# Patient Record
Sex: MALE | Race: WHITE | NOT HISPANIC OR LATINO
[De-identification: names, ages, dates, MRNs, and addresses within clinical notes are randomized per-mention and may not be internally consistent; named-entity substitution may affect disease eponyms.]

---

## 2017-01-03 ENCOUNTER — RX RENEWAL (OUTPATIENT)
Age: 62
End: 2017-01-03

## 2017-01-03 ENCOUNTER — APPOINTMENT (OUTPATIENT)
Dept: FAMILY MEDICINE | Facility: CLINIC | Age: 62
End: 2017-01-03

## 2017-01-31 ENCOUNTER — RX RENEWAL (OUTPATIENT)
Age: 62
End: 2017-01-31

## 2017-02-03 ENCOUNTER — RX RENEWAL (OUTPATIENT)
Age: 62
End: 2017-02-03

## 2017-02-07 ENCOUNTER — APPOINTMENT (OUTPATIENT)
Dept: FAMILY MEDICINE | Facility: CLINIC | Age: 62
End: 2017-02-07

## 2017-02-07 VITALS
DIASTOLIC BLOOD PRESSURE: 74 MMHG | SYSTOLIC BLOOD PRESSURE: 126 MMHG | WEIGHT: 315 LBS | OXYGEN SATURATION: 95 % | BODY MASS INDEX: 47.74 KG/M2 | RESPIRATION RATE: 13 BRPM | HEIGHT: 68 IN | HEART RATE: 91 BPM

## 2017-02-07 LAB
BILIRUB UR QL STRIP: NEGATIVE
CLARITY UR: CLEAR
COLLECTION METHOD: NORMAL
GLUCOSE UR-MCNC: NEGATIVE
HCG UR QL: 0.2 EU/DL
HGB UR QL STRIP.AUTO: NEGATIVE
KETONES UR-MCNC: NEGATIVE
LEUKOCYTE ESTERASE UR QL STRIP: NEGATIVE
NITRITE UR QL STRIP: NEGATIVE
PH UR STRIP: 5.5
PROT UR STRIP-MCNC: NEGATIVE
SP GR UR STRIP: 1.02

## 2017-02-08 LAB
24R-OH-CALCIDIOL SERPL-MCNC: 42.1 PG/ML
ALBUMIN SERPL ELPH-MCNC: 4.1 G/DL
ALP BLD-CCNC: 79 U/L
ALT SERPL-CCNC: 21 U/L
ANION GAP SERPL CALC-SCNC: 19 MMOL/L
AST SERPL-CCNC: 21 U/L
BASOPHILS # BLD AUTO: 0.03 K/UL
BASOPHILS NFR BLD AUTO: 0.4 %
BILIRUB SERPL-MCNC: 0.3 MG/DL
BUN SERPL-MCNC: 21 MG/DL
CALCIUM SERPL-MCNC: 9.9 MG/DL
CHLORIDE SERPL-SCNC: 100 MMOL/L
CHOLEST SERPL-MCNC: 177 MG/DL
CHOLEST/HDLC SERPL: 4.2 RATIO
CO2 SERPL-SCNC: 21 MMOL/L
CREAT SERPL-MCNC: 0.98 MG/DL
EOSINOPHIL # BLD AUTO: 0.22 K/UL
EOSINOPHIL NFR BLD AUTO: 2.6 %
ERYTHROCYTE [SEDIMENTATION RATE] IN BLOOD BY WESTERGREN METHOD: 25 MM/HR
FOLATE SERPL-MCNC: 3.7 NG/ML
GLUCOSE SERPL-MCNC: 139 MG/DL
HBA1C MFR BLD HPLC: 6.7 %
HCT VFR BLD CALC: 47.7 %
HDLC SERPL-MCNC: 42 MG/DL
HGB BLD-MCNC: 15 G/DL
IMM GRANULOCYTES NFR BLD AUTO: 0.2 %
LDLC SERPL CALC-MCNC: 105 MG/DL
LYMPHOCYTES # BLD AUTO: 2.27 K/UL
LYMPHOCYTES NFR BLD AUTO: 27.2 %
MAN DIFF?: NORMAL
MCHC RBC-ENTMCNC: 27.9 PG
MCHC RBC-ENTMCNC: 31.4 GM/DL
MCV RBC AUTO: 88.7 FL
MONOCYTES # BLD AUTO: 0.36 K/UL
MONOCYTES NFR BLD AUTO: 4.3 %
NEUTROPHILS # BLD AUTO: 5.46 K/UL
NEUTROPHILS NFR BLD AUTO: 65.3 %
PLATELET # BLD AUTO: 280 K/UL
POTASSIUM SERPL-SCNC: 4.3 MMOL/L
PROT SERPL-MCNC: 7.7 G/DL
RBC # BLD: 5.38 M/UL
RBC # FLD: 15.1 %
SODIUM SERPL-SCNC: 140 MMOL/L
T3 SERPL-MCNC: 105 NG/DL
T4 SERPL-MCNC: 5.2 UG/DL
TRIGL SERPL-MCNC: 150 MG/DL
TSH SERPL-ACNC: 2.21 UIU/ML
VIT B12 SERPL-MCNC: 762 PG/ML
WBC # FLD AUTO: 8.36 K/UL

## 2017-02-14 ENCOUNTER — RX RENEWAL (OUTPATIENT)
Age: 62
End: 2017-02-14

## 2017-05-03 ENCOUNTER — RX RENEWAL (OUTPATIENT)
Age: 62
End: 2017-05-03

## 2017-05-04 ENCOUNTER — RX RENEWAL (OUTPATIENT)
Age: 62
End: 2017-05-04

## 2017-05-12 ENCOUNTER — APPOINTMENT (OUTPATIENT)
Dept: FAMILY MEDICINE | Facility: CLINIC | Age: 62
End: 2017-05-12

## 2017-05-12 VITALS
BODY MASS INDEX: 47.74 KG/M2 | DIASTOLIC BLOOD PRESSURE: 76 MMHG | HEART RATE: 58 BPM | WEIGHT: 315 LBS | SYSTOLIC BLOOD PRESSURE: 126 MMHG | RESPIRATION RATE: 13 BRPM | OXYGEN SATURATION: 96 % | HEIGHT: 68 IN

## 2017-05-15 LAB
ALBUMIN SERPL ELPH-MCNC: 3.9 G/DL
ALP BLD-CCNC: 76 U/L
ALT SERPL-CCNC: 30 U/L
ANION GAP SERPL CALC-SCNC: 20 MMOL/L
AST SERPL-CCNC: 28 U/L
BASOPHILS # BLD AUTO: 0.05 K/UL
BASOPHILS NFR BLD AUTO: 0.7 %
BILIRUB SERPL-MCNC: 0.2 MG/DL
BUN SERPL-MCNC: 13 MG/DL
CALCIUM SERPL-MCNC: 10 MG/DL
CHLORIDE SERPL-SCNC: 98 MMOL/L
CHOLEST SERPL-MCNC: 167 MG/DL
CHOLEST/HDLC SERPL: 4 RATIO
CO2 SERPL-SCNC: 21 MMOL/L
CREAT SERPL-MCNC: 1.06 MG/DL
EOSINOPHIL # BLD AUTO: 0.22 K/UL
EOSINOPHIL NFR BLD AUTO: 3.2 %
ERYTHROCYTE [SEDIMENTATION RATE] IN BLOOD BY WESTERGREN METHOD: 23 MM/HR
GLUCOSE SERPL-MCNC: 114 MG/DL
HBA1C MFR BLD HPLC: 7 %
HCT VFR BLD CALC: 46.3 %
HDLC SERPL-MCNC: 40 MG/DL
HGB BLD-MCNC: 14.9 G/DL
IMM GRANULOCYTES NFR BLD AUTO: 0.1 %
LDLC SERPL CALC-MCNC: 105 MG/DL
LYMPHOCYTES # BLD AUTO: 2.09 K/UL
LYMPHOCYTES NFR BLD AUTO: 30.1 %
MAN DIFF?: NORMAL
MCHC RBC-ENTMCNC: 28.1 PG
MCHC RBC-ENTMCNC: 32.2 GM/DL
MCV RBC AUTO: 87.4 FL
MONOCYTES # BLD AUTO: 0.31 K/UL
MONOCYTES NFR BLD AUTO: 4.5 %
NEUTROPHILS # BLD AUTO: 4.26 K/UL
NEUTROPHILS NFR BLD AUTO: 61.4 %
PLATELET # BLD AUTO: 259 K/UL
POTASSIUM SERPL-SCNC: 4.6 MMOL/L
PROT SERPL-MCNC: 7.6 G/DL
RBC # BLD: 5.3 M/UL
RBC # FLD: 15 %
SODIUM SERPL-SCNC: 139 MMOL/L
TRIGL SERPL-MCNC: 112 MG/DL
TSH SERPL-ACNC: 3.2 UIU/ML
WBC # FLD AUTO: 6.94 K/UL

## 2017-05-25 ENCOUNTER — LABORATORY RESULT (OUTPATIENT)
Age: 62
End: 2017-05-25

## 2017-05-26 ENCOUNTER — APPOINTMENT (OUTPATIENT)
Dept: FAMILY MEDICINE | Facility: CLINIC | Age: 62
End: 2017-05-26

## 2017-05-26 VITALS
HEIGHT: 68 IN | BODY MASS INDEX: 47.74 KG/M2 | WEIGHT: 315 LBS | OXYGEN SATURATION: 97 % | DIASTOLIC BLOOD PRESSURE: 104 MMHG | HEART RATE: 79 BPM | SYSTOLIC BLOOD PRESSURE: 150 MMHG | TEMPERATURE: 98.3 F

## 2017-05-26 RX ORDER — METRONIDAZOLE 500 MG/1
500 TABLET ORAL
Qty: 15 | Refills: 0 | Status: DISCONTINUED | COMMUNITY
Start: 2017-05-12 | End: 2017-05-26

## 2017-05-26 RX ORDER — LEVOFLOXACIN 500 MG/1
500 TABLET, FILM COATED ORAL DAILY
Qty: 10 | Refills: 0 | Status: DISCONTINUED | COMMUNITY
Start: 2017-05-12 | End: 2017-05-26

## 2017-05-29 LAB
ALBUMIN SERPL ELPH-MCNC: 4.2 G/DL
ALP BLD-CCNC: 65 U/L
ALT SERPL-CCNC: 23 U/L
ANION GAP SERPL CALC-SCNC: 21 MMOL/L
AST SERPL-CCNC: 23 U/L
BASOPHILS # BLD AUTO: 0.03 K/UL
BASOPHILS NFR BLD AUTO: 0.4 %
BILIRUB SERPL-MCNC: 0.3 MG/DL
BUN SERPL-MCNC: 22 MG/DL
CALCIUM SERPL-MCNC: 9.8 MG/DL
CHLORIDE SERPL-SCNC: 100 MMOL/L
CO2 SERPL-SCNC: 19 MMOL/L
CREAT SERPL-MCNC: 1.05 MG/DL
EOSINOPHIL # BLD AUTO: 0.29 K/UL
EOSINOPHIL NFR BLD AUTO: 3.9 %
GLIADIN IGA SER QL: 7.5 UNITS
GLIADIN IGG SER QL: <5 UNITS
GLIADIN PEPTIDE IGA SER-ACNC: NEGATIVE
GLIADIN PEPTIDE IGG SER-ACNC: NEGATIVE
GLUCOSE SERPL-MCNC: 119 MG/DL
HCT VFR BLD CALC: 45.2 %
HGB BLD-MCNC: 14.6 G/DL
IMM GRANULOCYTES NFR BLD AUTO: 0.1 %
LYMPHOCYTES # BLD AUTO: 1.79 K/UL
LYMPHOCYTES NFR BLD AUTO: 24.2 %
MAN DIFF?: NORMAL
MCHC RBC-ENTMCNC: 28.1 PG
MCHC RBC-ENTMCNC: 32.3 GM/DL
MCV RBC AUTO: 86.9 FL
MONOCYTES # BLD AUTO: 0.42 K/UL
MONOCYTES NFR BLD AUTO: 5.7 %
NEUTROPHILS # BLD AUTO: 4.87 K/UL
NEUTROPHILS NFR BLD AUTO: 65.7 %
PLATELET # BLD AUTO: 248 K/UL
POTASSIUM SERPL-SCNC: 4.3 MMOL/L
PROT SERPL-MCNC: 7.5 G/DL
RBC # BLD: 5.2 M/UL
RBC # FLD: 15.4 %
SODIUM SERPL-SCNC: 140 MMOL/L
WBC # FLD AUTO: 7.41 K/UL

## 2017-06-08 LAB
DEPRECATED GLUTEN IGE RAST QL: <0.1 KUA/L
GLUTEN IGG QN: 0

## 2017-07-31 ENCOUNTER — RX RENEWAL (OUTPATIENT)
Age: 62
End: 2017-07-31

## 2017-08-22 ENCOUNTER — APPOINTMENT (OUTPATIENT)
Dept: FAMILY MEDICINE | Facility: CLINIC | Age: 62
End: 2017-08-22
Payer: COMMERCIAL

## 2017-08-22 VITALS
OXYGEN SATURATION: 97 % | SYSTOLIC BLOOD PRESSURE: 124 MMHG | DIASTOLIC BLOOD PRESSURE: 76 MMHG | BODY MASS INDEX: 47.74 KG/M2 | HEART RATE: 76 BPM | RESPIRATION RATE: 13 BRPM | HEIGHT: 68 IN | WEIGHT: 315 LBS

## 2017-08-22 PROCEDURE — 99214 OFFICE O/P EST MOD 30 MIN: CPT | Mod: 25

## 2017-08-22 PROCEDURE — 36415 COLL VENOUS BLD VENIPUNCTURE: CPT

## 2017-08-23 LAB
ALBUMIN SERPL ELPH-MCNC: 4 G/DL
ALP BLD-CCNC: 84 U/L
ALT SERPL-CCNC: 31 U/L
ANION GAP SERPL CALC-SCNC: 15 MMOL/L
APPEARANCE: CLEAR
AST SERPL-CCNC: 28 U/L
BASOPHILS # BLD AUTO: 0.03 K/UL
BASOPHILS NFR BLD AUTO: 0.4 %
BILIRUB SERPL-MCNC: 0.4 MG/DL
BILIRUBIN URINE: NEGATIVE
BLOOD URINE: NEGATIVE
BUN SERPL-MCNC: 19 MG/DL
CALCIUM SERPL-MCNC: 9.7 MG/DL
CHLORIDE SERPL-SCNC: 101 MMOL/L
CHOLEST SERPL-MCNC: 159 MG/DL
CHOLEST/HDLC SERPL: 4.5 RATIO
CO2 SERPL-SCNC: 24 MMOL/L
COLOR: YELLOW
CREAT SERPL-MCNC: 0.94 MG/DL
EOSINOPHIL # BLD AUTO: 0.25 K/UL
EOSINOPHIL NFR BLD AUTO: 3.1 %
ERYTHROCYTE [SEDIMENTATION RATE] IN BLOOD BY WESTERGREN METHOD: 29 MM/HR
GLUCOSE QUALITATIVE U: NORMAL MG/DL
GLUCOSE SERPL-MCNC: 138 MG/DL
HBA1C MFR BLD HPLC: 6.8 %
HCT VFR BLD CALC: 44.1 %
HDLC SERPL-MCNC: 35 MG/DL
HGB BLD-MCNC: 14.1 G/DL
IMM GRANULOCYTES NFR BLD AUTO: 0.1 %
KETONES URINE: NEGATIVE
LDLC SERPL CALC-MCNC: 95 MG/DL
LEUKOCYTE ESTERASE URINE: NEGATIVE
LYMPHOCYTES # BLD AUTO: 2.08 K/UL
LYMPHOCYTES NFR BLD AUTO: 26 %
MAN DIFF?: NORMAL
MCHC RBC-ENTMCNC: 27.9 PG
MCHC RBC-ENTMCNC: 32 GM/DL
MCV RBC AUTO: 87.2 FL
MONOCYTES # BLD AUTO: 0.42 K/UL
MONOCYTES NFR BLD AUTO: 5.2 %
NEUTROPHILS # BLD AUTO: 5.22 K/UL
NEUTROPHILS NFR BLD AUTO: 65.2 %
NITRITE URINE: NEGATIVE
PH URINE: 6.5
PLATELET # BLD AUTO: 240 K/UL
POTASSIUM SERPL-SCNC: 4.3 MMOL/L
PROT SERPL-MCNC: 7.3 G/DL
PROTEIN URINE: NEGATIVE MG/DL
RBC # BLD: 5.06 M/UL
RBC # FLD: 15.9 %
SODIUM SERPL-SCNC: 140 MMOL/L
SPECIFIC GRAVITY URINE: 1.01
TRIGL SERPL-MCNC: 144 MG/DL
TSH SERPL-ACNC: 3.17 UIU/ML
UROBILINOGEN URINE: NORMAL MG/DL
WBC # FLD AUTO: 8.01 K/UL

## 2017-10-18 ENCOUNTER — RX RENEWAL (OUTPATIENT)
Age: 62
End: 2017-10-18

## 2017-11-21 ENCOUNTER — RX RENEWAL (OUTPATIENT)
Age: 62
End: 2017-11-21

## 2017-11-28 ENCOUNTER — APPOINTMENT (OUTPATIENT)
Dept: FAMILY MEDICINE | Facility: CLINIC | Age: 62
End: 2017-11-28
Payer: COMMERCIAL

## 2017-11-28 VITALS
HEIGHT: 68 IN | DIASTOLIC BLOOD PRESSURE: 76 MMHG | RESPIRATION RATE: 13 BRPM | SYSTOLIC BLOOD PRESSURE: 132 MMHG | BODY MASS INDEX: 47.74 KG/M2 | OXYGEN SATURATION: 98 % | WEIGHT: 315 LBS | HEART RATE: 87 BPM

## 2017-11-28 PROCEDURE — 99214 OFFICE O/P EST MOD 30 MIN: CPT | Mod: 25

## 2017-11-28 PROCEDURE — 36415 COLL VENOUS BLD VENIPUNCTURE: CPT

## 2017-11-28 RX ORDER — HYDROCODONE BITARTRATE AND ACETAMINOPHEN 5; 325 MG/1; MG/1
5-325 TABLET ORAL
Qty: 24 | Refills: 0 | Status: DISCONTINUED | COMMUNITY
Start: 2017-11-14 | End: 2017-11-28

## 2017-11-29 LAB
BASOPHILS # BLD AUTO: 0.03 K/UL
BASOPHILS NFR BLD AUTO: 0.5 %
CHOLEST SERPL-MCNC: 156 MG/DL
CHOLEST/HDLC SERPL: 4.3 RATIO
EOSINOPHIL # BLD AUTO: 0.23 K/UL
EOSINOPHIL NFR BLD AUTO: 3.5 %
ERYTHROCYTE [SEDIMENTATION RATE] IN BLOOD BY WESTERGREN METHOD: 24 MM/HR
HBA1C MFR BLD HPLC: 7 %
HCT VFR BLD CALC: 45.8 %
HDLC SERPL-MCNC: 36 MG/DL
HGB BLD-MCNC: 14.5 G/DL
IMM GRANULOCYTES NFR BLD AUTO: 0.3 %
LDLC SERPL CALC-MCNC: 99 MG/DL
LYMPHOCYTES # BLD AUTO: 1.64 K/UL
LYMPHOCYTES NFR BLD AUTO: 25.2 %
MAN DIFF?: NORMAL
MCHC RBC-ENTMCNC: 29 PG
MCHC RBC-ENTMCNC: 31.7 GM/DL
MCV RBC AUTO: 91.6 FL
MONOCYTES # BLD AUTO: 0.33 K/UL
MONOCYTES NFR BLD AUTO: 5.1 %
NEUTROPHILS # BLD AUTO: 4.27 K/UL
NEUTROPHILS NFR BLD AUTO: 65.4 %
PLATELET # BLD AUTO: 222 K/UL
RBC # BLD: 5 M/UL
RBC # FLD: 15.4 %
TRIGL SERPL-MCNC: 106 MG/DL
TSH SERPL-ACNC: 2.92 UIU/ML
WBC # FLD AUTO: 6.52 K/UL

## 2017-12-14 LAB
ALBUMIN SERPL ELPH-MCNC: 3.8 G/DL
ALP BLD-CCNC: 75 U/L
ALT SERPL-CCNC: 33 U/L
ANION GAP SERPL CALC-SCNC: 14 MMOL/L
AST SERPL-CCNC: 30 U/L
BILIRUB SERPL-MCNC: 0.4 MG/DL
BUN SERPL-MCNC: 15 MG/DL
CALCIUM SERPL-MCNC: 9.4 MG/DL
CHLORIDE SERPL-SCNC: 98 MMOL/L
CO2 SERPL-SCNC: 24 MMOL/L
CREAT SERPL-MCNC: 1.02 MG/DL
GLUCOSE SERPL-MCNC: 152 MG/DL
POTASSIUM SERPL-SCNC: 4.4 MMOL/L
PROT SERPL-MCNC: 7.1 G/DL
SODIUM SERPL-SCNC: 136 MMOL/L

## 2018-01-21 ENCOUNTER — RX RENEWAL (OUTPATIENT)
Age: 63
End: 2018-01-21

## 2018-03-27 ENCOUNTER — LABORATORY RESULT (OUTPATIENT)
Age: 63
End: 2018-03-27

## 2018-03-27 ENCOUNTER — APPOINTMENT (OUTPATIENT)
Dept: FAMILY MEDICINE | Facility: CLINIC | Age: 63
End: 2018-03-27
Payer: COMMERCIAL

## 2018-03-27 VITALS
RESPIRATION RATE: 14 BRPM | SYSTOLIC BLOOD PRESSURE: 144 MMHG | HEART RATE: 69 BPM | DIASTOLIC BLOOD PRESSURE: 88 MMHG | OXYGEN SATURATION: 95 %

## 2018-03-27 PROCEDURE — 36415 COLL VENOUS BLD VENIPUNCTURE: CPT

## 2018-03-27 PROCEDURE — 99214 OFFICE O/P EST MOD 30 MIN: CPT | Mod: 25

## 2018-03-28 LAB
ALBUMIN SERPL ELPH-MCNC: 4.1 G/DL
ALP BLD-CCNC: 78 U/L
ALT SERPL-CCNC: 32 U/L
ANION GAP SERPL CALC-SCNC: 17 MMOL/L
APPEARANCE: CLEAR
AST SERPL-CCNC: 30 U/L
BASOPHILS # BLD AUTO: 0.04 K/UL
BASOPHILS NFR BLD AUTO: 0.5 %
BILIRUB SERPL-MCNC: 0.3 MG/DL
BILIRUBIN URINE: NEGATIVE
BLOOD URINE: NEGATIVE
BUN SERPL-MCNC: 14 MG/DL
CALCIUM SERPL-MCNC: 10 MG/DL
CHLORIDE SERPL-SCNC: 100 MMOL/L
CHOLEST SERPL-MCNC: 175 MG/DL
CHOLEST/HDLC SERPL: 4.3 RATIO
CO2 SERPL-SCNC: 23 MMOL/L
COLOR: ABNORMAL
CREAT SERPL-MCNC: 0.91 MG/DL
EOSINOPHIL # BLD AUTO: 0.29 K/UL
EOSINOPHIL NFR BLD AUTO: 3.6 %
ERYTHROCYTE [SEDIMENTATION RATE] IN BLOOD BY WESTERGREN METHOD: 34 MM/HR
GLUCOSE QUALITATIVE U: NEGATIVE MG/DL
GLUCOSE SERPL-MCNC: 132 MG/DL
HBA1C MFR BLD HPLC: 7.3 %
HCT VFR BLD CALC: 47.8 %
HDLC SERPL-MCNC: 41 MG/DL
HGB BLD-MCNC: 15.9 G/DL
IMM GRANULOCYTES NFR BLD AUTO: 0.1 %
KETONES URINE: NEGATIVE
LDLC SERPL CALC-MCNC: 112 MG/DL
LEUKOCYTE ESTERASE URINE: NEGATIVE
LYMPHOCYTES # BLD AUTO: 2.42 K/UL
LYMPHOCYTES NFR BLD AUTO: 29.9 %
MAN DIFF?: NORMAL
MCHC RBC-ENTMCNC: 29.2 PG
MCHC RBC-ENTMCNC: 33.3 GM/DL
MCV RBC AUTO: 87.9 FL
MONOCYTES # BLD AUTO: 0.42 K/UL
MONOCYTES NFR BLD AUTO: 5.2 %
NEUTROPHILS # BLD AUTO: 4.91 K/UL
NEUTROPHILS NFR BLD AUTO: 60.7 %
NITRITE URINE: NEGATIVE
PH URINE: 7
PLATELET # BLD AUTO: 214 K/UL
POTASSIUM SERPL-SCNC: 4.3 MMOL/L
PROT SERPL-MCNC: 7.7 G/DL
PROTEIN URINE: NEGATIVE MG/DL
RBC # BLD: 5.44 M/UL
RBC # FLD: 16.2 %
SODIUM SERPL-SCNC: 140 MMOL/L
SPECIFIC GRAVITY URINE: 1.03
TRIGL SERPL-MCNC: 111 MG/DL
TSH SERPL-ACNC: 2.41 UIU/ML
UROBILINOGEN URINE: NEGATIVE MG/DL
WBC # FLD AUTO: 8.09 K/UL

## 2018-04-27 ENCOUNTER — RX RENEWAL (OUTPATIENT)
Age: 63
End: 2018-04-27

## 2018-06-26 ENCOUNTER — APPOINTMENT (OUTPATIENT)
Dept: FAMILY MEDICINE | Facility: CLINIC | Age: 63
End: 2018-06-26
Payer: COMMERCIAL

## 2018-06-26 VITALS
DIASTOLIC BLOOD PRESSURE: 80 MMHG | SYSTOLIC BLOOD PRESSURE: 146 MMHG | BODY MASS INDEX: 47.74 KG/M2 | HEART RATE: 66 BPM | HEIGHT: 68 IN | OXYGEN SATURATION: 97 % | RESPIRATION RATE: 13 BRPM | WEIGHT: 315 LBS | TEMPERATURE: 98.5 F

## 2018-06-26 PROCEDURE — 99214 OFFICE O/P EST MOD 30 MIN: CPT | Mod: 25

## 2018-06-26 PROCEDURE — 36415 COLL VENOUS BLD VENIPUNCTURE: CPT

## 2018-06-26 NOTE — HISTORY OF PRESENT ILLNESS
[FreeTextEntry1] : patient is here for 3 month follow up. for HTN HLD DM  diabetic journal reviewed\par Needs meds has ortho and pulmonary pending has dermatis of hands needs derm eval

## 2018-06-26 NOTE — COUNSELING
[Healthy eating counseling provided] : healthy eating [Activity counseling provided] : activity [Keep Food Diary] : Keep food diary [Low Salt Diet] : Low salt diet [Walking] : Walking [None] : None [Good understanding] : Patient has a good understanding of lifestyle changes and the steps needed to achieve self management goals [Engage in a relaxing activity] : Engage in a relaxing activity [Plan in advance] : Plan in advance

## 2018-06-26 NOTE — REVIEW OF SYSTEMS
[Fatigue] : fatigue [Hearing Loss] : hearing loss [Postnasal Drip] : postnasal drip [Lower Ext Edema] : lower extremity edema [Nocturia] : nocturia [Joint Pain] : joint pain [Joint Stiffness] : joint stiffness [Muscle Weakness] : muscle weakness [Back Pain] : back pain [Joint Swelling] : joint swelling [Itching] : itching [Negative] : Heme/Lymph [Fever] : no fever [Chills] : no chills [Night Sweats] : no night sweats [Recent Change In Weight] : ~T no recent weight change [Discharge] : no discharge [Pain] : no pain [Redness] : no redness [Vision Problems] : no vision problems [Itching] : no itching [Earache] : no earache [Nosebleeds] : no nosebleeds [Nasal Discharge] : no nasal discharge [Sore Throat] : no sore throat [Hoarseness] : no hoarseness [Chest Pain] : no chest pain [Palpitations] : no palpitations [Claudication] : no  leg claudication [Orthopena] : no orthopnea [Paroysmal Nocturnal Dyspnea] : no paroysmal nocturnal dyspnea [Abdominal Pain] : no abdominal pain [Nausea] : no nausea [Vomiting] : no vomiting [Heartburn] : no heartburn [Melena] : no melena [Dysuria] : no dysuria [Incontinence] : no incontinence [Hesitancy] : no hesitancy [Hematuria] : no hematuria [Frequency] : no frequency [Impotence] : no impotency [Poor Libido] : libido not poor [Muscle Pain] : no muscle pain [Headache] : no headache [Dizziness] : no dizziness [Fainting] : no fainting [Confusion] : no confusion [Unsteady Walk] : no ataxia [Memory Loss] : no memory loss

## 2018-06-26 NOTE — PLAN
[FreeTextEntry1] : patient is here for 3 month follow up. for HTN HLD DM  diabetic journal reviewed\par Needs meds has ortho and pulmonary pending has dermatis of hands needs derm eval \par Meds and Labs done weight loss advised derm referral given \par Meds rx  has specialist follow up

## 2018-06-27 LAB
ALBUMIN SERPL ELPH-MCNC: 4.1 G/DL
ALP BLD-CCNC: 80 U/L
ALT SERPL-CCNC: 31 U/L
ANION GAP SERPL CALC-SCNC: 16 MMOL/L
AST SERPL-CCNC: 31 U/L
BASOPHILS # BLD AUTO: 0.05 K/UL
BASOPHILS NFR BLD AUTO: 0.7 %
BILIRUB SERPL-MCNC: 0.4 MG/DL
BUN SERPL-MCNC: 15 MG/DL
CALCIUM SERPL-MCNC: 9.9 MG/DL
CHLORIDE SERPL-SCNC: 105 MMOL/L
CHOLEST SERPL-MCNC: 164 MG/DL
CHOLEST/HDLC SERPL: 3.6 RATIO
CO2 SERPL-SCNC: 27 MMOL/L
CREAT SERPL-MCNC: 1.1 MG/DL
EOSINOPHIL # BLD AUTO: 0.27 K/UL
EOSINOPHIL NFR BLD AUTO: 3.6 %
ERYTHROCYTE [SEDIMENTATION RATE] IN BLOOD BY WESTERGREN METHOD: 24 MM/HR
GLUCOSE SERPL-MCNC: 138 MG/DL
HCT VFR BLD CALC: 50.3 %
HDLC SERPL-MCNC: 45 MG/DL
HGB BLD-MCNC: 15.5 G/DL
IMM GRANULOCYTES NFR BLD AUTO: 0.3 %
LDLC SERPL CALC-MCNC: 87 MG/DL
LYMPHOCYTES # BLD AUTO: 2.42 K/UL
LYMPHOCYTES NFR BLD AUTO: 31.9 %
MAN DIFF?: NORMAL
MCHC RBC-ENTMCNC: 27.9 PG
MCHC RBC-ENTMCNC: 30.8 GM/DL
MCV RBC AUTO: 90.6 FL
MONOCYTES # BLD AUTO: 0.46 K/UL
MONOCYTES NFR BLD AUTO: 6.1 %
NEUTROPHILS # BLD AUTO: 4.37 K/UL
NEUTROPHILS NFR BLD AUTO: 57.4 %
PLATELET # BLD AUTO: 238 K/UL
POTASSIUM SERPL-SCNC: 4.6 MMOL/L
PROT SERPL-MCNC: 7.8 G/DL
RBC # BLD: 5.55 M/UL
RBC # FLD: 15.6 %
SODIUM SERPL-SCNC: 148 MMOL/L
TRIGL SERPL-MCNC: 158 MG/DL
TSH SERPL-ACNC: 2.33 UIU/ML
WBC # FLD AUTO: 7.59 K/UL

## 2018-06-28 LAB
HBA1C MFR BLD HPLC: 7.1 %
HCV AB SER QL: NONREACTIVE
HCV S/CO RATIO: 0.24 S/CO

## 2018-07-02 ENCOUNTER — RX RENEWAL (OUTPATIENT)
Age: 63
End: 2018-07-02

## 2018-07-19 ENCOUNTER — RX RENEWAL (OUTPATIENT)
Age: 63
End: 2018-07-19

## 2018-09-10 ENCOUNTER — LABORATORY RESULT (OUTPATIENT)
Age: 63
End: 2018-09-10

## 2018-09-11 ENCOUNTER — APPOINTMENT (OUTPATIENT)
Dept: FAMILY MEDICINE | Facility: CLINIC | Age: 63
End: 2018-09-11
Payer: COMMERCIAL

## 2018-09-11 VITALS
HEIGHT: 68 IN | TEMPERATURE: 98.8 F | BODY MASS INDEX: 47.74 KG/M2 | SYSTOLIC BLOOD PRESSURE: 156 MMHG | RESPIRATION RATE: 13 BRPM | DIASTOLIC BLOOD PRESSURE: 78 MMHG | WEIGHT: 315 LBS | OXYGEN SATURATION: 98 % | HEART RATE: 77 BPM

## 2018-09-11 PROCEDURE — 36415 COLL VENOUS BLD VENIPUNCTURE: CPT

## 2018-09-11 PROCEDURE — 99214 OFFICE O/P EST MOD 30 MIN: CPT | Mod: 25

## 2018-09-11 NOTE — PLAN
[FreeTextEntry1] : patient is here for 3 month follow up. for HTN HLD DM  diabetic journal reviewed\par Needs Meds has follow up for cataract in left eye has follow up, patient also declines flu shot. Has seen ortho Dr Johnson and has been cleared on knee  Labs and Meds reviewed Diet exercise and Weight loss\par Labs and meds done care plan reviewed No Flu Shot   RTC 3 months

## 2018-09-11 NOTE — HISTORY OF PRESENT ILLNESS
[FreeTextEntry1] : patient is here for 3 month follow up. for HTN HLD DM  diabetic journal reviewed\par Needs meds has follow up for cataract in left eye has follow up, patient also declines flu shot. Has seen ortho Dr Johnson and has been cleared on knee

## 2018-09-11 NOTE — COUNSELING
[Healthy eating counseling provided] : healthy eating [Activity counseling provided] : activity [Keep Food Diary] : Keep food diary [Low Salt Diet] : Low salt diet [Walking] : Walking [Engage in a relaxing activity] : Engage in a relaxing activity [Plan in advance] : Plan in advance [None] : None [Good understanding] : Patient has a good understanding of lifestyle changes and the steps needed to achieve self management goals

## 2018-09-12 LAB
ALBUMIN SERPL ELPH-MCNC: 4.2 G/DL
ALP BLD-CCNC: 85 U/L
ALT SERPL-CCNC: 30 U/L
ANION GAP SERPL CALC-SCNC: 15 MMOL/L
APPEARANCE: CLEAR
AST SERPL-CCNC: 28 U/L
BILIRUB SERPL-MCNC: 0.3 MG/DL
BILIRUBIN URINE: NEGATIVE
BLOOD URINE: NEGATIVE
BUN SERPL-MCNC: 18 MG/DL
CALCIUM SERPL-MCNC: 9.4 MG/DL
CHLORIDE SERPL-SCNC: 103 MMOL/L
CHOLEST SERPL-MCNC: 151 MG/DL
CHOLEST/HDLC SERPL: 4.2 RATIO
CO2 SERPL-SCNC: 23 MMOL/L
COLOR: YELLOW
CREAT SERPL-MCNC: 0.89 MG/DL
CREAT SPEC-SCNC: 129 MG/DL
ERYTHROCYTE [SEDIMENTATION RATE] IN BLOOD BY WESTERGREN METHOD: 25 MM/HR
GLUCOSE QUALITATIVE U: NEGATIVE MG/DL
GLUCOSE SERPL-MCNC: 113 MG/DL
HBA1C MFR BLD HPLC: 6.8 %
HDLC SERPL-MCNC: 36 MG/DL
KETONES URINE: NEGATIVE
LDLC SERPL CALC-MCNC: 95 MG/DL
LEUKOCYTE ESTERASE URINE: NEGATIVE
MICROALBUMIN 24H UR DL<=1MG/L-MCNC: 3.2 MG/DL
MICROALBUMIN/CREAT 24H UR-RTO: 25 MG/G
NITRITE URINE: NEGATIVE
PH URINE: 6.5
POTASSIUM SERPL-SCNC: 4.1 MMOL/L
PROT SERPL-MCNC: 7.4 G/DL
PROTEIN URINE: ABNORMAL MG/DL
SODIUM SERPL-SCNC: 141 MMOL/L
SPECIFIC GRAVITY URINE: 1.02
TRIGL SERPL-MCNC: 100 MG/DL
TSH SERPL-ACNC: 2.02 UIU/ML
UROBILINOGEN URINE: 1 MG/DL

## 2018-09-18 LAB
BASOPHILS # BLD AUTO: 0.03 K/UL
BASOPHILS NFR BLD AUTO: 0.4 %
EOSINOPHIL # BLD AUTO: 0.25 K/UL
EOSINOPHIL NFR BLD AUTO: 3.7 %
HCT VFR BLD CALC: 47.7 %
HGB BLD-MCNC: 14.7 G/DL
IMM GRANULOCYTES NFR BLD AUTO: 0.1 %
LYMPHOCYTES # BLD AUTO: 2.08 K/UL
LYMPHOCYTES NFR BLD AUTO: 30.8 %
MAN DIFF?: NORMAL
MCHC RBC-ENTMCNC: 27.9 PG
MCHC RBC-ENTMCNC: 30.8 GM/DL
MCV RBC AUTO: 90.5 FL
MONOCYTES # BLD AUTO: 0.32 K/UL
MONOCYTES NFR BLD AUTO: 4.7 %
NEUTROPHILS # BLD AUTO: 4.07 K/UL
NEUTROPHILS NFR BLD AUTO: 60.3 %
PLATELET # BLD AUTO: 218 K/UL
RBC # BLD: 5.27 M/UL
RBC # FLD: 16.1 %
WBC # FLD AUTO: 6.76 K/UL

## 2018-10-09 ENCOUNTER — APPOINTMENT (OUTPATIENT)
Dept: DERMATOLOGY | Facility: CLINIC | Age: 63
End: 2018-10-09
Payer: COMMERCIAL

## 2018-10-09 PROCEDURE — 99202 OFFICE O/P NEW SF 15 MIN: CPT

## 2018-10-15 ENCOUNTER — RX RENEWAL (OUTPATIENT)
Age: 63
End: 2018-10-15

## 2018-12-09 ENCOUNTER — LABORATORY RESULT (OUTPATIENT)
Age: 63
End: 2018-12-09

## 2018-12-10 ENCOUNTER — RX RENEWAL (OUTPATIENT)
Age: 63
End: 2018-12-10

## 2018-12-11 ENCOUNTER — APPOINTMENT (OUTPATIENT)
Dept: FAMILY MEDICINE | Facility: CLINIC | Age: 63
End: 2018-12-11
Payer: COMMERCIAL

## 2018-12-11 VITALS
HEART RATE: 55 BPM | TEMPERATURE: 98.2 F | DIASTOLIC BLOOD PRESSURE: 88 MMHG | WEIGHT: 315 LBS | BODY MASS INDEX: 47.74 KG/M2 | HEIGHT: 68 IN | RESPIRATION RATE: 12 BRPM | SYSTOLIC BLOOD PRESSURE: 160 MMHG | OXYGEN SATURATION: 92 %

## 2018-12-11 PROCEDURE — 36415 COLL VENOUS BLD VENIPUNCTURE: CPT

## 2018-12-11 PROCEDURE — 99214 OFFICE O/P EST MOD 30 MIN: CPT | Mod: 25

## 2018-12-12 LAB
ALBUMIN SERPL ELPH-MCNC: 4.1 G/DL
ALP BLD-CCNC: 82 U/L
ALT SERPL-CCNC: 34 U/L
ANION GAP SERPL CALC-SCNC: 14 MMOL/L
APPEARANCE: CLEAR
AST SERPL-CCNC: 32 U/L
BASOPHILS # BLD AUTO: 0.04 K/UL
BASOPHILS NFR BLD AUTO: 0.5 %
BILIRUB SERPL-MCNC: 0.4 MG/DL
BILIRUBIN URINE: NEGATIVE
BLOOD URINE: NEGATIVE
BUN SERPL-MCNC: 21 MG/DL
CALCIUM SERPL-MCNC: 9.2 MG/DL
CHLORIDE SERPL-SCNC: 101 MMOL/L
CHOLEST SERPL-MCNC: 171 MG/DL
CHOLEST/HDLC SERPL: 4.8 RATIO
CO2 SERPL-SCNC: 25 MMOL/L
COLOR: YELLOW
CREAT SERPL-MCNC: 0.9 MG/DL
EOSINOPHIL # BLD AUTO: 0.18 K/UL
EOSINOPHIL NFR BLD AUTO: 2.1 %
ERYTHROCYTE [SEDIMENTATION RATE] IN BLOOD BY WESTERGREN METHOD: 27 MM/HR
GLUCOSE QUALITATIVE U: NEGATIVE MG/DL
GLUCOSE SERPL-MCNC: 108 MG/DL
HBA1C MFR BLD HPLC: 6.9 %
HCT VFR BLD CALC: 47.2 %
HDLC SERPL-MCNC: 36 MG/DL
HGB BLD-MCNC: 15 G/DL
IMM GRANULOCYTES NFR BLD AUTO: 0.4 %
KETONES URINE: NEGATIVE
LDLC SERPL CALC-MCNC: 109 MG/DL
LEUKOCYTE ESTERASE URINE: NEGATIVE
LYMPHOCYTES # BLD AUTO: 2.53 K/UL
LYMPHOCYTES NFR BLD AUTO: 29.7 %
MAN DIFF?: NORMAL
MCHC RBC-ENTMCNC: 28.6 PG
MCHC RBC-ENTMCNC: 31.8 GM/DL
MCV RBC AUTO: 89.9 FL
MONOCYTES # BLD AUTO: 0.44 K/UL
MONOCYTES NFR BLD AUTO: 5.2 %
NEUTROPHILS # BLD AUTO: 5.29 K/UL
NEUTROPHILS NFR BLD AUTO: 62.1 %
NITRITE URINE: NEGATIVE
PH URINE: 6
PLATELET # BLD AUTO: 246 K/UL
POTASSIUM SERPL-SCNC: 4.4 MMOL/L
PROT SERPL-MCNC: 7.6 G/DL
PROTEIN URINE: ABNORMAL MG/DL
RBC # BLD: 5.25 M/UL
RBC # FLD: 15.5 %
SODIUM SERPL-SCNC: 140 MMOL/L
SPECIFIC GRAVITY URINE: 1.02
TRIGL SERPL-MCNC: 131 MG/DL
TSH SERPL-ACNC: 3.4 UIU/ML
UROBILINOGEN URINE: NEGATIVE MG/DL
WBC # FLD AUTO: 8.51 K/UL

## 2018-12-12 NOTE — COUNSELING
[Healthy eating counseling provided] : healthy eating [Activity counseling provided] : activity [Behavioral health counseling provided] : behavioral health  [Low Salt Diet] : Low salt diet [___ min/wk activity recommended] : [unfilled] min/wk activity recommended [Walking] : Walking [Engage in a relaxing activity] : Engage in a relaxing activity [Plan in advance] : Plan in advance [None] : None

## 2018-12-12 NOTE — PLAN
[FreeTextEntry1] : Patient in for HTN  DM HLD and Increased BMI has JASON with daily use of CPAP \par Labs and Meds reviewed Patient declines all immunizations JASON reviewed\par Care plan reviewed and weight loss discussed

## 2018-12-12 NOTE — REVIEW OF SYSTEMS
[Fever] : no fever [Chills] : no chills [Fatigue] : fatigue [Night Sweats] : no night sweats [Recent Change In Weight] : ~T no recent weight change [Discharge] : no discharge [Pain] : no pain [Redness] : no redness [Vision Problems] : no vision problems [Itching] : no itching [Earache] : no earache [Hearing Loss] : hearing loss [Nosebleeds] : no nosebleeds [Postnasal Drip] : postnasal drip [Nasal Discharge] : no nasal discharge [Sore Throat] : no sore throat [Hoarseness] : no hoarseness [Chest Pain] : no chest pain [Palpitations] : no palpitations [Claudication] : no  leg claudication [Lower Ext Edema] : lower extremity edema [Orthopena] : no orthopnea [Paroysmal Nocturnal Dyspnea] : no paroysmal nocturnal dyspnea [Abdominal Pain] : no abdominal pain [Nausea] : no nausea [Vomiting] : no vomiting [Heartburn] : no heartburn [Melena] : no melena [Dysuria] : no dysuria [Incontinence] : no incontinence [Hesitancy] : no hesitancy [Nocturia] : nocturia [Hematuria] : no hematuria [Frequency] : no frequency [Impotence] : no impotency [Poor Libido] : libido not poor [Joint Pain] : joint pain [Joint Stiffness] : joint stiffness [Muscle Pain] : no muscle pain [Muscle Weakness] : muscle weakness [Back Pain] : back pain [Joint Swelling] : joint swelling [Itching] : itching [Headache] : no headache [Dizziness] : no dizziness [Fainting] : no fainting [Confusion] : no confusion [Unsteady Walk] : no ataxia [Memory Loss] : no memory loss [Negative] : Heme/Lymph

## 2018-12-12 NOTE — HEALTH RISK ASSESSMENT
[] : Yes [No falls in past year] : Patient reported no falls in the past year [0] : 2) Feeling down, depressed, or hopeless: Not at all (0) [[id number]] : 0

## 2018-12-18 ENCOUNTER — INPATIENT (INPATIENT)
Facility: HOSPITAL | Age: 63
LOS: 1 days | Discharge: HOME | DRG: 309 | End: 2018-12-20
Attending: HOSPITALIST | Admitting: INTERNAL MEDICINE
Payer: COMMERCIAL

## 2018-12-18 VITALS
RESPIRATION RATE: 20 BRPM | OXYGEN SATURATION: 98 % | HEIGHT: 70 IN | DIASTOLIC BLOOD PRESSURE: 100 MMHG | TEMPERATURE: 98 F | SYSTOLIC BLOOD PRESSURE: 214 MMHG | HEART RATE: 78 BPM | WEIGHT: 279.99 LBS

## 2018-12-18 DIAGNOSIS — R42 DIZZINESS AND GIDDINESS: ICD-10-CM

## 2018-12-18 DIAGNOSIS — Z96.651 PRESENCE OF RIGHT ARTIFICIAL KNEE JOINT: Chronic | ICD-10-CM

## 2018-12-18 LAB
ALBUMIN SERPL ELPH-MCNC: 4.2 G/DL — SIGNIFICANT CHANGE UP (ref 3.3–5.2)
ALP SERPL-CCNC: 82 U/L — SIGNIFICANT CHANGE UP (ref 40–120)
ALT FLD-CCNC: 57 U/L — HIGH
ANION GAP SERPL CALC-SCNC: 13 MMOL/L — SIGNIFICANT CHANGE UP (ref 5–17)
APAP SERPL-MCNC: <7.5 UG/ML — LOW (ref 10–26)
APTT BLD: 30.3 SEC — SIGNIFICANT CHANGE UP (ref 27.5–36.3)
AST SERPL-CCNC: 44 U/L — HIGH
BASOPHILS # BLD AUTO: 0 K/UL — SIGNIFICANT CHANGE UP (ref 0–0.2)
BASOPHILS NFR BLD AUTO: 0.2 % — SIGNIFICANT CHANGE UP (ref 0–2)
BILIRUB SERPL-MCNC: 0.7 MG/DL — SIGNIFICANT CHANGE UP (ref 0.4–2)
BUN SERPL-MCNC: 17 MG/DL — SIGNIFICANT CHANGE UP (ref 8–20)
CALCIUM SERPL-MCNC: 9.4 MG/DL — SIGNIFICANT CHANGE UP (ref 8.6–10.2)
CHLORIDE SERPL-SCNC: 101 MMOL/L — SIGNIFICANT CHANGE UP (ref 98–107)
CO2 SERPL-SCNC: 27 MMOL/L — SIGNIFICANT CHANGE UP (ref 22–29)
CREAT SERPL-MCNC: 0.83 MG/DL — SIGNIFICANT CHANGE UP (ref 0.5–1.3)
EOSINOPHIL # BLD AUTO: 0.1 K/UL — SIGNIFICANT CHANGE UP (ref 0–0.5)
EOSINOPHIL NFR BLD AUTO: 1.4 % — SIGNIFICANT CHANGE UP (ref 0–5)
GAS PNL BLDV: SIGNIFICANT CHANGE UP
GLUCOSE SERPL-MCNC: 119 MG/DL — HIGH (ref 70–115)
HCO3 BLDV-SCNC: 27 MMOL/L — HIGH (ref 20–26)
HCT VFR BLD CALC: 48.2 % — SIGNIFICANT CHANGE UP (ref 42–52)
HGB BLD-MCNC: 15.9 G/DL — SIGNIFICANT CHANGE UP (ref 14–18)
INR BLD: 1.09 RATIO — SIGNIFICANT CHANGE UP (ref 0.88–1.16)
LYMPHOCYTES # BLD AUTO: 1.8 K/UL — SIGNIFICANT CHANGE UP (ref 1–4.8)
LYMPHOCYTES # BLD AUTO: 20.9 % — SIGNIFICANT CHANGE UP (ref 20–55)
MCHC RBC-ENTMCNC: 29 PG — SIGNIFICANT CHANGE UP (ref 27–31)
MCHC RBC-ENTMCNC: 33 G/DL — SIGNIFICANT CHANGE UP (ref 32–36)
MCV RBC AUTO: 88 FL — SIGNIFICANT CHANGE UP (ref 80–94)
MONOCYTES # BLD AUTO: 0.5 K/UL — SIGNIFICANT CHANGE UP (ref 0–0.8)
MONOCYTES NFR BLD AUTO: 5.5 % — SIGNIFICANT CHANGE UP (ref 3–10)
NEUTROPHILS # BLD AUTO: 6.3 K/UL — SIGNIFICANT CHANGE UP (ref 1.8–8)
NEUTROPHILS NFR BLD AUTO: 71.8 % — SIGNIFICANT CHANGE UP (ref 37–73)
PCO2 BLDV: 41 MMHG — SIGNIFICANT CHANGE UP (ref 35–50)
PH BLDV: 7.44 — HIGH (ref 7.32–7.43)
PLATELET # BLD AUTO: 227 K/UL — SIGNIFICANT CHANGE UP (ref 150–400)
PO2 BLDV: 121 MMHG — HIGH (ref 25–45)
POTASSIUM SERPL-MCNC: 3.7 MMOL/L — SIGNIFICANT CHANGE UP (ref 3.5–5.3)
POTASSIUM SERPL-SCNC: 3.7 MMOL/L — SIGNIFICANT CHANGE UP (ref 3.5–5.3)
PROT SERPL-MCNC: 8 G/DL — SIGNIFICANT CHANGE UP (ref 6.6–8.7)
PROTHROM AB SERPL-ACNC: 12.6 SEC — SIGNIFICANT CHANGE UP (ref 10–12.9)
RBC # BLD: 5.48 M/UL — SIGNIFICANT CHANGE UP (ref 4.6–6.2)
RBC # FLD: 14.9 % — SIGNIFICANT CHANGE UP (ref 11–15.6)
SALICYLATES SERPL-MCNC: 2.3 MG/DL — LOW (ref 10–20)
SAO2 % BLDV: 99 % — SIGNIFICANT CHANGE UP
SODIUM SERPL-SCNC: 141 MMOL/L — SIGNIFICANT CHANGE UP (ref 135–145)
TROPONIN T SERPL-MCNC: <0.01 NG/ML — SIGNIFICANT CHANGE UP (ref 0–0.06)
TROPONIN T SERPL-MCNC: <0.01 NG/ML — SIGNIFICANT CHANGE UP (ref 0–0.06)
WBC # BLD: 8.8 K/UL — SIGNIFICANT CHANGE UP (ref 4.8–10.8)
WBC # FLD AUTO: 8.8 K/UL — SIGNIFICANT CHANGE UP (ref 4.8–10.8)

## 2018-12-18 PROCEDURE — 70450 CT HEAD/BRAIN W/O DYE: CPT | Mod: 26

## 2018-12-18 PROCEDURE — 93010 ELECTROCARDIOGRAM REPORT: CPT

## 2018-12-18 PROCEDURE — 99285 EMERGENCY DEPT VISIT HI MDM: CPT

## 2018-12-18 PROCEDURE — 99223 1ST HOSP IP/OBS HIGH 75: CPT

## 2018-12-18 PROCEDURE — 71045 X-RAY EXAM CHEST 1 VIEW: CPT | Mod: 26

## 2018-12-18 RX ORDER — ALLOPURINOL 300 MG
300 TABLET ORAL DAILY
Qty: 0 | Refills: 0 | Status: DISCONTINUED | OUTPATIENT
Start: 2018-12-18 | End: 2018-12-20

## 2018-12-18 RX ORDER — PANTOPRAZOLE SODIUM 20 MG/1
40 TABLET, DELAYED RELEASE ORAL
Qty: 0 | Refills: 0 | Status: DISCONTINUED | OUTPATIENT
Start: 2018-12-18 | End: 2018-12-20

## 2018-12-18 RX ORDER — OXYCODONE HYDROCHLORIDE 5 MG/1
5 TABLET ORAL EVERY 6 HOURS
Qty: 0 | Refills: 0 | Status: DISCONTINUED | OUTPATIENT
Start: 2018-12-18 | End: 2018-12-20

## 2018-12-18 RX ORDER — IBUPROFEN 200 MG
200 TABLET ORAL
Qty: 0 | Refills: 0 | Status: DISCONTINUED | OUTPATIENT
Start: 2018-12-18 | End: 2018-12-18

## 2018-12-18 RX ORDER — INFLUENZA VIRUS VACCINE 15; 15; 15; 15 UG/.5ML; UG/.5ML; UG/.5ML; UG/.5ML
0.5 SUSPENSION INTRAMUSCULAR ONCE
Qty: 0 | Refills: 0 | Status: COMPLETED | OUTPATIENT
Start: 2018-12-18 | End: 2018-12-18

## 2018-12-18 RX ORDER — MECLIZINE HCL 12.5 MG
25 TABLET ORAL ONCE
Qty: 0 | Refills: 0 | Status: COMPLETED | OUTPATIENT
Start: 2018-12-18 | End: 2018-12-18

## 2018-12-18 RX ORDER — HYDROCHLOROTHIAZIDE 25 MG
25 TABLET ORAL DAILY
Qty: 0 | Refills: 0 | Status: DISCONTINUED | OUTPATIENT
Start: 2018-12-18 | End: 2018-12-20

## 2018-12-18 RX ORDER — ACETAMINOPHEN 500 MG
650 TABLET ORAL EVERY 6 HOURS
Qty: 0 | Refills: 0 | Status: DISCONTINUED | OUTPATIENT
Start: 2018-12-18 | End: 2018-12-20

## 2018-12-18 RX ORDER — ALBUTEROL 90 UG/1
2 AEROSOL, METERED ORAL EVERY 6 HOURS
Qty: 0 | Refills: 0 | Status: DISCONTINUED | OUTPATIENT
Start: 2018-12-18 | End: 2018-12-20

## 2018-12-18 RX ORDER — ASPIRIN/CALCIUM CARB/MAGNESIUM 324 MG
81 TABLET ORAL DAILY
Qty: 0 | Refills: 0 | Status: DISCONTINUED | OUTPATIENT
Start: 2018-12-19 | End: 2018-12-20

## 2018-12-18 RX ORDER — ASPIRIN/CALCIUM CARB/MAGNESIUM 324 MG
1000 TABLET ORAL
Qty: 0 | Refills: 0 | Status: DISCONTINUED | OUTPATIENT
Start: 2018-12-18 | End: 2018-12-18

## 2018-12-18 RX ORDER — HYDRALAZINE HCL 50 MG
10 TABLET ORAL EVERY 6 HOURS
Qty: 0 | Refills: 0 | Status: DISCONTINUED | OUTPATIENT
Start: 2018-12-18 | End: 2018-12-20

## 2018-12-18 RX ORDER — HYDRALAZINE HCL 50 MG
10 TABLET ORAL ONCE
Qty: 0 | Refills: 0 | Status: COMPLETED | OUTPATIENT
Start: 2018-12-18 | End: 2018-12-18

## 2018-12-18 RX ORDER — ASPIRIN/CALCIUM CARB/MAGNESIUM 324 MG
325 TABLET ORAL ONCE
Qty: 0 | Refills: 0 | Status: COMPLETED | OUTPATIENT
Start: 2018-12-18 | End: 2018-12-18

## 2018-12-18 RX ORDER — ENOXAPARIN SODIUM 100 MG/ML
40 INJECTION SUBCUTANEOUS DAILY
Qty: 0 | Refills: 0 | Status: DISCONTINUED | OUTPATIENT
Start: 2018-12-18 | End: 2018-12-20

## 2018-12-18 RX ADMIN — Medication 25 MILLIGRAM(S): at 12:17

## 2018-12-18 RX ADMIN — Medication 10 MILLIGRAM(S): at 12:53

## 2018-12-18 RX ADMIN — Medication 325 MILLIGRAM(S): at 21:13

## 2018-12-18 RX ADMIN — Medication 10 MILLIGRAM(S): at 23:46

## 2018-12-18 RX ADMIN — Medication 200 MILLIGRAM(S): at 17:55

## 2018-12-18 NOTE — H&P ADULT - HISTORY OF PRESENT ILLNESS
64 yo obese M with pmh COPD, JASON on CPAP, gout, DJD, pre-DM, arthritis who p/w 2 days of worsening dizziness and diaphoresis. States he was in his usual state of health prior to yesterday and in the AM on 12/17 he felt dizzy after taking off his CPAP when going from laying to sitting position. Throughout the day his dizzy symptoms continued with accompanying sweating and at one point had to catch himself with a handrail. This AM he had similar symptoms after a good night of sleep and decided to come to [facility] ED. He states his pulmonologist is Dr. Guy, his PMD is Dr. Olena Urena and cardio is Dr. Laura Rodriguez form [facility]. In ER. patient was found to be hypertensive.CT head neg. labs unremarkable. patient was admitted for dizziness, hypertensive urgency, bradycardia. 62 yo obese M with pmh COPD, JASON on CPAP, gout, DJD, pre-DM, arthritis who p/w 2 days of worsening dizziness and diaphoresis. States he was in his usual state of health prior to yesterday and in the AM on 12/17 he felt dizzy after taking off his CPAP when going from laying to sitting position. Throughout the day his dizzy symptoms continued with accompanying sweating and at one point had to catch himself with a handrail. This AM he had similar symptoms after a good night of sleep and decided to come to [facility] ED. patient  also reports on and off headaches, occipital and neck, 8/10, worse for last 2 days, aggravates with movement. does not want pain meds. denied vision or speech problems. no lucretia weakness. no earaches or diacharge. no tinnitus or hearing loss. no sinus congestion. No recent head injury/ h/o head trauma on playground when he was 16 yrs old. did not seek medical attention. no memory problem. no recent viral infection. no recent travel or hospitalizations. no sick contact. he works as  .  In ER. patient was found to be hypertensive. CT head neg. EKG showed jose alfredo with Left fascicular block, Jose Alfredo. labs unremarkable. Reported no h/o afib. patient was admitted for dizziness, hypertensive urgency, new onset afib with bradycardia.

## 2018-12-18 NOTE — ED PROVIDER NOTE - OBJECTIVE STATEMENT
63M with hx of arthritis, afib, htn p/w dizziness. patient states he has been feeling dizzy since yesterday. worse when bending over. this morning woke up feeling like the room was spinning around him. lasted about 10mins, better when closing his eyes. (+) recent URI last week. no tinnitus. patient does admit to taking 2000mg of ASA daily for knee pain.

## 2018-12-18 NOTE — CONSULT NOTE ADULT - SUBJECTIVE AND OBJECTIVE BOX
[facility], [facility]                                                    [address], [address], [place] [address]                                                         PHONE: [phone]    FAX: [phone] [address], [address], [place] [address]                                                 PHONE: [phone]    FAX: [phone]  *******************************************************************************    Reason for Consult: Palpitations    HPI:  EVERETT GEORGE is a 63y Male    PAST MEDICAL & SURGICAL HISTORY:  Closed fracture of elbow, unspecified laterality, initial encounter  Degenerative joint disease  HTN (hypertension)  Afib  History of total right knee replacement (TKR)      fish (Unknown)  No Known Drug Allergies      MEDICATIONS  (STANDING):  allopurinol 300 milliGRAM(s) Oral daily  aspirin  Oral Tab/Cap - Peds 1000 milliGRAM(s) Oral two times a day  hydrochlorothiazide 25 milliGRAM(s) Oral daily  ibuprofen  Tablet. 200 milliGRAM(s) Oral two times a day  influenza   Vaccine 0.5 milliLiter(s) IntraMuscular once    MEDICATIONS  (PRN):  ALBUTerol    90 MICROgram(s) HFA Inhaler 2 Puff(s) Inhalation every 6 hours PRN Shortness of Breath and/or Wheezing      Social History: no active tobacco / EtOH / IVDA    Family History: No pertinent family history in first degree relatives      ROS: As noted above, otherwise unremarkable.    Vital Signs Last 24 Hrs  T(C): 36.8 (18 Dec 2018 16:07), Max: 36.8 (18 Dec 2018 16:07)  T(F): 98.3 (18 Dec 2018 16:07), Max: 98.3 (18 Dec 2018 16:07)  HR: 69 (18 Dec 2018 16:07) (52 - 78)  BP: 133/72 (18 Dec 2018 16:07) (132/80 - 214/100)  BP(mean): --  RR: 20 (18 Dec 2018 16:07) (18 - 20)  SpO2: 96% (18 Dec 2018 16:07) (96% - 99%)    I&O's Detail    I&O's Summary          PHYSICAL EXAM:  General: Appears well developed, well nourished, no acute distress  HEENT: Head: normocephalic, atraumatic  Eyes: Pupils equal and reactive  Neck: Supple, no carotid bruit, no JVD, no HJR  CARDIOVASCULAR: Normal S1 and S2, no murmur, rub, or gallop  LUNGS: Clear to auscultation bilaterally, no rales, rhonchi or wheeze  ABDOMEN: Soft, nontender, non-distended, positive bowel sounds, no mass or bruit  EXTREMITIES: No edema, distal pulses WNL  SKIN: Warm and dry with normal turgor  NEURO: Alert & oriented x 3, grossly intact  PSYCH: normal mood and affect    LABS:                        15.9   8.8   )-----------( 227      ( 18 Dec 2018 12:06 )             48.2     12-18    141  |  101  |  17.0  ----------------------------<  119<H>  3.7   |  27.0  |  0.83    Ca    9.4      18 Dec 2018 12:06    TPro  8.0  /  Alb  4.2  /  TBili  0.7  /  DBili  x   /  AST  44<H>  /  ALT  57<H>  /  AlkPhos  82  12-18    CARDIAC MARKERS ( 18 Dec 2018 15:43 )  x     / <0.01 ng/mL / x     / x     / x      CARDIAC MARKERS ( 18 Dec 2018 12:06 )  x     / <0.01 ng/mL / x     / x     / x          PT/INR - ( 18 Dec 2018 12:06 )   PT: 12.6 sec;   INR: 1.09 ratio         PTT - ( 18 Dec 2018 12:06 )  PTT:30.3 sec      RADIOLOGY & ADDITIONAL STUDIES:    ECG: af nsst    ECHO:    STRESS TEST:    CARDIAC CATHETERIZATION:    Assessment and Plan:  In summary, EVERETT GEORGE is a 63y Male with past medical history significant for morbid obesity, HTN, Dyslip p/w dizzness, no syncope no additional neuro sx, noted to have af on ecg.  No sob or cp    - Monitor on telemetry  - troponins x3   - Repeat EKG  - Echocardiogram  - No evidence of ischemia or CHF clinically, eventual ischemic evaluation (likely as outpatient)  - Rhythm/hemodynamics stable = continue current doses for now and titrate PRN  - Keep K > 4, Mg > 2  - CHADSVASC 1 with HTN will need above testing to recalculate AC need  - d/w er team and pt, hospitalist to enter all orders  - dvt proph    We will follow with you.  Thank you for allowing me to participate in the care of your patient.      Sincerely,    Mitchel Chaudhry, DO

## 2018-12-18 NOTE — H&P ADULT - ASSESSMENT
ASSESSMENT AND PLAN: 64 yo obese M with pmh COPD, JASON on CPAP, gout, DJD, pre-DM, arthritis who p/w 2 days of worsening dizziness and diaphoresis a/w Bradycardia, HTN urgency and vertigo, r/o ACS, w/u neuro and cardiac causes for current symptomatology    1.HTN urgency-unknown cause at this time  - monitor vs  -check MR head to r/o cva  -called cardio and neuro consults  -hydralazine prn bp above 160/99  -cont home dose med      2.Vertigo-may be sec to HTN urgency vs BPPV vs labyrinthitis to r/o TIA/CVA  -meclizine prn  -check orthostatics  -cont neuro checks Q4hrs  -consult neuro  -MR head to r/o infarcts  - PT eval  - Fall precautions    3. Bradycardia-acute, could be sec to current meds atenolol  -monitor on tele  -consult cardio  - echo  - CE and EKG neg*2, 3rd set pending  -trend CE, repeat EKG in AM  -cont pacer pads  -check thyroid panel  -avoid abraham blocking agents    4. DVT-P: lovenox 64 yo obese M with pmh COPD, JASON on CPAP, gout, DJD, pre-DM, arthritis who p/w 2 days of worsening dizziness and diaphoresis. States he was in his usual state of health prior to yesterday and in the AM on 12/17 he felt dizzy after taking off his CPAP when going from laying to sitting position. Throughout the day his dizzy symptoms continued with accompanying sweating and at one point had to catch himself with a handrail. This AM he had similar symptoms after a good night of sleep and decided to come to [facility] ED. patient  also reports on and off headaches, occipital and neck, 8/10, worse for last 2 days, aggravates with movement. does not want pain meds. denied vision or speech problems. no lucretia weakness. no earaches or diacharge. no tinnitus or hearing loss. no sinus congestion. No recent head injury/ h/o head trauma on playground when he was 16 yrs old. did not seek medical attention. no memory problem. no recent viral infection. no recent travel or hospitalizations. no sick contact. he works as  .  In ER. patient was found to be hypertensive. CT head neg. EKG showed suman with Left fascicular block, Suman. labs unremarkable. Reported no h/o afib. patient was admitted for dizziness, hypertensive urgency, new onset afib with bradycardia.       A/P:    1.HTN urgency. patient complaints with meds and diet  - monitor vs  -check MR head to r/o cva  -called cardio and neuro consults  -hydralazine prn bp above 160/99  -cont home med, adjust dose PRN      2.headaches, Vertigo-may be sec to HTN urgency vs BPPV vs labyrinthitis to r/o TIA/CVA  -meclizine prn  -check orthostatics  -cont neuro checks Q4hrs  -consult neuro  -MR head to r/o infarcts: no contraindication reported  - PT eval  - Fall precautions    3. Bradycardia-acute, with new onset aifb on EKG, suman could be sec to atenolol  -monitor on tele  -consult cardio  - echo  - CE  neg*2, 3rd set in am  - repeat EKG now and am  -cont pacer pads  -check thyroid panel  -avoid abraham blocking agents    4. DVT-P: lovenox 62 yo obese M with pmh COPD, JASON on CPAP, gout, DJD, pre-DM, arthritis who p/w 2 days of worsening dizziness and diaphoresis. States he was in his usual state of health prior to yesterday and in the AM on 12/17 he felt dizzy after taking off his CPAP when going from laying to sitting position. Throughout the day his dizzy symptoms continued with accompanying sweating and at one point had to catch himself with a handrail. This AM he had similar symptoms after a good night of sleep and decided to come to [facility] ED. patient  also reports on and off headaches, occipital and neck, 8/10, worse for last 2 days, aggravates with movement. does not want pain meds. denied vision or speech problems. no lucretia weakness. no earaches or diacharge. no tinnitus or hearing loss. no sinus congestion. No recent head injury/ h/o head trauma on playground when he was 16 yrs old. did not seek medical attention. no memory problem. no recent viral infection. no recent travel or hospitalizations. no sick contact. he works as  .  In ER. patient was found to be hypertensive. CT head neg. EKG showed suman with Left fascicular block, Suman. labs unremarkable. Reported no h/o afib. patient was admitted for dizziness, hypertensive urgency, new onset afib with bradycardia.       A/P:    1.HTN urgency. patient complaints with meds and diet  - monitor vs  -check MR head to r/o cva  -called cardio and neuro consults  -hydralazine prn bp above 160/99  -cont home med, adjust dose PRN      2.headaches, Vertigo-may be sec to HTN urgency vs BPPV vs labyrinthitis to r/o TIA/CVA  -meclizine prn  -check orthostatics  -cont neuro checks Q4hrs  -consult neuro  -MR head to r/o infarcts: no contraindication reported  - PT eval  - Fall precautions  - a1c, lipid  - optimize cardiovascular risk    3. Bradycardia-acute, with new onset aifb on EKG, suman could be sec to atenolol  - monitor on tele  -consult cardio  - echo  - CE  neg*2, 3rd set in am  - repeat EKG now and am  -cont pacer pads  -check thyroid panel  -avoid abraham blocking agents    4. DVT-P: lovenox

## 2018-12-18 NOTE — H&P ADULT - NSHPREVIEWOFSYSTEMS_GEN_ALL_CORE
CONSTITUTIONAL:  No distress. no fever/chills/fatigue/weight loss  HEENT:  Eyes:  No diplopia or blurred vision.   CARDIOVASCULAR:  No pressure, squeezing, tightness, heaviness or aching about the chest; no palpitations.no leg swelling, no orthopnea or PND  RESPIRATORY:  no SOB. no wheezing.no cough or sputum.  No hemoptysis  GI: no abd pain, no nausea, no vomiting, no diarrhea, no constipation. No hematochezia or melena  EXT:No joint pain or joint swelling or redness. no leg or calf pain  SKIN: no skin break or ulcer. No cellulitis.   CNS: AS per HPI/ No weakness. no numbness. No depression or anxiety. No SI

## 2018-12-18 NOTE — ED ADULT NURSE REASSESSMENT NOTE - NS ED NURSE REASSESS COMMENT FT1
Assumed pt care at 1230.  Report received from off going RN.  Pt awake, alert and oriented x3 offering no complaints at this time.  Pt bradycardic on cardiac monitor.  Placed on pads and bedside pacer as ordered.  Will continue to monitor and reassess.

## 2018-12-18 NOTE — ED ADULT NURSE REASSESSMENT NOTE - NS ED NURSE REASSESS COMMENT FT1
Pt remains awake, alert and oriented x3 with no signs of acute distress.  Pt states he sat up to use urinal and felt wave of dizziness.  Will continue to monitor.

## 2018-12-18 NOTE — ED STATDOCS - MEDICAL DECISION MAKING DETAILS
Focused evaluation performed at intake to enter orders appropriate for patient's complaint.  Patient placed in the main ED to complete full evaluation by another provider.

## 2018-12-18 NOTE — H&P ADULT - FAMILY HISTORY
No pertinent family history in first degree relatives Mother  Still living? Unknown  Family history of cerebrovascular accident (CVA), Age at diagnosis: Age Unknown     Sibling  Still living? Unknown  Family history of essential hypertension, Age at diagnosis: Age Unknown  Family history of diabetes mellitus, Age at diagnosis: Age Unknown

## 2018-12-18 NOTE — CHART NOTE - NSCHARTNOTEFT_GEN_A_CORE
REASON FOR CONSULT: admit for tele, vertigo and bradycardia    SUBJECTIVE: 64 yo obese M with pmh COPD, JASON on CPAP, gout, DJD, pre-DM, arthritis who p/w 2 days of worsening dizziness and diaphoresis. States he was in his usual state of health prior to yesterday and in the AM on  he felt dizzy after taking off his CPAP when going from laying to sitting position. Throughout the day his dizzy symptoms continued with accompanying sweating and at one point had to catch himself with a handrail. This AM he had similar symptoms after a good night of sleep and decided to come to [facility] ED. He states his pulmonologist is Dr. Guy, his PMD is Dr. Olena Urena and cardio is Dr. Laura Rodriguez form [facility].     PSxH: L TKR, R elbow fx repair, will need L cataract in near future  FH: mom  of cva at 67yo, brother recently had MI at age 63      OBJECTIVE  ROS: 12 systems wnl per patient except that stated in hpi    PHYSICAL EXAM: Tele-evaluation precludes physical exam. Pertinent physical exam findings as observed are as following: pt able to speak in full  sentences, AAOX3, multiple tatoos on upper torso. Pt is pleasant and cooperative, good historian.     LABS AND IMAGING DATA:                        15.9   8.8   )-----------( 227      ( 18 Dec 2018 12:06 )             48.2         141  |  101  |  17.0  ----------------------------<  119<H>  3.7   |  27.0  |  0.83    Ca    9.4      18 Dec 2018 12:06    TPro  8.0  /  Alb  4.2  /  TBili  0.7  /  DBili  x   /  AST  44<H>  /  ALT  57<H>  /  AlkPhos  82            ASSESSMENT AND PLAN: 64 yo obese M with pmh COPD, JASON on CPAP, gout, DJD, pre-DM, arthritis who p/w 2 days of worsening dizziness and diaphoresis a/w Bradycardia, HTN urgency and vertigo, r/o ACS, w/u neuro and cardiac causes for current symptomatology    1.HTN urgency-unknown cause at this time  -monitor vs  -check mr head to r/o cva  -apprec cardio and neuro consults  -hydralazine prn bp  -cont home dose with hold parameters valsartan and hctz    2. Bradycardia-acute, could be sec to current meds atenolol  -monitor on tele  -consult cardio  -consider echo  -trend CE, repeat EKG in AM  -cont pacer pads  -check thyroid panel  -avoid abraham blocking agents    3.Vertigo-may be sec to HTN urgency, check ear exam on physical  -meclizine prn  -check orthostatics  -cont neuro checks  -consult neuro  -consider MR head to r/o infarcts    Care plan discussed with Dr Burgess [facility] accepting/admitting hospitalist, will call cardio and neuro REASON FOR CONSULT: admit for tele, vertigo and bradycardia    SUBJECTIVE: 62 yo obese M with pmh COPD, JASON on CPAP, gout, DJD, pre-DM, arthritis who p/w 2 days of worsening dizziness and diaphoresis. States he was in his usual state of health prior to yesterday and in the AM on  he felt dizzy after taking off his CPAP when going from laying to sitting position. Throughout the day his dizzy symptoms continued with accompanying sweating and at one point had to catch himself with a handrail. This AM he had similar symptoms after a good night of sleep and decided to come to [facility] ED. He states his pulmonologist is Dr. Guy, his PMD is Dr. Olena Urena and cardio is Dr. Laura Rodriguez form [facility].     PSxH: L TKR, R elbow fx repair, will need L cataract in near future  FH: mom  of cva at 67yo, brother recently had MI at age 63      OBJECTIVE  ROS: 12 systems wnl per patient except that stated in hpi    PHYSICAL EXAM: Tele-evaluation precludes physical exam. Pertinent physical exam findings as observed are as following: pt able to speak in full  sentences, AAOX3, multiple tatoos on upper torso. Pt is pleasant and cooperative, good historian.     LABS AND IMAGING DATA:                        15.9   8.8   )-----------( 227      ( 18 Dec 2018 12:06 )             48.2         141  |  101  |  17.0  ----------------------------<  119<H>  3.7   |  27.0  |  0.83    Ca    9.4      18 Dec 2018 12:06    TPro  8.0  /  Alb  4.2  /  TBili  0.7  /  DBili  x   /  AST  44<H>  /  ALT  57<H>  /  AlkPhos  82            ASSESSMENT AND PLAN: 62 yo obese M with pmh COPD, JASON on CPAP, gout, DJD, pre-DM, arthritis who p/w 2 days of worsening dizziness and diaphoresis a/w Bradycardia, HTN urgency and vertigo, r/o ACS, w/u neuro and cardiac causes for current symptomatology    1.HTN urgency-unknown cause at this time  -monitor vs  -check mr head to r/o cva  -apprec cardio and neuro consults  -hydralazine prn bp  -cont home dose with hold parameters hctz, no valsartan on formulary at this time    2. Bradycardia-acute, could be sec to current meds atenolol  -monitor on tele  -consult cardio  -consider echo  -trend CE, repeat EKG in AM  -cont pacer pads  -check thyroid panel  -avoid abraham blocking agents    3.Vertigo-may be sec to HTN urgency, check ear exam on physical  -meclizine prn  -check orthostatics  -cont neuro checks  -consult neuro  -consider MR head to r/o infarcts    Care plan discussed with Dr Burgess [facility] accepting/admitting hospitalist, who will call cardio and neuro

## 2018-12-18 NOTE — H&P ADULT - PMH
Afib    Closed fracture of elbow, unspecified laterality, initial encounter    Degenerative joint disease    HTN (hypertension)

## 2018-12-18 NOTE — H&P ADULT - NSHPLABSRESULTS_GEN_ALL_CORE
15.9   8.8   )-----------( 227      ( 18 Dec 2018 12:06 )             48.2       12-18    141  |  101  |  17.0  ----------------------------<  119<H>  3.7   |  27.0  |  0.83    Ca    9.4      18 Dec 2018 12:06    TPro  8.0  /  Alb  4.2  /  TBili  0.7  /  DBili  x   /  AST  44<H>  /  ALT  57<H>  /  AlkPhos  82  12-18    PT/INR - ( 18 Dec 2018 12:06 )   PT: 12.6 sec;   INR: 1.09 ratio         PTT - ( 18 Dec 2018 12:06 )  PTT:30.3 sec    CARDIAC MARKERS ( 18 Dec 2018 15:43 )  x     / <0.01 ng/mL / x     / x     / x      CARDIAC MARKERS ( 18 Dec 2018 12:06 )  x     / <0.01 ng/mL / x     / x     / x        < from: CT Head No Cont (12.18.18 @ 13:22) >    IMPRESSION:       No parenchymal contusion, hemorrhage or extra-axial collection.    No CT evidence of an acute territorial infarction.    Chronic small vessel ischemic changes.    < end of copied text >

## 2018-12-18 NOTE — H&P ADULT - NSHPOUTPATIENTPROVIDERS_GEN_ALL_CORE
pulmonologist is Dr. Guy,   PMD is Dr. Olena Urena  cardio: Dr. Laura Rodriguez form [facility].

## 2018-12-18 NOTE — H&P ADULT - NSHPPHYSICALEXAM_GEN_ALL_CORE
Vital Signs Last 24 Hrs  T(C): 36.8 (18 Dec 2018 16:07), Max: 36.8 (18 Dec 2018 16:07)  T(F): 98.3 (18 Dec 2018 16:07), Max: 98.3 (18 Dec 2018 16:07)  HR: 69 (18 Dec 2018 16:07) (52 - 78)  BP: 133/72 (18 Dec 2018 16:07) (132/80 - 214/100)  BP(mean): --  RR: 20 (18 Dec 2018 16:07) (18 - 20)  SpO2: 96% (18 Dec 2018 16:07) (96% - 99%)    CAPILLARY BLOOD GLUCOSE    GENERAL: Not in distress. Alert    HEENT:  Normocephalic and atraumatic. PEARLA,EOMI  NECK: Supple.  No JVD.    CARDIOVASCULAR: RRR S1, S2. No murmur/rubs/gallop  LUNGS: BLAE+, no rales, no wheezing, no rhonchi.    ABDOMEN: ND. Soft,  NT, no guarding / rebound / rigidity. BS normoactive. No CVA tenderness.    BACK: No spine tenderness.  EXTREMITIES: no cyanosis, no clubbing, no edema.   SKIN: no rash. No skin break or ulcer. No cellulitis.  NEUROLOGIC: AAO*3.strength is symmetric, sensation intact, speech fluent.    PSYCHIATRIC: Calm.  No agitation. Vital Signs Last 24 Hrs  T(C): 36.8 (18 Dec 2018 16:07), Max: 36.8 (18 Dec 2018 16:07)  T(F): 98.3 (18 Dec 2018 16:07), Max: 98.3 (18 Dec 2018 16:07)  HR: 69 (18 Dec 2018 16:07) (52 - 78)  BP: 133/72 (18 Dec 2018 16:07) (132/80 - 214/100)  BP(mean): --  RR: 20 (18 Dec 2018 16:07) (18 - 20)  SpO2: 96% (18 Dec 2018 16:07) (96% - 99%)    CAPILLARY BLOOD GLUCOSE    GENERAL: Not in distress. Alert    HEENT:  Normocephalic and atraumatic. PEARLA,EOMI. normal ear/nose and throat. no sinus tenderness  NECK: Supple.  No JVD.    CARDIOVASCULAR: RRR S1, S2. No murmur/rubs/gallop  LUNGS: BLAE+, no rales, no wheezing, no rhonchi.    ABDOMEN: ND. Soft,  NT, no guarding / rebound / rigidity. BS normoactive. No CVA tenderness.    BACK: No spine tenderness.  EXTREMITIES: no cyanosis, no clubbing, no edema.   SKIN: no rash. No skin break or ulcer. No cellulitis.  NEUROLOGIC: AAO*3.strength is symmetric, sensation intact, speech fluent.    PSYCHIATRIC: Calm.  No agitation.

## 2018-12-18 NOTE — ED ADULT NURSE NOTE - NSIMPLEMENTINTERV_GEN_ALL_ED
Implemented All Universal Safety Interventions:  [place] to call system. Call bell, personal items and telephone within reach. Instruct patient to call for assistance. Room bathroom lighting operational. Non-slip footwear when patient is off stretcher. Physically safe environment: no spills, clutter or unnecessary equipment. Stretcher in lowest position, wheels locked, appropriate side rails in place.

## 2018-12-18 NOTE — ED STATDOCS - PROGRESS NOTE DETAILS
64 y/o M pt with hx of HTN presents to ED with dizziness associated with episodes of diaphoresis and headache since yesterday. Dizziness described as a room spinning sensation;  worsens with bending. pt states yesterday when he got out of bed yesterday he felt his neck crack and since then he's had dizziness sensation. 64 y/o M pt with hx of HTN presents to ED with dizziness associated with episodes of diaphoresis and headache since yesterday. Dizziness described as a room spinning sensation;  worsens with bending. pt states yesterday when he got out of bed yesterday he felt his neck crack and since then he's had dizziness sensation. Given marked hypertension, headache and dizziness (albeit appears vertigionous in nature), will upgrade to main  Gen: NAD, well appearing CV: RRR Pul: CTA b/l Abd: Soft, non-distended, non-tender Neuro: no focal deficits

## 2018-12-18 NOTE — ED PROVIDER NOTE - MEDICAL DECISION MAKING DETAILS
63M with vertigo, elevated BP and bradycardia. (+) Afib (chronic). r/o cva, salicylate OD, central vertigo.

## 2018-12-18 NOTE — ED PROVIDER NOTE - ATTENDING CONTRIBUTION TO CARE
I personally saw the patient with the resident, and completed the key components of the history and physical exam. I then discussed the management plan with the resident.    63M with hx of arthritis, afib, htn p/w dizziness. patient states he has been feeling dizzy since yesterday. worse when bending over. this morning woke up feeling like the room was spinning around him. lasted about 10mins, better when closing his eyes. (+) recent URI last week. no tinnitus. patient does admit to taking 2000mg of ASA daily for knee pain.    ***GEN - NAD; well appearing; A+O x3 ***HEAD - NC/AT ***EYES/NOSE - PEERL, EOMI, mucous membranes moist, no discharge ***THROAT: Oral cavity and pharynx normal. No inflammation***NECK: Neck supple  ***PULMONARY - CTA b/l, symmetric breath sounds. ***CARDIAC -s1s2, RRR  ***ABDOMEN - +BS, ND, NT, soft, no guarding, no rebound, no masses   ***BACK - no CVA tenderness, Normal  spine ***EXTREMITIES - symmetric pulses, 2+ dp, capillary refill < 2 seconds, no clubbing, no cyanosis, no edema ***SKIN - no rash or bruising   ***NEUROLOGIC - alert, CN 2-12 intact, reflexes nl, sensation nl, coordination negative, motor 5/5 RUE/LUE/RLE/LLE gait nl, ***PSYCH - insight and judgment nl, memory nl, affect nl, thought nl

## 2018-12-18 NOTE — ED ADULT NURSE NOTE - OBJECTIVE STATEMENT
pt arrives in critical care bay due to HTN, headache and dizziness, pt denies chest pain, SOB, weakness or numbness, c/o dull headache to posterior head pt arrives in critical care bay due to HTN crisis, headache and dizziness, pt denies chest pain, SOB, weakness or numbness, c/o dull headache to posterior head. neuro intact, MAEx4,resp even unlabored, will stabalize BP and move pt to main ED to be followed by primary RN. pt eith hx of htn currently on atenolo, valsartan and HTZ, 10mg hydralazine given in crit care pt BP now 175/84

## 2018-12-19 LAB
ANION GAP SERPL CALC-SCNC: 14 MMOL/L — SIGNIFICANT CHANGE UP (ref 5–17)
BUN SERPL-MCNC: 18 MG/DL — SIGNIFICANT CHANGE UP (ref 8–20)
CALCIUM SERPL-MCNC: 9 MG/DL — SIGNIFICANT CHANGE UP (ref 8.6–10.2)
CHLORIDE SERPL-SCNC: 103 MMOL/L — SIGNIFICANT CHANGE UP (ref 98–107)
CK MB CFR SERPL CALC: 2.9 NG/ML — SIGNIFICANT CHANGE UP (ref 0–6.7)
CK SERPL-CCNC: 180 U/L — SIGNIFICANT CHANGE UP (ref 30–200)
CO2 SERPL-SCNC: 24 MMOL/L — SIGNIFICANT CHANGE UP (ref 22–29)
CREAT SERPL-MCNC: 0.82 MG/DL — SIGNIFICANT CHANGE UP (ref 0.5–1.3)
GLUCOSE SERPL-MCNC: 109 MG/DL — SIGNIFICANT CHANGE UP (ref 70–115)
HBA1C BLD-MCNC: 6.5 % — HIGH (ref 4–5.6)
HCT VFR BLD CALC: 45.5 % — SIGNIFICANT CHANGE UP (ref 42–52)
HCV AB S/CO SERPL IA: 0.15 S/CO — SIGNIFICANT CHANGE UP
HCV AB SERPL-IMP: SIGNIFICANT CHANGE UP
HGB BLD-MCNC: 14.8 G/DL — SIGNIFICANT CHANGE UP (ref 14–18)
HIV 1 & 2 AB SERPL IA.RAPID: SIGNIFICANT CHANGE UP
MCHC RBC-ENTMCNC: 28.1 PG — SIGNIFICANT CHANGE UP (ref 27–31)
MCHC RBC-ENTMCNC: 32.5 G/DL — SIGNIFICANT CHANGE UP (ref 32–36)
MCV RBC AUTO: 86.5 FL — SIGNIFICANT CHANGE UP (ref 80–94)
PLATELET # BLD AUTO: 232 K/UL — SIGNIFICANT CHANGE UP (ref 150–400)
POTASSIUM SERPL-MCNC: 3.4 MMOL/L — LOW (ref 3.5–5.3)
POTASSIUM SERPL-SCNC: 3.4 MMOL/L — LOW (ref 3.5–5.3)
RBC # BLD: 5.26 M/UL — SIGNIFICANT CHANGE UP (ref 4.6–6.2)
RBC # FLD: 15.3 % — SIGNIFICANT CHANGE UP (ref 11–15.6)
SODIUM SERPL-SCNC: 141 MMOL/L — SIGNIFICANT CHANGE UP (ref 135–145)
T3 SERPL-MCNC: 103 NG/DL — SIGNIFICANT CHANGE UP (ref 80–200)
T4 AB SER-ACNC: 6.4 UG/DL — SIGNIFICANT CHANGE UP (ref 4.5–12)
TROPONIN T SERPL-MCNC: <0.01 NG/ML — SIGNIFICANT CHANGE UP (ref 0–0.06)
TSH SERPL-MCNC: 4.55 UIU/ML — HIGH (ref 0.27–4.2)
WBC # BLD: 8.4 K/UL — SIGNIFICANT CHANGE UP (ref 4.8–10.8)
WBC # FLD AUTO: 8.4 K/UL — SIGNIFICANT CHANGE UP (ref 4.8–10.8)

## 2018-12-19 PROCEDURE — 93306 TTE W/DOPPLER COMPLETE: CPT | Mod: 26

## 2018-12-19 PROCEDURE — 93880 EXTRACRANIAL BILAT STUDY: CPT | Mod: 26

## 2018-12-19 PROCEDURE — 70551 MRI BRAIN STEM W/O DYE: CPT | Mod: 26

## 2018-12-19 PROCEDURE — 99233 SBSQ HOSP IP/OBS HIGH 50: CPT

## 2018-12-19 PROCEDURE — 99223 1ST HOSP IP/OBS HIGH 75: CPT

## 2018-12-19 RX ORDER — POTASSIUM CHLORIDE 20 MEQ
40 PACKET (EA) ORAL EVERY 4 HOURS
Qty: 0 | Refills: 0 | Status: COMPLETED | OUTPATIENT
Start: 2018-12-19 | End: 2018-12-19

## 2018-12-19 RX ADMIN — Medication 300 MILLIGRAM(S): at 12:03

## 2018-12-19 RX ADMIN — Medication 25 MILLIGRAM(S): at 05:41

## 2018-12-19 RX ADMIN — ENOXAPARIN SODIUM 40 MILLIGRAM(S): 100 INJECTION SUBCUTANEOUS at 12:03

## 2018-12-19 RX ADMIN — PANTOPRAZOLE SODIUM 40 MILLIGRAM(S): 20 TABLET, DELAYED RELEASE ORAL at 05:41

## 2018-12-19 RX ADMIN — Medication 40 MILLIEQUIVALENT(S): at 13:20

## 2018-12-19 RX ADMIN — Medication 81 MILLIGRAM(S): at 12:03

## 2018-12-19 RX ADMIN — Medication 40 MILLIEQUIVALENT(S): at 09:54

## 2018-12-19 NOTE — PHYSICAL THERAPY INITIAL EVALUATION ADULT - PERTINENT HX OF CURRENT PROBLEM, REHAB EVAL
Pt presents to [facility] with reports of worsening dizziness and headache at the back of his head

## 2018-12-19 NOTE — PROGRESS NOTE ADULT - ASSESSMENT
64 yo obese M with pmh COPD, JASON on CPAP, gout, DJD, pre-DM, arthritis who p/w 2 days of worsening dizziness and diaphoresis. States he was in his usual state of health prior to yesterday and in the AM on 12/17 he felt dizzy after taking off his CPAP when going from laying to sitting position. Throughout the day his dizzy symptoms continued with accompanying sweating and at one point had to catch himself with a handrail. This AM he had similar symptoms after a good night of sleep and decided to come to [facility] ED. patient  also reports on and off headaches, occipital and neck, 8/10, worse for last 2 days, aggravates with movement. does not want pain meds. denied vision or speech problems. no lucretia weakness. no earaches or diacharge. no tinnitus or hearing loss. no sinus congestion. No recent head injury/ h/o head trauma on playground when he was 16 yrs old. did not seek medical attention. no memory problem. no recent viral infection. no recent travel or hospitalizations. no sick contact. he works as  .  In ER. patient was found to be hypertensive. CT head neg. EKG showed suman with Left fascicular block, Suman. labs unremarkable. Reported no h/o afib. patient was admitted for dizziness, hypertensive urgency, new onset afib with bradycardia.       A/P:    1.HTN urgency. patient complaints with meds and diet: BP better controlled  - monitor vs  -check MR head to r/o cva. patient has left TKA, RN to check with MR tech.   - cardio cx appreciated.  -hydralazine prn bp above 160/99  -cont home med, adjust dose PRN      2.headaches, Vertigo-may be sec to HTN urgency vs BPPV vs labyrinthitis vs cervicogenic headaches to r/o TIA/CVA  -meclizine prn  -check orthostatics  -cont neuro checks Q4hrs  - f/u neuro cx  -MR head to r/o infarcts: no contraindication reported except left TKR. will check with MR tech  - PT eval appreciated. no skilled PT need  - Fall precautions  - a1c 6.5, lipid pending  - optimize cardiovascular risk  - get CT C-spine. no ssx of radiculopathy    3. Tachy-suman syndrome, with ?new onset aifb on EKG was on Atenolol at home  - monitor on tele  -consulted cardio  - echo pending  - CE and EKG neg *3  - normal thyroid panel  -avoid abraham blocking agents  - Need EP eval, cardiologist was informed about tele findings    4. DVT-P: lovenox

## 2018-12-19 NOTE — CONSULT NOTE ADULT - ASSESSMENT
The patient is a 63y Male who is followed by neurology because of positional vertigo    Vertigo  likely positional   has some risk factor for CVA  await MRI brain if not too broad for MRI opening  can have o/p vestibular rehab on discharge    Afib, jose alfredo  per cardiology    HTN  better controlled now  per medicine    will follow with you    Girish Aguilera MD PhD   [id number]

## 2018-12-19 NOTE — PROGRESS NOTE ADULT - SUBJECTIVE AND OBJECTIVE BOX
Dr. Burgess Hospitalist Progress Note  EVERETT GEORGE [id number]    Patient is a 63y old  Male who presents with a chief complaint of dizziness (19 Dec 2018 09:10)    HPI:  62 yo obese M with pmh COPD, JASON on CPAP, gout, DJD, pre-DM, arthritis who p/w 2 days of worsening dizziness and diaphoresis. States he was in his usual state of health prior to yesterday and in the AM on 12/17 he felt dizzy after taking off his CPAP when going from laying to sitting position. Throughout the day his dizzy symptoms continued with accompanying sweating and at one point had to catch himself with a handrail. This AM he had similar symptoms after a good night of sleep and decided to come to [facility] ED. patient  also reports on and off headaches, occipital and neck, 8/10, worse for last 2 days, aggravates with movement. does not want pain meds. denied vision or speech problems. no lucretia weakness. no earaches or diacharge. no tinnitus or hearing loss. no sinus congestion. No recent head injury/ h/o head trauma on playground when he was 16 yrs old. did not seek medical attention. no memory problem. no recent viral infection. no recent travel or hospitalizations. no sick contact. he works as  .  In ER. patient was found to be hypertensive. CT head neg. EKG showed suman with Left fascicular block, Suman. labs unremarkable. Reported no h/o afib. patient was admitted for dizziness, hypertensive urgency, new onset afib with bradycardia. (18 Dec 2018 16:09).     Interval: seen at bedside. denied complaints except dizziness and headaches and neck pain. neuro eval pending. seen by cardio. ECHO pending. tele: afib , with multiple pauses.       ROS:  CONSTITUTIONAL:  No distress. no fever/chills/fatigue/weight loss  HEENT:  Eyes:  No diplopia or blurred vision. pressure inside the eyes since this am  CARDIOVASCULAR:  No pressure, squeezing, tightness, heaviness or aching about the chest; no palpitations. no leg swelling, no orthopnea or PND  RESPIRATORY:  no SOB. no wheezing.no cough or sputum.  No hemoptysis  GI: no abd pain, no nausea, no vomiting, no diarrhea, no constipation. No hematochezia or melena  EXT:No joint pain or joint swelling or redness. no leg or calf pain  MSK: h/o OA was taking aspirin 1000 mg BID and ibuprofen 200 mg Q6hrs. currently denied any pain except above  SKIN: no skin break or ulcer. No rash  CNS: + headaches. No weakness.no numbness. No depression or anxiety. No SI    T(C): 36.8 (12-19-18 @ 10:20), Max: 36.8 (12-18-18 @ 16:07)  HR: 68 (12-19-18 @ 10:20) (54 - 98)  BP: 152/72 (12-19-18 @ 10:20) (133/72 - 170/84)  RR: 18 (12-19-18 @ 10:20) (18 - 20)  SpO2: 97% (12-19-18 @ 05:39) (65% - 99%)  CAPILLARY BLOOD GLUCOSE    Physical Exam:  GENERAL: Not in distress. Alert. morbid obesity  HEENT:  Normocephalic and atraumatic. PEARLA, EOMI  NECK: Supple.  No JVD.    CARDIOVASCULAR: RRR S1, S2. No murmur/rubs/gallop  LUNGS: BLAE+, no rales, no wheezing, no rhonchi.    ABDOMEN: ND. Soft,  NT, no guarding / rebound / rigidity. BS normoactive. No CVA tenderness.    BACK: No spine tenderness.  EXTREMITIES: no cyanosis, no clubbing, no edema.   SKIN: no rash. No skin break or ulcer. No cellulitis.  NEUROLOGIC: AAO*3.strength is symmetric, sensation intact, speech fluent.    PSYCHIATRIC: Calm.  No agitation.    Labs                        14.8   8.4   )-----------( 232      ( 19 Dec 2018 06:10 )             45.5     12-19    141  |  103  |  18.0  ----------------------------<  109  3.4<L>   |  24.0  |  0.82    Ca    9.0      19 Dec 2018 06:10    TPro  8.0  /  Alb  4.2  /  TBili  0.7  /  DBili  x   /  AST  44<H>  /  ALT  57<H>  /  AlkPhos  82  12-18   PT/INR - ( 18 Dec 2018 12:06 )   PT: 12.6 sec;   INR: 1.09 ratio         PTT - ( 18 Dec 2018 12:06 )  PTT:30.3 sec    Hemoglobin A1C, Whole Blood in AM (12.19.18 @ 06:10)    Hemoglobin A1C, Whole Blood: 6.5 %    Thyroid Stimulating Hormone, Serum in AM (12.19.18 @ 06:10)    Thyroid Stimulating Hormone, Serum: 4.55 uIU/mL        MEDICATIONS  (STANDING):  allopurinol 300 milliGRAM(s) Oral daily  aspirin  chewable 81 milliGRAM(s) Oral daily  enoxaparin Injectable 40 milliGRAM(s) SubCutaneous daily  hydrochlorothiazide 25 milliGRAM(s) Oral daily  pantoprazole    Tablet 40 milliGRAM(s) Oral before breakfast  potassium chloride    Tablet ER 40 milliEquivalent(s) Oral every 4 hours    MEDICATIONS  (PRN):  acetaminophen   Tablet .. 650 milliGRAM(s) Oral every 6 hours PRN Mild Pain (1 - 3), Moderate Pain (4 - 6)  ALBUTerol    90 MICROgram(s) HFA Inhaler 2 Puff(s) Inhalation every 6 hours PRN Shortness of Breath and/or Wheezing  hydrALAZINE Injectable 10 milliGRAM(s) IV Push every 6 hours PRN SBP > 160  oxyCODONE    IR 5 milliGRAM(s) Oral every 6 hours PRN Severe Pain (7 - 10)

## 2018-12-19 NOTE — PROGRESS NOTE ADULT - SUBJECTIVE AND OBJECTIVE BOX
INTERVAL HISTORY: denies CP,SOB  	  MEDICATIONS:  hydrALAZINE Injectable 10 milliGRAM(s) IV Push every 6 hours PRN  hydrochlorothiazide 25 milliGRAM(s) Oral daily  ALBUTerol    90 MICROgram(s) HFA Inhaler 2 Puff(s) Inhalation every 6 hours PRN  acetaminophen   Tablet .. 650 milliGRAM(s) Oral every 6 hours PRN  oxyCODONE    IR 5 milliGRAM(s) Oral every 6 hours PRN  pantoprazole    Tablet 40 milliGRAM(s) Oral before breakfast  allopurinol 300 milliGRAM(s) Oral daily  aspirin  chewable 81 milliGRAM(s) Oral daily  enoxaparin Injectable 40 milliGRAM(s) SubCutaneous daily  influenza   Vaccine 0.5 milliLiter(s) IntraMuscular once  potassium chloride    Tablet ER 40 milliEquivalent(s) Oral every 4 hours        PHYSICAL EXAM:  T(C): 36.8 (12-19-18 @ 05:39), Max: 36.8 (12-18-18 @ 16:07)  HR: 54 (12-19-18 @ 05:39) (52 - 98)  BP: 144/74 (12-19-18 @ 05:39) (132/80 - 214/100)  RR: 18 (12-19-18 @ 05:39) (18 - 20)  SpO2: 97% (12-19-18 @ 05:39) (65% - 99%)  Wt(kg): --  I&O's Summary    Height (cm): 177.8 (12-18 @ 11:21)  Weight (kg): 127 (12-18 @ 11:21)  BMI (kg/m2): 40.2 (12-18 @ 11:21)  BSA (m2): 2.41 (12-18 @ 11:21)    Appearance: Normal	  HEENT:   Normal oral mucosa  Cardiovascular: Normal S1 S2, No JVD, No murmurs, No edema  Respiratory: Lungs clear to auscultation	  Psychiatry: A & O x 3, Mood & affect appropriate  Gastrointestinal:  Soft, Non-tender, + BS	  Skin: No rashes, No ecchymoses, No cyanosis  Neurologic: Non-focal  Extremities: Normal range of motion, No clubbing, cyanosis or edema  Vascular: Peripheral pulses palpable 2+ bilaterally    TELEMETRY: 	AF CVR      LABS:	 	                        14.8   8.4   )-----------( 232      ( 19 Dec 2018 06:10 )             45.5     12-19    141  |  103  |  18.0  ----------------------------<  109  3.4<L>   |  24.0  |  0.82    Ca    9.0      19 Dec 2018 06:10    TPro  8.0  /  Alb  4.2  /  TBili  0.7  /  DBili  x   /  AST  44<H>  /  ALT  57<H>  /  AlkPhos  82  12-18    HgA1c: Hemoglobin A1C, Whole Blood: 6.5 % (12-19 @ 06:10)    TSH: Thyroid Stimulating Hormone, Serum: 4.55 uIU/mL (12-19 @ 06:10)      ASSESSMENT/PLAN: EVERETT GEORGE is a 63y Male with obesity, HTN, Dyslip p/w dizzness, no syncope no additional neuro sx, noted to have AFib.    Denies CP,SOB, palpitation, syncope. No suggestion of ACS, CHF    - Monitor on telemetry  - Echocardiogram  - No evidence of ischemia or CHF clinically, eventual ischemic evaluation (likely as outpatient)  - Rhythm/hemodynamics stable = continue current doses for now and titrate PRN  - Keep K > 4, Mg > 2  - CHADSVASC 1 with HTN will need above testing to recalculate AC need

## 2018-12-19 NOTE — PHYSICAL THERAPY INITIAL EVALUATION ADULT - ADDITIONAL COMMENTS
per patient he has 1 small step to enter the home. He reports owning a SAC that he uses on occasions when his ankles bother him.

## 2018-12-19 NOTE — CONSULT NOTE ADULT - SUBJECTIVE AND OBJECTIVE BOX
[facility]                                     Neurology at [place]                                 Ale Benitez, & Fred                                  [address]                                        [place], [place], [address]                                             [phone]    CC:   vertigo  HPI:  The patient is a 63y Male with episodic vertigo with change in head position over past two days with nausea, now resolved.  He also had diaphoresis and a sensation of his heart racing with accelerated HTN on arrival to ED.  He is doing better today.  He also reports posterior headache radiating to vertex and across head temples to front.  He also hears "cracking" in his neck often.  Neuro eval is called    PAST MEDICAL & SURGICAL HISTORY:  Closed fracture of elbow, unspecified laterality, initial encounter  Degenerative joint disease  HTN (hypertension)  Afib  History of total right knee replacement (TKR)      MEDICATIONS  (STANDING):  allopurinol 300 milliGRAM(s) Oral daily  aspirin  chewable 81 milliGRAM(s) Oral daily  enoxaparin Injectable 40 milliGRAM(s) SubCutaneous daily  hydrochlorothiazide 25 milliGRAM(s) Oral daily  pantoprazole    Tablet 40 milliGRAM(s) Oral before breakfast    MEDICATIONS  (PRN):  acetaminophen   Tablet .. 650 milliGRAM(s) Oral every 6 hours PRN Mild Pain (1 - 3), Moderate Pain (4 - 6)  ALBUTerol    90 MICROgram(s) HFA Inhaler 2 Puff(s) Inhalation every 6 hours PRN Shortness of Breath and/or Wheezing  hydrALAZINE Injectable 10 milliGRAM(s) IV Push every 6 hours PRN SBP > 160  oxyCODONE    IR 5 milliGRAM(s) Oral every 6 hours PRN Severe Pain (7 - 10)      Allergies    fish (Unknown)  No Known Drug Allergies    Intolerances    none    SOCIAL HISTORY:  no tob,   no sig  alcohol   no drugs    FAMILY HISTORY:  Family history of diabetes mellitus (Sibling)  Family history of essential hypertension (Sibling)  Family history of cerebrovascular accident (CVA) (Mother)      ROS:  ROS: 14 point ROS negative other than what is present in HPI or below    Vital Signs Last 24 Hrs  T(C): 36.8 (19 Dec 2018 10:20), Max: 36.8 (18 Dec 2018 16:07)  T(F): 98.3 (19 Dec 2018 10:20), Max: 98.3 (18 Dec 2018 16:07)  HR: 68 (19 Dec 2018 10:20) (54 - 98)  BP: 152/72 (19 Dec 2018 10:20) (133/72 - 164/82)  BP(mean): --  RR: 18 (19 Dec 2018 10:20) (18 - 20)  SpO2: 97% (19 Dec 2018 05:39) (65% - 97%)      General: NAD    Detailed Neurologic Exam:    Mental status: The patient is awake and alert and has normal attention span.  The patient is fully oriented in 3 spheres. The patient is oriented to current events. The patient is able to name objects, follow commands, repeat sentences.    Cranial nerves: Pupils equal and react symmetrically to light. There is no visual field deficit to confrontation. Extraocular motion is full with no nystagmus. There is no ptosis. Facial sensation is intact. Facial musculature is symmetric. Palate elevates symmetrically. Shoulder shrug is normal. Tongue is midline.    Motor: There is normal bulk and tone.  There is no tremor.  Strength is 5/5 in the right arm and leg.   Strength is 5/5 in the left arm and leg.    Sensation: Intact to light touch and pin in 4 extremities    Reflexes: 2+ throughout and plantar responses are flexor.    Cerebellar: There is no dysmetria on finger to nose testing.    Gait : deferred    LABS:                         14.8   8.4   )-----------( 232      ( 19 Dec 2018 06:10 )             45.5       12-19    141  |  103  |  18.0  ----------------------------<  109  3.4<L>   |  24.0  |  0.82    Ca    9.0      19 Dec 2018 06:10    TPro  8.0  /  Alb  4.2  /  TBili  0.7  /  DBili  x   /  AST  44<H>  /  ALT  57<H>  /  AlkPhos  82  12-18      PT/INR - ( 18 Dec 2018 12:06 )   PT: 12.6 sec;   INR: 1.09 ratio         PTT - ( 18 Dec 2018 12:06 )  PTT:30.3 sec    RADIOLOGY & ADDITIONAL STUDIES (independently reviewed unless otherwise noted):  CT head- no acute CVA, mass or blood

## 2018-12-20 ENCOUNTER — TRANSCRIPTION ENCOUNTER (OUTPATIENT)
Age: 63
End: 2018-12-20

## 2018-12-20 VITALS — DIASTOLIC BLOOD PRESSURE: 104 MMHG | SYSTOLIC BLOOD PRESSURE: 150 MMHG

## 2018-12-20 PROBLEM — M19.90 UNSPECIFIED OSTEOARTHRITIS, UNSPECIFIED SITE: Chronic | Status: ACTIVE | Noted: 2018-12-18

## 2018-12-20 PROBLEM — I10 ESSENTIAL (PRIMARY) HYPERTENSION: Chronic | Status: ACTIVE | Noted: 2018-12-18

## 2018-12-20 LAB
ANION GAP SERPL CALC-SCNC: 13 MMOL/L — SIGNIFICANT CHANGE UP (ref 5–17)
BUN SERPL-MCNC: 17 MG/DL — SIGNIFICANT CHANGE UP (ref 8–20)
CALCIUM SERPL-MCNC: 9.4 MG/DL — SIGNIFICANT CHANGE UP (ref 8.6–10.2)
CHLORIDE SERPL-SCNC: 100 MMOL/L — SIGNIFICANT CHANGE UP (ref 98–107)
CO2 SERPL-SCNC: 23 MMOL/L — SIGNIFICANT CHANGE UP (ref 22–29)
CREAT SERPL-MCNC: 0.92 MG/DL — SIGNIFICANT CHANGE UP (ref 0.5–1.3)
GLUCOSE SERPL-MCNC: 139 MG/DL — HIGH (ref 70–115)
POTASSIUM SERPL-MCNC: 4.2 MMOL/L — SIGNIFICANT CHANGE UP (ref 3.5–5.3)
POTASSIUM SERPL-SCNC: 4.2 MMOL/L — SIGNIFICANT CHANGE UP (ref 3.5–5.3)
SODIUM SERPL-SCNC: 136 MMOL/L — SIGNIFICANT CHANGE UP (ref 135–145)

## 2018-12-20 PROCEDURE — 93005 ELECTROCARDIOGRAM TRACING: CPT

## 2018-12-20 PROCEDURE — 71045 X-RAY EXAM CHEST 1 VIEW: CPT

## 2018-12-20 PROCEDURE — 93306 TTE W/DOPPLER COMPLETE: CPT

## 2018-12-20 PROCEDURE — 93880 EXTRACRANIAL BILAT STUDY: CPT

## 2018-12-20 PROCEDURE — 99239 HOSP IP/OBS DSCHRG MGMT >30: CPT

## 2018-12-20 PROCEDURE — 85610 PROTHROMBIN TIME: CPT

## 2018-12-20 PROCEDURE — 84480 ASSAY TRIIODOTHYRONINE (T3): CPT

## 2018-12-20 PROCEDURE — 70551 MRI BRAIN STEM W/O DYE: CPT

## 2018-12-20 PROCEDURE — 96374 THER/PROPH/DIAG INJ IV PUSH: CPT

## 2018-12-20 PROCEDURE — 80053 COMPREHEN METABOLIC PANEL: CPT

## 2018-12-20 PROCEDURE — 94660 CPAP INITIATION&MGMT: CPT

## 2018-12-20 PROCEDURE — 80048 BASIC METABOLIC PNL TOTAL CA: CPT

## 2018-12-20 PROCEDURE — 84436 ASSAY OF TOTAL THYROXINE: CPT

## 2018-12-20 PROCEDURE — 70450 CT HEAD/BRAIN W/O DYE: CPT

## 2018-12-20 PROCEDURE — 82550 ASSAY OF CK (CPK): CPT

## 2018-12-20 PROCEDURE — 80307 DRUG TEST PRSMV CHEM ANLYZR: CPT

## 2018-12-20 PROCEDURE — 86803 HEPATITIS C AB TEST: CPT

## 2018-12-20 PROCEDURE — 84484 ASSAY OF TROPONIN QUANT: CPT

## 2018-12-20 PROCEDURE — 85730 THROMBOPLASTIN TIME PARTIAL: CPT

## 2018-12-20 PROCEDURE — 99285 EMERGENCY DEPT VISIT HI MDM: CPT | Mod: 25

## 2018-12-20 PROCEDURE — 86703 HIV-1/HIV-2 1 RESULT ANTBDY: CPT

## 2018-12-20 PROCEDURE — 36415 COLL VENOUS BLD VENIPUNCTURE: CPT

## 2018-12-20 PROCEDURE — 85027 COMPLETE CBC AUTOMATED: CPT

## 2018-12-20 PROCEDURE — 84443 ASSAY THYROID STIM HORMONE: CPT

## 2018-12-20 PROCEDURE — 82803 BLOOD GASES ANY COMBINATION: CPT

## 2018-12-20 PROCEDURE — 83036 HEMOGLOBIN GLYCOSYLATED A1C: CPT

## 2018-12-20 PROCEDURE — 82553 CREATINE MB FRACTION: CPT

## 2018-12-20 PROCEDURE — 97163 PT EVAL HIGH COMPLEX 45 MIN: CPT

## 2018-12-20 RX ORDER — METFORMIN HYDROCHLORIDE 850 MG/1
1 TABLET ORAL
Qty: 60 | Refills: 0
Start: 2018-12-20 | End: 2019-01-18

## 2018-12-20 RX ORDER — LISINOPRIL 2.5 MG/1
1 TABLET ORAL
Qty: 30 | Refills: 0
Start: 2018-12-20 | End: 2019-01-18

## 2018-12-20 RX ORDER — PANTOPRAZOLE SODIUM 20 MG/1
1 TABLET, DELAYED RELEASE ORAL
Qty: 0 | Refills: 0 | DISCHARGE
Start: 2018-12-20

## 2018-12-20 RX ORDER — FENOPROFEN CALCIUM 600 MG
1 TABLET ORAL
Qty: 0 | Refills: 0 | COMMUNITY

## 2018-12-20 RX ORDER — IBUPROFEN 200 MG
1 TABLET ORAL
Qty: 0 | Refills: 0 | COMMUNITY

## 2018-12-20 RX ORDER — ASPIRIN/CALCIUM CARB/MAGNESIUM 324 MG
2 TABLET ORAL
Qty: 0 | Refills: 0 | COMMUNITY

## 2018-12-20 RX ORDER — ASPIRIN/CALCIUM CARB/MAGNESIUM 324 MG
1 TABLET ORAL
Qty: 30 | Refills: 0
Start: 2018-12-20 | End: 2019-01-18

## 2018-12-20 RX ORDER — ATENOLOL 25 MG/1
1 TABLET ORAL
Qty: 0 | Refills: 0 | COMMUNITY

## 2018-12-20 RX ADMIN — Medication 81 MILLIGRAM(S): at 11:12

## 2018-12-20 RX ADMIN — Medication 300 MILLIGRAM(S): at 11:12

## 2018-12-20 RX ADMIN — PANTOPRAZOLE SODIUM 40 MILLIGRAM(S): 20 TABLET, DELAYED RELEASE ORAL at 05:37

## 2018-12-20 RX ADMIN — Medication 25 MILLIGRAM(S): at 05:37

## 2018-12-20 NOTE — DISCHARGE NOTE ADULT - PLAN OF CARE
resolverd, likely secondary to bradycardia atenolol held, follow up with cardio  TIA RULED OUT hold atenolol folllow up with cardio    full dose aspirin home meds adjusted metfomrin new diagnosis advised to lose weight continue home cpap follow up with pulmonary

## 2018-12-20 NOTE — DISCHARGE NOTE ADULT - MEDICATION SUMMARY - MEDICATIONS TO STOP TAKING
I will STOP taking the medications listed below when I get home from the hospital:    Nalfon 400 mg oral capsule  -- 1 cap(s) by mouth 3 times a day    valsartan-hydrochlorothiazide 320mg-25mg oral tablet  -- 1 tab(s) by mouth once a day    atenolol 50 mg oral tablet  -- 1 tab(s) by mouth once a day    Aleve 220 mg oral tablet  -- orally 2 times a day    Advil 200 mg oral tablet  -- 1 tab(s) by mouth 2 times a day

## 2018-12-20 NOTE — PROGRESS NOTE ADULT - SUBJECTIVE AND OBJECTIVE BOX
INTERVAL HISTORY: Feels better, denies CP,SOB  	  MEDICATIONS:  hydrALAZINE Injectable 10 milliGRAM(s) IV Push every 6 hours PRN  hydrochlorothiazide 25 milliGRAM(s) Oral daily  ALBUTerol    90 MICROgram(s) HFA Inhaler 2 Puff(s) Inhalation every 6 hours PRN  acetaminophen   Tablet .. 650 milliGRAM(s) Oral every 6 hours PRN  oxyCODONE    IR 5 milliGRAM(s) Oral every 6 hours PRN  pantoprazole    Tablet 40 milliGRAM(s) Oral before breakfast  allopurinol 300 milliGRAM(s) Oral daily  aspirin  chewable 81 milliGRAM(s) Oral daily  enoxaparin Injectable 40 milliGRAM(s) SubCutaneous daily        PHYSICAL EXAM:  T(C): 36.6 (12-20-18 @ 05:35), Max: 36.8 (12-19-18 @ 10:20)  HR: 63 (12-20-18 @ 05:35) (62 - 68)  BP: 140/60 (12-20-18 @ 05:35) (140/60 - 154/92)  RR: 18 (12-20-18 @ 05:35) (18 - 18)  SpO2: 95% (12-20-18 @ 05:35) (95% - 95%)  Wt(kg): --  I&O's Summary    19 Dec 2018 07:01  -  20 Dec 2018 07:00  --------------------------------------------------------  IN: 720 mL / OUT: 800 mL / NET: -80 mL          Appearance: Normal	  HEENT:   Normal oral mucosa  Cardiovascular: Normal S1 S2, No JVD, No murmurs, No edema  Respiratory: Lungs clear to auscultation	  Psychiatry: A & O x 3, Mood & affect appropriate  Gastrointestinal:  Soft, Non-tender, + BS	  Skin: No rashes, No ecchymoses, No cyanosis  Neurologic: Non-focal  Extremities: Normal range of motion, No clubbing, cyanosis or edema  Vascular: Peripheral pulses palpable 2+ bilaterally    TELEMETRY: AF,  occasional pauses	    	  LABS:	 	                        14.8   8.4   )-----------( 232      ( 19 Dec 2018 06:10 )             45.5     12-19    141  |  103  |  18.0  ----------------------------<  109  3.4<L>   |  24.0  |  0.82    Ca    9.0      19 Dec 2018 06:10    TPro  8.0  /  Alb  4.2  /  TBili  0.7  /  DBili  x   /  AST  44<H>  /  ALT  57<H>  /  AlkPhos  82  12-18        ASSESSMENT/PLAN: EVERETT GEORGE is a 63y Male with obesity, HTN, Dyslip p/w dizzness, no syncope no additional neuro sx, noted to have AFib.    Denies CP,SOB, palpitation, syncope. No suggestion of ACS, CHF. Echocardiogram, CT of head, Carotid studies are rather unremarkable.  - No evidence of ischemia or CHF clinically, eventual ischemic evaluation (likely as outpatient)  - Rhythm/hemodynamics stable = continue current doses for now and titrate PRN  - Keep K > 4, Mg > 2  - CHADSVASC 1 with HTN. Would treat with ASA.  - Stable for D/C with out pt f/w with EPS.

## 2018-12-20 NOTE — DISCHARGE NOTE ADULT - CARE PROVIDERS DIRECT ADDRESSES
[email or web address],[email or web address],DirectAddress_Unknown

## 2018-12-20 NOTE — DISCHARGE NOTE ADULT - MEDICATION SUMMARY - MEDICATIONS TO CHANGE
I will SWITCH the dose or number of times a day I take the medications listed below when I get home from the hospital:    aspirin 500 mg oral tablet  -- 2 tab(s) by mouth 2 times a day

## 2018-12-20 NOTE — DISCHARGE NOTE ADULT - PROVIDER TOKENS
TOKEN:'[id number]',FREE:[LAST:[kinga],PHONE:[(   )    -],FAX:[(   )    -],ADDRESS:[[facility]]],TOKEN:'[id number]'

## 2018-12-20 NOTE — DISCHARGE NOTE ADULT - MEDICATION SUMMARY - MEDICATIONS TO TAKE
I will START or STAY ON the medications listed below when I get home from the hospital:    aspirin 325 mg oral delayed release tablet  -- 1 tab(s) by mouth once a day   -- Swallow whole.  Do not crush.  Take with food or milk.    -- Indication: For Heart health    lisinopril 5 mg oral tablet  -- 1 tab(s) by mouth once a day   -- Do not take this drug if you are pregnant.  It is very important that you take or use this exactly as directed.  Do not skip doses or discontinue unless directed by your doctor.  Some non-prescription drugs may aggravate your condition.  Read all labels carefully.  If a warning appears, check with your doctor before taking.    -- Indication: For HTN (hypertension)    metFORMIN 500 mg oral tablet  -- 1 tab(s) by mouth 2 times a day   -- Check with your doctor before becoming pregnant.  Do not drink alcoholic beverages when taking this medication.  It is very important that you take or use this exactly as directed.  Do not skip doses or discontinue unless directed by your doctor.  Obtain medical advice before taking any non-prescription drugs as some may affect the action of this medication.  Take with food or milk.    -- Indication: For Dm2    allopurinol 300 mg oral tablet  -- 1 tab(s) by mouth once a day  -- Indication: For gout    ProAir HFA 90 mcg/inh inhalation aerosol  -- 2 puff(s) inhaled 4 times a day, As Needed sob/wheezing/ COPD  -- Indication: For breathing    hydroCHLOROthiazide 25 mg oral tablet  -- 1 tab(s) by mouth once a day  -- Indication: For HTN (hypertension)    pantoprazole 40 mg oral delayed release tablet  -- 1 tab(s) by mouth once a day (before a meal)  -- Indication: For gerd

## 2018-12-20 NOTE — DISCHARGE NOTE ADULT - PATIENT PORTAL LINK FT
You can access the [facility], offered by [facility], by registering with the following website: [email or web address]

## 2018-12-20 NOTE — DISCHARGE NOTE ADULT - HOSPITAL COURSE
History of Present Illness: 	  64 yo obese M with pmh COPD, JASON on CPAP, gout, DJD, pre-DM, arthritis who p/w 2 days of worsening dizziness and diaphoresis. States he was in his usual state of health prior to yesterday and in the AM on 12/17 he felt dizzy after taking off his CPAP when going from laying to sitting position. Throughout the day his dizzy symptoms continued with accompanying sweating and at one point had to catch himself with a handrail. This AM he had similar symptoms after a good night of sleep and decided to come to [facility] ED. CT head neg. EKG showed jose alfredo with Left fascicular block, Jose Alfredo. labs unremarkable. Reported no h/o afib. patient was admitted for dizziness, hypertensive urgency, new onset afib with bradycardia    patient seen by cardio and neuro. mri head was negative for stroke and atenolol held,. patient cleared by pt and is now stable for dc home with full dose aspirin and metformin.     time spent on dc 32 minutes

## 2018-12-26 ENCOUNTER — APPOINTMENT (OUTPATIENT)
Dept: PULMONOLOGY | Facility: CLINIC | Age: 63
End: 2018-12-26
Payer: COMMERCIAL

## 2018-12-26 VITALS
HEART RATE: 60 BPM | BODY MASS INDEX: 53.67 KG/M2 | SYSTOLIC BLOOD PRESSURE: 130 MMHG | DIASTOLIC BLOOD PRESSURE: 96 MMHG | OXYGEN SATURATION: 94 % | WEIGHT: 315 LBS

## 2018-12-26 DIAGNOSIS — Z87.898 PERSONAL HISTORY OF OTHER SPECIFIED CONDITIONS: ICD-10-CM

## 2018-12-26 PROCEDURE — 99205 OFFICE O/P NEW HI 60 MIN: CPT | Mod: 25

## 2018-12-26 PROCEDURE — 94729 DIFFUSING CAPACITY: CPT

## 2018-12-26 PROCEDURE — 94010 BREATHING CAPACITY TEST: CPT

## 2018-12-26 PROCEDURE — 94727 GAS DIL/WSHOT DETER LNG VOL: CPT

## 2018-12-26 PROCEDURE — 85018 HEMOGLOBIN: CPT | Mod: QW

## 2018-12-27 ENCOUNTER — APPOINTMENT (OUTPATIENT)
Dept: FAMILY MEDICINE | Facility: CLINIC | Age: 63
End: 2018-12-27
Payer: COMMERCIAL

## 2018-12-27 VITALS
OXYGEN SATURATION: 93 % | RESPIRATION RATE: 13 BRPM | HEART RATE: 56 BPM | DIASTOLIC BLOOD PRESSURE: 78 MMHG | TEMPERATURE: 98.8 F | SYSTOLIC BLOOD PRESSURE: 150 MMHG | WEIGHT: 315 LBS | BODY MASS INDEX: 47.74 KG/M2 | HEIGHT: 68 IN

## 2018-12-27 PROCEDURE — 99496 TRANSJ CARE MGMT HIGH F2F 7D: CPT | Mod: 25

## 2018-12-27 PROCEDURE — 36415 COLL VENOUS BLD VENIPUNCTURE: CPT

## 2018-12-27 NOTE — HISTORY OF PRESENT ILLNESS
[Post-hospitalization from ___ Hospital] : Post-hospitalization from [unfilled] Hospital [Admitted on: ___] : The patient was admitted on [unfilled] [Discharged on ___] : discharged on [unfilled] [Med Reconciliation] : medication reconciliation has been completed [FreeTextEntry2] : Patient is a 63-year-old white male here for hospital discharge followup. Patient states on December 18 she was admitted at [facility] for severe dizziness. Patient was found to be in atrial fibrillation. Patient states multiple tests were done.. he was discharged on December 20 with a change of medication from valsartan to lisinopril 5 mg once daily, hydrochlorothiazide 25 mg once daily and Ecotrin 325 mg once daily. Patient was also started on metformin 500 mg b.i.d. for new onset diabetes.\par Patient has a cardiology appointment this afternoon with Dr. Rodriguez.\par He was found to be hypokalemic in the hospital and given potassium supplements.\par No other acute complaints today.

## 2018-12-27 NOTE — COUNSELING
[Weight management counseling provided] : Weight management [Healthy eating counseling provided] : healthy eating [Activity counseling provided] : activity [Weight Self Once Weekly] : Weight self once weekly [Low Fat Diet] : Low fat diet [Decrease Portions] : Decrease food portions [Low Salt Diet] : Low salt diet [Walking] : Walking

## 2018-12-27 NOTE — REVIEW OF SYSTEMS
[Fever] : no fever [Chills] : no chills [Discharge] : no discharge [Vision Problems] : no vision problems [Earache] : no earache [Nasal Discharge] : no nasal discharge [Sore Throat] : no sore throat [Chest Pain] : no chest pain [Palpitations] : no palpitations [Lower Ext Edema] : no lower extremity edema [Shortness Of Breath] : no shortness of breath [Cough] : no cough [Abdominal Pain] : no abdominal pain [Nausea] : no nausea [Diarrhea] : no diarrhea [Dysuria] : no dysuria [Joint Pain] : no joint pain [Back Pain] : no back pain [Itching] : no itching [Skin Rash] : no skin rash [Anxiety] : no anxiety [Depression] : no depression [Easy Bleeding] : no easy bleeding [Easy Bruising] : no easy bruising [Swollen Glands] : no swollen glands [Negative] : Heme/Lymph

## 2018-12-27 NOTE — ASSESSMENT
[FreeTextEntry1] : Hospital discharge followup\par Atrial fibrillation----patient is on atenolol 50 mg once daily and aspirin 325 mg once daily. Patient was not put on any other blood thinners. Appointment with cardiologist as this afternoon.\par Hypertension----stable on lisinopril 5 mg once daily and hydrochlorothiazide 25 mg once daily.\par New-onset diabetes----patient has been started on metformin 500 mg b.i.d..\par Hypokalemia--- patient given potassium supplements in the hospital. CMP checked today.\par Multiple lung nodules----patient has seen pulmonologist for multiple lung nodules seen on CAT scan chest in the hospital. Repeat CAT scan has been ordered. Followup with pulmonology.\par \par Followup one month.

## 2018-12-28 ENCOUNTER — RESULT REVIEW (OUTPATIENT)
Age: 63
End: 2018-12-28

## 2018-12-28 LAB
ALBUMIN SERPL ELPH-MCNC: 3.8 G/DL
ALP BLD-CCNC: 77 U/L
ALT SERPL-CCNC: 34 U/L
ANION GAP SERPL CALC-SCNC: 12 MMOL/L
AST SERPL-CCNC: 31 U/L
BILIRUB SERPL-MCNC: 0.2 MG/DL
BUN SERPL-MCNC: 12 MG/DL
CALCIUM SERPL-MCNC: 9.9 MG/DL
CHLORIDE SERPL-SCNC: 100 MMOL/L
CO2 SERPL-SCNC: 27 MMOL/L
CREAT SERPL-MCNC: 0.91 MG/DL
GLUCOSE SERPL-MCNC: 121 MG/DL
POTASSIUM SERPL-SCNC: 4.2 MMOL/L
PROT SERPL-MCNC: 7.4 G/DL
SODIUM SERPL-SCNC: 139 MMOL/L

## 2019-01-08 LAB — HEMOCCULT STL QL IA: NEGATIVE

## 2019-01-17 ENCOUNTER — FORM ENCOUNTER (OUTPATIENT)
Age: 64
End: 2019-01-17

## 2019-01-18 ENCOUNTER — APPOINTMENT (OUTPATIENT)
Dept: CT IMAGING | Facility: CLINIC | Age: 64
End: 2019-01-18
Payer: SELF-PAY

## 2019-01-18 ENCOUNTER — OUTPATIENT (OUTPATIENT)
Dept: OUTPATIENT SERVICES | Facility: HOSPITAL | Age: 64
LOS: 1 days | End: 2019-01-18
Payer: SELF-PAY

## 2019-01-18 DIAGNOSIS — Z96.651 PRESENCE OF RIGHT ARTIFICIAL KNEE JOINT: Chronic | ICD-10-CM

## 2019-01-18 DIAGNOSIS — Z87.891 PERSONAL HISTORY OF NICOTINE DEPENDENCE: ICD-10-CM

## 2019-01-18 PROCEDURE — G0297: CPT

## 2019-01-18 PROCEDURE — G0297: CPT | Mod: 26

## 2019-01-22 ENCOUNTER — APPOINTMENT (OUTPATIENT)
Dept: FAMILY MEDICINE | Facility: CLINIC | Age: 64
End: 2019-01-22
Payer: COMMERCIAL

## 2019-01-22 VITALS
BODY MASS INDEX: 47.74 KG/M2 | SYSTOLIC BLOOD PRESSURE: 158 MMHG | RESPIRATION RATE: 12 BRPM | OXYGEN SATURATION: 97 % | TEMPERATURE: 97.5 F | DIASTOLIC BLOOD PRESSURE: 90 MMHG | WEIGHT: 315 LBS | HEIGHT: 68 IN | HEART RATE: 58 BPM

## 2019-01-22 PROCEDURE — 99214 OFFICE O/P EST MOD 30 MIN: CPT | Mod: 25

## 2019-01-22 PROCEDURE — 36415 COLL VENOUS BLD VENIPUNCTURE: CPT

## 2019-01-22 RX ORDER — HYDROCHLOROTHIAZIDE 25 MG/1
25 TABLET ORAL
Refills: 0 | Status: DISCONTINUED | COMMUNITY
Start: 1900-01-01 | End: 2019-01-22

## 2019-01-22 RX ORDER — LISINOPRIL 5 MG/1
5 TABLET ORAL
Refills: 0 | Status: DISCONTINUED | COMMUNITY
End: 2019-01-22

## 2019-01-22 NOTE — HEALTH RISK ASSESSMENT
[] : No [No falls in past year] : Patient reported no falls in the past year [0] : 2) Feeling down, depressed, or hopeless: Not at all (0) [de-identified] : Pulm,Cardio [[id number]] : 0

## 2019-01-22 NOTE — ASSESSMENT
[FreeTextEntry1] : Hypertension----blood pressure elevated today. Patient just took blood pressure medications of an hour ago. He has been restarted on valsartan/hydrochlorothiazide 320/25 once daily, metoprolol 25 mg once daily to continue.\par Atrial fibrillation----patient is awaiting insurance approval to start Xarelto. Cardioversion to be scheduled next month.\par Diabetes new-onset----hemoglobin A1c and urine microalbumin, CBC and CMP checked today. Patient to get diabetic eye exams. Metformin 500 mg one tablet b.i.d. refilled.\par \par Care plan reviewed. Healthy eating, regular exercise, keep well hydrated.\par Follow up 2 months.

## 2019-01-22 NOTE — HISTORY OF PRESENT ILLNESS
[FreeTextEntry1] : Patient is a 63-year-old white male here for followup of diabetes, hypertension and atrial fibrillation. Patient has seen cardiologist last week. Recommendation is to start Xaralto after insurance approval. Patient will be scheduled for cardioversion next month.\par Patient has also seen pulmonologist for lung nodules. Low dose CT scan has been done on Friday, results are pending.\par Patient feels well, no acute complaints.

## 2019-01-23 ENCOUNTER — RESULT REVIEW (OUTPATIENT)
Age: 64
End: 2019-01-23

## 2019-01-23 LAB
ALBUMIN SERPL ELPH-MCNC: 3.9 G/DL
ALP BLD-CCNC: 79 U/L
ALT SERPL-CCNC: 33 U/L
ANION GAP SERPL CALC-SCNC: 13 MMOL/L
AST SERPL-CCNC: 26 U/L
BASOPHILS # BLD AUTO: 0.03 K/UL
BASOPHILS NFR BLD AUTO: 0.4 %
BILIRUB SERPL-MCNC: 0.3 MG/DL
BUN SERPL-MCNC: 14 MG/DL
CALCIUM SERPL-MCNC: 9.4 MG/DL
CHLORIDE SERPL-SCNC: 101 MMOL/L
CO2 SERPL-SCNC: 26 MMOL/L
CREAT SERPL-MCNC: 0.88 MG/DL
CREAT SPEC-SCNC: 235 MG/DL
EOSINOPHIL # BLD AUTO: 0.21 K/UL
EOSINOPHIL NFR BLD AUTO: 2.7 %
GLUCOSE SERPL-MCNC: 136 MG/DL
HBA1C MFR BLD HPLC: 7 %
HCT VFR BLD CALC: 48.3 %
HGB BLD-MCNC: 15.6 G/DL
IMM GRANULOCYTES NFR BLD AUTO: 0.3 %
LYMPHOCYTES # BLD AUTO: 2.08 K/UL
LYMPHOCYTES NFR BLD AUTO: 27.2 %
MAN DIFF?: NORMAL
MCHC RBC-ENTMCNC: 28.7 PG
MCHC RBC-ENTMCNC: 32.3 GM/DL
MCV RBC AUTO: 89 FL
MICROALBUMIN 24H UR DL<=1MG/L-MCNC: 5.2 MG/DL
MICROALBUMIN/CREAT 24H UR-RTO: 22 MG/G
MONOCYTES # BLD AUTO: 0.32 K/UL
MONOCYTES NFR BLD AUTO: 4.2 %
NEUTROPHILS # BLD AUTO: 4.98 K/UL
NEUTROPHILS NFR BLD AUTO: 65.2 %
PLATELET # BLD AUTO: 239 K/UL
POTASSIUM SERPL-SCNC: 4.3 MMOL/L
PROT SERPL-MCNC: 7.4 G/DL
RBC # BLD: 5.43 M/UL
RBC # FLD: 15.3 %
SODIUM SERPL-SCNC: 140 MMOL/L
WBC # FLD AUTO: 7.64 K/UL

## 2019-02-12 ENCOUNTER — APPOINTMENT (OUTPATIENT)
Dept: PULMONOLOGY | Facility: CLINIC | Age: 64
End: 2019-02-12
Payer: COMMERCIAL

## 2019-02-12 VITALS
WEIGHT: 315 LBS | BODY MASS INDEX: 55.35 KG/M2 | OXYGEN SATURATION: 95 % | SYSTOLIC BLOOD PRESSURE: 148 MMHG | DIASTOLIC BLOOD PRESSURE: 84 MMHG | HEART RATE: 62 BPM

## 2019-02-12 PROCEDURE — 99214 OFFICE O/P EST MOD 30 MIN: CPT

## 2019-02-12 NOTE — PHYSICAL EXAM
[General Appearance - Well Developed] : well developed [Normal Appearance] : normal appearance [General Appearance - In No Acute Distress] : no acute distress [Normal Conjunctiva] : the conjunctiva exhibited no abnormalities [Low Lying Soft Palate] : low lying soft palate [Elongated Uvula] : elongated uvula [Enlarged Base of the Tongue] : enlargement of the base of the tongue [III] : III [Neck Appearance] : the appearance of the neck was normal [Heart Rate And Rhythm] : heart rate and rhythm were normal [Heart Sounds] : normal S1 and S2 [Murmurs] : no murmurs present [Edema] : no peripheral edema present [] : no respiratory distress [Respiration, Rhythm And Depth] : normal respiratory rhythm and effort [Exaggerated Use Of Accessory Muscles For Inspiration] : no accessory muscle use [Auscultation Breath Sounds / Voice Sounds] : lungs were clear to auscultation bilaterally [Abdomen Soft] : soft [Abdomen Tenderness] : non-tender [Abnormal Walk] : normal gait [Nail Clubbing] : no clubbing of the fingernails [Cyanosis, Localized] : no localized cyanosis [No Focal Deficits] : no focal deficits [Oriented To Time, Place, And Person] : oriented to person, place, and time [FreeTextEntry1] : No abnormalities.

## 2019-02-12 NOTE — CONSULT LETTER
[Dear  ___] : Dear  [unfilled], [Consult Letter:] : I had the pleasure of evaluating your patient, [unfilled]. [Please see my note below.] : Please see my note below. [Consult Closing:] : Thank you very much for allowing me to participate in the care of this patient.  If you have any questions, please do not hesitate to contact me. [Sincerely,] : Sincerely, [FreeTextEntry3] : Jono Reid MD, FCCP, ELVIRA. ABSM\par

## 2019-02-12 NOTE — DISCUSSION/SUMMARY
[FreeTextEntry1] : \par #1. Mild restriction on PFTs likely related to weight\par #2. SOBOE is likely related to weight or deconditioning given restriction on PFTs\par #3. Diet and exercise for weight loss\par #4. ProAir as needed but he rarely requires it\par #5. The patient does not appear to require chronic BD therapy at this time\par #6. Continue BiPAP at 24/18 cm of water\par #7. Will order a new machine with replacement equipment as needed; ordered 2/12/19\par #8. Chest CT for lung cancer screening given smoking hx was clear; repeat in 2/2020\par #9. F/u in 8 weeks

## 2019-02-12 NOTE — HISTORY OF PRESENT ILLNESS
[BiPAP] : BiPAP [Good Compliance] : good compliance with treatment [Good Tolerance] : good tolerance of treatment [Good Symptom Control] : good symptom control [Follow-Up - Routine Clinic] : a routine clinic follow-up of [Currently Experiencing] : The patient is currently experiencing symptoms. [Dyspnea] : dyspnea [Knee Pain] : knee pain [Joint Pain] : joint pain [Low Calorie Diet] : low calorie diet [Fair Compliance] : fair compliance with treatment [Fair Tolerance] : fair tolerance of treatment [Poor Symptom Control] : poor symptom control [On ___] : performed on [unfilled] [Patient] : the patient [To Assess ___] : to assess [unfilled] [None] : no new symptoms reported [FreeTextEntry1] : He was recently hospitalized at [facility] for dizziness and was found to have AF which was new. He also has a h/o morbid obesity and JASON for which he is maintained on BiPAP of 24/18 cm of water. He reports compliance with the machine. He was told to f/u in our office on d/c from the hospital. He denies any respiratory complaints during his hospitalization.\par \par He is a former smoker of up to 2 ppd x 30 years but quit 2007. [de-identified] : 24/18 cm of water [FreeTextEntry9] : Chest CT  [FreeTextEntry8] : Stable nodules going back to 3/15/14.

## 2019-02-12 NOTE — PROCEDURE
[FreeTextEntry1] : PFTs 12/26/18 - Mild to moderate restriction with normal diffusion capacity; restriction is worse than his previous.

## 2019-02-12 NOTE — REASON FOR VISIT
[Follow-Up] : a follow-up visit [Abnormal CT Scan] : abnormal CT Scan [Shortness of Breath] : shortness of Breath [Sleep Apnea] : sleep apnea [FreeTextEntry2] : morbid obesity,  nodules

## 2019-02-12 NOTE — REVIEW OF SYSTEMS
[Hypertension] : ~T hypertension [Dysrhythmia] : dysrhythmia [Edema] : ~T edema was present [Diabetes] : diabetes mellitus [As Noted in HPI] : as noted in HPI [Fever] : no fever [Chills] : no chills [Dry Eyes] : no dryness of the eyes [Eye Irritation] : no ~T irritation of the eyes [Nasal Congestion] : no nasal congestion [Epistaxis] : no nosebleeds [Postnasal Drip] : no postnasal drip [Sinus Problems] : no sinus problems [Cough] : no cough [Sputum] : not coughing up ~M sputum [Dyspnea] : no dyspnea [Chest Tightness] : no chest tightness [Pleuritic Pain] : no pleuritic pain [Wheezing] : no wheezing [Chest Discomfort] : no chest discomfort [Murmurs] : no murmurs were heard [Palpitations] : no palpitations [Hay Fever] : no hay fever [Itchy Eyes] : no itching of ~T the eyes [Reflux] : no reflux [Nausea] : no nausea [Vomiting] : no vomiting [Constipation] : no constipation [Diarrhea] : no diarrhea [Abdominal Pain] : no abdominal pain [Dysuria] : no dysuria [Trauma] : no ~T physical trauma [Fracture] : no fracture [Anemia] : no anemia [Headache] : no headache [Dizziness] : no dizziness [Syncope] : no fainting [Numbness] : no numbness [Paralysis] : no paralysis was seen [Seizures] : no seizures [Depression] : no depression [Anxiety] : no anxiety [Thyroid Problem] : no thyroid problem

## 2019-03-12 ENCOUNTER — RX RENEWAL (OUTPATIENT)
Age: 64
End: 2019-03-12

## 2019-04-01 ENCOUNTER — APPOINTMENT (OUTPATIENT)
Dept: FAMILY MEDICINE | Facility: CLINIC | Age: 64
End: 2019-04-01
Payer: MEDICAID

## 2019-04-01 ENCOUNTER — LABORATORY RESULT (OUTPATIENT)
Age: 64
End: 2019-04-01

## 2019-04-01 VITALS
BODY MASS INDEX: 47.74 KG/M2 | HEIGHT: 68 IN | SYSTOLIC BLOOD PRESSURE: 180 MMHG | DIASTOLIC BLOOD PRESSURE: 100 MMHG | WEIGHT: 315 LBS | TEMPERATURE: 97.6 F | RESPIRATION RATE: 12 BRPM | OXYGEN SATURATION: 96 % | HEART RATE: 60 BPM

## 2019-04-01 PROCEDURE — 36415 COLL VENOUS BLD VENIPUNCTURE: CPT

## 2019-04-01 PROCEDURE — 99214 OFFICE O/P EST MOD 30 MIN: CPT | Mod: 25

## 2019-04-01 NOTE — COUNSELING
[Weight management counseling provided] : Weight management [Healthy eating counseling provided] : healthy eating [Activity counseling provided] : activity [Behavioral health counseling provided] : behavioral health  [Fall prevention counseling provided] : fall prevention  [Decrease Portions] : Decrease food portions [Walking] : Walking

## 2019-04-01 NOTE — HISTORY OF PRESENT ILLNESS
[FreeTextEntry1] : Patient in for follow up of HTN  has not had insurance patient claims 9 lb weight gain, has been eating poorly ave 170-180/90 had been in hospital december for afib followed by cardio, on Xarelto, also compliant of CPAP  needs meds and Labs today

## 2019-04-01 NOTE — HEALTH RISK ASSESSMENT
[] : Yes [No falls in past year] : Patient reported no falls in the past year [0] : 2) Feeling down, depressed, or hopeless: Not at all (0) [[id number]] : 0

## 2019-04-01 NOTE — PLAN
[FreeTextEntry1] : Patient in for follow up of HTN  has not had insurance patient claims 9 lb weight gain, has been eating poorly ave 170-180/90 had been in hospital december for afib followed by cardio, on Xarelto, also compliant of CPAP  needs meds and Labs today\par Care plan reviewed New Meds  continue meds RTC 1 week continue home BP add Norvasc weight loss advised

## 2019-04-02 LAB
ALBUMIN SERPL ELPH-MCNC: 4 G/DL
ALP BLD-CCNC: 76 U/L
ALT SERPL-CCNC: 39 U/L
ANION GAP SERPL CALC-SCNC: 16 MMOL/L
APPEARANCE: CLEAR
AST SERPL-CCNC: 36 U/L
BASOPHILS # BLD AUTO: 0.05 K/UL
BASOPHILS NFR BLD AUTO: 0.6 %
BILIRUB SERPL-MCNC: 0.5 MG/DL
BILIRUBIN URINE: NEGATIVE
BLOOD URINE: NEGATIVE
BUN SERPL-MCNC: 16 MG/DL
CALCIUM SERPL-MCNC: 9.4 MG/DL
CHLORIDE SERPL-SCNC: 100 MMOL/L
CHOLEST SERPL-MCNC: 172 MG/DL
CHOLEST/HDLC SERPL: 4.8 RATIO
CO2 SERPL-SCNC: 25 MMOL/L
COLOR: YELLOW
CREAT SERPL-MCNC: 0.92 MG/DL
CREAT SPEC-SCNC: 197 MG/DL
EOSINOPHIL # BLD AUTO: 0.2 K/UL
EOSINOPHIL NFR BLD AUTO: 2.2 %
ERYTHROCYTE [SEDIMENTATION RATE] IN BLOOD BY WESTERGREN METHOD: 36 MM/HR
ESTIMATED AVERAGE GLUCOSE: 154 MG/DL
GLUCOSE QUALITATIVE U: NEGATIVE
GLUCOSE SERPL-MCNC: 158 MG/DL
HBA1C MFR BLD HPLC: 7 %
HCT VFR BLD CALC: 50.8 %
HDLC SERPL-MCNC: 36 MG/DL
HGB BLD-MCNC: 15.6 G/DL
IMM GRANULOCYTES NFR BLD AUTO: 0.2 %
KETONES URINE: NEGATIVE
LDLC SERPL CALC-MCNC: 104 MG/DL
LEUKOCYTE ESTERASE URINE: NEGATIVE
LYMPHOCYTES # BLD AUTO: 1.98 K/UL
LYMPHOCYTES NFR BLD AUTO: 22.3 %
MAN DIFF?: NORMAL
MCHC RBC-ENTMCNC: 28.3 PG
MCHC RBC-ENTMCNC: 30.7 GM/DL
MCV RBC AUTO: 92 FL
MICROALBUMIN 24H UR DL<=1MG/L-MCNC: 12.3 MG/DL
MICROALBUMIN/CREAT 24H UR-RTO: 63 MG/G
MONOCYTES # BLD AUTO: 0.4 K/UL
MONOCYTES NFR BLD AUTO: 4.5 %
NEUTROPHILS # BLD AUTO: 6.24 K/UL
NEUTROPHILS NFR BLD AUTO: 70.2 %
NITRITE URINE: NEGATIVE
PH URINE: 6
PLATELET # BLD AUTO: 201 K/UL
POTASSIUM SERPL-SCNC: 4.1 MMOL/L
PROT SERPL-MCNC: 7.6 G/DL
PROTEIN URINE: ABNORMAL
RBC # BLD: 5.52 M/UL
RBC # FLD: 16.8 %
SODIUM SERPL-SCNC: 141 MMOL/L
SPECIFIC GRAVITY URINE: 1.02
TRIGL SERPL-MCNC: 161 MG/DL
TSH SERPL-ACNC: 3.27 UIU/ML
UROBILINOGEN URINE: NORMAL
WBC # FLD AUTO: 8.89 K/UL

## 2019-04-11 ENCOUNTER — APPOINTMENT (OUTPATIENT)
Dept: FAMILY MEDICINE | Facility: CLINIC | Age: 64
End: 2019-04-11
Payer: MEDICAID

## 2019-04-11 VITALS
HEART RATE: 56 BPM | RESPIRATION RATE: 13 BRPM | HEIGHT: 68 IN | DIASTOLIC BLOOD PRESSURE: 76 MMHG | WEIGHT: 315 LBS | BODY MASS INDEX: 47.74 KG/M2 | OXYGEN SATURATION: 98 % | SYSTOLIC BLOOD PRESSURE: 126 MMHG

## 2019-04-11 DIAGNOSIS — Z78.9 OTHER SPECIFIED HEALTH STATUS: ICD-10-CM

## 2019-04-11 PROCEDURE — 99214 OFFICE O/P EST MOD 30 MIN: CPT | Mod: 25

## 2019-04-11 PROCEDURE — 36415 COLL VENOUS BLD VENIPUNCTURE: CPT

## 2019-04-11 NOTE — COUNSELING
[Weight management counseling provided] : Weight management [Healthy eating counseling provided] : healthy eating [Activity counseling provided] : activity [Behavioral health counseling provided] : behavioral health  [Fall prevention counseling provided] : fall prevention  [Needs reinforcement, provided] : Patient needs reinforcement on understanding of disease, goals and obesity follow-up plan; reinforcement was provided [Decrease Portions] : Decrease food portions

## 2019-04-12 LAB
ALBUMIN SERPL ELPH-MCNC: 4 G/DL
ALP BLD-CCNC: 78 U/L
ALT SERPL-CCNC: 37 U/L
ANION GAP SERPL CALC-SCNC: 18 MMOL/L
AST SERPL-CCNC: 38 U/L
BASOPHILS # BLD AUTO: 0.06 K/UL
BASOPHILS NFR BLD AUTO: 0.7 %
BILIRUB SERPL-MCNC: 0.2 MG/DL
BUN SERPL-MCNC: 18 MG/DL
CALCIUM SERPL-MCNC: 9.9 MG/DL
CHLORIDE SERPL-SCNC: 101 MMOL/L
CO2 SERPL-SCNC: 24 MMOL/L
CREAT SERPL-MCNC: 0.91 MG/DL
EOSINOPHIL # BLD AUTO: 0.2 K/UL
EOSINOPHIL NFR BLD AUTO: 2.4 %
GLUCOSE SERPL-MCNC: 185 MG/DL
HCT VFR BLD CALC: 49.4 %
HGB BLD-MCNC: 15.5 G/DL
IMM GRANULOCYTES NFR BLD AUTO: 0.4 %
LYMPHOCYTES # BLD AUTO: 2.05 K/UL
LYMPHOCYTES NFR BLD AUTO: 24.8 %
MAN DIFF?: NORMAL
MCHC RBC-ENTMCNC: 28.6 PG
MCHC RBC-ENTMCNC: 31.4 GM/DL
MCV RBC AUTO: 91.1 FL
MONOCYTES # BLD AUTO: 0.42 K/UL
MONOCYTES NFR BLD AUTO: 5.1 %
NEUTROPHILS # BLD AUTO: 5.5 K/UL
NEUTROPHILS NFR BLD AUTO: 66.6 %
PLATELET # BLD AUTO: 241 K/UL
POTASSIUM SERPL-SCNC: 4.4 MMOL/L
PROT SERPL-MCNC: 7.7 G/DL
RBC # BLD: 5.42 M/UL
RBC # FLD: 15.8 %
SODIUM SERPL-SCNC: 143 MMOL/L
WBC # FLD AUTO: 8.26 K/UL

## 2019-04-16 ENCOUNTER — APPOINTMENT (OUTPATIENT)
Dept: FAMILY MEDICINE | Facility: CLINIC | Age: 64
End: 2019-04-16

## 2019-04-16 ENCOUNTER — APPOINTMENT (OUTPATIENT)
Dept: PULMONOLOGY | Facility: CLINIC | Age: 64
End: 2019-04-16
Payer: MEDICAID

## 2019-04-16 VITALS — OXYGEN SATURATION: 94 % | HEART RATE: 64 BPM

## 2019-04-16 VITALS — DIASTOLIC BLOOD PRESSURE: 62 MMHG | BODY MASS INDEX: 55.8 KG/M2 | WEIGHT: 315 LBS | SYSTOLIC BLOOD PRESSURE: 140 MMHG

## 2019-04-16 PROCEDURE — G0296 VISIT TO DETERM LDCT ELIG: CPT

## 2019-04-16 PROCEDURE — 99214 OFFICE O/P EST MOD 30 MIN: CPT | Mod: 25

## 2019-04-16 NOTE — PHYSICAL EXAM
[General Appearance - Well Developed] : well developed [Normal Appearance] : normal appearance [General Appearance - In No Acute Distress] : no acute distress [Low Lying Soft Palate] : low lying soft palate [Normal Conjunctiva] : the conjunctiva exhibited no abnormalities [III] : III [Enlarged Base of the Tongue] : enlargement of the base of the tongue [Elongated Uvula] : elongated uvula [Neck Appearance] : the appearance of the neck was normal [Heart Rate And Rhythm] : heart rate and rhythm were normal [Murmurs] : no murmurs present [Heart Sounds] : normal S1 and S2 [Edema] : no peripheral edema present [] : no respiratory distress [Respiration, Rhythm And Depth] : normal respiratory rhythm and effort [Exaggerated Use Of Accessory Muscles For Inspiration] : no accessory muscle use [Abdomen Soft] : soft [Auscultation Breath Sounds / Voice Sounds] : lungs were clear to auscultation bilaterally [Nail Clubbing] : no clubbing of the fingernails [Abnormal Walk] : normal gait [Abdomen Tenderness] : non-tender [No Focal Deficits] : no focal deficits [Cyanosis, Localized] : no localized cyanosis [Oriented To Time, Place, And Person] : oriented to person, place, and time [FreeTextEntry1] : No abnormalities.

## 2019-04-16 NOTE — DISCUSSION/SUMMARY
[FreeTextEntry1] : \par #1. Mild restriction on PFTs likely related to weight\par #2. SOBOE is likely related to weight or deconditioning given restriction on PFTs\par #3. Diet and exercise for weight loss\par #4. ProAir as needed but he rarely requires it\par #5. The patient does not appear to require chronic BD therapy at this time\par #6. Continue BiPAP at 24/18 cm of water\par #7. Will order a new machine with replacement equipment as needed; ordered 2/12/19\par #8. Chest CT for lung cancer screening given smoking hx. Risks and benefits regarding screening CT discussed including but not limited to the benefit of early lung cancer detection as well as other possible unknown pathology but also risk of discovering nodules or other findings which may be benign but will require periodic evaluation. Chest CT for lung cancer screening given smoking hx was clear; repeat in 2/2020\par #9. F/u one month after starting CPAP therapy

## 2019-04-16 NOTE — HISTORY OF PRESENT ILLNESS
[BiPAP] : BiPAP [Good Compliance] : good compliance with treatment [Good Tolerance] : good tolerance of treatment [Good Symptom Control] : good symptom control [Follow-Up - Routine Clinic] : a routine clinic follow-up of [Currently Experiencing] : The patient is currently experiencing symptoms. [Dyspnea] : dyspnea [Joint Pain] : joint pain [Knee Pain] : knee pain [Low Calorie Diet] : low calorie diet [Fair Compliance] : fair compliance with treatment [Poor Symptom Control] : poor symptom control [Fair Tolerance] : fair tolerance of treatment [Patient] : the patient [On ___] : performed on [unfilled] [None] : no new symptoms reported [To Assess ___] : to assess [unfilled] [FreeTextEntry1] : He was recently hospitalized at [facility] for dizziness and was found to have AF which was new. He also has a h/o morbid obesity and JASON for which he is maintained on BiPAP of 24/18 cm of water. He reports compliance with the machine. He was told to f/u in our office on d/c from the hospital. He denies any respiratory complaints during his hospitalization.\par \par He is a former smoker of up to 2 ppd x 30 years but quit 2007. [de-identified] : 24/18 cm of water [FreeTextEntry9] : Chest CT  [FreeTextEntry8] : Stable nodules going back to 3/15/14.

## 2019-04-16 NOTE — CONSULT LETTER
[Dear  ___] : Dear  [unfilled], [Please see my note below.] : Please see my note below. [Consult Letter:] : I had the pleasure of evaluating your patient, [unfilled]. [Consult Closing:] : Thank you very much for allowing me to participate in the care of this patient.  If you have any questions, please do not hesitate to contact me. [Sincerely,] : Sincerely, [FreeTextEntry3] : Jono Reid MD, FCCP, ELVIRA. ABSM\par

## 2019-04-18 NOTE — HEALTH RISK ASSESSMENT
[No falls in past year] : Patient reported no falls in the past year [0] : 2) Feeling down, depressed, or hopeless: Not at all (0) [] : No [[id number]] : 0

## 2019-04-18 NOTE — PLAN
[FreeTextEntry1] : Patient in for follow up of HTN  has been followed by Cardio for Afib planing cardio version next week has non productive, also bothered by allergies , Has appointment with pulmonary , had started to improve diet , Using CPAP and it is clean doing well on new meds  will consider Ozempic \par Care plan reviewed New Meds  continue meds RTC 1 week continue home BP norvasc doing well  weight loss advised

## 2019-04-18 NOTE — HISTORY OF PRESENT ILLNESS
[FreeTextEntry1] : Patient in for follow up of HTN  has been followed by Cardio for Afib planing cardio version next week has non productive, also bothered by allergies , Has appointment with pulmonary , had started to improve diet , Using CPAP and it is clean

## 2019-04-26 ENCOUNTER — OTHER (OUTPATIENT)
Age: 64
End: 2019-04-26

## 2019-04-30 ENCOUNTER — MEDICATION RENEWAL (OUTPATIENT)
Age: 64
End: 2019-04-30

## 2019-05-01 ENCOUNTER — APPOINTMENT (OUTPATIENT)
Dept: FAMILY MEDICINE | Facility: CLINIC | Age: 64
End: 2019-05-01
Payer: MEDICAID

## 2019-05-01 VITALS
HEIGHT: 68 IN | DIASTOLIC BLOOD PRESSURE: 76 MMHG | RESPIRATION RATE: 13 BRPM | HEART RATE: 55 BPM | WEIGHT: 315 LBS | BODY MASS INDEX: 47.74 KG/M2 | OXYGEN SATURATION: 98 % | SYSTOLIC BLOOD PRESSURE: 128 MMHG

## 2019-05-01 PROCEDURE — 99214 OFFICE O/P EST MOD 30 MIN: CPT

## 2019-05-01 RX ORDER — VALSARTAN AND HYDROCHLOROTHIAZIDE 320; 25 MG/1; MG/1
320-25 TABLET, FILM COATED ORAL
Qty: 90 | Refills: 0 | Status: DISCONTINUED | COMMUNITY
Start: 2019-01-22 | End: 2019-05-01

## 2019-05-01 NOTE — REVIEW OF SYSTEMS
[Fatigue] : fatigue [Hearing Loss] : hearing loss [Palpitations] : palpitations [Lower Ext Edema] : lower extremity edema [Nocturia] : nocturia [Joint Pain] : joint pain [Joint Stiffness] : joint stiffness [Muscle Weakness] : muscle weakness [Joint Swelling] : joint swelling [Back Pain] : back pain [Itching] : itching [Negative] : Heme/Lymph [Chills] : no chills [Fever] : no fever [Night Sweats] : no night sweats [Recent Change In Weight] : ~T no recent weight change [Discharge] : no discharge [Pain] : no pain [Redness] : no redness [Vision Problems] : no vision problems [Earache] : no earache [Itching] : no itching [Nosebleeds] : no nosebleeds [Postnasal Drip] : no postnasal drip [Nasal Discharge] : no nasal discharge [Sore Throat] : no sore throat [Chest Pain] : no chest pain [Hoarseness] : no hoarseness [Claudication] : no  leg claudication [Orthopena] : no orthopnea [Paroysmal Nocturnal Dyspnea] : no paroysmal nocturnal dyspnea [Abdominal Pain] : no abdominal pain [Nausea] : no nausea [Vomiting] : no vomiting [Heartburn] : no heartburn [Melena] : no melena [Incontinence] : no incontinence [Dysuria] : no dysuria [Hesitancy] : no hesitancy [Frequency] : no frequency [Hematuria] : no hematuria [Impotence] : no impotency [Poor Libido] : libido not poor [Muscle Pain] : no muscle pain [Dizziness] : no dizziness [Headache] : no headache [Fainting] : no fainting [Confusion] : no confusion [Unsteady Walk] : no ataxia [Memory Loss] : no memory loss

## 2019-05-01 NOTE — HISTORY OF PRESENT ILLNESS
[FreeTextEntry1] : Patient in for follow up of Cardioversion was not successful back in AFib occasion weakness, has also seen by pulmonary.

## 2019-05-01 NOTE — COUNSELING
[Weight management counseling provided] : Weight management [Activity counseling provided] : activity [Healthy eating counseling provided] : healthy eating [Behavioral health counseling provided] : behavioral health  [Fall prevention counseling provided] : fall prevention

## 2019-05-01 NOTE — HEALTH RISK ASSESSMENT
[No falls in past year] : Patient reported no falls in the past year [0] : 2) Feeling down, depressed, or hopeless: Not at all (0) [] : No [[id number]] : 0

## 2019-05-01 NOTE — PHYSICAL EXAM
[Well Nourished] : well nourished [No Acute Distress] : no acute distress [Normal Sclera/Conjunctiva] : normal sclera/conjunctiva [Well Developed] : well developed [Well-Appearing] : well-appearing [PERRL] : pupils equal round and reactive to light [Normal Outer Ear/Nose] : the outer ears and nose were normal in appearance [EOMI] : extraocular movements intact [Supple] : supple [No JVD] : no jugular venous distention [Normal Oropharynx] : the oropharynx was normal [Thyroid Normal, No Nodules] : the thyroid was normal and there were no nodules present [No Lymphadenopathy] : no lymphadenopathy [Clear to Auscultation] : lungs were clear to auscultation bilaterally [No Accessory Muscle Use] : no accessory muscle use [No Respiratory Distress] : no respiratory distress  [Regular Rhythm] : with a regular rhythm [Normal S1, S2] : normal S1 and S2 [Normal Rate] : normal rate  [No Murmur] : no murmur heard [No Carotid Bruits] : no carotid bruits [No Varicosities] : no varicosities [Pedal Pulses Present] : the pedal pulses are present [No Abdominal Bruit] : a ~M bruit was not heard ~T in the abdomen [No Extremity Clubbing/Cyanosis] : no extremity clubbing/cyanosis [No Edema] : there was no peripheral edema [Non Tender] : non-tender [No Palpable Aorta] : no palpable aorta [Soft] : abdomen soft [No Masses] : no abdominal mass palpated [Non-distended] : non-distended [Normal Posterior Cervical Nodes] : no posterior cervical lymphadenopathy [Normal Bowel Sounds] : normal bowel sounds [No HSM] : no HSM [No CVA Tenderness] : no CVA  tenderness [Normal Anterior Cervical Nodes] : no anterior cervical lymphadenopathy [No Spinal Tenderness] : no spinal tenderness [No Joint Swelling] : no joint swelling [No Rash] : no rash [Grossly Normal Strength/Tone] : grossly normal strength/tone [Normal Gait] : normal gait [Coordination Grossly Intact] : coordination grossly intact [No Focal Deficits] : no focal deficits [Deep Tendon Reflexes (DTR)] : deep tendon reflexes were 2+ and symmetric [Normal Insight/Judgement] : insight and judgment were intact [Normal Affect] : the affect was normal

## 2019-05-01 NOTE — PLAN
[FreeTextEntry1] : Patient in for follow up of Cardioversion was not successful back in AFib occasion weakness, has also seen by pulmonary. \par care plan reviewed had been seen by cardio and new Meds added has follow up 1 month\par still has weakness  Labs and Meds reviewed

## 2019-05-21 ENCOUNTER — APPOINTMENT (OUTPATIENT)
Dept: FAMILY MEDICINE | Facility: CLINIC | Age: 64
End: 2019-05-21
Payer: MEDICAID

## 2019-05-21 VITALS
RESPIRATION RATE: 13 BRPM | BODY MASS INDEX: 47.74 KG/M2 | WEIGHT: 315 LBS | OXYGEN SATURATION: 95 % | HEART RATE: 61 BPM | DIASTOLIC BLOOD PRESSURE: 80 MMHG | SYSTOLIC BLOOD PRESSURE: 128 MMHG | HEIGHT: 68 IN

## 2019-05-21 PROCEDURE — 99214 OFFICE O/P EST MOD 30 MIN: CPT

## 2019-05-21 RX ORDER — VALSARTAN 320 MG/1
320 TABLET, COATED ORAL DAILY
Qty: 90 | Refills: 0 | Status: DISCONTINUED | COMMUNITY
Start: 2019-05-01 | End: 2019-05-21

## 2019-05-21 NOTE — HEALTH RISK ASSESSMENT
[No falls in past year] : Patient reported no falls in the past year [0] : 2) Feeling down, depressed, or hopeless: Not at all (0) [] : No [[id number]] : 0

## 2019-05-21 NOTE — PLAN
[FreeTextEntry1] : Patient in for follow up of HTN  has been followed by Cardio for Afib planing 2nd  cardio version Wednesday  has non productive, also bothered by allergies ,, had started to improve diet , Using CPAP and it is clean \par Care plan reviewed New Meds  continue meds  had to change to losartan for cost  follow up with cardio and return post ablation

## 2019-05-21 NOTE — HISTORY OF PRESENT ILLNESS
[FreeTextEntry1] : Patient in for follow up of HTN  has been followed by Cardio for Afib planing 2nd  cardio version Wednesday  has non productive, also bothered by allergies ,, had started to improve diet , Using CPAP and it is clean

## 2019-06-04 ENCOUNTER — APPOINTMENT (OUTPATIENT)
Dept: FAMILY MEDICINE | Facility: CLINIC | Age: 64
End: 2019-06-04
Payer: MEDICAID

## 2019-06-04 VITALS
DIASTOLIC BLOOD PRESSURE: 90 MMHG | WEIGHT: 315 LBS | BODY MASS INDEX: 47.74 KG/M2 | HEIGHT: 68 IN | TEMPERATURE: 98.9 F | HEART RATE: 75 BPM | OXYGEN SATURATION: 93 % | SYSTOLIC BLOOD PRESSURE: 140 MMHG | RESPIRATION RATE: 13 BRPM

## 2019-06-04 PROCEDURE — 99214 OFFICE O/P EST MOD 30 MIN: CPT

## 2019-06-04 NOTE — HISTORY OF PRESENT ILLNESS
[FreeTextEntry1] : Patient in for follow up of HTN  has been followed by Cardio for Afib planing 2nd  cardio version Wednesday  has non productive, also bothered by allergies ,, had started to improve diet , Using CPAP and it is clean has follow up with cardio and pulmonary has appointment for cataract in left eye has eval today (

## 2019-06-04 NOTE — HEALTH RISK ASSESSMENT
[No falls in past year] : Patient reported no falls in the past year [0] : 1) Little interest or pleasure doing things: Not at all (0) [[id number]] : 0 [] : No

## 2019-06-04 NOTE — PLAN
[FreeTextEntry1] : Patient in for follow up of HTN  has been followed by Cardio for AFib planing 2nd  cardio version Wednesday  has non productive, also bothered by allergies ,, had started to improve diet , Using CPAP and it is clean has follow up with cardio and pulmonary has appointment for cataract in left eye has Eval today ( \par Dr perales  Meds given

## 2019-06-04 NOTE — REVIEW OF SYSTEMS
[Fatigue] : fatigue [Hearing Loss] : hearing loss [Postnasal Drip] : postnasal drip [Lower Ext Edema] : lower extremity edema [Nocturia] : nocturia [Joint Pain] : joint pain [Joint Stiffness] : joint stiffness [Muscle Weakness] : muscle weakness [Back Pain] : back pain [Joint Swelling] : joint swelling [Itching] : itching [Negative] : Heme/Lymph [Fever] : no fever [Chills] : no chills [Night Sweats] : no night sweats [Recent Change In Weight] : ~T no recent weight change [Pain] : no pain [Discharge] : no discharge [Redness] : no redness [Vision Problems] : no vision problems [Itching] : no itching [Earache] : no earache [Nosebleeds] : no nosebleeds [Nasal Discharge] : no nasal discharge [Sore Throat] : no sore throat [Hoarseness] : no hoarseness [Palpitations] : no palpitations [Chest Pain] : no chest pain [Claudication] : no  leg claudication [Paroysmal Nocturnal Dyspnea] : no paroysmal nocturnal dyspnea [Orthopena] : no orthopnea [Abdominal Pain] : no abdominal pain [Nausea] : no nausea [Vomiting] : no vomiting [Heartburn] : no heartburn [Melena] : no melena [Dysuria] : no dysuria [Incontinence] : no incontinence [Hesitancy] : no hesitancy [Hematuria] : no hematuria [Frequency] : no frequency [Impotence] : no impotency [Poor Libido] : libido not poor [Headache] : no headache [Muscle Pain] : no muscle pain [Dizziness] : no dizziness [Confusion] : no confusion [Fainting] : no fainting [Unsteady Walk] : no ataxia [Memory Loss] : no memory loss

## 2019-06-10 ENCOUNTER — APPOINTMENT (OUTPATIENT)
Dept: OPHTHALMOLOGY | Facility: CLINIC | Age: 64
End: 2019-06-10
Payer: MEDICAID

## 2019-06-10 PROCEDURE — 92136 OPHTHALMIC BIOMETRY: CPT | Mod: LT

## 2019-06-10 PROCEDURE — 92004 COMPRE OPH EXAM NEW PT 1/>: CPT

## 2019-06-25 ENCOUNTER — APPOINTMENT (OUTPATIENT)
Dept: FAMILY MEDICINE | Facility: CLINIC | Age: 64
End: 2019-06-25
Payer: MEDICAID

## 2019-06-25 VITALS
HEIGHT: 68 IN | RESPIRATION RATE: 12 BRPM | SYSTOLIC BLOOD PRESSURE: 130 MMHG | TEMPERATURE: 98.4 F | DIASTOLIC BLOOD PRESSURE: 88 MMHG | WEIGHT: 315 LBS | HEART RATE: 76 BPM | BODY MASS INDEX: 47.74 KG/M2 | OXYGEN SATURATION: 96 %

## 2019-06-25 PROCEDURE — 99215 OFFICE O/P EST HI 40 MIN: CPT

## 2019-06-25 NOTE — REVIEW OF SYSTEMS
[Fatigue] : fatigue [Vision Problems] : vision problems [Hearing Loss] : hearing loss [Postnasal Drip] : postnasal drip [Lower Ext Edema] : lower extremity edema [Nocturia] : nocturia [Joint Pain] : joint pain [Joint Stiffness] : joint stiffness [Muscle Weakness] : muscle weakness [Back Pain] : back pain [Joint Swelling] : joint swelling [Itching] : itching [Negative] : Heme/Lymph [Fever] : no fever [Chills] : no chills [Night Sweats] : no night sweats [Recent Change In Weight] : ~T no recent weight change [Discharge] : no discharge [Pain] : no pain [Redness] : no redness [Itching] : no itching [Earache] : no earache [Nosebleeds] : no nosebleeds [Nasal Discharge] : no nasal discharge [Sore Throat] : no sore throat [Chest Pain] : no chest pain [Hoarseness] : no hoarseness [Palpitations] : no palpitations [Claudication] : no  leg claudication [Orthopena] : no orthopnea [Paroysmal Nocturnal Dyspnea] : no paroysmal nocturnal dyspnea [Abdominal Pain] : no abdominal pain [Nausea] : no nausea [Vomiting] : no vomiting [Heartburn] : no heartburn [Melena] : no melena [Dysuria] : no dysuria [Incontinence] : no incontinence [Hesitancy] : no hesitancy [Hematuria] : no hematuria [Frequency] : no frequency [Impotence] : no impotency [Poor Libido] : libido not poor [Muscle Pain] : no muscle pain [Headache] : no headache [Dizziness] : no dizziness [Fainting] : no fainting [Confusion] : no confusion [Unsteady Walk] : no ataxia [Memory Loss] : no memory loss

## 2019-06-25 NOTE — PHYSICAL EXAM

## 2019-06-25 NOTE — REVIEW OF SYSTEMS
[Fatigue] : fatigue [Vision Problems] : vision problems [Hearing Loss] : hearing loss [Postnasal Drip] : postnasal drip [Lower Ext Edema] : lower extremity edema [Nocturia] : nocturia [Joint Pain] : joint pain [Joint Stiffness] : joint stiffness [Muscle Weakness] : muscle weakness [Back Pain] : back pain [Joint Swelling] : joint swelling [Itching] : itching [Negative] : Heme/Lymph [Fever] : no fever [Chills] : no chills [Night Sweats] : no night sweats [Recent Change In Weight] : ~T no recent weight change [Discharge] : no discharge [Pain] : no pain [Redness] : no redness [Itching] : no itching [Earache] : no earache [Nosebleeds] : no nosebleeds [Nasal Discharge] : no nasal discharge [Sore Throat] : no sore throat [Hoarseness] : no hoarseness [Chest Pain] : no chest pain [Palpitations] : no palpitations [Claudication] : no  leg claudication [Orthopena] : no orthopnea [Paroysmal Nocturnal Dyspnea] : no paroysmal nocturnal dyspnea [Abdominal Pain] : no abdominal pain [Nausea] : no nausea [Vomiting] : no vomiting [Heartburn] : no heartburn [Melena] : no melena [Dysuria] : no dysuria [Incontinence] : no incontinence [Hesitancy] : no hesitancy [Hematuria] : no hematuria [Frequency] : no frequency [Impotence] : no impotency [Poor Libido] : libido not poor [Muscle Pain] : no muscle pain [Headache] : no headache [Dizziness] : no dizziness [Fainting] : no fainting [Confusion] : no confusion [Unsteady Walk] : no ataxia [Memory Loss] : no memory loss

## 2019-06-25 NOTE — ASSESSMENT
[Patient Optimized for Surgery] : Patient optimized for surgery [FreeTextEntry4] : Patient has separate cardiac letter

## 2019-06-25 NOTE — COUNSELING
[Weight management counseling provided] : Weight management [Healthy eating counseling provided] : healthy eating [Activity counseling provided] : activity [Behavioral health counseling provided] : behavioral health  [Fall prevention counseling provided] : fall prevention  [Good understanding] : Patient has a good understanding of disease, goals and obesity follow-up plan [Low Salt Diet] : Low salt diet [Keep Food Diary] : Keep food diary [Walking] : Walking [Engage in a relaxing activity] : Engage in a relaxing activity

## 2019-06-25 NOTE — HISTORY OF PRESENT ILLNESS
[Atrial Fibrillation] : atrial fibrillation [Coronary Artery Disease] : coronary artery disease [Asthma] : asthma [COPD] : COPD [Sleep Apnea] : sleep apnea [Diabetes] : diabetes [Recent Myocardial Infarction] : no recent myocardial infarction [Implantable Device/Pacemaker] : no implantable device/pacemaker [Smoker] : not a smoker [Family Member] : no family member with adverse anesthesia reaction/sudden death [Self] : no previous adverse anesthesia reaction [Chronic Anticoagulation] : no chronic anticoagulation [Chronic Kidney Disease] : no chronic kidney disease [FreeTextEntry1] : Left Cataract  [FreeTextEntry2] : 07-08-19 [FreeTextEntry3] : Dr Araiza [FreeTextEntry4] : Long Hx of visual changes

## 2019-07-08 ENCOUNTER — RX RENEWAL (OUTPATIENT)
Age: 64
End: 2019-07-08

## 2019-07-08 ENCOUNTER — APPOINTMENT (OUTPATIENT)
Dept: OPHTHALMOLOGY | Facility: AMBULATORY MEDICAL SERVICES | Age: 64
End: 2019-07-08
Payer: MEDICAID

## 2019-07-08 PROCEDURE — 66984 XCAPSL CTRC RMVL W/O ECP: CPT | Mod: LT

## 2019-07-09 ENCOUNTER — NON-APPOINTMENT (OUTPATIENT)
Age: 64
End: 2019-07-09

## 2019-07-09 ENCOUNTER — APPOINTMENT (OUTPATIENT)
Dept: OPHTHALMOLOGY | Facility: CLINIC | Age: 64
End: 2019-07-09
Payer: MEDICAID

## 2019-07-09 PROCEDURE — 99024 POSTOP FOLLOW-UP VISIT: CPT

## 2019-07-12 ENCOUNTER — RX RENEWAL (OUTPATIENT)
Age: 64
End: 2019-07-12

## 2019-07-19 ENCOUNTER — APPOINTMENT (OUTPATIENT)
Dept: OPHTHALMOLOGY | Facility: CLINIC | Age: 64
End: 2019-07-19
Payer: MEDICAID

## 2019-07-19 PROCEDURE — 99024 POSTOP FOLLOW-UP VISIT: CPT

## 2019-07-29 ENCOUNTER — APPOINTMENT (OUTPATIENT)
Dept: FAMILY MEDICINE | Facility: CLINIC | Age: 64
End: 2019-07-29
Payer: MEDICAID

## 2019-07-29 VITALS
WEIGHT: 315 LBS | TEMPERATURE: 98.6 F | OXYGEN SATURATION: 96 % | HEART RATE: 78 BPM | HEIGHT: 68 IN | SYSTOLIC BLOOD PRESSURE: 142 MMHG | BODY MASS INDEX: 47.74 KG/M2 | RESPIRATION RATE: 13 BRPM | DIASTOLIC BLOOD PRESSURE: 90 MMHG

## 2019-07-29 DIAGNOSIS — H25.093 OTHER AGE-RELATED INCIPIENT CATARACT, BILATERAL: ICD-10-CM

## 2019-07-29 DIAGNOSIS — H26.8 OTHER SPECIFIED CATARACT: ICD-10-CM

## 2019-07-29 DIAGNOSIS — H25.13 AGE-RELATED NUCLEAR CATARACT, BILATERAL: ICD-10-CM

## 2019-07-29 PROCEDURE — 36415 COLL VENOUS BLD VENIPUNCTURE: CPT

## 2019-07-29 PROCEDURE — 99214 OFFICE O/P EST MOD 30 MIN: CPT | Mod: 25

## 2019-07-29 NOTE — HEALTH RISK ASSESSMENT
[No falls in past year] : Patient reported no falls in the past year [0] : 2) Feeling down, depressed, or hopeless: Not at all (0) [] : No [[id number]] : 0

## 2019-07-29 NOTE — PLAN
[FreeTextEntry1] : Patient in for follow up of HTN  has been followed by Cardio for Afib had 3 cardio version Wednesday  h, Using CPAP and it is clean has follow up with cardio and pulmonary has appointment for s/p  cataract removed \par Has cardio eval pending for meds change  no arthritis drugs Nalfon not covered needs cheap meds \par Dr perales  Meds given  Labs done RTC 1 month

## 2019-07-29 NOTE — PHYSICAL EXAM
[No Acute Distress] : no acute distress [Well Nourished] : well nourished [Well-Appearing] : well-appearing [Well Developed] : well developed [Normal Sclera/Conjunctiva] : normal sclera/conjunctiva [PERRL] : pupils equal round and reactive to light [EOMI] : extraocular movements intact [Normal Outer Ear/Nose] : the outer ears and nose were normal in appearance [Normal Oropharynx] : the oropharynx was normal [No JVD] : no jugular venous distention [No Lymphadenopathy] : no lymphadenopathy [Thyroid Normal, No Nodules] : the thyroid was normal and there were no nodules present [Supple] : supple [No Accessory Muscle Use] : no accessory muscle use [No Respiratory Distress] : no respiratory distress  [Clear to Auscultation] : lungs were clear to auscultation bilaterally [Normal Rate] : normal rate  [Regular Rhythm] : with a regular rhythm [No Murmur] : no murmur heard [Normal S1, S2] : normal S1 and S2 [No Carotid Bruits] : no carotid bruits [No Abdominal Bruit] : a ~M bruit was not heard ~T in the abdomen [Pedal Pulses Present] : the pedal pulses are present [No Varicosities] : no varicosities [No Edema] : there was no peripheral edema [No Palpable Aorta] : no palpable aorta [No Extremity Clubbing/Cyanosis] : no extremity clubbing/cyanosis [Non Tender] : non-tender [Soft] : abdomen soft [Non-distended] : non-distended [No Masses] : no abdominal mass palpated [No HSM] : no HSM [Normal Bowel Sounds] : normal bowel sounds [Normal Anterior Cervical Nodes] : no anterior cervical lymphadenopathy [Normal Posterior Cervical Nodes] : no posterior cervical lymphadenopathy [No CVA Tenderness] : no CVA  tenderness [No Spinal Tenderness] : no spinal tenderness [No Joint Swelling] : no joint swelling [No Rash] : no rash [Grossly Normal Strength/Tone] : grossly normal strength/tone [Coordination Grossly Intact] : coordination grossly intact [No Focal Deficits] : no focal deficits [Deep Tendon Reflexes (DTR)] : deep tendon reflexes were 2+ and symmetric [Normal Gait] : normal gait [Normal Insight/Judgement] : insight and judgment were intact [Normal Affect] : the affect was normal

## 2019-07-29 NOTE — COUNSELING
[Weight management counseling provided] : Weight management [Activity counseling provided] : activity [Healthy eating counseling provided] : healthy eating [Behavioral health counseling provided] : behavioral health  [Needs reinforcement, provided] : Patient needs reinforcement on understanding of disease, goals and obesity follow-up plan; reinforcement was provided [Fall prevention counseling provided] : fall prevention  [Decrease Portions] : Decrease food portions

## 2019-07-29 NOTE — REVIEW OF SYSTEMS
[Fatigue] : fatigue [Hearing Loss] : hearing loss [Postnasal Drip] : postnasal drip [Lower Ext Edema] : lower extremity edema [Nocturia] : nocturia [Joint Pain] : joint pain [Joint Stiffness] : joint stiffness [Muscle Weakness] : muscle weakness [Back Pain] : back pain [Joint Swelling] : joint swelling [Itching] : itching [Negative] : Psychiatric [Fever] : no fever [Chills] : no chills [Night Sweats] : no night sweats [Recent Change In Weight] : ~T no recent weight change [Discharge] : no discharge [Pain] : no pain [Itching] : no itching [Redness] : no redness [Vision Problems] : no vision problems [Earache] : no earache [Nosebleeds] : no nosebleeds [Nasal Discharge] : no nasal discharge [Hoarseness] : no hoarseness [Sore Throat] : no sore throat [Palpitations] : no palpitations [Chest Pain] : no chest pain [Orthopena] : no orthopnea [Claudication] : no  leg claudication [Paroysmal Nocturnal Dyspnea] : no paroysmal nocturnal dyspnea [Abdominal Pain] : no abdominal pain [Vomiting] : no vomiting [Nausea] : no nausea [Heartburn] : no heartburn [Dysuria] : no dysuria [Incontinence] : no incontinence [Melena] : no melena [Hesitancy] : no hesitancy [Frequency] : no frequency [Hematuria] : no hematuria [Poor Libido] : libido not poor [Impotence] : no impotency [Muscle Pain] : no muscle pain [Headache] : no headache [Dizziness] : no dizziness [Fainting] : no fainting [Unsteady Walk] : no ataxia [Confusion] : no confusion [Memory Loss] : no memory loss

## 2019-07-29 NOTE — HISTORY OF PRESENT ILLNESS
[FreeTextEntry1] : Patient in for follow up of HTN  has been followed by Cardio for Afib had 3 cardio version Wednesday  h, Using CPAP and it is clean has follow up with cardio and pulmonary has appointment for s/p  cataract removed

## 2019-07-30 LAB
ALBUMIN SERPL ELPH-MCNC: 4.6 G/DL
ALP BLD-CCNC: 79 U/L
ALT SERPL-CCNC: 47 U/L
ANION GAP SERPL CALC-SCNC: 17 MMOL/L
AST SERPL-CCNC: 42 U/L
BASOPHILS # BLD AUTO: 0.07 K/UL
BASOPHILS NFR BLD AUTO: 0.9 %
BILIRUB SERPL-MCNC: 0.3 MG/DL
BUN SERPL-MCNC: 12 MG/DL
CALCIUM SERPL-MCNC: 10 MG/DL
CHLORIDE SERPL-SCNC: 101 MMOL/L
CHOLEST SERPL-MCNC: 187 MG/DL
CHOLEST/HDLC SERPL: 4.4 RATIO
CO2 SERPL-SCNC: 22 MMOL/L
CREAT SERPL-MCNC: 0.88 MG/DL
EOSINOPHIL # BLD AUTO: 0.2 K/UL
EOSINOPHIL NFR BLD AUTO: 2.5 %
ERYTHROCYTE [SEDIMENTATION RATE] IN BLOOD BY WESTERGREN METHOD: 47 MM/HR
GLUCOSE SERPL-MCNC: 121 MG/DL
HCT VFR BLD CALC: 50.6 %
HDLC SERPL-MCNC: 43 MG/DL
HGB BLD-MCNC: 16 G/DL
IMM GRANULOCYTES NFR BLD AUTO: 0.4 %
LDLC SERPL CALC-MCNC: 113 MG/DL
LYMPHOCYTES # BLD AUTO: 2.36 K/UL
LYMPHOCYTES NFR BLD AUTO: 29 %
MAN DIFF?: NORMAL
MCHC RBC-ENTMCNC: 29 PG
MCHC RBC-ENTMCNC: 31.6 GM/DL
MCV RBC AUTO: 91.7 FL
MONOCYTES # BLD AUTO: 0.33 K/UL
MONOCYTES NFR BLD AUTO: 4.1 %
NEUTROPHILS # BLD AUTO: 5.15 K/UL
NEUTROPHILS NFR BLD AUTO: 63.1 %
PLATELET # BLD AUTO: 261 K/UL
POTASSIUM SERPL-SCNC: 4.1 MMOL/L
PROT SERPL-MCNC: 8.2 G/DL
RBC # BLD: 5.52 M/UL
RBC # FLD: 14.4 %
SODIUM SERPL-SCNC: 140 MMOL/L
TRIGL SERPL-MCNC: 155 MG/DL
TSH SERPL-ACNC: 3.45 UIU/ML
WBC # FLD AUTO: 8.14 K/UL

## 2019-08-05 ENCOUNTER — RX RENEWAL (OUTPATIENT)
Age: 64
End: 2019-08-05

## 2019-08-06 ENCOUNTER — NON-APPOINTMENT (OUTPATIENT)
Age: 64
End: 2019-08-06

## 2019-08-06 ENCOUNTER — APPOINTMENT (OUTPATIENT)
Dept: OPHTHALMOLOGY | Facility: CLINIC | Age: 64
End: 2019-08-06
Payer: MEDICAID

## 2019-08-06 PROCEDURE — 99024 POSTOP FOLLOW-UP VISIT: CPT

## 2019-09-03 NOTE — PHYSICAL THERAPY INITIAL EVALUATION ADULT - GAIT DEVIATIONS NOTED, PT EVAL
Group Topic: BH Coping Skills Education    Date: 8/31/2019  Start Time:  9:00 AM  End Time: 10:00 AM    Focus: PERMA   Number in attendance: 7    A presentation was given on PERMA and how it can improve a person's well-being and improve mental health. PERMA: Positive emotions, engagement, relationships, meaning, and accomplishment.  Pt shared optimism and happiness by being genuine and authentic.                               Rm Cohen, LPC, CSAC, CSIT    Attendance: Present  Patient Response: Appropriate feedback   decreased weight-shifting ability

## 2019-09-12 ENCOUNTER — RX RENEWAL (OUTPATIENT)
Age: 64
End: 2019-09-12

## 2019-09-16 NOTE — ED ADULT NURSE REASSESSMENT NOTE - PUPILS PERRL
Hospitalist Discharge Summary     Patient ID:  William Siegel  [id number]  24 y.o.  1970 9/13/2019    PCP on record: Ryan Monroy MD    Admit date: 9/13/2019  Discharge date and time: 9/16/2019    DISCHARGE DIAGNOSIS:    Acute on chronic hypercapnic and hypoxic respiratory failure- due to non compliance with BIPAP/Trilogy machine at home, recommend compliance- pt understands, empiric Amoxicillin x 4 more days  Chronic diastolic CHF  DM  Super Morbid Obesity /Cutaneous candidiasis POA- cont nystatin  COPD  HTN          CONSULTATIONS:  IP CONSULT TO PULMONOLOGY    Excerpted HPI from H&P of Stacy Law MD:  54years old female from home with past medical history significant for COPD, morbid obesity, DM, obstructive sleep apnea presented to the hospital for evaluation of worsening shortness of breath associated with chest pain, patient stated her [facility] was not working and for this reason she not  used her CPAP  because it was very hot, patient denies any fever chills any nausea vomiting any abdominal pain, ABG was done and show elevated CO2 level 75.7.                     We were asked to admit for work up and evaluation of the above problems.          ______________________________________________________________________  DISCHARGE SUMMARY/HOSPITAL COURSE:  for full details see H&P, daily progress notes, labs, consult notes. Acute on chronic hypercapnic and hypoxic respiratory failure  Continue BiPAP  DuoNeb nebulizer  - Compliance is the main issue. She says cant use BIPAp when it is too hot as her AC is broke. She says she has  A letter from her doctor so probably will be fixed soon. Chronic diastolic CHF  - On Lasix.     Chest pain   -f/u serial troponin   -echocardiogram was not done as she refused it. Labs not done as she refused.     DM  Continue Lantus  Start patient sliding scale    Morbid obesity    COPD  -continue nebulizer     Chronic lymphedema     HTN -continue home med         Code status: full  DVT prophylaxis: lovenox          _______________________________________________________________________  Patient seen and examined by me on discharge day. Pertinent Findings:  Gen:    Not in distress, Morbidly obese +  Chest: Clear lungs  CVS:   Regular rhythm. No edema  Abd:  Soft, not distended, not tender  Neuro:  Alert, oriented x 3  _______________________________________________________________________  DISCHARGE MEDICATIONS:   Current Discharge Medication List      START taking these medications    Details   amoxicillin-clavulanate (AUGMENTIN) 875-125 mg per tablet Take 1 Tab by mouth two (2) times daily (with meals) for 4 days. Qty: 8 Tab, Refills: 0         CONTINUE these medications which have CHANGED    Details   nystatin (MYCOSTATIN) topical cream Apply  to affected area two (2) times a day. Qty: 15 g, Refills: 0         CONTINUE these medications which have NOT CHANGED    Details   alum-mag hydroxide-simeth (MAALOX ADVANCED) 200-200-20 mg/5 mL susp Take 30 mL by mouth every four (4) hours as needed for Pain. Qty: 354 mL, Refills: 0      ranolazine ER (RANEXA) 500 mg SR tablet Take 1 Tab by mouth two (2) times a day. Qty: 60 Tab, Refills: 8      cyclobenzaprine (FLEXERIL) 5 mg tablet Take 1 Tab by mouth two (2) times daily as needed for Muscle Spasm(s). flexeriol  Qty: 5 Tab, Refills: 0      albuterol-ipratropium (DUO-NEB) 2.5 mg-0.5 mg/3 ml nebu 3 mL by Nebulization route four (4) times daily. Qty: 100 Nebule, Refills: 1      furosemide (LASIX) 40 mg tablet Take 40 mg by mouth two (2) times a day. fluticasone (FLONASE) 50 mcg/actuation nasal spray 2 Sprays by Both Nostrils route daily. Qty: 1 Bottle, Refills: 0      insulin glargine (LANTUS) 100 unit/mL injection 45 Units by SubCUTAneous route daily. metoprolol tartrate (LOPRESSOR) 25 mg tablet Take 25 mg by mouth two (2) times a day.       fluticasone-salmeterol (ADVAIR DISKUS) 500-50 mcg/dose diskus inhaler Take 1 Puff by inhalation every twelve (12) hours. loratadine (CLARITIN) 10 mg tablet Take 10 mg by mouth daily. albuterol (VENTOLIN HFA) 90 mcg/actuation inhaler Take 2 Puffs by inhalation every six (6) hours as needed for Wheezing. aspirin 81 mg chewable tablet Take 81 mg by mouth daily. hydrOXYzine pamoate (VISTARIL) 50 mg capsule Take 50 mg by mouth every eight (8) hours as needed for Itching. lovastatin (MEVACOR) 40 mg tablet Take 1 Tab by mouth nightly. Qty: 30 Tab, Refills: 6    Associated Diagnoses: Atherosclerosis of native coronary artery without angina pectoris      glyBURIDE (DIABETA) 5 mg tablet Take 5 mg by mouth two (2) times daily (with meals). ammonium lactate (LAC-HYDRIN) 12 % topical cream rub in to affected area well  Qty: 280 g, Refills: 1      nitroglycerin (NITROSTAT) 0.4 mg SL tablet 1 Tab by SubLINGual route every five (5) minutes as needed for Chest Pain (call 911 if not relieved by 3). Qty: 25 Tab, Refills: 2    Associated Diagnoses: SVT (supraventricular tachycardia) (HCC); CHF (congestive heart failure) (HCC)         STOP taking these medications       nystatin (MYCOSTATIN) powder Comments:   Reason for Stopping:                 Patient Follow Up Instructions:    Activity: Activity as tolerated  Diet: Cardiac Diet and Low fat, Low cholesterol, Diabetic diet    Follow-up with PCP in 1 week as needed    Follow-up Information     Follow up With Specialties Details Why Contact Info    [address], [address] [address]  [phone]          ________________________________________________________________    Risk of deterioration: Moderate due to non compliance with Trilogy machine at home    Condition at Discharge:  Stable  __________________________________________________________________    Disposition  Home with family and home health services/[facility] ____________________________________________________________________    Code Status: Full Code  ___________________________________________________________________      Total time in minutes spent coordinating this discharge (includes going over instructions, follow-up, prescriptions, and preparing report for sign off to her PCP) :  36 minutes    Signed:  Lucita Staley MD no

## 2019-09-27 ENCOUNTER — RX RENEWAL (OUTPATIENT)
Age: 64
End: 2019-09-27

## 2019-10-22 ENCOUNTER — RX RENEWAL (OUTPATIENT)
Age: 64
End: 2019-10-22

## 2019-10-30 ENCOUNTER — APPOINTMENT (OUTPATIENT)
Dept: FAMILY MEDICINE | Facility: CLINIC | Age: 64
End: 2019-10-30
Payer: MEDICAID

## 2019-10-30 ENCOUNTER — LABORATORY RESULT (OUTPATIENT)
Age: 64
End: 2019-10-30

## 2019-10-30 VITALS
WEIGHT: 315 LBS | OXYGEN SATURATION: 95 % | RESPIRATION RATE: 13 BRPM | HEART RATE: 52 BPM | TEMPERATURE: 98.9 F | HEIGHT: 68 IN | DIASTOLIC BLOOD PRESSURE: 90 MMHG | SYSTOLIC BLOOD PRESSURE: 122 MMHG | BODY MASS INDEX: 47.74 KG/M2

## 2019-10-30 PROCEDURE — 36415 COLL VENOUS BLD VENIPUNCTURE: CPT

## 2019-10-30 PROCEDURE — 99214 OFFICE O/P EST MOD 30 MIN: CPT | Mod: 25

## 2019-10-30 RX ORDER — OFLOXACIN 3 MG/ML
0.3 SOLUTION/ DROPS OPHTHALMIC 4 TIMES DAILY
Qty: 1 | Refills: 1 | Status: DISCONTINUED | COMMUNITY
Start: 2019-07-02 | End: 2019-10-30

## 2019-10-30 RX ORDER — ALBUTEROL SULFATE 90 UG/1
108 (90 BASE) INHALANT RESPIRATORY (INHALATION)
Qty: 1 | Refills: 3 | Status: DISCONTINUED | COMMUNITY
Start: 2019-09-12 | End: 2019-10-30

## 2019-10-30 RX ORDER — PREDNISOLONE ACETATE 10 MG/ML
1 SUSPENSION/ DROPS OPHTHALMIC 4 TIMES DAILY
Qty: 1 | Refills: 1 | Status: DISCONTINUED | COMMUNITY
Start: 2019-07-02 | End: 2019-10-30

## 2019-10-30 NOTE — COUNSELING
[Fall prevention counseling provided] : Fall prevention counseling provided [Behavioral health counseling provided] : Behavioral health counseling provided [Engage in a relaxing activity] : Engage in a relaxing activity [Potential consequences of obesity discussed] : Potential consequences of obesity discussed [Benefits of weight loss discussed] : Benefits of weight loss discussed [Encouraged to maintain food diary] : Encouraged to maintain food diary [Encouraged to increase physical activity] : Encouraged to increase physical activity

## 2019-10-31 LAB
ALBUMIN SERPL ELPH-MCNC: 4.7 G/DL
ALP BLD-CCNC: 76 U/L
ALT SERPL-CCNC: 84 U/L
ANION GAP SERPL CALC-SCNC: 17 MMOL/L
AST SERPL-CCNC: 77 U/L
BASOPHILS # BLD AUTO: 0.05 K/UL
BASOPHILS NFR BLD AUTO: 0.7 %
BILIRUB SERPL-MCNC: 0.4 MG/DL
BUN SERPL-MCNC: 22 MG/DL
CALCIUM SERPL-MCNC: 10.4 MG/DL
CHLORIDE SERPL-SCNC: 101 MMOL/L
CHOLEST SERPL-MCNC: 239 MG/DL
CHOLEST/HDLC SERPL: 6.5 RATIO
CO2 SERPL-SCNC: 22 MMOL/L
CREAT SERPL-MCNC: 1.02 MG/DL
EOSINOPHIL # BLD AUTO: 0.18 K/UL
EOSINOPHIL NFR BLD AUTO: 2.4 %
ERYTHROCYTE [SEDIMENTATION RATE] IN BLOOD BY WESTERGREN METHOD: 54 MM/HR
ESTIMATED AVERAGE GLUCOSE: 157 MG/DL
GLUCOSE SERPL-MCNC: 107 MG/DL
HBA1C MFR BLD HPLC: 7.1 %
HCT VFR BLD CALC: 48.3 %
HDLC SERPL-MCNC: 37 MG/DL
HGB BLD-MCNC: 15.3 G/DL
IMM GRANULOCYTES NFR BLD AUTO: 0.3 %
LDLC SERPL CALC-MCNC: 154 MG/DL
LYMPHOCYTES # BLD AUTO: 2.02 K/UL
LYMPHOCYTES NFR BLD AUTO: 26.4 %
MAN DIFF?: NORMAL
MCHC RBC-ENTMCNC: 29.1 PG
MCHC RBC-ENTMCNC: 31.7 GM/DL
MCV RBC AUTO: 92 FL
MONOCYTES # BLD AUTO: 0.37 K/UL
MONOCYTES NFR BLD AUTO: 4.8 %
NEUTROPHILS # BLD AUTO: 5.01 K/UL
NEUTROPHILS NFR BLD AUTO: 65.4 %
PLATELET # BLD AUTO: 310 K/UL
POTASSIUM SERPL-SCNC: 4.3 MMOL/L
PROT SERPL-MCNC: 8.2 G/DL
RBC # BLD: 5.25 M/UL
RBC # FLD: 15.1 %
SODIUM SERPL-SCNC: 139 MMOL/L
TRIGL SERPL-MCNC: 239 MG/DL
WBC # FLD AUTO: 7.65 K/UL

## 2019-10-31 NOTE — REVIEW OF SYSTEMS
[Fatigue] : fatigue [Hearing Loss] : hearing loss [Postnasal Drip] : postnasal drip [Lower Ext Edema] : lower extremity edema [Nocturia] : nocturia [Joint Pain] : joint pain [Joint Stiffness] : joint stiffness [Muscle Weakness] : muscle weakness [Back Pain] : back pain [Joint Swelling] : joint swelling [Itching] : itching [Negative] : Heme/Lymph [Fever] : no fever [Chills] : no chills [Night Sweats] : no night sweats [Recent Change In Weight] : ~T no recent weight change [Discharge] : no discharge [Pain] : no pain [Redness] : no redness [Vision Problems] : no vision problems [Itching] : no itching [Earache] : no earache [Nosebleeds] : no nosebleeds [Nasal Discharge] : no nasal discharge [Sore Throat] : no sore throat [Hoarseness] : no hoarseness [Chest Pain] : no chest pain [Palpitations] : no palpitations [Claudication] : no  leg claudication [Orthopena] : no orthopnea [Abdominal Pain] : no abdominal pain [Nausea] : no nausea [Vomiting] : no vomiting [Heartburn] : no heartburn [Melena] : no melena [Dysuria] : no dysuria [Incontinence] : no incontinence [Hesitancy] : no hesitancy [Hematuria] : no hematuria [Frequency] : no frequency [Impotence] : no impotency [Poor Libido] : libido not poor [Muscle Pain] : no muscle pain [Headache] : no headache [Dizziness] : no dizziness [Fainting] : no fainting [Confusion] : no confusion [Unsteady Walk] : no ataxia [Memory Loss] : no memory loss

## 2019-10-31 NOTE — HISTORY OF PRESENT ILLNESS
[FreeTextEntry1] : Patient in for follow up of HTN  has been followed by Cardio for Afib had additional  cardio version This week  on CPAP needs meds today, declines labs

## 2019-10-31 NOTE — PLAN
[FreeTextEntry1] : Patient in for follow up of HTN  has been followed by Cardio for Afib had additional  cardio version This week  on CPAP needs meds today, declines Vaccines \par \par Labs and meds diabetic care plan reviewed \par

## 2019-10-31 NOTE — HEALTH RISK ASSESSMENT
[No falls in past year] : Patient reported no falls in the past year [0] : 2) Feeling down, depressed, or hopeless: Not at all (0) [] : No [[id number]] : 0

## 2019-11-05 LAB — TSH SERPL-ACNC: 7.63 UIU/ML

## 2019-11-20 ENCOUNTER — TRANSCRIPTION ENCOUNTER (OUTPATIENT)
Age: 64
End: 2019-11-20

## 2019-11-26 ENCOUNTER — TRANSCRIPTION ENCOUNTER (OUTPATIENT)
Age: 64
End: 2019-11-26

## 2020-01-21 ENCOUNTER — RX RENEWAL (OUTPATIENT)
Age: 65
End: 2020-01-21

## 2020-01-30 ENCOUNTER — APPOINTMENT (OUTPATIENT)
Dept: FAMILY MEDICINE | Facility: CLINIC | Age: 65
End: 2020-01-30
Payer: COMMERCIAL

## 2020-01-30 ENCOUNTER — LABORATORY RESULT (OUTPATIENT)
Age: 65
End: 2020-01-30

## 2020-01-30 VITALS
BODY MASS INDEX: 47.74 KG/M2 | HEIGHT: 68 IN | OXYGEN SATURATION: 94 % | HEART RATE: 76 BPM | SYSTOLIC BLOOD PRESSURE: 128 MMHG | DIASTOLIC BLOOD PRESSURE: 82 MMHG | RESPIRATION RATE: 12 BRPM | WEIGHT: 315 LBS

## 2020-01-30 PROCEDURE — 36415 COLL VENOUS BLD VENIPUNCTURE: CPT

## 2020-01-30 PROCEDURE — 99214 OFFICE O/P EST MOD 30 MIN: CPT | Mod: 25

## 2020-01-30 NOTE — HEALTH RISK ASSESSMENT
[No falls in past year] : Patient reported no falls in the past year [0] : 2) Feeling down, depressed, or hopeless: Not at all (0) [] : No [[id number]] : 0

## 2020-01-30 NOTE — PLAN
[FreeTextEntry1] : Patient in for follow up of HTN  has been followed by Cardio for Afib  CPAP needs Meds today, pt has a cardiac follow up in a month for new  heart motor. pt not feeling palpitations \par \par Labs and meds diabetic care plan reviewed \par

## 2020-01-30 NOTE — PHYSICAL EXAM
[No Acute Distress] : no acute distress [Well Nourished] : well nourished [Well Developed] : well developed [Well-Appearing] : well-appearing [PERRL] : pupils equal round and reactive to light [Normal Sclera/Conjunctiva] : normal sclera/conjunctiva [EOMI] : extraocular movements intact [Normal Outer Ear/Nose] : the outer ears and nose were normal in appearance [Normal Oropharynx] : the oropharynx was normal [No JVD] : no jugular venous distention [No Lymphadenopathy] : no lymphadenopathy [Supple] : supple [Thyroid Normal, No Nodules] : the thyroid was normal and there were no nodules present [No Respiratory Distress] : no respiratory distress  [No Accessory Muscle Use] : no accessory muscle use [Normal Rate] : normal rate  [Clear to Auscultation] : lungs were clear to auscultation bilaterally [No Murmur] : no murmur heard [Regular Rhythm] : with a regular rhythm [Normal S1, S2] : normal S1 and S2 [No Carotid Bruits] : no carotid bruits [No Abdominal Bruit] : a ~M bruit was not heard ~T in the abdomen [No Varicosities] : no varicosities [No Edema] : there was no peripheral edema [Pedal Pulses Present] : the pedal pulses are present [No Palpable Aorta] : no palpable aorta [No Extremity Clubbing/Cyanosis] : no extremity clubbing/cyanosis [Soft] : abdomen soft [Non Tender] : non-tender [Non-distended] : non-distended [No Masses] : no abdominal mass palpated [Normal Bowel Sounds] : normal bowel sounds [Normal Posterior Cervical Nodes] : no posterior cervical lymphadenopathy [No HSM] : no HSM [Normal Anterior Cervical Nodes] : no anterior cervical lymphadenopathy [No CVA Tenderness] : no CVA  tenderness [No Joint Swelling] : no joint swelling [No Spinal Tenderness] : no spinal tenderness [Coordination Grossly Intact] : coordination grossly intact [Grossly Normal Strength/Tone] : grossly normal strength/tone [No Rash] : no rash [No Focal Deficits] : no focal deficits [Normal Gait] : normal gait [Normal Insight/Judgement] : insight and judgment were intact [Normal Affect] : the affect was normal [Deep Tendon Reflexes (DTR)] : deep tendon reflexes were 2+ and symmetric

## 2020-01-30 NOTE — COUNSELING
[Fall prevention counseling provided] : Fall prevention counseling provided [Behavioral health counseling provided] : Behavioral health counseling provided [Engage in a relaxing activity] : Engage in a relaxing activity [Potential consequences of obesity discussed] : Potential consequences of obesity discussed [Encouraged to maintain food diary] : Encouraged to maintain food diary [Benefits of weight loss discussed] : Benefits of weight loss discussed [Encouraged to increase physical activity] : Encouraged to increase physical activity

## 2020-01-30 NOTE — HISTORY OF PRESENT ILLNESS
[FreeTextEntry1] : Patient in for follow up of HTN  has been followed by Cardio for Afib  CPAP needs meds today, pt has a cardiac follow up in a month for new  heart motor. pt not feeling palpatations

## 2020-01-30 NOTE — REVIEW OF SYSTEMS
[Fatigue] : fatigue [Hearing Loss] : hearing loss [Postnasal Drip] : postnasal drip [Lower Ext Edema] : lower extremity edema [Nocturia] : nocturia [Joint Stiffness] : joint stiffness [Joint Pain] : joint pain [Muscle Weakness] : muscle weakness [Itching] : itching [Back Pain] : back pain [Joint Swelling] : joint swelling [Negative] : Psychiatric [Fever] : no fever [Chills] : no chills [Night Sweats] : no night sweats [Recent Change In Weight] : ~T no recent weight change [Discharge] : no discharge [Redness] : no redness [Pain] : no pain [Itching] : no itching [Vision Problems] : no vision problems [Earache] : no earache [Nosebleeds] : no nosebleeds [Nasal Discharge] : no nasal discharge [Sore Throat] : no sore throat [Hoarseness] : no hoarseness [Chest Pain] : no chest pain [Palpitations] : no palpitations [Claudication] : no  leg claudication [Orthopena] : no orthopnea [Abdominal Pain] : no abdominal pain [Nausea] : no nausea [Vomiting] : no vomiting [Heartburn] : no heartburn [Melena] : no melena [Dysuria] : no dysuria [Hesitancy] : no hesitancy [Incontinence] : no incontinence [Hematuria] : no hematuria [Frequency] : no frequency [Impotence] : no impotency [Poor Libido] : libido not poor [Muscle Pain] : no muscle pain [Headache] : no headache [Dizziness] : no dizziness [Fainting] : no fainting [Confusion] : no confusion [Unsteady Walk] : no ataxia [Memory Loss] : no memory loss

## 2020-01-31 LAB
ALBUMIN SERPL ELPH-MCNC: 4.6 G/DL
ALP BLD-CCNC: 75 U/L
ALT SERPL-CCNC: 66 U/L
ANION GAP SERPL CALC-SCNC: 14 MMOL/L
AST SERPL-CCNC: 53 U/L
BASOPHILS # BLD AUTO: 0.06 K/UL
BASOPHILS NFR BLD AUTO: 0.7 %
BILIRUB SERPL-MCNC: 0.3 MG/DL
BUN SERPL-MCNC: 15 MG/DL
CALCIUM SERPL-MCNC: 10.1 MG/DL
CHLORIDE SERPL-SCNC: 103 MMOL/L
CHOLEST SERPL-MCNC: 224 MG/DL
CHOLEST/HDLC SERPL: 5.5 RATIO
CO2 SERPL-SCNC: 26 MMOL/L
CREAT SERPL-MCNC: 1.16 MG/DL
EOSINOPHIL # BLD AUTO: 0.2 K/UL
EOSINOPHIL NFR BLD AUTO: 2.5 %
ERYTHROCYTE [SEDIMENTATION RATE] IN BLOOD BY WESTERGREN METHOD: 47 MM/HR
ESTIMATED AVERAGE GLUCOSE: 140 MG/DL
GLUCOSE SERPL-MCNC: 115 MG/DL
HBA1C MFR BLD HPLC: 6.5 %
HCT VFR BLD CALC: 45.3 %
HDLC SERPL-MCNC: 41 MG/DL
HGB BLD-MCNC: 14.5 G/DL
IMM GRANULOCYTES NFR BLD AUTO: 0.2 %
LDLC SERPL CALC-MCNC: 149 MG/DL
LYMPHOCYTES # BLD AUTO: 2.02 K/UL
LYMPHOCYTES NFR BLD AUTO: 24.9 %
MAN DIFF?: NORMAL
MCHC RBC-ENTMCNC: 29.1 PG
MCHC RBC-ENTMCNC: 32 GM/DL
MCV RBC AUTO: 90.8 FL
MONOCYTES # BLD AUTO: 0.44 K/UL
MONOCYTES NFR BLD AUTO: 5.4 %
NEUTROPHILS # BLD AUTO: 5.37 K/UL
NEUTROPHILS NFR BLD AUTO: 66.3 %
PLATELET # BLD AUTO: 259 K/UL
POTASSIUM SERPL-SCNC: 4.1 MMOL/L
PROT SERPL-MCNC: 7.6 G/DL
RBC # BLD: 4.99 M/UL
RBC # FLD: 14.7 %
SODIUM SERPL-SCNC: 142 MMOL/L
TRIGL SERPL-MCNC: 168 MG/DL
WBC # FLD AUTO: 8.11 K/UL

## 2020-02-04 LAB — TSH SERPL-ACNC: 7.87 UIU/ML

## 2020-03-04 ENCOUNTER — RX RENEWAL (OUTPATIENT)
Age: 65
End: 2020-03-04

## 2020-05-27 ENCOUNTER — APPOINTMENT (OUTPATIENT)
Dept: FAMILY MEDICINE | Facility: CLINIC | Age: 65
End: 2020-05-27
Payer: COMMERCIAL

## 2020-05-27 ENCOUNTER — LABORATORY RESULT (OUTPATIENT)
Age: 65
End: 2020-05-27

## 2020-05-27 VITALS
BODY MASS INDEX: 47.74 KG/M2 | HEART RATE: 97 BPM | OXYGEN SATURATION: 98 % | DIASTOLIC BLOOD PRESSURE: 84 MMHG | RESPIRATION RATE: 14 BRPM | HEIGHT: 68 IN | SYSTOLIC BLOOD PRESSURE: 140 MMHG | WEIGHT: 315 LBS

## 2020-05-27 PROCEDURE — 99214 OFFICE O/P EST MOD 30 MIN: CPT | Mod: 25

## 2020-05-27 PROCEDURE — 36415 COLL VENOUS BLD VENIPUNCTURE: CPT

## 2020-05-27 NOTE — COUNSELING
[Behavioral health counseling provided] : Behavioral health counseling provided [Engage in a relaxing activity] : Engage in a relaxing activity [Fall prevention counseling provided] : Fall prevention counseling provided [Potential consequences of obesity discussed] : Potential consequences of obesity discussed [Benefits of weight loss discussed] : Benefits of weight loss discussed [Encouraged to increase physical activity] : Encouraged to increase physical activity [Encouraged to maintain food diary] : Encouraged to maintain food diary

## 2020-05-27 NOTE — HEALTH RISK ASSESSMENT
[] : No [No falls in past year] : Patient reported no falls in the past year [0] : 1) Little interest or pleasure doing things: Not at all (0) [[id number]] : 0

## 2020-05-27 NOTE — REVIEW OF SYSTEMS
[Fatigue] : fatigue [Lower Ext Edema] : lower extremity edema [Nocturia] : nocturia [Joint Pain] : joint pain [Joint Stiffness] : joint stiffness [Muscle Weakness] : muscle weakness [Back Pain] : back pain [Joint Swelling] : joint swelling [Itching] : itching [Negative] : Heme/Lymph [Fever] : no fever [Night Sweats] : no night sweats [Chills] : no chills [Recent Change In Weight] : ~T no recent weight change [Pain] : no pain [Discharge] : no discharge [Itching] : no itching [Vision Problems] : no vision problems [Redness] : no redness [Hearing Loss] : hearing loss [Earache] : no earache [Nosebleeds] : no nosebleeds [Postnasal Drip] : postnasal drip [Nasal Discharge] : no nasal discharge [Sore Throat] : no sore throat [Palpitations] : no palpitations [Chest Pain] : no chest pain [Hoarseness] : no hoarseness [Abdominal Pain] : no abdominal pain [Orthopena] : no orthopnea [Claudication] : no  leg claudication [Heartburn] : no heartburn [Nausea] : no nausea [Vomiting] : no vomiting [Incontinence] : no incontinence [Melena] : no melena [Dysuria] : no dysuria [Hesitancy] : no hesitancy [Hematuria] : no hematuria [Impotence] : no impotency [Frequency] : no frequency [Poor Libido] : libido not poor [Headache] : no headache [Dizziness] : no dizziness [Muscle Pain] : no muscle pain [Unsteady Walk] : no ataxia [Fainting] : no fainting [Confusion] : no confusion [Memory Loss] : no memory loss

## 2020-05-27 NOTE — PLAN
[FreeTextEntry1] : Patient in for follow up of HTN  has been followed by Cardio for Afib  CPAP needs meds today, PMH stable on meds no weight gain \par \par Labs and meds diabetic care plan reviewed \par Covid education\par Advised cardiac f/u\par Diabetic education and Optho advised \par RTC 1 month\par

## 2020-05-27 NOTE — PHYSICAL EXAM
[Well Nourished] : well nourished [No Acute Distress] : no acute distress [Well Developed] : well developed [Normal Sclera/Conjunctiva] : normal sclera/conjunctiva [Well-Appearing] : well-appearing [EOMI] : extraocular movements intact [Normal Outer Ear/Nose] : the outer ears and nose were normal in appearance [PERRL] : pupils equal round and reactive to light [No Lymphadenopathy] : no lymphadenopathy [No JVD] : no jugular venous distention [Normal Oropharynx] : the oropharynx was normal [Supple] : supple [Thyroid Normal, No Nodules] : the thyroid was normal and there were no nodules present [Clear to Auscultation] : lungs were clear to auscultation bilaterally [No Respiratory Distress] : no respiratory distress  [No Accessory Muscle Use] : no accessory muscle use [Normal Rate] : normal rate  [Regular Rhythm] : with a regular rhythm [Normal S1, S2] : normal S1 and S2 [No Murmur] : no murmur heard [No Carotid Bruits] : no carotid bruits [Pedal Pulses Present] : the pedal pulses are present [No Abdominal Bruit] : a ~M bruit was not heard ~T in the abdomen [No Varicosities] : no varicosities [No Palpable Aorta] : no palpable aorta [No Edema] : there was no peripheral edema [No Extremity Clubbing/Cyanosis] : no extremity clubbing/cyanosis [Soft] : abdomen soft [Non Tender] : non-tender [Non-distended] : non-distended [No Masses] : no abdominal mass palpated [Normal Posterior Cervical Nodes] : no posterior cervical lymphadenopathy [No HSM] : no HSM [Normal Bowel Sounds] : normal bowel sounds [Normal Anterior Cervical Nodes] : no anterior cervical lymphadenopathy [No Spinal Tenderness] : no spinal tenderness [No CVA Tenderness] : no CVA  tenderness [No Joint Swelling] : no joint swelling [Grossly Normal Strength/Tone] : grossly normal strength/tone [No Focal Deficits] : no focal deficits [Coordination Grossly Intact] : coordination grossly intact [No Rash] : no rash [Normal Gait] : normal gait [Deep Tendon Reflexes (DTR)] : deep tendon reflexes were 2+ and symmetric [Normal Affect] : the affect was normal [Normal Insight/Judgement] : insight and judgment were intact

## 2020-05-28 LAB
ALBUMIN SERPL ELPH-MCNC: 4.3 G/DL
ALP BLD-CCNC: 83 U/L
ALT SERPL-CCNC: 77 U/L
ANION GAP SERPL CALC-SCNC: 14 MMOL/L
AST SERPL-CCNC: 80 U/L
BASOPHILS # BLD AUTO: 0.06 K/UL
BASOPHILS NFR BLD AUTO: 0.7 %
BILIRUB SERPL-MCNC: 0.3 MG/DL
BUN SERPL-MCNC: 13 MG/DL
CALCIUM SERPL-MCNC: 9.7 MG/DL
CHLORIDE SERPL-SCNC: 98 MMOL/L
CO2 SERPL-SCNC: 26 MMOL/L
CREAT SERPL-MCNC: 0.91 MG/DL
EOSINOPHIL # BLD AUTO: 0.37 K/UL
EOSINOPHIL NFR BLD AUTO: 4.5 %
ERYTHROCYTE [SEDIMENTATION RATE] IN BLOOD BY WESTERGREN METHOD: 56 MM/HR
ESTIMATED AVERAGE GLUCOSE: 154 MG/DL
GLUCOSE SERPL-MCNC: 231 MG/DL
HBA1C MFR BLD HPLC: 7 %
HCT VFR BLD CALC: 48.1 %
HGB BLD-MCNC: 14.5 G/DL
IMM GRANULOCYTES NFR BLD AUTO: 0.4 %
LYMPHOCYTES # BLD AUTO: 1.95 K/UL
LYMPHOCYTES NFR BLD AUTO: 23.6 %
MAN DIFF?: NORMAL
MCHC RBC-ENTMCNC: 28 PG
MCHC RBC-ENTMCNC: 30.1 GM/DL
MCV RBC AUTO: 93 FL
MONOCYTES # BLD AUTO: 0.41 K/UL
MONOCYTES NFR BLD AUTO: 5 %
NEUTROPHILS # BLD AUTO: 5.43 K/UL
NEUTROPHILS NFR BLD AUTO: 65.8 %
PLATELET # BLD AUTO: 285 K/UL
POTASSIUM SERPL-SCNC: 4.2 MMOL/L
PROT SERPL-MCNC: 8 G/DL
PSA SERPL-MCNC: 0.7 NG/ML
RBC # BLD: 5.17 M/UL
RBC # FLD: 16 %
SARS-COV-2 IGG SERPL IA-ACNC: 132 AU/ML
SARS-COV-2 IGG SERPL QL IA: POSITIVE
SODIUM SERPL-SCNC: 138 MMOL/L
TSH SERPL-ACNC: 6.8 UIU/ML
WBC # FLD AUTO: 8.25 K/UL

## 2020-05-29 LAB — 24R-OH-CALCIDIOL SERPL-MCNC: 39.6 PG/ML

## 2020-06-02 LAB
CHOLEST SERPL-MCNC: 220 MG/DL
CHOLEST/HDLC SERPL: 5.7 RATIO
HDLC SERPL-MCNC: 39 MG/DL
LDLC SERPL CALC-MCNC: 142 MG/DL
TRIGL SERPL-MCNC: 199 MG/DL

## 2020-06-05 ENCOUNTER — RX RENEWAL (OUTPATIENT)
Age: 65
End: 2020-06-05

## 2020-08-04 ENCOUNTER — RX RENEWAL (OUTPATIENT)
Age: 65
End: 2020-08-04

## 2020-08-25 ENCOUNTER — RX RENEWAL (OUTPATIENT)
Age: 65
End: 2020-08-25

## 2020-08-26 ENCOUNTER — APPOINTMENT (OUTPATIENT)
Dept: FAMILY MEDICINE | Facility: CLINIC | Age: 65
End: 2020-08-26
Payer: COMMERCIAL

## 2020-08-26 ENCOUNTER — LABORATORY RESULT (OUTPATIENT)
Age: 65
End: 2020-08-26

## 2020-08-26 VITALS
HEART RATE: 77 BPM | BODY MASS INDEX: 47.74 KG/M2 | HEIGHT: 68 IN | WEIGHT: 315 LBS | TEMPERATURE: 97.6 F | OXYGEN SATURATION: 94 % | SYSTOLIC BLOOD PRESSURE: 130 MMHG | DIASTOLIC BLOOD PRESSURE: 84 MMHG | RESPIRATION RATE: 13 BRPM

## 2020-08-26 DIAGNOSIS — Z71.89 OTHER SPECIFIED COUNSELING: ICD-10-CM

## 2020-08-26 LAB — HBA1C MFR BLD HPLC: 7.9

## 2020-08-26 PROCEDURE — 36415 COLL VENOUS BLD VENIPUNCTURE: CPT

## 2020-08-26 PROCEDURE — 99214 OFFICE O/P EST MOD 30 MIN: CPT | Mod: 25

## 2020-08-26 PROCEDURE — 83036 HEMOGLOBIN GLYCOSYLATED A1C: CPT | Mod: QW

## 2020-08-26 RX ORDER — LOSARTAN POTASSIUM 100 MG/1
100 TABLET, FILM COATED ORAL
Qty: 30 | Refills: 2 | Status: DISCONTINUED | COMMUNITY
Start: 2019-05-21 | End: 2020-08-26

## 2020-08-26 NOTE — PHYSICAL EXAM
[No Acute Distress] : no acute distress [Well Developed] : well developed [Well Nourished] : well nourished [Normal Sclera/Conjunctiva] : normal sclera/conjunctiva [Well-Appearing] : well-appearing [PERRL] : pupils equal round and reactive to light [EOMI] : extraocular movements intact [Normal Outer Ear/Nose] : the outer ears and nose were normal in appearance [Normal Oropharynx] : the oropharynx was normal [No JVD] : no jugular venous distention [No Lymphadenopathy] : no lymphadenopathy [Thyroid Normal, No Nodules] : the thyroid was normal and there were no nodules present [Supple] : supple [No Accessory Muscle Use] : no accessory muscle use [No Respiratory Distress] : no respiratory distress  [Clear to Auscultation] : lungs were clear to auscultation bilaterally [Normal Rate] : normal rate  [Normal S1, S2] : normal S1 and S2 [Regular Rhythm] : with a regular rhythm [No Murmur] : no murmur heard [No Carotid Bruits] : no carotid bruits [No Abdominal Bruit] : a ~M bruit was not heard ~T in the abdomen [No Varicosities] : no varicosities [Pedal Pulses Present] : the pedal pulses are present [No Edema] : there was no peripheral edema [No Palpable Aorta] : no palpable aorta [No Extremity Clubbing/Cyanosis] : no extremity clubbing/cyanosis [Soft] : abdomen soft [Non Tender] : non-tender [Non-distended] : non-distended [No Masses] : no abdominal mass palpated [No HSM] : no HSM [Normal Bowel Sounds] : normal bowel sounds [Normal Posterior Cervical Nodes] : no posterior cervical lymphadenopathy [Normal Anterior Cervical Nodes] : no anterior cervical lymphadenopathy [No CVA Tenderness] : no CVA  tenderness [No Spinal Tenderness] : no spinal tenderness [No Joint Swelling] : no joint swelling [Grossly Normal Strength/Tone] : grossly normal strength/tone [No Rash] : no rash [Coordination Grossly Intact] : coordination grossly intact [No Focal Deficits] : no focal deficits [Normal Gait] : normal gait [Deep Tendon Reflexes (DTR)] : deep tendon reflexes were 2+ and symmetric [Normal Affect] : the affect was normal [Normal Insight/Judgement] : insight and judgment were intact

## 2020-08-26 NOTE — PLAN
[FreeTextEntry1] : Patient in for follow up of HTN  has been followed by Cardio for Afib  CPAP needs meds today, PMH stable on meds  needs labs. Patient reports feeling well.  Patient had cardioversion done a few weeks ago for afib. \par \par Diabetic care plan reviewed \par Medications reviewed and renewed \par Patient given Ramipril instead of Losartan due to rash\par Labs done- CBC, CMP, POCT A1C\par A1C- 7.9%\par Advised cardiac f/u\par Diabetic education \par RTC 1 month Flu shot\par

## 2020-08-26 NOTE — HEALTH RISK ASSESSMENT
[No falls in past year] : Patient reported no falls in the past year [0] : 1) Little interest or pleasure doing things: Not at all (0) [] : No [[id number]] : 0

## 2020-08-26 NOTE — HISTORY OF PRESENT ILLNESS
[FreeTextEntry1] : Patient in for follow up of HTN  has been followed by Cardio for Afib  CPAP needs meds today, PMH stable on meds  needs labs. Patient reports feeling well.  Patient had cardioversion done a few weeks ago for afib.

## 2020-08-26 NOTE — REVIEW OF SYSTEMS
[Fatigue] : fatigue [Hearing Loss] : hearing loss [Postnasal Drip] : postnasal drip [Lower Ext Edema] : lower extremity edema [Nocturia] : nocturia [Joint Pain] : joint pain [Joint Stiffness] : joint stiffness [Back Pain] : back pain [Joint Swelling] : joint swelling [Muscle Weakness] : muscle weakness [Itching] : itching [Negative] : Heme/Lymph [Fever] : no fever [Chills] : no chills [Night Sweats] : no night sweats [Recent Change In Weight] : ~T no recent weight change [Discharge] : no discharge [Redness] : no redness [Pain] : no pain [Itching] : no itching [Vision Problems] : no vision problems [Earache] : no earache [Nasal Discharge] : no nasal discharge [Sore Throat] : no sore throat [Nosebleeds] : no nosebleeds [Hoarseness] : no hoarseness [Chest Pain] : no chest pain [Palpitations] : no palpitations [Orthopena] : no orthopnea [Claudication] : no  leg claudication [Abdominal Pain] : no abdominal pain [Nausea] : no nausea [Vomiting] : no vomiting [Heartburn] : no heartburn [Dysuria] : no dysuria [Melena] : no melena [Hesitancy] : no hesitancy [Incontinence] : no incontinence [Frequency] : no frequency [Hematuria] : no hematuria [Impotence] : no impotency [Muscle Pain] : no muscle pain [Headache] : no headache [Poor Libido] : libido not poor [Dizziness] : no dizziness [Fainting] : no fainting [Confusion] : no confusion [Unsteady Walk] : no ataxia [Memory Loss] : no memory loss

## 2020-08-27 LAB
ALBUMIN SERPL ELPH-MCNC: 4.3 G/DL
ALP BLD-CCNC: 96 U/L
ALT SERPL-CCNC: 90 U/L
ANION GAP SERPL CALC-SCNC: 16 MMOL/L
AST SERPL-CCNC: 105 U/L
BASOPHILS # BLD AUTO: 0.06 K/UL
BASOPHILS NFR BLD AUTO: 0.7 %
BILIRUB SERPL-MCNC: 0.3 MG/DL
BUN SERPL-MCNC: 15 MG/DL
CALCIUM SERPL-MCNC: 9.9 MG/DL
CHLORIDE SERPL-SCNC: 100 MMOL/L
CHOLEST SERPL-MCNC: 227 MG/DL
CHOLEST/HDLC SERPL: 6.6 RATIO
CO2 SERPL-SCNC: 22 MMOL/L
CREAT SERPL-MCNC: 0.9 MG/DL
EOSINOPHIL # BLD AUTO: 0.33 K/UL
EOSINOPHIL NFR BLD AUTO: 4 %
ERYTHROCYTE [SEDIMENTATION RATE] IN BLOOD BY WESTERGREN METHOD: 45 MM/HR
GLUCOSE SERPL-MCNC: 203 MG/DL
HCT VFR BLD CALC: 48 %
HCV AB SER QL: NONREACTIVE
HCV S/CO RATIO: 0.26 S/CO
HDLC SERPL-MCNC: 34 MG/DL
HGB BLD-MCNC: 15.1 G/DL
IMM GRANULOCYTES NFR BLD AUTO: 0.5 %
LDLC SERPL CALC-MCNC: 146 MG/DL
LYMPHOCYTES # BLD AUTO: 2.15 K/UL
LYMPHOCYTES NFR BLD AUTO: 25.8 %
MAN DIFF?: NORMAL
MCHC RBC-ENTMCNC: 28.4 PG
MCHC RBC-ENTMCNC: 31.5 GM/DL
MCV RBC AUTO: 90.2 FL
MONOCYTES # BLD AUTO: 0.43 K/UL
MONOCYTES NFR BLD AUTO: 5.2 %
NEUTROPHILS # BLD AUTO: 5.33 K/UL
NEUTROPHILS NFR BLD AUTO: 63.8 %
PLATELET # BLD AUTO: 244 K/UL
POTASSIUM SERPL-SCNC: 4.4 MMOL/L
PROT SERPL-MCNC: 7.9 G/DL
PSA SERPL-MCNC: 0.71 NG/ML
RBC # BLD: 5.32 M/UL
RBC # FLD: 16.4 %
SODIUM SERPL-SCNC: 138 MMOL/L
TESTOST SERPL-MCNC: 85.6 NG/DL
TRIGL SERPL-MCNC: 231 MG/DL
WBC # FLD AUTO: 8.34 K/UL

## 2020-08-28 ENCOUNTER — RX CHANGE (OUTPATIENT)
Age: 65
End: 2020-08-28

## 2020-09-03 LAB — TSH SERPL-ACNC: 5.25 UIU/ML

## 2020-09-15 RX ORDER — FENOPROFEN CALCIUM 400 MG/1
400 CAPSULE ORAL
Qty: 60 | Refills: 2 | Status: COMPLETED | COMMUNITY
Start: 2017-01-03 | End: 2020-09-15

## 2020-09-16 ENCOUNTER — APPOINTMENT (OUTPATIENT)
Dept: FAMILY MEDICINE | Facility: CLINIC | Age: 65
End: 2020-09-16
Payer: COMMERCIAL

## 2020-09-16 VITALS
TEMPERATURE: 97.2 F | RESPIRATION RATE: 14 BRPM | OXYGEN SATURATION: 96 % | HEIGHT: 68 IN | DIASTOLIC BLOOD PRESSURE: 80 MMHG | HEART RATE: 78 BPM | SYSTOLIC BLOOD PRESSURE: 134 MMHG | BODY MASS INDEX: 47.74 KG/M2 | WEIGHT: 315 LBS

## 2020-09-16 LAB — GLUCOSE BLDC GLUCOMTR-MCNC: 174

## 2020-09-16 PROCEDURE — 82962 GLUCOSE BLOOD TEST: CPT

## 2020-09-16 PROCEDURE — 99214 OFFICE O/P EST MOD 30 MIN: CPT | Mod: 25

## 2020-09-16 NOTE — PLAN
[FreeTextEntry1] : Patient in for follow up of abnormal labs. Patient has been followed by Cardio for Afib  CPAP. PMH stable on meds  needs repeat labs. Patient states he has neck swelling and difficulty swallowing, brittle nails and trouble healing \par \par Diabetic care plan reviewed \par Labs done\par Finger stick glucose 174\par Patient given rheumatology follow up \par US of thyroid and parathyroid ordered \par RTC 1 month\par

## 2020-09-16 NOTE — HEALTH RISK ASSESSMENT
[No falls in past year] : Patient reported no falls in the past year [0] : 2) Feeling down, depressed, or hopeless: Not at all (0) [] : No [[id number]] : 0

## 2020-09-16 NOTE — COUNSELING
[Fall prevention counseling provided] : Fall prevention counseling provided [Engage in a relaxing activity] : Engage in a relaxing activity [Behavioral health counseling provided] : Behavioral health counseling provided [Benefits of weight loss discussed] : Benefits of weight loss discussed [Potential consequences of obesity discussed] : Potential consequences of obesity discussed [Encouraged to maintain food diary] : Encouraged to maintain food diary [Encouraged to increase physical activity] : Encouraged to increase physical activity

## 2020-09-16 NOTE — REVIEW OF SYSTEMS
[Fatigue] : fatigue [Hearing Loss] : hearing loss [Postnasal Drip] : postnasal drip [Lower Ext Edema] : lower extremity edema [Nocturia] : nocturia [Joint Pain] : joint pain [Joint Stiffness] : joint stiffness [Muscle Weakness] : muscle weakness [Back Pain] : back pain [Joint Swelling] : joint swelling [Itching] : itching [Negative] : Neurological [Fever] : no fever [Night Sweats] : no night sweats [Chills] : no chills [Recent Change In Weight] : ~T no recent weight change [Discharge] : no discharge [Pain] : no pain [Vision Problems] : no vision problems [Redness] : no redness [Earache] : no earache [Itching] : no itching [Sore Throat] : no sore throat [Hoarseness] : no hoarseness [Nasal Discharge] : no nasal discharge [Nosebleeds] : no nosebleeds [Chest Pain] : no chest pain [Palpitations] : no palpitations [Abdominal Pain] : no abdominal pain [Orthopena] : no orthopnea [Claudication] : no  leg claudication [Nausea] : no nausea [Vomiting] : no vomiting [Dysuria] : no dysuria [Heartburn] : no heartburn [Melena] : no melena [Hematuria] : no hematuria [Incontinence] : no incontinence [Hesitancy] : no hesitancy [Frequency] : no frequency [Impotence] : no impotency [Poor Libido] : libido not poor [Muscle Pain] : no muscle pain [Headache] : no headache [Fainting] : no fainting [Dizziness] : no dizziness [Confusion] : no confusion [Memory Loss] : no memory loss [Unsteady Walk] : no ataxia

## 2020-09-16 NOTE — PHYSICAL EXAM
[No Acute Distress] : no acute distress [Well Developed] : well developed [Well-Appearing] : well-appearing [Well Nourished] : well nourished [Normal Sclera/Conjunctiva] : normal sclera/conjunctiva [EOMI] : extraocular movements intact [PERRL] : pupils equal round and reactive to light [Normal Outer Ear/Nose] : the outer ears and nose were normal in appearance [Normal Oropharynx] : the oropharynx was normal [Supple] : supple [No Lymphadenopathy] : no lymphadenopathy [No JVD] : no jugular venous distention [No Respiratory Distress] : no respiratory distress  [No Accessory Muscle Use] : no accessory muscle use [Thyroid Normal, No Nodules] : the thyroid was normal and there were no nodules present [Clear to Auscultation] : lungs were clear to auscultation bilaterally [Normal Rate] : normal rate  [Regular Rhythm] : with a regular rhythm [No Murmur] : no murmur heard [Normal S1, S2] : normal S1 and S2 [No Varicosities] : no varicosities [No Abdominal Bruit] : a ~M bruit was not heard ~T in the abdomen [No Carotid Bruits] : no carotid bruits [Pedal Pulses Present] : the pedal pulses are present [No Palpable Aorta] : no palpable aorta [No Edema] : there was no peripheral edema [No Extremity Clubbing/Cyanosis] : no extremity clubbing/cyanosis [Soft] : abdomen soft [Non Tender] : non-tender [No HSM] : no HSM [No Masses] : no abdominal mass palpated [Non-distended] : non-distended [Normal Bowel Sounds] : normal bowel sounds [Normal Posterior Cervical Nodes] : no posterior cervical lymphadenopathy [No Spinal Tenderness] : no spinal tenderness [Normal Anterior Cervical Nodes] : no anterior cervical lymphadenopathy [No CVA Tenderness] : no CVA  tenderness [Grossly Normal Strength/Tone] : grossly normal strength/tone [No Rash] : no rash [No Joint Swelling] : no joint swelling [Coordination Grossly Intact] : coordination grossly intact [No Focal Deficits] : no focal deficits [Normal Affect] : the affect was normal [Normal Gait] : normal gait [Deep Tendon Reflexes (DTR)] : deep tendon reflexes were 2+ and symmetric [Normal Insight/Judgement] : insight and judgment were intact

## 2020-09-16 NOTE — HISTORY OF PRESENT ILLNESS
[FreeTextEntry1] : Patient in for follow up of abnormal labs. Patient has been followed by Cardio for Afib  CPAP. PMH stable on meds  needs repeat labs. Patient states he has neck swelling and difficulty swallowing, brittle nails and trouble healing

## 2020-09-17 ENCOUNTER — LABORATORY RESULT (OUTPATIENT)
Age: 65
End: 2020-09-17

## 2020-09-17 LAB — TSH SERPL-ACNC: 4.53 UIU/ML

## 2020-09-20 ENCOUNTER — APPOINTMENT (OUTPATIENT)
Dept: ULTRASOUND IMAGING | Facility: CLINIC | Age: 65
End: 2020-09-20
Payer: COMMERCIAL

## 2020-09-20 ENCOUNTER — OUTPATIENT (OUTPATIENT)
Dept: OUTPATIENT SERVICES | Facility: HOSPITAL | Age: 65
LOS: 1 days | End: 2020-09-20
Payer: COMMERCIAL

## 2020-09-20 DIAGNOSIS — R79.89 OTHER SPECIFIED ABNORMAL FINDINGS OF BLOOD CHEMISTRY: ICD-10-CM

## 2020-09-20 DIAGNOSIS — Z96.651 PRESENCE OF RIGHT ARTIFICIAL KNEE JOINT: Chronic | ICD-10-CM

## 2020-09-20 DIAGNOSIS — Z00.00 ENCOUNTER FOR GENERAL ADULT MEDICAL EXAMINATION WITHOUT ABNORMAL FINDINGS: ICD-10-CM

## 2020-09-20 PROCEDURE — 76536 US EXAM OF HEAD AND NECK: CPT

## 2020-09-20 PROCEDURE — 76536 US EXAM OF HEAD AND NECK: CPT | Mod: 26

## 2020-09-23 ENCOUNTER — RESULT CHARGE (OUTPATIENT)
Age: 65
End: 2020-09-23

## 2020-10-28 ENCOUNTER — RX RENEWAL (OUTPATIENT)
Age: 65
End: 2020-10-28

## 2020-12-02 ENCOUNTER — APPOINTMENT (OUTPATIENT)
Dept: FAMILY MEDICINE | Facility: CLINIC | Age: 65
End: 2020-12-02
Payer: COMMERCIAL

## 2020-12-02 VITALS
HEART RATE: 59 BPM | OXYGEN SATURATION: 96 % | BODY MASS INDEX: 47.74 KG/M2 | HEIGHT: 68 IN | SYSTOLIC BLOOD PRESSURE: 124 MMHG | DIASTOLIC BLOOD PRESSURE: 80 MMHG | RESPIRATION RATE: 14 BRPM | TEMPERATURE: 98.2 F | WEIGHT: 315 LBS

## 2020-12-02 LAB
GLUCOSE BLDC GLUCOMTR-MCNC: 161
HBA1C MFR BLD HPLC: 7.7

## 2020-12-02 PROCEDURE — 83036 HEMOGLOBIN GLYCOSYLATED A1C: CPT | Mod: QW

## 2020-12-02 PROCEDURE — 36415 COLL VENOUS BLD VENIPUNCTURE: CPT

## 2020-12-02 PROCEDURE — 82962 GLUCOSE BLOOD TEST: CPT

## 2020-12-02 PROCEDURE — 99214 OFFICE O/P EST MOD 30 MIN: CPT | Mod: 25

## 2020-12-02 PROCEDURE — 99072 ADDL SUPL MATRL&STAF TM PHE: CPT

## 2020-12-02 NOTE — HEALTH RISK ASSESSMENT
[No falls in past year] : Patient reported no falls in the past year [0] : 2) Feeling down, depressed, or hopeless: Not at all (0) [] : No [[id number]] : 0

## 2020-12-02 NOTE — HISTORY OF PRESENT ILLNESS
[FreeTextEntry1] : Patient in for follow up of abnormal labs. Patient has been followed by Cardio for Afib  CPAP. PMH of HTN, HLD, hypothyroid, asthma, IGT stable on meds needs repeat labs.

## 2020-12-02 NOTE — PLAN
[FreeTextEntry1] : Patient in for follow up of abnormal labs. Patient has been followed by Cardio for Afib  CPAP. PMH of HTN, HLD, hypothyroid, asthma, IGT stable on meds needs repeat labs.\par \par Diabetic care plan reviewed \par Labs done\par Finger stick glucose 161, A1C 7.7\par Patient given rheumatology follow up - has not been seen yet\par US of thyroid and parathyroid done already\par RTC 1 month\par

## 2020-12-03 ENCOUNTER — LABORATORY RESULT (OUTPATIENT)
Age: 65
End: 2020-12-03

## 2020-12-03 LAB
24R-OH-CALCIDIOL SERPL-MCNC: 21.6 PG/ML
ALBUMIN SERPL ELPH-MCNC: 4.4 G/DL
ALP BLD-CCNC: 92 U/L
ALT SERPL-CCNC: 63 U/L
ANION GAP SERPL CALC-SCNC: 14 MMOL/L
AST SERPL-CCNC: 56 U/L
BASOPHILS # BLD AUTO: 0.06 K/UL
BASOPHILS NFR BLD AUTO: 0.6 %
BILIRUB SERPL-MCNC: 0.3 MG/DL
BUN SERPL-MCNC: 15 MG/DL
CALCIUM SERPL-MCNC: 9.7 MG/DL
CHLORIDE SERPL-SCNC: 100 MMOL/L
CHOLEST SERPL-MCNC: 219 MG/DL
CO2 SERPL-SCNC: 25 MMOL/L
CREAT SERPL-MCNC: 0.97 MG/DL
EOSINOPHIL # BLD AUTO: 0.27 K/UL
EOSINOPHIL NFR BLD AUTO: 2.7 %
ERYTHROCYTE [SEDIMENTATION RATE] IN BLOOD BY WESTERGREN METHOD: 52 MM/HR
GLUCOSE SERPL-MCNC: 168 MG/DL
HCT VFR BLD CALC: 48.9 %
HDLC SERPL-MCNC: 36 MG/DL
HGB BLD-MCNC: 15.2 G/DL
IMM GRANULOCYTES NFR BLD AUTO: 0.3 %
LDLC SERPL CALC-MCNC: 145 MG/DL
LYMPHOCYTES # BLD AUTO: 2.35 K/UL
LYMPHOCYTES NFR BLD AUTO: 23.6 %
MAN DIFF?: NORMAL
MCHC RBC-ENTMCNC: 28.1 PG
MCHC RBC-ENTMCNC: 31.1 GM/DL
MCV RBC AUTO: 90.4 FL
MONOCYTES # BLD AUTO: 0.52 K/UL
MONOCYTES NFR BLD AUTO: 5.2 %
NEUTROPHILS # BLD AUTO: 6.72 K/UL
NEUTROPHILS NFR BLD AUTO: 67.6 %
NONHDLC SERPL-MCNC: 183 MG/DL
PLATELET # BLD AUTO: 262 K/UL
POTASSIUM SERPL-SCNC: 4.2 MMOL/L
PROT SERPL-MCNC: 7.7 G/DL
RBC # BLD: 5.41 M/UL
RBC # FLD: 16.5 %
SODIUM SERPL-SCNC: 139 MMOL/L
T4 SERPL-MCNC: 8.1 UG/DL
TRIGL SERPL-MCNC: 189 MG/DL
WBC # FLD AUTO: 9.95 K/UL

## 2020-12-04 LAB — TSH SERPL-ACNC: 7.2 UIU/ML

## 2020-12-07 ENCOUNTER — NON-APPOINTMENT (OUTPATIENT)
Age: 65
End: 2020-12-07

## 2020-12-08 ENCOUNTER — APPOINTMENT (OUTPATIENT)
Dept: RHEUMATOLOGY | Facility: CLINIC | Age: 65
End: 2020-12-08
Payer: COMMERCIAL

## 2020-12-08 VITALS
BODY MASS INDEX: 47.74 KG/M2 | HEART RATE: 58 BPM | WEIGHT: 315 LBS | DIASTOLIC BLOOD PRESSURE: 78 MMHG | OXYGEN SATURATION: 95 % | HEIGHT: 68 IN | SYSTOLIC BLOOD PRESSURE: 137 MMHG | TEMPERATURE: 98.1 F

## 2020-12-08 PROCEDURE — 99072 ADDL SUPL MATRL&STAF TM PHE: CPT

## 2020-12-08 PROCEDURE — 99204 OFFICE O/P NEW MOD 45 MIN: CPT | Mod: 25

## 2020-12-08 PROCEDURE — 36415 COLL VENOUS BLD VENIPUNCTURE: CPT

## 2020-12-09 ENCOUNTER — LABORATORY RESULT (OUTPATIENT)
Age: 65
End: 2020-12-09

## 2021-01-04 ENCOUNTER — RX CHANGE (OUTPATIENT)
Age: 66
End: 2021-01-04

## 2021-01-04 RX ORDER — DULOXETINE HYDROCHLORIDE 30 MG/1
30 CAPSULE, DELAYED RELEASE PELLETS ORAL
Qty: 30 | Refills: 2 | Status: DISCONTINUED | COMMUNITY
Start: 2020-12-08 | End: 2021-01-04

## 2021-01-12 ENCOUNTER — APPOINTMENT (OUTPATIENT)
Dept: RHEUMATOLOGY | Facility: CLINIC | Age: 66
End: 2021-01-12
Payer: COMMERCIAL

## 2021-01-12 VITALS
SYSTOLIC BLOOD PRESSURE: 132 MMHG | HEIGHT: 68 IN | BODY MASS INDEX: 47.74 KG/M2 | OXYGEN SATURATION: 94 % | DIASTOLIC BLOOD PRESSURE: 73 MMHG | TEMPERATURE: 98.3 F | WEIGHT: 315 LBS | HEART RATE: 45 BPM

## 2021-01-12 PROCEDURE — 99072 ADDL SUPL MATRL&STAF TM PHE: CPT

## 2021-01-12 PROCEDURE — 99214 OFFICE O/P EST MOD 30 MIN: CPT

## 2021-02-23 ENCOUNTER — RX RENEWAL (OUTPATIENT)
Age: 66
End: 2021-02-23

## 2021-02-24 ENCOUNTER — APPOINTMENT (OUTPATIENT)
Dept: RHEUMATOLOGY | Facility: CLINIC | Age: 66
End: 2021-02-24
Payer: COMMERCIAL

## 2021-02-24 VITALS
DIASTOLIC BLOOD PRESSURE: 80 MMHG | WEIGHT: 315 LBS | OXYGEN SATURATION: 97 % | HEIGHT: 68 IN | TEMPERATURE: 97.2 F | SYSTOLIC BLOOD PRESSURE: 120 MMHG | HEART RATE: 66 BPM | BODY MASS INDEX: 47.74 KG/M2 | RESPIRATION RATE: 17 BRPM

## 2021-02-24 PROCEDURE — 99215 OFFICE O/P EST HI 40 MIN: CPT | Mod: 25

## 2021-02-24 PROCEDURE — 36415 COLL VENOUS BLD VENIPUNCTURE: CPT

## 2021-02-24 PROCEDURE — 99072 ADDL SUPL MATRL&STAF TM PHE: CPT

## 2021-02-24 RX ORDER — ASPIRIN 325 MG/1
325 TABLET ORAL
Refills: 0 | Status: DISCONTINUED | COMMUNITY
End: 2021-02-24

## 2021-02-25 ENCOUNTER — RX RENEWAL (OUTPATIENT)
Age: 66
End: 2021-02-25

## 2021-03-03 ENCOUNTER — APPOINTMENT (OUTPATIENT)
Dept: FAMILY MEDICINE | Facility: CLINIC | Age: 66
End: 2021-03-03
Payer: COMMERCIAL

## 2021-03-03 VITALS
OXYGEN SATURATION: 98 % | TEMPERATURE: 98.2 F | HEIGHT: 68 IN | RESPIRATION RATE: 16 BRPM | BODY MASS INDEX: 47.74 KG/M2 | SYSTOLIC BLOOD PRESSURE: 140 MMHG | DIASTOLIC BLOOD PRESSURE: 80 MMHG | WEIGHT: 315 LBS | HEART RATE: 68 BPM

## 2021-03-03 DIAGNOSIS — Z86.39 PERSONAL HISTORY OF OTHER ENDOCRINE, NUTRITIONAL AND METABOLIC DISEASE: ICD-10-CM

## 2021-03-03 LAB — GLUCOSE BLDC GLUCOMTR-MCNC: 207

## 2021-03-03 PROCEDURE — 83036 HEMOGLOBIN GLYCOSYLATED A1C: CPT | Mod: QW

## 2021-03-03 PROCEDURE — 99214 OFFICE O/P EST MOD 30 MIN: CPT | Mod: 25

## 2021-03-03 PROCEDURE — 99072 ADDL SUPL MATRL&STAF TM PHE: CPT

## 2021-03-03 PROCEDURE — 36415 COLL VENOUS BLD VENIPUNCTURE: CPT

## 2021-03-03 NOTE — HEALTH RISK ASSESSMENT
[No] : In the past 12 months have you used drugs other than those required for medical reasons? No [No falls in past year] : Patient reported no falls in the past year [0] : 2) Feeling down, depressed, or hopeless: Not at all (0) [] : No [de-identified] : Former smoker [[id number]] : 0

## 2021-03-03 NOTE — HISTORY OF PRESENT ILLNESS
[FreeTextEntry1] : PMH of Afib, HTN, HLD, hypothyroid, OA,  asthma, DM stable on meds. Patient is doing well today. A1C 7.7 today. Fingerstick  glucose 207.patient had not been following diet \par

## 2021-03-03 NOTE — ASSESSMENT
[FreeTextEntry1] : PMH of Afib, HTN, HLD, hypothyroid, OA,  asthma, DM stable on meds. Patient is doing well today. A1C 7.7 today. Fingerstick  glucose 207. \par \par \par Eye Exam referral- DM\par Meds done \par Labs done\par Patient care plan discussed \par FIT testing

## 2021-03-03 NOTE — COUNSELING
[Fall prevention counseling provided] : Fall prevention counseling provided [Use proper foot wear] : Use proper foot wear [Behavioral health counseling provided] : Behavioral health counseling provided [Engage in a relaxing activity] : Engage in a relaxing activity [Encouraged to increase physical activity] : Encouraged to increase physical activity [Decrease Portions] : decrease portions [None] : None [Good understanding] : Patient has a good understanding of lifestyle changes and steps needed to achieve self management goal [FreeTextEntry2] : Former smoker

## 2021-03-04 LAB
ESTIMATED AVERAGE GLUCOSE: 177 MG/DL
HBA1C MFR BLD HPLC: 7.8 %

## 2021-04-17 LAB — HEMOCCULT STL QL IA: POSITIVE

## 2021-04-21 ENCOUNTER — APPOINTMENT (OUTPATIENT)
Dept: RHEUMATOLOGY | Facility: CLINIC | Age: 66
End: 2021-04-21
Payer: COMMERCIAL

## 2021-04-21 VITALS
RESPIRATION RATE: 17 BRPM | WEIGHT: 315 LBS | BODY MASS INDEX: 47.74 KG/M2 | HEART RATE: 71 BPM | SYSTOLIC BLOOD PRESSURE: 124 MMHG | TEMPERATURE: 97.7 F | HEIGHT: 68 IN | DIASTOLIC BLOOD PRESSURE: 76 MMHG | OXYGEN SATURATION: 98 %

## 2021-04-21 PROCEDURE — 36415 COLL VENOUS BLD VENIPUNCTURE: CPT

## 2021-04-21 PROCEDURE — 99072 ADDL SUPL MATRL&STAF TM PHE: CPT

## 2021-04-21 PROCEDURE — 99214 OFFICE O/P EST MOD 30 MIN: CPT | Mod: 25

## 2021-04-22 NOTE — HISTORY OF PRESENT ILLNESS
[___ Month(s) Ago] : [unfilled] month(s) ago [FreeTextEntry1] : Remains on Allopurinol 300mg and Duloxetine 60mg\par Polyarthralgia in shoulders, hips, knees, hands daily.\par \par - Pt c/o new arthralgia in L shoulder and L hip \par - He reports persistent leg swelling and gout flares\par - Currently taking Hydrochlorothiazide 25mg\par - Previous lab results were discussed with the patient. Mild ESR levels.\par - ROS remains unchanged otherwise.

## 2021-04-22 NOTE — ADDENDUM
[FreeTextEntry1] : I, Argelia Emmanuel wrote this note acting as a scribe for Dr. Angel Spencer MD on Apr 21, 2021 .\par \par I, Dr. Angel Spencer MD, ordering physician, have read and attest that all the information, medical decision making and discharge instructions within are true and accurate on 04/21/2021.

## 2021-04-22 NOTE — PHYSICAL EXAM
[General Appearance - Alert] : alert [General Appearance - In No Acute Distress] : in no acute distress [General Appearance - Well Nourished] : well nourished [General Appearance - Well Developed] : well developed [General Appearance - Well-Appearing] : healthy appearing [Sclera] : the sclera and conjunctiva were normal [PERRL With Normal Accommodation] : pupils were equal in size, round, and reactive to light [Extraocular Movements] : extraocular movements were intact [Outer Ear] : the ears and nose were normal in appearance [Nasal Cavity] : the nasal mucosa and septum were normal [Oropharynx] : the oropharynx was normal [Neck Appearance] : the appearance of the neck was normal [Neck Cervical Mass (___cm)] : no neck mass was observed [Jugular Venous Distention Increased] : there was no jugular-venous distention [Thyroid Diffuse Enlargement] : the thyroid was not enlarged [Thyroid Nodule] : there were no palpable thyroid nodules [Respiration, Rhythm And Depth] : normal respiratory rhythm and effort [Auscultation Breath Sounds / Voice Sounds] : lungs were clear to auscultation bilaterally [Heart Rate And Rhythm] : heart rate was normal and rhythm regular [Heart Sounds] : normal S1 and S2 [Heart Sounds Gallop] : no gallops [Murmurs] : no murmurs [Heart Sounds Pericardial Friction Rub] : no pericardial rub [Full Pulse] : the pedal pulses are present [Edema] : there was no peripheral edema [Bowel Sounds] : normal bowel sounds [Abdomen Soft] : soft [Abdomen Tenderness] : non-tender [Abdomen Mass (___ Cm)] : no abdominal mass palpated [Cervical Lymph Nodes Enlarged Posterior Bilaterally] : posterior cervical [Cervical Lymph Nodes Enlarged Anterior Bilaterally] : anterior cervical [Supraclavicular Lymph Nodes Enlarged Bilaterally] : supraclavicular [No CVA Tenderness] : no ~M costovertebral angle tenderness [No Spinal Tenderness] : no spinal tenderness [Abnormal Walk] : normal gait [Nail Clubbing] : no clubbing  or cyanosis of the fingernails [Motor Tone] : muscle strength and tone were normal [Musculoskeletal - Swelling] : no joint swelling seen [Skin Color & Pigmentation] : normal skin color and pigmentation [Skin Turgor] : normal skin turgor [] : no rash [Skin Lesions] : no skin lesions [Sensation] : the sensory exam was normal to light touch and pinprick [Deep Tendon Reflexes (DTR)] : deep tendon reflexes were 2+ and symmetric [Motor Exam] : the motor exam was normal [No Focal Deficits] : no focal deficits [Oriented To Time, Place, And Person] : oriented to person, place, and time [Affect] : the affect was normal [Impaired Insight] : insight and judgment were intact [Mood] : the mood was normal [FreeTextEntry1] : \par Hands- OA changes in B/L hands with Heberden and Jose's nodes. L #3 DIP with ulnar subluxation\par Wrists- normal\par Elbows- normal\par Shoulders- anterior GH tenderness, +Supraspinatus signs\par Hips- Reduced external rotation bilaterally. L bursitis.\par Knees- Patellofemoral crepitus on R with small effusion.\par Ankles- normal\par Feet- normal\par MMT 10/10 proximally/distally bilaterally.

## 2021-04-23 ENCOUNTER — APPOINTMENT (OUTPATIENT)
Dept: RADIOLOGY | Facility: CLINIC | Age: 66
End: 2021-04-23
Payer: COMMERCIAL

## 2021-04-23 ENCOUNTER — RESULT REVIEW (OUTPATIENT)
Age: 66
End: 2021-04-23

## 2021-04-23 ENCOUNTER — OUTPATIENT (OUTPATIENT)
Dept: OUTPATIENT SERVICES | Facility: HOSPITAL | Age: 66
LOS: 1 days | End: 2021-04-23
Payer: COMMERCIAL

## 2021-04-23 DIAGNOSIS — Z96.651 PRESENCE OF RIGHT ARTIFICIAL KNEE JOINT: Chronic | ICD-10-CM

## 2021-04-23 DIAGNOSIS — M47.812 SPONDYLOSIS WITHOUT MYELOPATHY OR RADICULOPATHY, CERVICAL REGION: ICD-10-CM

## 2021-04-23 DIAGNOSIS — Z00.8 ENCOUNTER FOR OTHER GENERAL EXAMINATION: ICD-10-CM

## 2021-04-23 PROCEDURE — 72050 X-RAY EXAM NECK SPINE 4/5VWS: CPT | Mod: 26

## 2021-04-23 PROCEDURE — 73522 X-RAY EXAM HIPS BI 3-4 VIEWS: CPT | Mod: 26

## 2021-04-23 PROCEDURE — 72050 X-RAY EXAM NECK SPINE 4/5VWS: CPT

## 2021-04-23 PROCEDURE — 73030 X-RAY EXAM OF SHOULDER: CPT

## 2021-04-23 PROCEDURE — 73030 X-RAY EXAM OF SHOULDER: CPT | Mod: 26,50

## 2021-04-23 PROCEDURE — 73522 X-RAY EXAM HIPS BI 3-4 VIEWS: CPT

## 2021-05-04 ENCOUNTER — RX RENEWAL (OUTPATIENT)
Age: 66
End: 2021-05-04

## 2021-06-22 ENCOUNTER — RX CHANGE (OUTPATIENT)
Age: 66
End: 2021-06-22

## 2021-06-25 NOTE — DISCHARGE NOTE ADULT - REASON FOR REFUSAL (REFER PATIENT TO HEALTHCARE PROVIDER FOR FOLLOW-UP):
OB/GYN PROGRESS NOTE    Leslie Bay is a 42 y.o. female  at [address]  Day: 7    Subjective:   Patient has been seen and examined. Patient is resting in bed comfortably. She still complains of intermittent contractions. She denies any pain, but states there is just pelvic pressure. She also reports difficulty with sleeping, because she has a lot on her mind. She does not want to try any sleeping aid at this time. Patient denies any vaginal discharge and any urinary complaints. The patient reports fetal movement is present. Yesterday she reported a brief period where she felt like she was leaking fluid. Pelvic exam was declined by patient at that time. She denies any further LOF since then. She denies vaginal bleeding. Patient denies headache, vision changes, nausea, vomiting, fever, chills, shortness of breath, chest pain, RUQ pain, abdominal pain, diarrhea, change in color/amount/odor of vaginal discharge, dysuria or, hematuria. Patient is voicing concern that the team is not doing enough for her baby. When asked to elaborate, patient states that it doesn't make since that she has to wait another day for steroids. I reviewed that the neonatologists at our hospital do not resuscitate neonates until 22w0d. Celestone course will start at 21w5d. Support was offered to Pillo Cabrales and reassurance was provided that the L&D and NICU team were available to her if she needed anything.      Objective:   Vitals:  Vitals:    21 1015 21 1720 21 1940 21 0009   BP: (!) 88/53 99/65 119/68 (!) 105/58   Pulse: 96 91 89 94   Resp: 16  18 18   Temp: 98 °F (36.7 °C)  98 °F (36.7 °C) 98.3 °F (36.8 °C)   TempSrc:       SpO2:       Weight:       Height:                 Physical Exam:  General appearance:  no apparent distress, alert and cooperative  HEENT: head atraumatic, normocephalic, moist mucous membranes, trachea midline  Neurologic:  alert, oriented, normal speech, no focal findings or movement disorder noted  Lungs:  No increased work of breathing  Heart:  regular rate and rhythm and no murmur    Abdomen:  soft, gravid, non-tender, no rebound, guarding, or rigidity, no RUQ or epigastric tenderness, no signs or symptoms of abruption and no signs or symptoms of chorioamnionitis  Extremities:  no calf tenderness, non edematous  Musculoskeletal: Gross strength equal and intact throughout, no gross abnormalities, range of motion normal in hips, knees, shoulders and spine, CVA tenderness: none  Psychiatric: Mood appropriate, normal affect   Rectal Exam: not indicated  Pelvic Exam: not indicated at this time. Assessment/Plan:  Rose Davila is a 42 y.o. female  at 21w3d IUP   - Rh positive/ Rubella immune/ GBS negative   - No indication for GBS prophylaxis    - Rhogam: not indicated               - Continue PNV, SCDs, Progesterone suppositories daily              - VSS              - FHT daily              - CBC, T&S q3d- next today              - Maintain IV access              - Diet: general              - DVT prophylaxis: SCDs when non ambulatory              - [facility] US 21: breech presentation, posterior fundal placenta, EFW 13 oz. No obvious congenital anomalies      Advanced Cervical Dilation in Periviable Gestational Age              - VSS, afebrile              - On last sterile vaginal exam (21), there was bulging bag of membranes almost at +2 station with no appreciation of cervix. - Not a candidate for cerclage at this time given advanced cervical dilation with bulging membranes. - Consider Celestone at 21w5d- ordered              - NICU consulted and in to speak with patient yesterday. NICU plans to attend birth to evaluate for possible resuscitation              - Viability considered at 22 weeks.  She would accept resuscitation at that gestational age and has been counseled on significant morbidity and mortality for the fetus at that gestational age. - R/B/A were discussed regarding latency PPROM antibiotics. She desired to continue latency antibiotics despite counseling on no current RCT trials supporting these antibiotics for prolapsing membranes. She is S/p Azithromycin 1g PO x1 and Ancef 1g IV q8h for 48 hours total. Will continue Keflex 500mg PO QID x5 days.              - Patient was previously requesting tocolysis. R/B/A were discussed since tocolysis is not recommended in previable infant. Patient still was desiring something to stop contractions. Nifedipine 10mg PO was ordered, however patient declined medication.              - Patient has been reporting painful contractions and pelvic pressure. She has Morphine 2mg IV q3 hours ordered. - Continue expectant management     Hx of Spontaneous Previable Delivery after PPROM at 19w6d (G1)              - Patient has been compliant with progesterone suppositories nightly. Ordered this admission.       Asthma (no medications)              - Denies any SOB and wheezing     Hypothyroidism              - Followed by Dr. Marianna Maradiaga              - Last TSH 6/4/21 0.60              - Controlled on no medications              - Denies any s/s of hypo/hyperthyroidism     Adrenal Insufficiency              - Patient has not required medications/steroids at this time              - Recommend close f/u in PP period     Trichomonas infection in pregnancy (TOR negative)              - Positive 2/21/21              - TOR negative              - Vaginitis negative on admission      E Coli UTI during Pregnancy              - Urine culture negative on admission              - Denies dysuria     AMA (NIPT wnl)     Short Interval Pregnancy              - Last delivery 12/19/20     Anxiety/Depression              - Stable on no medications              - Denies any SI/HI     Alpha Thalassemia Minor              - aa/a- noted on carrier screening (in media section)              - low risk Overview Note:     21- [facility] referral placed for first trimester screen, anatomy scan and NIPT      Fam Hx Polydactyly 2021     Overview Note:     FOB sister w/ polydactyly      E. coli UTI during pregnancy  2021    LGSIL (POLO-1) on Colpo 2021     Overview Note:     9 o'clock and 12 o'clock biopsies  Needs repeat Pap smear 22      Trichomonas vaginalis infection 2021     Overview Note:     Neg 21  Neg 5/3/21      ASCUS with positive high risk HPV cervical 2021    SPTD after PPROM at 19w6d      Overview Note:     Pt is candidate for progesterone   Starting on vag progesterone per MFM. Please ask patient if she would like to switch to injectable and contact Margarette CURRY      Hx Previable PPROM @19wk6d on 20     Overview Note:     The patient did not know she was pregnant prior to rupture.  Anxiety 2020    Adrenal insufficiency ([facility] [address] 2020    Tobacco use 2020    Obesity 09/15/2017     Overview Note:     Early 1hr GTT wnl 111      Strabismus 09/15/2017     h/o Major depression 2013     Overview Note:     No meds      Thyroid nodule 2012     Overview Note:     CT neck : 2017  Impression: Large mass in the lower pole of the left lobe of the thyroid gland plunging into the superior mediastinum and displacing the trachea somewhat toward the right.  This has progressed dramatically since .    FNA: Benign. Nodular goiter. 2016             Will update Dr. Valentino Gunner. Lizzette Delgado DO  Ob/Gyn Resident  2021, 1:54 AM    Date: 2021  Time: 5:42 PM      Patient Name: Kathy Yoo  Patient : 1981  Room/Bed: 0703/0703-01  Admission Date/Time: 2021  7:58 PM        Attending Physician Statement  I have discussed the care of Kathy Yoo, including pertinent history and exam findings with the resident.  I have reviewed and edited their note in the electronic medical record. The key elements of all parts of the encounter have been performed/reviewed by me . I agree with the assessment, plan and orders as documented by the resident. The level of care submitted represents to the best of my ability the care documented in the medical record today. GC Modifier. This service has been performed in part by a resident under the direction of a teaching physician. Have been to see Mamta Freire twice today and both times she has been resting comfortably. Advised that I am available for any questions or concerns she may have, but today has been a good day for Mamta Freire. She has not called out in pain and her spirits have been higher. I again discussed her case with Toby Elizabeth and Jonah. Plan of care remains initiation of steroids at 21w5d, repeat 21w6d. Toby Elizabeth and Crystal Chance confirmed that NICU will attend the delivery whenever it happens, but will only consider heroic life-saving measures such as intubation/ chest compressions etc starting on Tuesday, June 29th at 25 weeks. Our team remains available for any questions and emotional support.       Attending's Name:  Paige Daly DO patient does not want

## 2021-07-02 ENCOUNTER — RX RENEWAL (OUTPATIENT)
Age: 66
End: 2021-07-02

## 2021-07-07 ENCOUNTER — APPOINTMENT (OUTPATIENT)
Dept: RHEUMATOLOGY | Facility: CLINIC | Age: 66
End: 2021-07-07

## 2021-07-26 ENCOUNTER — RX RENEWAL (OUTPATIENT)
Age: 66
End: 2021-07-26

## 2021-08-04 ENCOUNTER — LABORATORY RESULT (OUTPATIENT)
Age: 66
End: 2021-08-04

## 2021-08-04 ENCOUNTER — RX RENEWAL (OUTPATIENT)
Age: 66
End: 2021-08-04

## 2021-08-04 ENCOUNTER — APPOINTMENT (OUTPATIENT)
Dept: FAMILY MEDICINE | Facility: CLINIC | Age: 66
End: 2021-08-04
Payer: MEDICARE

## 2021-08-04 VITALS
TEMPERATURE: 98 F | HEIGHT: 68 IN | DIASTOLIC BLOOD PRESSURE: 84 MMHG | WEIGHT: 315 LBS | HEART RATE: 65 BPM | BODY MASS INDEX: 47.74 KG/M2 | RESPIRATION RATE: 16 BRPM | OXYGEN SATURATION: 98 % | SYSTOLIC BLOOD PRESSURE: 130 MMHG

## 2021-08-04 DIAGNOSIS — H53.9 UNSPECIFIED VISUAL DISTURBANCE: ICD-10-CM

## 2021-08-04 DIAGNOSIS — Z87.09 PERSONAL HISTORY OF OTHER DISEASES OF THE RESPIRATORY SYSTEM: ICD-10-CM

## 2021-08-04 DIAGNOSIS — R19.7 DIARRHEA, UNSPECIFIED: ICD-10-CM

## 2021-08-04 DIAGNOSIS — Z09 ENCOUNTER FOR FOLLOW-UP EXAMINATION AFTER COMPLETED TREATMENT FOR CONDITIONS OTHER THAN MALIGNANT NEOPLASM: ICD-10-CM

## 2021-08-04 DIAGNOSIS — R94.2 ABNORMAL RESULTS OF PULMONARY FUNCTION STUDIES: ICD-10-CM

## 2021-08-04 DIAGNOSIS — Z87.39 PERSONAL HISTORY OF OTHER DISEASES OF THE MUSCULOSKELETAL SYSTEM AND CONNECTIVE TISSUE: ICD-10-CM

## 2021-08-04 DIAGNOSIS — M25.561 PAIN IN RIGHT KNEE: ICD-10-CM

## 2021-08-04 DIAGNOSIS — Z01.818 ENCOUNTER FOR OTHER PREPROCEDURAL EXAMINATION: ICD-10-CM

## 2021-08-04 DIAGNOSIS — M25.371 OTHER INSTABILITY, RIGHT ANKLE: ICD-10-CM

## 2021-08-04 DIAGNOSIS — Z86.19 PERSONAL HISTORY OF OTHER INFECTIOUS AND PARASITIC DISEASES: ICD-10-CM

## 2021-08-04 DIAGNOSIS — M17.11 UNILATERAL PRIMARY OSTEOARTHRITIS, RIGHT KNEE: ICD-10-CM

## 2021-08-04 DIAGNOSIS — R20.0 ANESTHESIA OF SKIN: ICD-10-CM

## 2021-08-04 DIAGNOSIS — M17.10 UNILATERAL PRIMARY OSTEOARTHRITIS, UNSPECIFIED KNEE: ICD-10-CM

## 2021-08-04 LAB — HCV RNA SERPL NAA DL=5-ACNC: NON REACTIVE

## 2021-08-04 PROCEDURE — 99214 OFFICE O/P EST MOD 30 MIN: CPT | Mod: 25

## 2021-08-04 PROCEDURE — 36415 COLL VENOUS BLD VENIPUNCTURE: CPT

## 2021-08-04 NOTE — HEALTH RISK ASSESSMENT
[No] : In the past 12 months have you used drugs other than those required for medical reasons? No [No falls in past year] : Patient reported no falls in the past year [0] : 2) Feeling down, depressed, or hopeless: Not at all (0) [] : No [de-identified] : Former smoker [[id number]] : 0

## 2021-08-04 NOTE — HISTORY OF PRESENT ILLNESS
[FreeTextEntry1] : PMH of Afib, HTN, HLD, hypothyroid, OA,  asthma, DM stable on meds. Patient is doing well today. A1C 7.7 today. Fingerstick  glucose 287.patient had not been following diet \par  [de-identified] : 66M with a PMH of  Afib, HTN, HLD, hypothyroid, OA,  asthma, DM presents to the office for a 3 month follow up. The patient is complaining of new wounds on the foot that appears around 3 weeks ago. There are 3 small openings on the dorsal aspect of the foot and appear to be draining fluid as well. The wounds are tender to palpitation and the surrounding area is erythematous.  was seen in walk in given cream

## 2021-08-04 NOTE — PLAN
[FreeTextEntry1] : Patient presents to the office for a three month follow up. On physical exam the patient has bilateral lower extremity edema. He also has a wound on the right foot.

## 2021-08-04 NOTE — PHYSICAL EXAM
[Well Nourished] : well nourished [Well Developed] : well developed [Well-Appearing] : well-appearing [Normal Sclera/Conjunctiva] : normal sclera/conjunctiva [PERRL] : pupils equal round and reactive to light [EOMI] : extraocular movements intact [Normal Outer Ear/Nose] : the outer ears and nose were normal in appearance [Normal Oropharynx] : the oropharynx was normal [No JVD] : no jugular venous distention [No Lymphadenopathy] : no lymphadenopathy [Supple] : supple [Thyroid Normal, No Nodules] : the thyroid was normal and there were no nodules present [No Respiratory Distress] : no respiratory distress  [No Accessory Muscle Use] : no accessory muscle use [Clear to Auscultation] : lungs were clear to auscultation bilaterally [Normal Rate] : normal rate  [Regular Rhythm] : with a regular rhythm [Normal S1, S2] : normal S1 and S2 [No Murmur] : no murmur heard [No Carotid Bruits] : no carotid bruits [No Abdominal Bruit] : a ~M bruit was not heard ~T in the abdomen [No Varicosities] : no varicosities [Pedal Pulses Present] : the pedal pulses are present [No Palpable Aorta] : no palpable aorta [No Extremity Clubbing/Cyanosis] : no extremity clubbing/cyanosis [Soft] : abdomen soft [Non Tender] : non-tender [Non-distended] : non-distended [No Masses] : no abdominal mass palpated [No HSM] : no HSM [Normal Bowel Sounds] : normal bowel sounds [Normal Posterior Cervical Nodes] : no posterior cervical lymphadenopathy [Normal Anterior Cervical Nodes] : no anterior cervical lymphadenopathy [No CVA Tenderness] : no CVA  tenderness [No Spinal Tenderness] : no spinal tenderness [No Joint Swelling] : no joint swelling [Grossly Normal Strength/Tone] : grossly normal strength/tone [Coordination Grossly Intact] : coordination grossly intact [No Focal Deficits] : no focal deficits [Normal Gait] : normal gait [Deep Tendon Reflexes (DTR)] : deep tendon reflexes were 2+ and symmetric [Normal Affect] : the affect was normal [Normal Insight/Judgement] : insight and judgment were intact [de-identified] : Bilateral leg edema [de-identified] : Wound on the right dorsal portion of the foot. Drainage from the wound is apparent. Very tender

## 2021-08-04 NOTE — ASSESSMENT
[FreeTextEntry1] : 66M with a PMH of  Afib, HTN, HLD, hypothyroid, OA,  asthma, DM presents to the office for a 3 month follow up. The patient is complaining of new wounds on the foot that appears around 3 weeks ago. There are 3 small openings on the dorsal aspect of the foot and appear to be draining fluid as well. The wounds are tender to palpitation and the surrounding area is erythematous.  was seen in walk in given cream\par \par ortho eval \par meds\par xray \par rtc 1 week \par Eye Exam referral- DM\par Meds done \par Labs done\par Patient care plan discussed \par FIT testing

## 2021-08-05 LAB
ALBUMIN SERPL ELPH-MCNC: 4.3 G/DL
ALP BLD-CCNC: 121 U/L
ALT SERPL-CCNC: 43 U/L
ANION GAP SERPL CALC-SCNC: 18 MMOL/L
APPEARANCE: CLEAR
AST SERPL-CCNC: 27 U/L
BASOPHILS # BLD AUTO: 0.08 K/UL
BASOPHILS NFR BLD AUTO: 0.7 %
BILIRUB SERPL-MCNC: 0.2 MG/DL
BILIRUBIN URINE: NEGATIVE
BLOOD URINE: NEGATIVE
BUN SERPL-MCNC: 15 MG/DL
CALCIUM SERPL-MCNC: 10 MG/DL
CHLORIDE SERPL-SCNC: 95 MMOL/L
CHOLEST SERPL-MCNC: 200 MG/DL
CO2 SERPL-SCNC: 24 MMOL/L
COLOR: YELLOW
CREAT SERPL-MCNC: 0.95 MG/DL
EOSINOPHIL # BLD AUTO: 0.1 K/UL
EOSINOPHIL NFR BLD AUTO: 0.9 %
ERYTHROCYTE [SEDIMENTATION RATE] IN BLOOD BY WESTERGREN METHOD: 79 MM/HR
ESTIMATED AVERAGE GLUCOSE: 232 MG/DL
GLUCOSE QUALITATIVE U: ABNORMAL
GLUCOSE SERPL-MCNC: 296 MG/DL
HBA1C MFR BLD HPLC: 9.7 %
HCT VFR BLD CALC: 45 %
HDLC SERPL-MCNC: 46 MG/DL
HGB BLD-MCNC: 14.1 G/DL
IMM GRANULOCYTES NFR BLD AUTO: 0.7 %
KETONES URINE: NEGATIVE
LDLC SERPL CALC-MCNC: 118 MG/DL
LEUKOCYTE ESTERASE URINE: NEGATIVE
LYMPHOCYTES # BLD AUTO: 1.88 K/UL
LYMPHOCYTES NFR BLD AUTO: 16.4 %
MAN DIFF?: NORMAL
MCHC RBC-ENTMCNC: 29.1 PG
MCHC RBC-ENTMCNC: 31.3 GM/DL
MCV RBC AUTO: 93 FL
MONOCYTES # BLD AUTO: 0.58 K/UL
MONOCYTES NFR BLD AUTO: 5.1 %
NEUTROPHILS # BLD AUTO: 8.74 K/UL
NEUTROPHILS NFR BLD AUTO: 76.2 %
NITRITE URINE: NEGATIVE
NONHDLC SERPL-MCNC: 153 MG/DL
PH URINE: 6
PLATELET # BLD AUTO: 383 K/UL
POTASSIUM SERPL-SCNC: 4.3 MMOL/L
PROT SERPL-MCNC: 8 G/DL
PROTEIN URINE: ABNORMAL
PSA SERPL-MCNC: 0.5 NG/ML
RBC # BLD: 4.84 M/UL
RBC # FLD: 15.1 %
SODIUM SERPL-SCNC: 137 MMOL/L
SPECIFIC GRAVITY URINE: 1.03
TRIGL SERPL-MCNC: 175 MG/DL
TSH SERPL-ACNC: 3.82 UIU/ML
UROBILINOGEN URINE: NORMAL
WBC # FLD AUTO: 11.46 K/UL

## 2021-08-06 ENCOUNTER — OUTPATIENT (OUTPATIENT)
Dept: OUTPATIENT SERVICES | Facility: HOSPITAL | Age: 66
LOS: 1 days | End: 2021-08-06
Payer: MEDICARE

## 2021-08-06 DIAGNOSIS — L02.611 CUTANEOUS ABSCESS OF RIGHT FOOT: ICD-10-CM

## 2021-08-06 DIAGNOSIS — Z96.651 PRESENCE OF RIGHT ARTIFICIAL KNEE JOINT: Chronic | ICD-10-CM

## 2021-08-06 PROCEDURE — 73630 X-RAY EXAM OF FOOT: CPT | Mod: 26,RT

## 2021-08-10 LAB
CREAT SPEC-SCNC: 154 MG/DL
MICROALBUMIN 24H UR DL<=1MG/L-MCNC: 11.1 MG/DL
MICROALBUMIN/CREAT 24H UR-RTO: 72 MG/G

## 2021-08-11 ENCOUNTER — APPOINTMENT (OUTPATIENT)
Dept: FAMILY MEDICINE | Facility: CLINIC | Age: 66
End: 2021-08-11
Payer: MEDICARE

## 2021-08-11 ENCOUNTER — APPOINTMENT (OUTPATIENT)
Dept: RHEUMATOLOGY | Facility: CLINIC | Age: 66
End: 2021-08-11
Payer: MEDICARE

## 2021-08-11 VITALS
TEMPERATURE: 97.6 F | BODY MASS INDEX: 53.22 KG/M2 | RESPIRATION RATE: 17 BRPM | HEIGHT: 68 IN | OXYGEN SATURATION: 94 % | HEART RATE: 89 BPM

## 2021-08-11 VITALS
WEIGHT: 315 LBS | RESPIRATION RATE: 16 BRPM | HEART RATE: 79 BPM | HEIGHT: 68 IN | TEMPERATURE: 97.3 F | BODY MASS INDEX: 47.74 KG/M2 | SYSTOLIC BLOOD PRESSURE: 134 MMHG | DIASTOLIC BLOOD PRESSURE: 82 MMHG | OXYGEN SATURATION: 97 %

## 2021-08-11 LAB — GLUCOSE BLDC GLUCOMTR-MCNC: 251

## 2021-08-11 PROCEDURE — 99214 OFFICE O/P EST MOD 30 MIN: CPT

## 2021-08-11 PROCEDURE — 83036 HEMOGLOBIN GLYCOSYLATED A1C: CPT | Mod: QW

## 2021-08-11 PROCEDURE — 99214 OFFICE O/P EST MOD 30 MIN: CPT | Mod: 25

## 2021-08-11 NOTE — ASSESSMENT
[FreeTextEntry1] :  follow up of 66M with a PMH of  AFib, HTN, HLD, hypothyroid, OA,  asthma, DM presents to the office for a 1 week follow up appointment to discuss lab results and recent Xray of the foot. The patient will be seeing his foot doctor next week for wounds on the right foot. \par patient reports weight loss from 375 to 350 \par Labs reviewed \par Patient care plan discussed \par \par Diabetic education\par has appointment with podiatry \par Accu 251

## 2021-08-11 NOTE — PHYSICAL EXAM
[Well Nourished] : well nourished [Well Developed] : well developed [Well-Appearing] : well-appearing [Normal Sclera/Conjunctiva] : normal sclera/conjunctiva [PERRL] : pupils equal round and reactive to light [EOMI] : extraocular movements intact [Normal Outer Ear/Nose] : the outer ears and nose were normal in appearance [Normal Oropharynx] : the oropharynx was normal [No JVD] : no jugular venous distention [No Lymphadenopathy] : no lymphadenopathy [Supple] : supple [Thyroid Normal, No Nodules] : the thyroid was normal and there were no nodules present [No Respiratory Distress] : no respiratory distress  [No Accessory Muscle Use] : no accessory muscle use [Clear to Auscultation] : lungs were clear to auscultation bilaterally [Normal Rate] : normal rate  [Regular Rhythm] : with a regular rhythm [Normal S1, S2] : normal S1 and S2 [No Murmur] : no murmur heard [No Carotid Bruits] : no carotid bruits [No Abdominal Bruit] : a ~M bruit was not heard ~T in the abdomen [No Varicosities] : no varicosities [Pedal Pulses Present] : the pedal pulses are present [No Palpable Aorta] : no palpable aorta [No Extremity Clubbing/Cyanosis] : no extremity clubbing/cyanosis [Soft] : abdomen soft [Non Tender] : non-tender [Non-distended] : non-distended [No Masses] : no abdominal mass palpated [No HSM] : no HSM [Normal Bowel Sounds] : normal bowel sounds [Normal Posterior Cervical Nodes] : no posterior cervical lymphadenopathy [Normal Anterior Cervical Nodes] : no anterior cervical lymphadenopathy [No CVA Tenderness] : no CVA  tenderness [No Spinal Tenderness] : no spinal tenderness [No Joint Swelling] : no joint swelling [Grossly Normal Strength/Tone] : grossly normal strength/tone [Coordination Grossly Intact] : coordination grossly intact [No Focal Deficits] : no focal deficits [Normal Gait] : normal gait [Deep Tendon Reflexes (DTR)] : deep tendon reflexes were 2+ and symmetric [Normal Affect] : the affect was normal [Normal Insight/Judgement] : insight and judgment were intact [de-identified] : Bilateral leg edema [de-identified] : Wound on the right dorsal portion of the foot. Drainage from the wound is apparent. Very tender

## 2021-08-11 NOTE — HEALTH RISK ASSESSMENT
[No] : In the past 12 months have you used drugs other than those required for medical reasons? No [No falls in past year] : Patient reported no falls in the past year [0] : 2) Feeling down, depressed, or hopeless: Not at all (0) [] : No [de-identified] : Former smoker [[id number]] : 0

## 2021-08-11 NOTE — HISTORY OF PRESENT ILLNESS
[FreeTextEntry1] : Follow up appointment [de-identified] : 66M with a PMH of  Afib, HTN, HLD, hypothyroid, OA,  asthma, DM presents to the office for a 1 week follow up appointment to discuss lab results and recent xrays of the foot. The patient will be seeing his foot doctor next week for wounds on the right foot.

## 2021-08-13 LAB — TESTOST SERPL-MCNC: 70.1 NG/DL

## 2021-08-18 ENCOUNTER — APPOINTMENT (OUTPATIENT)
Dept: ORTHOPEDIC SURGERY | Facility: CLINIC | Age: 66
End: 2021-08-18
Payer: MEDICARE

## 2021-08-18 VITALS
BODY MASS INDEX: 47.74 KG/M2 | WEIGHT: 315 LBS | DIASTOLIC BLOOD PRESSURE: 87 MMHG | HEIGHT: 68 IN | HEART RATE: 72 BPM | SYSTOLIC BLOOD PRESSURE: 130 MMHG

## 2021-08-18 PROCEDURE — 99204 OFFICE O/P NEW MOD 45 MIN: CPT

## 2021-08-24 ENCOUNTER — RX RENEWAL (OUTPATIENT)
Age: 66
End: 2021-08-24

## 2021-09-15 ENCOUNTER — APPOINTMENT (OUTPATIENT)
Dept: FAMILY MEDICINE | Facility: CLINIC | Age: 66
End: 2021-09-15
Payer: MEDICARE

## 2021-09-15 VITALS
SYSTOLIC BLOOD PRESSURE: 130 MMHG | OXYGEN SATURATION: 94 % | RESPIRATION RATE: 16 BRPM | HEIGHT: 68 IN | TEMPERATURE: 98.2 F | BODY MASS INDEX: 47.74 KG/M2 | HEART RATE: 77 BPM | DIASTOLIC BLOOD PRESSURE: 80 MMHG | WEIGHT: 315 LBS

## 2021-09-15 PROCEDURE — 36415 COLL VENOUS BLD VENIPUNCTURE: CPT

## 2021-09-15 PROCEDURE — 99214 OFFICE O/P EST MOD 30 MIN: CPT | Mod: 25

## 2021-09-15 PROCEDURE — 83036 HEMOGLOBIN GLYCOSYLATED A1C: CPT | Mod: QW

## 2021-09-15 NOTE — COUNSELING
[Use proper foot wear] : Use proper foot wear [Fall prevention counseling provided] : Fall prevention counseling provided [Behavioral health counseling provided] : Behavioral health counseling provided [Engage in a relaxing activity] : Engage in a relaxing activity [Encouraged to increase physical activity] : Encouraged to increase physical activity [Decrease Portions] : decrease portions [None] : None [Good understanding] : Patient has a good understanding of lifestyle changes and steps needed to achieve self management goal [FreeTextEntry2] : Former smoker

## 2021-09-15 NOTE — ASSESSMENT
[FreeTextEntry1] : 6-8 week follow-up. Pt has no presenting complaints.\par \par Down to 345 lbs from 375 lbs\par Labs reviewed - POC glucose 301, POC A1C shortage\par Patient care plan discussed \par Diabetic education\par RTC in 1 month for flu shot, 3 months for routine f/u\par

## 2021-09-15 NOTE — HISTORY OF PRESENT ILLNESS
[FreeTextEntry1] : 6-8 week follow-up. Pt has no presenting complaints. [de-identified] : PMH of  Afib, HTN, HLD, hypothyroid, OA,  asthma, DM

## 2021-09-15 NOTE — PHYSICAL EXAM
[Well Nourished] : well nourished [Well Developed] : well developed [Well-Appearing] : well-appearing [Normal Sclera/Conjunctiva] : normal sclera/conjunctiva [PERRL] : pupils equal round and reactive to light [EOMI] : extraocular movements intact [Normal Outer Ear/Nose] : the outer ears and nose were normal in appearance [Normal Oropharynx] : the oropharynx was normal [No JVD] : no jugular venous distention [No Lymphadenopathy] : no lymphadenopathy [Supple] : supple [Thyroid Normal, No Nodules] : the thyroid was normal and there were no nodules present [No Respiratory Distress] : no respiratory distress  [No Accessory Muscle Use] : no accessory muscle use [Clear to Auscultation] : lungs were clear to auscultation bilaterally [Normal Rate] : normal rate  [Regular Rhythm] : with a regular rhythm [Normal S1, S2] : normal S1 and S2 [No Murmur] : no murmur heard [No Carotid Bruits] : no carotid bruits [No Abdominal Bruit] : a ~M bruit was not heard ~T in the abdomen [No Varicosities] : no varicosities [Pedal Pulses Present] : the pedal pulses are present [No Palpable Aorta] : no palpable aorta [No Extremity Clubbing/Cyanosis] : no extremity clubbing/cyanosis [Soft] : abdomen soft [Non Tender] : non-tender [Non-distended] : non-distended [No Masses] : no abdominal mass palpated [No HSM] : no HSM [Normal Bowel Sounds] : normal bowel sounds [Normal Posterior Cervical Nodes] : no posterior cervical lymphadenopathy [Normal Anterior Cervical Nodes] : no anterior cervical lymphadenopathy [No CVA Tenderness] : no CVA  tenderness [No Spinal Tenderness] : no spinal tenderness [No Joint Swelling] : no joint swelling [Grossly Normal Strength/Tone] : grossly normal strength/tone [Coordination Grossly Intact] : coordination grossly intact [No Focal Deficits] : no focal deficits [Normal Gait] : normal gait [Deep Tendon Reflexes (DTR)] : deep tendon reflexes were 2+ and symmetric [Normal Affect] : the affect was normal [Normal Insight/Judgement] : insight and judgment were intact [de-identified] : Bilateral leg edema [de-identified] : Wound on the right dorsal portion of the foot. Drainage from the wound is apparent. Very tender

## 2021-09-15 NOTE — HEALTH RISK ASSESSMENT
[No] : In the past 12 months have you used drugs other than those required for medical reasons? No [No falls in past year] : Patient reported no falls in the past year [0] : 2) Feeling down, depressed, or hopeless: Not at all (0) [] : No [de-identified] : Former smoker [[id number]] : 0

## 2021-09-16 LAB
ALBUMIN SERPL ELPH-MCNC: 4.3 G/DL
ALP BLD-CCNC: 102 U/L
ALT SERPL-CCNC: 37 U/L
ANION GAP SERPL CALC-SCNC: 13 MMOL/L
AST SERPL-CCNC: 29 U/L
BASOPHILS # BLD AUTO: 0.07 K/UL
BASOPHILS NFR BLD AUTO: 0.6 %
BILIRUB SERPL-MCNC: 0.4 MG/DL
BUN SERPL-MCNC: 18 MG/DL
CALCIUM SERPL-MCNC: 9.8 MG/DL
CHLORIDE SERPL-SCNC: 96 MMOL/L
CHOLEST SERPL-MCNC: 154 MG/DL
CO2 SERPL-SCNC: 26 MMOL/L
CREAT SERPL-MCNC: 0.92 MG/DL
EOSINOPHIL # BLD AUTO: 0.16 K/UL
EOSINOPHIL NFR BLD AUTO: 1.4 %
ERYTHROCYTE [SEDIMENTATION RATE] IN BLOOD BY WESTERGREN METHOD: 75 MM/HR
GLUCOSE SERPL-MCNC: 302 MG/DL
HCT VFR BLD CALC: 44.5 %
HDLC SERPL-MCNC: 45 MG/DL
HGB BLD-MCNC: 13.6 G/DL
IMM GRANULOCYTES NFR BLD AUTO: 0.5 %
LDLC SERPL CALC-MCNC: 85 MG/DL
LYMPHOCYTES # BLD AUTO: 1.4 K/UL
LYMPHOCYTES NFR BLD AUTO: 11.8 %
MAN DIFF?: NORMAL
MCHC RBC-ENTMCNC: 28.3 PG
MCHC RBC-ENTMCNC: 30.6 GM/DL
MCV RBC AUTO: 92.7 FL
MONOCYTES # BLD AUTO: 0.55 K/UL
MONOCYTES NFR BLD AUTO: 4.6 %
NEUTROPHILS # BLD AUTO: 9.61 K/UL
NEUTROPHILS NFR BLD AUTO: 81.1 %
NONHDLC SERPL-MCNC: 109 MG/DL
PLATELET # BLD AUTO: 388 K/UL
POTASSIUM SERPL-SCNC: 4.6 MMOL/L
PROT SERPL-MCNC: 7.9 G/DL
RBC # BLD: 4.8 M/UL
RBC # FLD: 15.7 %
SODIUM SERPL-SCNC: 135 MMOL/L
TRIGL SERPL-MCNC: 122 MG/DL
TSH SERPL-ACNC: 3.3 UIU/ML
WBC # FLD AUTO: 11.85 K/UL

## 2021-09-21 LAB
ESTIMATED AVERAGE GLUCOSE: 269 MG/DL
HBA1C MFR BLD HPLC: 11 %

## 2021-10-16 ENCOUNTER — RX RENEWAL (OUTPATIENT)
Age: 66
End: 2021-10-16

## 2021-10-21 RX ORDER — BLOOD SUGAR DIAGNOSTIC
STRIP MISCELLANEOUS DAILY
Qty: 1 | Refills: 1 | Status: ACTIVE | COMMUNITY
Start: 2021-10-21 | End: 1900-01-01

## 2021-10-21 RX ORDER — LANCETS 33 GAUGE
EACH MISCELLANEOUS
Qty: 1 | Refills: 1 | Status: ACTIVE | COMMUNITY
Start: 2021-10-21 | End: 1900-01-01

## 2021-10-21 RX ORDER — BLOOD-GLUCOSE METER
W/DEVICE EACH MISCELLANEOUS
Qty: 1 | Refills: 0 | Status: ACTIVE | COMMUNITY
Start: 2021-10-21 | End: 1900-01-01

## 2021-10-27 ENCOUNTER — RX RENEWAL (OUTPATIENT)
Age: 66
End: 2021-10-27

## 2021-10-27 RX ORDER — BLOOD-GLUCOSE CONTROL, LOW
LOW EACH MISCELLANEOUS
Qty: 1 | Refills: 0 | Status: ACTIVE | COMMUNITY
Start: 2021-10-26 | End: 1900-01-01

## 2021-11-06 NOTE — DISCHARGE NOTE ADULT - CARE PLAN
bilateral UEs & LEs grossly at least 3+/5 throughout/grossly assessed due to
Principal Discharge DX:	Vertigo  Goal:	resolverd, likely secondary to bradycardia  Assessment and plan of treatment:	atenolol held, follow up with cardio  TIA RULED OUT  Secondary Diagnosis:	Afib  Goal:	hold atenolol  Assessment and plan of treatment:	folllow up with cardio    full dose aspirin  Secondary Diagnosis:	HTN (hypertension)  Goal:	home meds adjusted  Secondary Diagnosis:	DM2 (diabetes mellitus, type 2)  Goal:	metfomrin  Assessment and plan of treatment:	new diagnosis  Secondary Diagnosis:	Obesity  Goal:	advised to lose weight  Secondary Diagnosis:	JASON (obstructive sleep apnea)  Goal:	continue home cpap  Assessment and plan of treatment:	follow up with pulmonary

## 2021-11-10 ENCOUNTER — RX RENEWAL (OUTPATIENT)
Age: 66
End: 2021-11-10

## 2021-12-08 ENCOUNTER — APPOINTMENT (OUTPATIENT)
Dept: RHEUMATOLOGY | Facility: CLINIC | Age: 66
End: 2021-12-08
Payer: MEDICARE

## 2021-12-08 VITALS
DIASTOLIC BLOOD PRESSURE: 60 MMHG | RESPIRATION RATE: 17 BRPM | OXYGEN SATURATION: 96 % | HEART RATE: 63 BPM | WEIGHT: 315 LBS | HEIGHT: 68 IN | TEMPERATURE: 97.3 F | SYSTOLIC BLOOD PRESSURE: 110 MMHG | BODY MASS INDEX: 47.74 KG/M2

## 2021-12-08 DIAGNOSIS — M10.9 GOUT, UNSPECIFIED: ICD-10-CM

## 2021-12-08 DIAGNOSIS — Z96.652 PRESENCE OF LEFT ARTIFICIAL KNEE JOINT: ICD-10-CM

## 2021-12-08 PROCEDURE — 36415 COLL VENOUS BLD VENIPUNCTURE: CPT

## 2021-12-08 PROCEDURE — 99214 OFFICE O/P EST MOD 30 MIN: CPT | Mod: 25

## 2021-12-08 RX ORDER — AMOXICILLIN AND CLAVULANATE POTASSIUM 875; 125 MG/1; MG/1
875-125 TABLET, COATED ORAL
Qty: 14 | Refills: 0 | Status: DISCONTINUED | COMMUNITY
Start: 2021-08-04 | End: 2021-12-08

## 2021-12-09 RX ORDER — PREDNISONE 5 MG/1
5 TABLET ORAL
Qty: 20 | Refills: 0 | Status: DISCONTINUED | COMMUNITY
Start: 2021-08-11 | End: 2021-12-09

## 2021-12-15 ENCOUNTER — APPOINTMENT (OUTPATIENT)
Dept: FAMILY MEDICINE | Facility: CLINIC | Age: 66
End: 2021-12-15
Payer: MEDICARE

## 2021-12-15 VITALS
OXYGEN SATURATION: 98 % | BODY MASS INDEX: 47.74 KG/M2 | HEART RATE: 89 BPM | RESPIRATION RATE: 16 BRPM | WEIGHT: 315 LBS | TEMPERATURE: 98.2 F | HEIGHT: 68 IN | DIASTOLIC BLOOD PRESSURE: 80 MMHG | SYSTOLIC BLOOD PRESSURE: 132 MMHG

## 2021-12-15 DIAGNOSIS — L30.9 DERMATITIS, UNSPECIFIED: ICD-10-CM

## 2021-12-15 PROCEDURE — 82962 GLUCOSE BLOOD TEST: CPT

## 2021-12-15 PROCEDURE — 83036 HEMOGLOBIN GLYCOSYLATED A1C: CPT | Mod: QW

## 2021-12-15 PROCEDURE — 99214 OFFICE O/P EST MOD 30 MIN: CPT | Mod: 25

## 2021-12-15 RX ORDER — METHYLPREDNISOLONE 4 MG/1
4 TABLET ORAL
Qty: 30 | Refills: 5 | Status: DISCONTINUED | COMMUNITY
Start: 2021-01-12 | End: 2021-12-15

## 2021-12-15 NOTE — PHYSICAL EXAM
[Well Nourished] : well nourished [Well Developed] : well developed [Well-Appearing] : well-appearing [Normal Sclera/Conjunctiva] : normal sclera/conjunctiva [PERRL] : pupils equal round and reactive to light [EOMI] : extraocular movements intact [Normal Outer Ear/Nose] : the outer ears and nose were normal in appearance [Normal Oropharynx] : the oropharynx was normal [No JVD] : no jugular venous distention [No Lymphadenopathy] : no lymphadenopathy [Supple] : supple [Thyroid Normal, No Nodules] : the thyroid was normal and there were no nodules present [No Respiratory Distress] : no respiratory distress  [No Accessory Muscle Use] : no accessory muscle use [Clear to Auscultation] : lungs were clear to auscultation bilaterally [Normal Rate] : normal rate  [Regular Rhythm] : with a regular rhythm [Normal S1, S2] : normal S1 and S2 [No Murmur] : no murmur heard [No Carotid Bruits] : no carotid bruits [No Abdominal Bruit] : a ~M bruit was not heard ~T in the abdomen [No Varicosities] : no varicosities [Pedal Pulses Present] : the pedal pulses are present [No Palpable Aorta] : no palpable aorta [No Extremity Clubbing/Cyanosis] : no extremity clubbing/cyanosis [Soft] : abdomen soft [Non Tender] : non-tender [Non-distended] : non-distended [No Masses] : no abdominal mass palpated [No HSM] : no HSM [Normal Bowel Sounds] : normal bowel sounds [Normal Posterior Cervical Nodes] : no posterior cervical lymphadenopathy [Normal Anterior Cervical Nodes] : no anterior cervical lymphadenopathy [No CVA Tenderness] : no CVA  tenderness [No Spinal Tenderness] : no spinal tenderness [No Joint Swelling] : no joint swelling [Grossly Normal Strength/Tone] : grossly normal strength/tone [Coordination Grossly Intact] : coordination grossly intact [No Focal Deficits] : no focal deficits [Normal Gait] : normal gait [Deep Tendon Reflexes (DTR)] : deep tendon reflexes were 2+ and symmetric [Normal Affect] : the affect was normal [Normal Insight/Judgement] : insight and judgment were intact [de-identified] : Bilateral leg edema [de-identified] : Wound on the right dorsal portion of the foot. Drainage from the wound is apparent. Very tender

## 2021-12-15 NOTE — ASSESSMENT
[FreeTextEntry1] :  Pt has no presenting complaints.PMH of  Afib, HTN, HLD, hypothyroid, OA,  asthma, DM  has stopped all diabetic meds does not want metformin \par CT stone hunt\par Down to 3305 lbs from 375 lbs\par Labs reviewed - POC glucose 301, POC A1C shortage\par Patient care plan discussed \par Diabetic education again\par declines insulin\par diet and endo trial of jardiance \par RTC in 1 month  \par Endo and urology

## 2021-12-15 NOTE — HEALTH RISK ASSESSMENT
[No] : In the past 12 months have you used drugs other than those required for medical reasons? No [No falls in past year] : Patient reported no falls in the past year [0] : 2) Feeling down, depressed, or hopeless: Not at all (0) [de-identified] : Former smoker [[id number]] : 0

## 2021-12-15 NOTE — COUNSELING
[Fall prevention counseling provided] : Fall prevention counseling provided [Use proper foot wear] : Use proper foot wear [Behavioral health counseling provided] : Behavioral health counseling provided [Engage in a relaxing activity] : Engage in a relaxing activity [Encouraged to increase physical activity] : Encouraged to increase physical activity [Decrease Portions] : decrease portions [None] : None [Good understanding] : Patient has a good understanding of lifestyle changes and steps needed to achieve self management goal

## 2021-12-15 NOTE — HISTORY OF PRESENT ILLNESS
[FreeTextEntry1] :  Pt has no presenting complaints.PMH of  Afib, HTN, HLD, hypothyroid, OA,  asthma, DM  has stopped all diabetic meds does not want metformin passed 4 kidney stones

## 2021-12-16 ENCOUNTER — NON-APPOINTMENT (OUTPATIENT)
Age: 66
End: 2021-12-16

## 2021-12-18 ENCOUNTER — RX RENEWAL (OUTPATIENT)
Age: 66
End: 2021-12-18

## 2021-12-20 ENCOUNTER — RX RENEWAL (OUTPATIENT)
Age: 66
End: 2021-12-20

## 2021-12-20 ENCOUNTER — OUTPATIENT (OUTPATIENT)
Dept: OUTPATIENT SERVICES | Facility: HOSPITAL | Age: 66
LOS: 1 days | End: 2021-12-20
Payer: MEDICARE

## 2021-12-20 ENCOUNTER — APPOINTMENT (OUTPATIENT)
Dept: CT IMAGING | Facility: CLINIC | Age: 66
End: 2021-12-20
Payer: MEDICARE

## 2021-12-20 DIAGNOSIS — Z00.8 ENCOUNTER FOR OTHER GENERAL EXAMINATION: ICD-10-CM

## 2021-12-20 DIAGNOSIS — Z96.651 PRESENCE OF RIGHT ARTIFICIAL KNEE JOINT: Chronic | ICD-10-CM

## 2021-12-20 PROCEDURE — 74176 CT ABD & PELVIS W/O CONTRAST: CPT

## 2021-12-20 PROCEDURE — 74176 CT ABD & PELVIS W/O CONTRAST: CPT | Mod: 26

## 2022-01-11 ENCOUNTER — APPOINTMENT (OUTPATIENT)
Dept: CT IMAGING | Facility: CLINIC | Age: 67
End: 2022-01-11
Payer: MEDICARE

## 2022-01-11 ENCOUNTER — OUTPATIENT (OUTPATIENT)
Dept: OUTPATIENT SERVICES | Facility: HOSPITAL | Age: 67
LOS: 1 days | End: 2022-01-11
Payer: MEDICARE

## 2022-01-11 DIAGNOSIS — Z00.8 ENCOUNTER FOR OTHER GENERAL EXAMINATION: ICD-10-CM

## 2022-01-11 DIAGNOSIS — Z96.651 PRESENCE OF RIGHT ARTIFICIAL KNEE JOINT: Chronic | ICD-10-CM

## 2022-01-11 PROCEDURE — 74170 CT ABD WO CNTRST FLWD CNTRST: CPT

## 2022-01-11 PROCEDURE — 82565 ASSAY OF CREATININE: CPT

## 2022-01-11 PROCEDURE — 74170 CT ABD WO CNTRST FLWD CNTRST: CPT | Mod: 26

## 2022-01-12 ENCOUNTER — APPOINTMENT (OUTPATIENT)
Dept: FAMILY MEDICINE | Facility: CLINIC | Age: 67
End: 2022-01-12
Payer: MEDICARE

## 2022-01-12 ENCOUNTER — EMERGENCY (EMERGENCY)
Facility: HOSPITAL | Age: 67
LOS: 1 days | Discharge: HOME | End: 2022-01-12
Attending: EMERGENCY MEDICINE
Payer: MEDICARE

## 2022-01-12 VITALS
HEART RATE: 78 BPM | RESPIRATION RATE: 16 BRPM | BODY MASS INDEX: 47.74 KG/M2 | HEIGHT: 68 IN | WEIGHT: 315 LBS | TEMPERATURE: 97.2 F | OXYGEN SATURATION: 98 % | DIASTOLIC BLOOD PRESSURE: 70 MMHG | SYSTOLIC BLOOD PRESSURE: 120 MMHG

## 2022-01-12 VITALS
RESPIRATION RATE: 16 BRPM | SYSTOLIC BLOOD PRESSURE: 125 MMHG | HEIGHT: 70 IN | DIASTOLIC BLOOD PRESSURE: 70 MMHG | TEMPERATURE: 98 F | HEART RATE: 70 BPM | WEIGHT: 315 LBS | OXYGEN SATURATION: 97 %

## 2022-01-12 VITALS
OXYGEN SATURATION: 94 % | TEMPERATURE: 99 F | DIASTOLIC BLOOD PRESSURE: 73 MMHG | SYSTOLIC BLOOD PRESSURE: 112 MMHG | HEART RATE: 71 BPM | RESPIRATION RATE: 16 BRPM

## 2022-01-12 DIAGNOSIS — Z96.651 PRESENCE OF RIGHT ARTIFICIAL KNEE JOINT: Chronic | ICD-10-CM

## 2022-01-12 LAB
A1C WITH ESTIMATED AVERAGE GLUCOSE RESULT: 12.7 % — HIGH (ref 4–5.6)
ACETONE SERPL-MCNC: NEGATIVE — SIGNIFICANT CHANGE UP
ALBUMIN SERPL ELPH-MCNC: 3.8 G/DL — SIGNIFICANT CHANGE UP (ref 3.3–5.2)
ALP SERPL-CCNC: 122 U/L — HIGH (ref 40–120)
ALT FLD-CCNC: 41 U/L — HIGH
ANION GAP SERPL CALC-SCNC: 15 MMOL/L — SIGNIFICANT CHANGE UP (ref 5–17)
AST SERPL-CCNC: 37 U/L — SIGNIFICANT CHANGE UP
BASE EXCESS BLDV CALC-SCNC: 3.3 MMOL/L — HIGH (ref -2–3)
BASOPHILS # BLD AUTO: 0.08 K/UL — SIGNIFICANT CHANGE UP (ref 0–0.2)
BASOPHILS NFR BLD AUTO: 0.6 % — SIGNIFICANT CHANGE UP (ref 0–2)
BILIRUB SERPL-MCNC: 0.3 MG/DL — LOW (ref 0.4–2)
BUN SERPL-MCNC: 19.4 MG/DL — SIGNIFICANT CHANGE UP (ref 8–20)
CALCIUM SERPL-MCNC: 9.9 MG/DL — SIGNIFICANT CHANGE UP (ref 8.6–10.2)
CHLORIDE SERPL-SCNC: 91 MMOL/L — LOW (ref 98–107)
CO2 SERPL-SCNC: 24 MMOL/L — SIGNIFICANT CHANGE UP (ref 22–29)
CREAT SERPL-MCNC: 1.16 MG/DL — SIGNIFICANT CHANGE UP (ref 0.5–1.3)
EOSINOPHIL # BLD AUTO: 0.13 K/UL — SIGNIFICANT CHANGE UP (ref 0–0.5)
EOSINOPHIL NFR BLD AUTO: 1.1 % — SIGNIFICANT CHANGE UP (ref 0–6)
ESTIMATED AVERAGE GLUCOSE: 318 MG/DL — HIGH (ref 68–114)
GLUCOSE BLDC GLUCOMTR-MCNC: 573
GLUCOSE SERPL-MCNC: 543 MG/DL — CRITICAL HIGH (ref 70–99)
HCO3 BLDV-SCNC: 28 MMOL/L — SIGNIFICANT CHANGE UP (ref 22–29)
HCT VFR BLD CALC: 41.6 % — SIGNIFICANT CHANGE UP (ref 39–50)
HGB BLD-MCNC: 13.3 G/DL — SIGNIFICANT CHANGE UP (ref 13–17)
IMM GRANULOCYTES NFR BLD AUTO: 0.6 % — SIGNIFICANT CHANGE UP (ref 0–1.5)
LYMPHOCYTES # BLD AUTO: 1.37 K/UL — SIGNIFICANT CHANGE UP (ref 1–3.3)
LYMPHOCYTES # BLD AUTO: 11.1 % — LOW (ref 13–44)
MCHC RBC-ENTMCNC: 26.4 PG — LOW (ref 27–34)
MCHC RBC-ENTMCNC: 32 GM/DL — SIGNIFICANT CHANGE UP (ref 32–36)
MCV RBC AUTO: 82.7 FL — SIGNIFICANT CHANGE UP (ref 80–100)
MONOCYTES # BLD AUTO: 0.46 K/UL — SIGNIFICANT CHANGE UP (ref 0–0.9)
MONOCYTES NFR BLD AUTO: 3.7 % — SIGNIFICANT CHANGE UP (ref 2–14)
NEUTROPHILS # BLD AUTO: 10.25 K/UL — HIGH (ref 1.8–7.4)
NEUTROPHILS NFR BLD AUTO: 82.9 % — HIGH (ref 43–77)
PCO2 BLDV: 48 MMHG — SIGNIFICANT CHANGE UP (ref 42–55)
PH BLDV: 7.38 — SIGNIFICANT CHANGE UP (ref 7.32–7.43)
PLATELET # BLD AUTO: 405 K/UL — HIGH (ref 150–400)
PO2 BLDV: 59 MMHG — HIGH (ref 25–45)
POTASSIUM SERPL-MCNC: 4.5 MMOL/L — SIGNIFICANT CHANGE UP (ref 3.5–5.3)
POTASSIUM SERPL-SCNC: 4.5 MMOL/L — SIGNIFICANT CHANGE UP (ref 3.5–5.3)
PROT SERPL-MCNC: 8.7 G/DL — SIGNIFICANT CHANGE UP (ref 6.6–8.7)
RBC # BLD: 5.03 M/UL — SIGNIFICANT CHANGE UP (ref 4.2–5.8)
RBC # FLD: 15.8 % — HIGH (ref 10.3–14.5)
SAO2 % BLDV: 88.6 % — SIGNIFICANT CHANGE UP
SODIUM SERPL-SCNC: 130 MMOL/L — LOW (ref 135–145)
WBC # BLD: 12.37 K/UL — HIGH (ref 3.8–10.5)
WBC # FLD AUTO: 12.37 K/UL — HIGH (ref 3.8–10.5)

## 2022-01-12 PROCEDURE — 83036 HEMOGLOBIN GLYCOSYLATED A1C: CPT

## 2022-01-12 PROCEDURE — 99284 EMERGENCY DEPT VISIT MOD MDM: CPT | Mod: 25

## 2022-01-12 PROCEDURE — 80053 COMPREHEN METABOLIC PANEL: CPT

## 2022-01-12 PROCEDURE — 99215 OFFICE O/P EST HI 40 MIN: CPT | Mod: 25

## 2022-01-12 PROCEDURE — 82009 KETONE BODYS QUAL: CPT

## 2022-01-12 PROCEDURE — 73030 X-RAY EXAM OF SHOULDER: CPT | Mod: 26,LT

## 2022-01-12 PROCEDURE — 82962 GLUCOSE BLOOD TEST: CPT

## 2022-01-12 PROCEDURE — 73080 X-RAY EXAM OF ELBOW: CPT

## 2022-01-12 PROCEDURE — 70450 CT HEAD/BRAIN W/O DYE: CPT | Mod: 26,MA

## 2022-01-12 PROCEDURE — 73030 X-RAY EXAM OF SHOULDER: CPT

## 2022-01-12 PROCEDURE — 73080 X-RAY EXAM OF ELBOW: CPT | Mod: 26,LT

## 2022-01-12 PROCEDURE — 82803 BLOOD GASES ANY COMBINATION: CPT

## 2022-01-12 PROCEDURE — 99285 EMERGENCY DEPT VISIT HI MDM: CPT

## 2022-01-12 PROCEDURE — 85025 COMPLETE CBC W/AUTO DIFF WBC: CPT

## 2022-01-12 PROCEDURE — 70450 CT HEAD/BRAIN W/O DYE: CPT | Mod: MA

## 2022-01-12 PROCEDURE — 83036 HEMOGLOBIN GLYCOSYLATED A1C: CPT | Mod: QW

## 2022-01-12 PROCEDURE — 96374 THER/PROPH/DIAG INJ IV PUSH: CPT

## 2022-01-12 PROCEDURE — 36415 COLL VENOUS BLD VENIPUNCTURE: CPT

## 2022-01-12 RX ORDER — SODIUM CHLORIDE 9 MG/ML
1000 INJECTION, SOLUTION INTRAVENOUS ONCE
Refills: 0 | Status: COMPLETED | OUTPATIENT
Start: 2022-01-12 | End: 2022-01-12

## 2022-01-12 RX ORDER — EMPAGLIFLOZIN 25 MG/1
25 TABLET, FILM COATED ORAL DAILY
Qty: 90 | Refills: 2 | Status: DISCONTINUED | COMMUNITY
Start: 2021-12-15 | End: 2022-01-12

## 2022-01-12 RX ORDER — INSULIN HUMAN 100 [IU]/ML
10 INJECTION, SOLUTION SUBCUTANEOUS ONCE
Refills: 0 | Status: COMPLETED | OUTPATIENT
Start: 2022-01-12 | End: 2022-01-12

## 2022-01-12 RX ADMIN — SODIUM CHLORIDE 1000 MILLILITER(S): 9 INJECTION, SOLUTION INTRAVENOUS at 13:55

## 2022-01-12 RX ADMIN — INSULIN HUMAN 10 UNIT(S): 100 INJECTION, SOLUTION SUBCUTANEOUS at 13:55

## 2022-01-12 NOTE — ED PROVIDER NOTE - OBJECTIVE STATEMENT
66 year old male with PMh DM, HTN, afib presents with hyperglycemia. pt reports that he stopped taking his metformin, went to his pmd, and was told that his blood sugar was critically high and to go to the ER. he denies any Sx including no chest pain, nausea, vomiting, diarrhea, abd pain. Then in addition, pt reports that while walking through parking lot here he tripped and fell, striking his head and landing on his shoulder. Denies headache, neck pain, numbness, tingling, weakness, palpitations, chest pain, but he is c/o L shoulder pain.

## 2022-01-12 NOTE — ED PROVIDER NOTE - PATIENT PORTAL LINK FT
You can access the FollowMyHealth Patient Portal offered by [facility] by registering at the following website: [email or web address]. By joining [facility]’s FollowMyHealth portal, you will also be able to view your health information using other applications (apps) compatible with our system.

## 2022-01-12 NOTE — HISTORY OF PRESENT ILLNESS
[FreeTextEntry1] : 67 y/o M PMH of  Afib, HTN, HLD, hypothyroid, OA,  asthma, DM for 1 month follow up.  has stopped all diabetic meds does not want metformin, as it was too expensive. Seeing Urologist Dr. Brooke today.  passed 4 kidney stones had second CT performed yesterday due to potential lesion on left kidney \par pt checking blood sugars at home - ~340

## 2022-01-12 NOTE — PHYSICAL EXAM
[Well Nourished] : well nourished [Well Developed] : well developed [Well-Appearing] : well-appearing [Normal Sclera/Conjunctiva] : normal sclera/conjunctiva [PERRL] : pupils equal round and reactive to light [EOMI] : extraocular movements intact [Normal Outer Ear/Nose] : the outer ears and nose were normal in appearance [Normal Oropharynx] : the oropharynx was normal [No JVD] : no jugular venous distention [No Lymphadenopathy] : no lymphadenopathy [Supple] : supple [Thyroid Normal, No Nodules] : the thyroid was normal and there were no nodules present [No Respiratory Distress] : no respiratory distress  [No Accessory Muscle Use] : no accessory muscle use [Clear to Auscultation] : lungs were clear to auscultation bilaterally [Normal Rate] : normal rate  [Regular Rhythm] : with a regular rhythm [Normal S1, S2] : normal S1 and S2 [No Murmur] : no murmur heard [No Carotid Bruits] : no carotid bruits [No Abdominal Bruit] : a ~M bruit was not heard ~T in the abdomen [No Varicosities] : no varicosities [Pedal Pulses Present] : the pedal pulses are present [No Palpable Aorta] : no palpable aorta [No Extremity Clubbing/Cyanosis] : no extremity clubbing/cyanosis [Soft] : abdomen soft [Non Tender] : non-tender [Non-distended] : non-distended [No Masses] : no abdominal mass palpated [No HSM] : no HSM [Normal Bowel Sounds] : normal bowel sounds [Normal Posterior Cervical Nodes] : no posterior cervical lymphadenopathy [Normal Anterior Cervical Nodes] : no anterior cervical lymphadenopathy [No CVA Tenderness] : no CVA  tenderness [No Spinal Tenderness] : no spinal tenderness [No Joint Swelling] : no joint swelling [Grossly Normal Strength/Tone] : grossly normal strength/tone [Coordination Grossly Intact] : coordination grossly intact [No Focal Deficits] : no focal deficits [Normal Gait] : normal gait [Deep Tendon Reflexes (DTR)] : deep tendon reflexes were 2+ and symmetric [Normal Affect] : the affect was normal [Normal Insight/Judgement] : insight and judgment were intact [No Acute Distress] : no acute distress [de-identified] : Bilateral leg edema [de-identified] : Wound on the right dorsal portion of the foot. Drainage from the wound is apparent. Very tender

## 2022-01-12 NOTE — ED PROVIDER NOTE - CARE PROVIDER_API CALL
Herbert Chaudhry)  EndocrinologyMetabDiabetes; Internal Medicine  [address] [place] [address]  Phone: [phone]  Fax: [phone]  Follow Up Time:

## 2022-01-12 NOTE — ED ADULT NURSE NOTE - OBJECTIVE STATEMENT
Pt with PMH of T2DM, AOx3 speaking coherently, breathing even and unlabored, pulses present and equal bilaterally, abd soft, nontender, nondistended. Pt reports drinking more because he had kidney stones and he was instructed to drink more, therefore he is urinating more. Last stone was passed 1/8/22. Pt reports no CP, SOB, dizziness, N/V/D. Pt went to the doctor this morning where his BG was 575 and his A1C was 17, so his doctor prompted him to come to the ED. Pt was on Metformin and took himself off around  months ago. He stated he checked his BG daily and it was always around 350. While walking to the ED pt tripped and hurt his left shoulder. Pt rated pain 9/10, and he cannot abduct his left arm.       went to Dr this morning 575 sugar A1C 17. Dr recommended going to ED. Fell walking to the ED, now he has pain in shoulder. drinking more because he had kidney stones so he was going to the bathroom more than usual as well. T2 taking metformin took self off about 2 months ago. glucose around 350 everyday. shoulder pain 9/10 shoulder pain. lacking ROM. passed kidney tone 4 days ago, blood in urine. Pt with PMH of T2DM, AOx3 speaking coherently, breathing even and unlabored, pulses present and equal bilaterally, abd soft, nontender, nondistended. Pt reports drinking more because he had kidney stones and he was instructed to drink more, therefore he is urinating more. Last stone was passed 1/8/22. Pt reports no CP, SOB, dizziness, N/V/D. Pt went to the doctor this morning where his BG was 575 and his A1C was 17, so his doctor prompted him to come to the ED. Pt was on Metformin and took himself off around  months ago. He stated he checked his BG daily and it was always around 350. While walking to the ED pt tripped and hurt his left shoulder. Pt rated pain 9/10, and he cannot abduct his left arm.

## 2022-01-12 NOTE — ED ADULT TRIAGE NOTE - CHIEF COMPLAINT QUOTE
Pt states was sent to ER after routine blood work showed glucose greater than 600. Pt also tripped and fell while walking into ER and is c/o L shoulder pain and was unable to get off floor by himself due to gen weakness and L shoulder pain.

## 2022-01-12 NOTE — ED ADULT NURSE NOTE - NSICDXFAMILYHX_GEN_ALL_CORE_FT
FAMILY HISTORY:  Mother  Still living? Unknown  Family history of cerebrovascular accident (CVA), Age at diagnosis: Age Unknown    Sibling  Still living? Unknown  Family history of diabetes mellitus, Age at diagnosis: Age Unknown  Family history of essential hypertension, Age at diagnosis: Age Unknown

## 2022-01-12 NOTE — ED ADULT NURSE NOTE - NSICDXPASTMEDICALHX_GEN_ALL_CORE_FT
PAST MEDICAL HISTORY:  Afib     Closed fracture of elbow, unspecified laterality, initial encounter     Degenerative joint disease     HTN (hypertension)

## 2022-01-12 NOTE — ASSESSMENT
[FreeTextEntry1] : 67 y/o M PMH of  Afib, HTN, HLD, hypothyroid, OA,  asthma, DM for 1 month follow up.  has stopped all diabetic meds does not want metformin, as it was too expensive. Seeing Urologist Dr. Brooke today.  passed 4 kidney stones had second CT performed yesterday due to potential lesion on left kidney \par \par Care plan reviewed\par Labs reviewed \par POC glucose 573\par POC A1C: 13.3\par \par pt advised to go to the hospital due to hyperglycemia. pt noncompliant with medications. declined ambulance.

## 2022-01-12 NOTE — ED ADULT NURSE NOTE - DISCHARGE DATE/TIME
71 yo F extensive pmhx presenting with asthma exacerbation and clinical pneumonia, cbc, cmp, rvp, cxr, trop, cap abx, steroids, tba 12-Jan-2022 16:36

## 2022-01-12 NOTE — HEALTH RISK ASSESSMENT
[Former] : Former [1 or 2 (0 pts)] : 1 or 2 (0 points) [Never (0 pts)] : Never (0 points) [No] : In the past 12 months have you used drugs other than those required for medical reasons? No [No falls in past year] : Patient reported no falls in the past year [0] : 2) Feeling down, depressed, or hopeless: Not at all (0) [PHQ-2 Negative - No further assessment needed] : PHQ-2 Negative - No further assessment needed [Patient/Caregiver not ready to engage] : , patient/caregiver not ready to engage [I will adhere to the patient's wishes.] : I will adhere to the patient's wishes. [Time Spent: ___ minutes] : Time Spent: [unfilled] minutes [de-identified] : Former smoker [Audit-CScore] : 0 [de-identified] : average [de-identified] : susan [[facility]] : 8 [[id number]] : 0 [AdvancecareDate] : 01/22

## 2022-01-12 NOTE — REVIEW OF SYSTEMS
[Fatigue] : fatigue [Hearing Loss] : hearing loss [Nocturia] : nocturia [Joint Pain] : joint pain [Joint Stiffness] : joint stiffness [Muscle Weakness] : muscle weakness [Back Pain] : back pain [Joint Swelling] : joint swelling [Negative] : Heme/Lymph [Fever] : no fever [Chills] : no chills [Night Sweats] : no night sweats [Recent Change In Weight] : ~T no recent weight change [Discharge] : no discharge [Pain] : no pain [Redness] : no redness [Vision Problems] : no vision problems [Itching] : no itching [Earache] : no earache [Nosebleeds] : no nosebleeds [Postnasal Drip] : no postnasal drip [Nasal Discharge] : no nasal discharge [Sore Throat] : no sore throat [Hoarseness] : no hoarseness [Chest Pain] : no chest pain [Palpitations] : no palpitations [Claudication] : no  leg claudication [Lower Ext Edema] : no lower extremity edema [Orthopena] : no orthopnea [Abdominal Pain] : no abdominal pain [Nausea] : no nausea [Vomiting] : no vomiting [Heartburn] : no heartburn [Melena] : no melena [Dysuria] : no dysuria [Incontinence] : no incontinence [Hesitancy] : no hesitancy [Hematuria] : no hematuria [Frequency] : no frequency [Impotence] : no impotency [Poor Libido] : libido not poor [Muscle Pain] : no muscle pain [Headache] : no headache [Dizziness] : no dizziness [Fainting] : no fainting [Confusion] : no confusion [Unsteady Walk] : no ataxia [Memory Loss] : no memory loss

## 2022-01-12 NOTE — ED PROVIDER NOTE - CLINICAL SUMMARY MEDICAL DECISION MAKING FREE TEXT BOX
no traumatic injury, no sign of DKA, glycemic control improved. Advised to restart the metformin and f/u with endocrine.

## 2022-01-12 NOTE — ED PROVIDER NOTE - CARE PROVIDERS DIRECT ADDRESSES
[email or web address]

## 2022-01-12 NOTE — COUNSELING
[Fall prevention counseling provided] : Fall prevention counseling provided [Use proper foot wear] : Use proper foot wear [Behavioral health counseling provided] : Behavioral health counseling provided [Engage in a relaxing activity] : Engage in a relaxing activity [Decrease Portions] : decrease portions [None] : None [Good understanding] : Patient has a good understanding of lifestyle changes and steps needed to achieve self management goal [Adequate lighting] : Adequate lighting [Sleep ___ hours/day] : Sleep [unfilled] hours/day [Plan in advance] : Plan in advance [AUDIT-C Screening administered and reviewed] : AUDIT-C Screening administered and reviewed [Potential consequences of obesity discussed] : Potential consequences of obesity discussed [Benefits of weight loss discussed] : Benefits of weight loss discussed [Encouraged to increase physical activity] : Encouraged to increase physical activity [Weigh Self Weekly] : weigh self weekly [FreeTextEntry2] : never smoker

## 2022-01-14 NOTE — CHART NOTE - NSCHARTNOTEFT_GEN_A_CORE
Outpatient Follow Up Scheduled with Dr. Chaudhry at 2:00pm on 1/17/22  Endocrinology  [address] [place] [id number]  Phone: [phone]

## 2022-01-17 ENCOUNTER — APPOINTMENT (OUTPATIENT)
Dept: ENDOCRINOLOGY | Facility: CLINIC | Age: 67
End: 2022-01-17
Payer: MEDICARE

## 2022-01-17 VITALS
HEIGHT: 68 IN | DIASTOLIC BLOOD PRESSURE: 70 MMHG | WEIGHT: 315 LBS | BODY MASS INDEX: 47.74 KG/M2 | OXYGEN SATURATION: 98 % | HEART RATE: 66 BPM | SYSTOLIC BLOOD PRESSURE: 125 MMHG

## 2022-01-17 LAB
GLUCOSE BLDC GLUCOMTR-MCNC: 452
POCT - KETONE, BLOOD: 1

## 2022-01-17 PROCEDURE — 82962 GLUCOSE BLOOD TEST: CPT

## 2022-01-17 PROCEDURE — 98960 EDU&TRN PT SELF-MGMT NQHP 1: CPT

## 2022-01-17 PROCEDURE — 99205 OFFICE O/P NEW HI 60 MIN: CPT | Mod: 25

## 2022-01-17 RX ORDER — FLASH GLUCOSE SCANNING READER
EACH MISCELLANEOUS
Qty: 1 | Refills: 0 | Status: ACTIVE | COMMUNITY
Start: 2022-01-17 | End: 1900-01-01

## 2022-01-17 NOTE — PHYSICAL EXAM
[Alert] : alert [No Acute Distress] : no acute distress [Normal Sclera/Conjunctiva] : normal sclera/conjunctiva [EOMI] : extra ocular movement intact [Thyroid Not Enlarged] : the thyroid was not enlarged [No Thyroid Nodules] : no palpable thyroid nodules [Clear to Auscultation] : lungs were clear to auscultation bilaterally [Normal to Percussion] : lungs were normal to percussion [No Stigmata of Cushings Syndrome] : no stigmata of Cushings Syndrome [Normal Gait] : normal gait [No Clubbing, Cyanosis] : no clubbing  or cyanosis of the fingernails [Cranial Nerves Intact] : cranial nerves 2-12 were intact [No Motor Deficits] : the motor exam was normal [Oriented x3] : oriented to person, place, and time [Normal Affect] : the affect was normal [de-identified] : irregular rhythm [de-identified] : generalized obesity [de-identified] : warm and dry

## 2022-01-17 NOTE — HISTORY OF PRESENT ILLNESS
[FreeTextEntry1] : DM type:2\par Severity:severely uncontrolled\par Duration:several years\par Onset:found DM on labs\par \par Associated symptoms or complications:none\par \par Modifying Factors:severe obesity; uncontrolled diabetes continues despite weight loss from 375 to 315 pounds. Alfred anorexia or abdominal pain; made many diet changes in an effort to lose weight for anticipated cardioversion\par \par Current regimen:\par did not tolerate metformin\par \par \par Current control:. ER visit yesterday due to glucose over 600. A1C over13%\par \par \par \par PMH:\par atrial fib\par renal stones\par renal lesion suspicious for carcinoma; scheduled for MRI\par hypothyroid\par \par \par

## 2022-01-17 NOTE — ASSESSMENT
[FreeTextEntry1] : DM type 2, severely uncontrolled. Acute need for better control, especially in view of possibility of kidney surgery.\par Plan;\par start basal insulin at 30 units\par start glimepiride 2\par rx viktoriya home or pro depending on availability\par eventual candidate for a glp-1 and/or sglt-2\par \par f/u 2 weeks

## 2022-01-22 ENCOUNTER — RX RENEWAL (OUTPATIENT)
Age: 67
End: 2022-01-22

## 2022-01-24 ENCOUNTER — APPOINTMENT (OUTPATIENT)
Dept: MRI IMAGING | Facility: CLINIC | Age: 67
End: 2022-01-24
Payer: MEDICARE

## 2022-01-24 ENCOUNTER — OUTPATIENT (OUTPATIENT)
Dept: OUTPATIENT SERVICES | Facility: HOSPITAL | Age: 67
LOS: 1 days | End: 2022-01-24
Payer: MEDICARE

## 2022-01-24 DIAGNOSIS — Z96.651 PRESENCE OF RIGHT ARTIFICIAL KNEE JOINT: Chronic | ICD-10-CM

## 2022-01-24 DIAGNOSIS — Z00.8 ENCOUNTER FOR OTHER GENERAL EXAMINATION: ICD-10-CM

## 2022-01-24 PROCEDURE — A9585: CPT

## 2022-01-24 PROCEDURE — 74183 MRI ABD W/O CNTR FLWD CNTR: CPT | Mod: 26

## 2022-01-24 PROCEDURE — 74183 MRI ABD W/O CNTR FLWD CNTR: CPT

## 2022-02-03 ENCOUNTER — APPOINTMENT (OUTPATIENT)
Dept: ENDOCRINOLOGY | Facility: CLINIC | Age: 67
End: 2022-02-03
Payer: MEDICARE

## 2022-02-03 PROCEDURE — G0108 DIAB MANAGE TRN  PER INDIV: CPT

## 2022-02-04 ENCOUNTER — NON-APPOINTMENT (OUTPATIENT)
Age: 67
End: 2022-02-04

## 2022-02-07 ENCOUNTER — RX RENEWAL (OUTPATIENT)
Age: 67
End: 2022-02-07

## 2022-03-10 ENCOUNTER — RX RENEWAL (OUTPATIENT)
Age: 67
End: 2022-03-10

## 2022-03-21 ENCOUNTER — LABORATORY RESULT (OUTPATIENT)
Age: 67
End: 2022-03-21

## 2022-03-21 DIAGNOSIS — R91.8 OTHER NONSPECIFIC ABNORMAL FINDING OF LUNG FIELD: ICD-10-CM

## 2022-03-21 LAB
ALBUMIN SERPL ELPH-MCNC: 3.9 G/DL
ALP BLD-CCNC: 124 U/L
ALT SERPL-CCNC: 14 U/L
ANION GAP SERPL CALC-SCNC: 15 MMOL/L
AST SERPL-CCNC: 17 U/L
BILIRUB SERPL-MCNC: 0.3 MG/DL
BUN SERPL-MCNC: 18 MG/DL
CALCIUM SERPL-MCNC: 8.8 MG/DL
CHLORIDE SERPL-SCNC: 105 MMOL/L
CO2 SERPL-SCNC: 22 MMOL/L
CREAT SERPL-MCNC: 1.13 MG/DL
EGFR: 72 ML/MIN/1.73M2
FOLATE SERPL-MCNC: 12.2 NG/ML
GLUCOSE SERPL-MCNC: 88 MG/DL
IRON SATN MFR SERPL: 6 %
IRON SERPL-MCNC: 23 UG/DL
POTASSIUM SERPL-SCNC: 4.5 MMOL/L
PROT SERPL-MCNC: 7.5 G/DL
SODIUM SERPL-SCNC: 142 MMOL/L
TIBC SERPL-MCNC: 409 UG/DL
UIBC SERPL-MCNC: 386 UG/DL
VIT B12 SERPL-MCNC: 872 PG/ML

## 2022-03-22 LAB
APPEARANCE: ABNORMAL
BASOPHILS # BLD AUTO: 0.07 K/UL
BASOPHILS NFR BLD AUTO: 0.7 %
BILIRUBIN URINE: NEGATIVE
BLOOD URINE: ABNORMAL
COLOR: ABNORMAL
EOSINOPHIL # BLD AUTO: 0.54 K/UL
EOSINOPHIL NFR BLD AUTO: 5.5 %
ERYTHROCYTE [SEDIMENTATION RATE] IN BLOOD BY WESTERGREN METHOD: 78 MM/HR
GLUCOSE QUALITATIVE U: NEGATIVE
HCT VFR BLD CALC: 27.6 %
HGB BLD-MCNC: 7.8 G/DL
IMM GRANULOCYTES NFR BLD AUTO: 0.3 %
KETONES URINE: NEGATIVE
LEUKOCYTE ESTERASE URINE: ABNORMAL
LYMPHOCYTES # BLD AUTO: 1.63 K/UL
LYMPHOCYTES NFR BLD AUTO: 16.5 %
MAN DIFF?: NORMAL
MCHC RBC-ENTMCNC: 22.4 PG
MCHC RBC-ENTMCNC: 28.3 GM/DL
MCV RBC AUTO: 79.3 FL
MONOCYTES # BLD AUTO: 0.51 K/UL
MONOCYTES NFR BLD AUTO: 5.2 %
NEUTROPHILS # BLD AUTO: 7.1 K/UL
NEUTROPHILS NFR BLD AUTO: 71.8 %
NITRITE URINE: NEGATIVE
PH URINE: 6.5
PLATELET # BLD AUTO: 418 K/UL
PROTEIN URINE: ABNORMAL
RBC # BLD: 3.48 M/UL
RBC # FLD: 17.6 %
SPECIFIC GRAVITY URINE: 1.02
UROBILINOGEN URINE: NORMAL
WBC # FLD AUTO: 9.88 K/UL

## 2022-03-25 ENCOUNTER — NON-APPOINTMENT (OUTPATIENT)
Age: 67
End: 2022-03-25

## 2022-03-28 LAB
A PHAGOCYTOPH IGG TITR SER IF: NORMAL TITER
A-TUMOR NECROSIS FACT SERPL-MCNC: 10 PG/ML
ACE BLD-CCNC: <5 U/L
ALBUMIN SERPL ELPH-MCNC: 3.6 G/DL
ALBUMIN SERPL ELPH-MCNC: 4.2 G/DL
ALBUMIN SERPL ELPH-MCNC: 4.4 G/DL
ALP BLD-CCNC: 114 U/L
ALP BLD-CCNC: 79 U/L
ALP BLD-CCNC: 83 U/L
ALT SERPL-CCNC: 27 U/L
ALT SERPL-CCNC: 54 U/L
ALT SERPL-CCNC: 81 U/L
ANA PAT FLD IF-IMP: ABNORMAL
ANACR T: ABNORMAL
ANION GAP SERPL CALC-SCNC: 12 MMOL/L
ANION GAP SERPL CALC-SCNC: 13 MMOL/L
ANION GAP SERPL CALC-SCNC: 15 MMOL/L
AST SERPL-CCNC: 19 U/L
AST SERPL-CCNC: 33 U/L
AST SERPL-CCNC: 57 U/L
B BURGDOR AB SER QL IA: NEGATIVE
B MICROTI IGG TITR SER: NORMAL TITER
BASOPHILS # BLD AUTO: 0.06 K/UL
BASOPHILS # BLD AUTO: 0.07 K/UL
BASOPHILS # BLD AUTO: 0.08 K/UL
BASOPHILS NFR BLD AUTO: 0.5 %
BASOPHILS NFR BLD AUTO: 0.5 %
BASOPHILS NFR BLD AUTO: 0.7 %
BILIRUB SERPL-MCNC: 0.4 MG/DL
BUN SERPL-MCNC: 17 MG/DL
BUN SERPL-MCNC: 19 MG/DL
BUN SERPL-MCNC: 20 MG/DL
CALCIUM SERPL-MCNC: 10.1 MG/DL
CALCIUM SERPL-MCNC: 9.2 MG/DL
CALCIUM SERPL-MCNC: 9.7 MG/DL
CCP AB SER IA-ACNC: <8 UNITS
CHLORIDE SERPL-SCNC: 100 MMOL/L
CHLORIDE SERPL-SCNC: 100 MMOL/L
CHLORIDE SERPL-SCNC: 90 MMOL/L
CK SERPL-CCNC: 147 U/L
CO2 SERPL-SCNC: 23 MMOL/L
CO2 SERPL-SCNC: 25 MMOL/L
CO2 SERPL-SCNC: 25 MMOL/L
CREAT SERPL-MCNC: 0.98 MG/DL
CREAT SERPL-MCNC: 1.04 MG/DL
CREAT SERPL-MCNC: 1.17 MG/DL
CRP SERPL-MCNC: 0.54 MG/DL
CRP SERPL-MCNC: 1.3 MG/DL
CRP SERPL-MCNC: 56 MG/L
CRP SERPL-MCNC: 8 MG/L
E CHAFFEENSIS IGG TITR SER IF: NORMAL TITER
EOSINOPHIL # BLD AUTO: 0.08 K/UL
EOSINOPHIL # BLD AUTO: 0.18 K/UL
EOSINOPHIL # BLD AUTO: 0.24 K/UL
EOSINOPHIL NFR BLD AUTO: 0.5 %
EOSINOPHIL NFR BLD AUTO: 1.6 %
EOSINOPHIL NFR BLD AUTO: 2.1 %
ERYTHROCYTE [SEDIMENTATION RATE] IN BLOOD BY WESTERGREN METHOD: 20 MM/HR
ERYTHROCYTE [SEDIMENTATION RATE] IN BLOOD BY WESTERGREN METHOD: 25 MM/HR
ERYTHROCYTE [SEDIMENTATION RATE] IN BLOOD BY WESTERGREN METHOD: 50 MM/HR
ERYTHROCYTE [SEDIMENTATION RATE] IN BLOOD BY WESTERGREN METHOD: 94 MM/HR
GLUCOSE SERPL-MCNC: 140 MG/DL
GLUCOSE SERPL-MCNC: 150 MG/DL
GLUCOSE SERPL-MCNC: 638 MG/DL
HCT VFR BLD CALC: 39.3 %
HCT VFR BLD CALC: 46 %
HCT VFR BLD CALC: 47.7 %
HGB BLD-MCNC: 12.2 G/DL
HGB BLD-MCNC: 15.1 G/DL
HGB BLD-MCNC: 15.2 G/DL
HLA-B27 RELATED AG QL: NEGATIVE
IGNF SERPL-MCNC: <4.2 PG/ML
IL10 SERPL-MCNC: <2.8 PG/ML
IL12 SERPL-MCNC: <1.9 PG/ML
IL13 SERPL-MCNC: <1.7 PG/ML
IL17A SERPL-MCNC: <1.4 PG/ML
IL2 SERPL-MCNC: 203.9 PG/ML
IL2 SERPL-MCNC: <2.1 PG/ML
IL4 SERPL-MCNC: <2.2 PG/ML
IL6 SERPL-MCNC: 5.3 PG/ML
IL6 SERPL-MCNC: <2 PG/ML
IL8 SERPL-MCNC: <3 PG/ML
IMM GRANULOCYTES NFR BLD AUTO: 0.3 %
IMM GRANULOCYTES NFR BLD AUTO: 0.4 %
IMM GRANULOCYTES NFR BLD AUTO: 0.6 %
INTERLEUKIN 1 BETA: <6.5 PG/ML
INTERLEUKIN 5: <2.1 PG/ML
LYMPHOCYTES # BLD AUTO: 1.14 K/UL
LYMPHOCYTES # BLD AUTO: 1.99 K/UL
LYMPHOCYTES # BLD AUTO: 2.29 K/UL
LYMPHOCYTES NFR BLD AUTO: 17.4 %
LYMPHOCYTES NFR BLD AUTO: 19.8 %
LYMPHOCYTES NFR BLD AUTO: 7.6 %
MAN DIFF?: NORMAL
MCHC RBC-ENTMCNC: 27.2 PG
MCHC RBC-ENTMCNC: 27.8 PG
MCHC RBC-ENTMCNC: 28.8 PG
MCHC RBC-ENTMCNC: 31 GM/DL
MCHC RBC-ENTMCNC: 31.9 GM/DL
MCHC RBC-ENTMCNC: 32.8 GM/DL
MCV RBC AUTO: 87.4 FL
MCV RBC AUTO: 87.5 FL
MCV RBC AUTO: 87.8 FL
MONOCYTES # BLD AUTO: 0.48 K/UL
MONOCYTES # BLD AUTO: 0.6 K/UL
MONOCYTES # BLD AUTO: 0.62 K/UL
MONOCYTES NFR BLD AUTO: 3.2 %
MONOCYTES NFR BLD AUTO: 5.2 %
MONOCYTES NFR BLD AUTO: 5.4 %
MPO AB + PR3 PNL SER: NORMAL
NEUTROPHILS # BLD AUTO: 13.12 K/UL
NEUTROPHILS # BLD AUTO: 8.28 K/UL
NEUTROPHILS # BLD AUTO: 8.55 K/UL
NEUTROPHILS NFR BLD AUTO: 71.7 %
NEUTROPHILS NFR BLD AUTO: 74.9 %
NEUTROPHILS NFR BLD AUTO: 87.6 %
PLATELET # BLD AUTO: 320 K/UL
PLATELET # BLD AUTO: 332 K/UL
PLATELET # BLD AUTO: 380 K/UL
POTASSIUM SERPL-SCNC: 4.1 MMOL/L
POTASSIUM SERPL-SCNC: 4.1 MMOL/L
POTASSIUM SERPL-SCNC: 4.4 MMOL/L
PROT SERPL-MCNC: 7.1 G/DL
PROT SERPL-MCNC: 7.3 G/DL
PROT SERPL-MCNC: 7.4 G/DL
RBC # BLD: 4.49 M/UL
RBC # BLD: 5.24 M/UL
RBC # BLD: 5.46 M/UL
RBC # FLD: 16.3 %
RBC # FLD: 16.4 %
RBC # FLD: 18 %
RF+CCP IGG SER-IMP: NEGATIVE
RHEUMATOID FACT SER QL: <10 IU/ML
SODIUM SERPL-SCNC: 127 MMOL/L
SODIUM SERPL-SCNC: 137 MMOL/L
SODIUM SERPL-SCNC: 138 MMOL/L
URATE SERPL-MCNC: 3.5 MG/DL
URATE SERPL-MCNC: 4.9 MG/DL
URATE SERPL-MCNC: 5.1 MG/DL
URATE SERPL-MCNC: 7.9 MG/DL
WBC # FLD AUTO: 11.43 K/UL
WBC # FLD AUTO: 11.54 K/UL
WBC # FLD AUTO: 14.98 K/UL

## 2022-04-04 ENCOUNTER — RX RENEWAL (OUTPATIENT)
Age: 67
End: 2022-04-04

## 2022-04-06 ENCOUNTER — NON-APPOINTMENT (OUTPATIENT)
Age: 67
End: 2022-04-06

## 2022-04-10 ENCOUNTER — RX RENEWAL (OUTPATIENT)
Age: 67
End: 2022-04-10

## 2022-04-12 ENCOUNTER — APPOINTMENT (OUTPATIENT)
Dept: FAMILY MEDICINE | Facility: CLINIC | Age: 67
End: 2022-04-12
Payer: MEDICARE

## 2022-04-12 VITALS
OXYGEN SATURATION: 97 % | BODY MASS INDEX: 46.98 KG/M2 | WEIGHT: 310 LBS | RESPIRATION RATE: 16 BRPM | TEMPERATURE: 98.2 F | SYSTOLIC BLOOD PRESSURE: 122 MMHG | HEART RATE: 70 BPM | DIASTOLIC BLOOD PRESSURE: 68 MMHG | HEIGHT: 68 IN

## 2022-04-12 DIAGNOSIS — N28.89 OTHER SPECIFIED DISORDERS OF KIDNEY AND URETER: ICD-10-CM

## 2022-04-12 PROCEDURE — 99214 OFFICE O/P EST MOD 30 MIN: CPT

## 2022-04-12 RX ORDER — METFORMIN HYDROCHLORIDE 500 MG/1
500 TABLET, COATED ORAL
Qty: 60 | Refills: 2 | Status: DISCONTINUED | COMMUNITY
Start: 2019-09-27 | End: 2022-04-12

## 2022-04-12 NOTE — REVIEW OF SYSTEMS
08/04/17 1535   Discharge Reassessment   Assessment Type Discharge Planning Reassessment   Can the patient answer the patient profile reliably? Yes, cognitively intact   How does the patient rate their overall health at the present time? Fair   Describe the patient's ability to walk at the present time. Major restrictions/daily assistance from another person   How often would a person be available to care for the patient? Often   Number of comorbid conditions (as recorded on the chart) Five or more   During the past month, has the patient often been bothered by feeling down, depressed or hopeless? No   During the past month, has the patient often been bothered by little interest or pleasure in doing things? No   Discharge plan remains the same: Yes   Provided patient/caregiver education on the expected discharge date and the discharge plan Yes   Discharge Plan A Rehab   Discharge Plan B Home Health   Change in patient condition or support system No      [Negative] : Heme/Lymph

## 2022-04-13 ENCOUNTER — APPOINTMENT (OUTPATIENT)
Dept: RHEUMATOLOGY | Facility: CLINIC | Age: 67
End: 2022-04-13
Payer: MEDICARE

## 2022-04-13 VITALS
DIASTOLIC BLOOD PRESSURE: 66 MMHG | HEIGHT: 68 IN | RESPIRATION RATE: 17 BRPM | HEART RATE: 74 BPM | SYSTOLIC BLOOD PRESSURE: 112 MMHG | OXYGEN SATURATION: 93 % | TEMPERATURE: 98.3 F

## 2022-04-13 PROCEDURE — 99214 OFFICE O/P EST MOD 30 MIN: CPT

## 2022-04-14 RX ORDER — CEFADROXIL 500 MG/1
500 CAPSULE ORAL
Qty: 28 | Refills: 0 | Status: COMPLETED | COMMUNITY
Start: 2021-11-03

## 2022-04-14 RX ORDER — ENOXAPARIN SODIUM 150 MG/ML
150 INJECTION, SOLUTION SUBCUTANEOUS
Qty: 4 | Refills: 0 | Status: COMPLETED | COMMUNITY
Start: 2021-12-30

## 2022-04-14 NOTE — ASSESSMENT
[As per surgery] : as per surgery [Modify anticoagulant treatment prior to procedure] : Modify anticoagulant treatment prior to procedure [Modify medications prior to procedure] : Modify medications prior to procedure [High Risk Surgery - Intraperitoneal, Intrathoracic or Supringuinal Vascular Procedures] : High Risk Surgery - Intraperitoneal, Intrathoracic or Supringuinal Vascular Procedures - No (0) [Ischemic Heart Disease] : Ischemic Heart Disease - No (0) [Congestive Heart Failure] : Congestive Heart Failure - No (0) [Prior Cerebrovascular Accident or TIA] : Prior Cerebrovascular Accident or TIA - No (0) [Insulin-dependent Diabetic (1 point)] : Insulin-dependent Diabetic - Yes (1) [Creatinine >= 2mg/dL (1 Point)] : Creatinine >= 2mg/dL - No (0) [1] : 1 , RCRI Class: II, Risk of Post-Op Cardiac Complications: 6.0%, 95% CI for Risk Estimate: 4.9% - 7.4% [FreeTextEntry4] : Pt is a 67 year old M with pmhx significant for obesity, a-fib on xarelto s/p cardiac ablation 01/22, HTN on amlodipine, HCTZ, ramipril, HLD on atorvastatin, hypothyroidism on levothyroxine, JASON with CPAP, and insulin dependent DM presents for medical clearance for robot-assisted laparoscopic R radical nephrectomy for R renal mass on 4/20/22. \par Medical clearance is pending results of presurgical testing [FreeTextEntry5] : stop xarelto prior to procedure  [FreeTextEntry7] : stop all vitamins at least 5 days prior to procedure, stop NSAIDs

## 2022-04-14 NOTE — PLAN
[FreeTextEntry1] : Pt with no absolute contraindication for surgical procedure. Clear from clinical stand point.

## 2022-04-14 NOTE — HISTORY OF PRESENT ILLNESS
[Atrial Fibrillation] : atrial fibrillation [Asthma] : asthma [Sleep Apnea] : sleep apnea [No Adverse Anesthesia Reaction] : no adverse anesthesia reaction in self or family member [Chronic Anticoagulation] : chronic anticoagulation [Diabetes] : diabetes [(Patient denies any chest pain, claudication, dyspnea on exertion, orthopnea, palpitations or syncope)] : Patient denies any chest pain, claudication, dyspnea on exertion, orthopnea, palpitations or syncope [Anticoagulants: _____] : Anticoagulants: [unfilled] [NSAIDs: _____] : NSAIDs: [unfilled] [Aortic Stenosis] : no aortic stenosis [Coronary Artery Disease] : no coronary artery disease [Recent Myocardial Infarction] : no recent myocardial infarction [Implantable Device/Pacemaker] : no implantable device/pacemaker [COPD] : no COPD [Smoker] : not a smoker [Chronic Kidney Disease] : no chronic kidney disease [FreeTextEntry1] : Robot assisted laparoscopic R radical nephrectomy  [FreeTextEntry2] : 4/20/22 [FreeTextEntry3] : Dr. Shetty  [FreeTextEntry4] : Pt is a 67 year old M with pmhx significant for obesity, a-fib on xarelto s/p cardiac ablation 01/22, HTN on amlodipine, HCTZ, ramipril, HLD on atorvastatin, hypothyroidism on levothyroxine, JASON with CPAP, and insulin dependent DM presents for medical clearance for robot-assisted laparoscopic R radical nephrectomy for R renal mass on 4/20/22. \par Pt has presurgical testing tomorrow (4/13/22).

## 2022-04-18 ENCOUNTER — RX RENEWAL (OUTPATIENT)
Age: 67
End: 2022-04-18

## 2022-04-26 ENCOUNTER — NON-APPOINTMENT (OUTPATIENT)
Age: 67
End: 2022-04-26

## 2022-04-26 RX ORDER — FLASH GLUCOSE SENSOR
KIT MISCELLANEOUS
Qty: 2 | Refills: 1 | Status: ACTIVE | COMMUNITY
Start: 2022-01-17 | End: 1900-01-01

## 2022-04-26 RX ORDER — ISOPROPYL ALCOHOL 0.7 ML/ML
SWAB TOPICAL
Qty: 1 | Refills: 0 | Status: ACTIVE | COMMUNITY
Start: 2022-01-17 | End: 1900-01-01

## 2022-04-28 ENCOUNTER — RX RENEWAL (OUTPATIENT)
Age: 67
End: 2022-04-28

## 2022-05-03 ENCOUNTER — NON-APPOINTMENT (OUTPATIENT)
Age: 67
End: 2022-05-03

## 2022-05-04 RX ORDER — PEN NEEDLE, DIABETIC 32GX 5/32"
32G X 4 MM NEEDLE, DISPOSABLE MISCELLANEOUS
Qty: 100 | Refills: 2 | Status: ACTIVE | COMMUNITY
Start: 2022-02-03 | End: 1900-01-01

## 2022-05-06 ENCOUNTER — APPOINTMENT (OUTPATIENT)
Dept: ENDOCRINOLOGY | Facility: CLINIC | Age: 67
End: 2022-05-06
Payer: MEDICARE

## 2022-05-06 VITALS
HEART RATE: 85 BPM | SYSTOLIC BLOOD PRESSURE: 124 MMHG | WEIGHT: 315 LBS | DIASTOLIC BLOOD PRESSURE: 70 MMHG | HEIGHT: 68 IN | BODY MASS INDEX: 47.74 KG/M2

## 2022-05-06 PROCEDURE — 99214 OFFICE O/P EST MOD 30 MIN: CPT | Mod: 25

## 2022-05-06 PROCEDURE — 95251 CONT GLUC MNTR ANALYSIS I&R: CPT

## 2022-05-06 NOTE — HISTORY OF PRESENT ILLNESS
[FreeTextEntry1] : DM type:2\par Severity:severely uncontrolled\par Duration:several years\par Onset:found DM on labs\par \par Associated symptoms or complications:none\par \par Modifying Factors:severe obesity; uncontrolled diabetes continues despite weight loss from 375 to 315 pounds. Alfred anorexia or abdominal pain; made many diet changes in an effort to lose weight for anticipated cardioversion\par \par Current regimen:\par did not tolerate metformin\par \par \par Current control:. ER visit yesterday due to glucose over 600. A1C over13%\par \par \par \par PMH:\par atrial fib\par renal stones\par renal lesion suspicious for carcinoma; stage one CA, s/p surgery. May have liver lesions; seen by GI\par hypothyroid\par \par \par  [Continuous Glucose Monitoring] : Continuous Glucose Monitoring: Yes [Talon] : Talon [FreeTextEntry2] : 84 [FreeTextEntry3] : 3 [FreeTextEntry4] : 13 [de-identified] : [id number] [FreeTextEntry5] : 107 [FreeTextEntry6] : 30.6

## 2022-05-06 NOTE — ASSESSMENT
[FreeTextEntry1] : DM type 2, markedly improved, likely with reversal of glucotoxicity and responsiveness to glimepiride. Last night he held his 40 units of insulin, yet today glucose levels remain WNL.Will hold insulin; pt. may eventually need to reduce glimepiride dose. Possible use instead of a glp-1 and/or sglt-2 depending on kidney function and liver findings.

## 2022-05-19 ENCOUNTER — NON-APPOINTMENT (OUTPATIENT)
Age: 67
End: 2022-05-19

## 2022-06-08 ENCOUNTER — LABORATORY RESULT (OUTPATIENT)
Age: 67
End: 2022-06-08

## 2022-06-08 ENCOUNTER — APPOINTMENT (OUTPATIENT)
Dept: FAMILY MEDICINE | Facility: CLINIC | Age: 67
End: 2022-06-08
Payer: MEDICARE

## 2022-06-08 VITALS
BODY MASS INDEX: 47.44 KG/M2 | RESPIRATION RATE: 16 BRPM | HEART RATE: 84 BPM | HEIGHT: 68 IN | TEMPERATURE: 98 F | WEIGHT: 313 LBS | DIASTOLIC BLOOD PRESSURE: 80 MMHG | SYSTOLIC BLOOD PRESSURE: 140 MMHG | OXYGEN SATURATION: 98 %

## 2022-06-08 LAB — HBA1C MFR BLD HPLC: 6.1

## 2022-06-08 PROCEDURE — 99215 OFFICE O/P EST HI 40 MIN: CPT | Mod: 25

## 2022-06-08 PROCEDURE — 36415 COLL VENOUS BLD VENIPUNCTURE: CPT

## 2022-06-08 PROCEDURE — 83036 HEMOGLOBIN GLYCOSYLATED A1C: CPT | Mod: QW

## 2022-06-08 NOTE — REVIEW OF SYSTEMS
[Fatigue] : fatigue [Hearing Loss] : hearing loss [Nocturia] : nocturia [Joint Pain] : joint pain [Joint Stiffness] : joint stiffness [Muscle Weakness] : muscle weakness [Back Pain] : back pain [Joint Swelling] : joint swelling [Negative] : Heme/Lymph [Fever] : no fever [Chills] : no chills [Night Sweats] : no night sweats [Recent Change In Weight] : ~T no recent weight change [Discharge] : no discharge [Pain] : no pain [Redness] : no redness [Vision Problems] : no vision problems [Itching] : no itching [Earache] : no earache [Nosebleeds] : no nosebleeds [Postnasal Drip] : no postnasal drip [Nasal Discharge] : no nasal discharge [Sore Throat] : no sore throat [Hoarseness] : no hoarseness [Chest Pain] : no chest pain [Palpitations] : no palpitations [Claudication] : no  leg claudication [Lower Ext Edema] : no lower extremity edema [Orthopena] : no orthopnea [Abdominal Pain] : no abdominal pain [Nausea] : no nausea [Vomiting] : no vomiting [Heartburn] : no heartburn [Melena] : no melena [Dysuria] : no dysuria [Incontinence] : no incontinence [Hesitancy] : no hesitancy [Hematuria] : no hematuria [Frequency] : no frequency [Impotence] : no impotency [Poor Libido] : libido not poor [Muscle Pain] : no muscle pain [Itching] : no itching [Headache] : no headache [Dizziness] : no dizziness [Fainting] : no fainting [Confusion] : no confusion [Unsteady Walk] : no ataxia [Memory Loss] : no memory loss

## 2022-06-08 NOTE — ASSESSMENT
[FreeTextEntry1] : 66 y/o M PMH of  Afib, HTN, HLD, hypothyroid, OA,  asthma, DM for 3 month follow up.  \par \par Care plan reviewed\par Labs ordered\par POC glucose \par POC A1C: 6.1 \par \par RTC in 3 months \par \par

## 2022-06-08 NOTE — PHYSICAL EXAM
[No Acute Distress] : no acute distress [Well Nourished] : well nourished [Well Developed] : well developed [Well-Appearing] : well-appearing [Normal Sclera/Conjunctiva] : normal sclera/conjunctiva [PERRL] : pupils equal round and reactive to light [EOMI] : extraocular movements intact [Normal Outer Ear/Nose] : the outer ears and nose were normal in appearance [Normal Oropharynx] : the oropharynx was normal [No JVD] : no jugular venous distention [No Lymphadenopathy] : no lymphadenopathy [Supple] : supple [Thyroid Normal, No Nodules] : the thyroid was normal and there were no nodules present [No Respiratory Distress] : no respiratory distress  [No Accessory Muscle Use] : no accessory muscle use [Clear to Auscultation] : lungs were clear to auscultation bilaterally [Normal Rate] : normal rate  [Regular Rhythm] : with a regular rhythm [Normal S1, S2] : normal S1 and S2 [No Murmur] : no murmur heard [No Carotid Bruits] : no carotid bruits [No Abdominal Bruit] : a ~M bruit was not heard ~T in the abdomen [No Varicosities] : no varicosities [Pedal Pulses Present] : the pedal pulses are present [No Palpable Aorta] : no palpable aorta [No Extremity Clubbing/Cyanosis] : no extremity clubbing/cyanosis [Soft] : abdomen soft [Non Tender] : non-tender [Non-distended] : non-distended [No Masses] : no abdominal mass palpated [No HSM] : no HSM [Normal Bowel Sounds] : normal bowel sounds [Normal Posterior Cervical Nodes] : no posterior cervical lymphadenopathy [Normal Anterior Cervical Nodes] : no anterior cervical lymphadenopathy [No CVA Tenderness] : no CVA  tenderness [No Spinal Tenderness] : no spinal tenderness [No Joint Swelling] : no joint swelling [Grossly Normal Strength/Tone] : grossly normal strength/tone [Coordination Grossly Intact] : coordination grossly intact [No Focal Deficits] : no focal deficits [Normal Gait] : normal gait [Deep Tendon Reflexes (DTR)] : deep tendon reflexes were 2+ and symmetric [Normal Affect] : the affect was normal [Normal Insight/Judgement] : insight and judgment were intact [No Rash] : no rash [de-identified] : Bilateral leg edema

## 2022-06-08 NOTE — HISTORY OF PRESENT ILLNESS
[FreeTextEntry1] : 68 y/o M PMH of  Afib, HTN, HLD, hypothyroid, OA,  asthma, DM for 3 month follow up.   [de-identified] : 66 y/o M PMH of  Afib, HTN, HLD, hypothyroid, OA,  asthma, DM for 3 month follow up. Denies fever, chills, sweats, abdominal pain, N/V/D/C, SOB, chest pain and cough. Pt states he is doing well today, and has no acute complaints at this time.

## 2022-06-08 NOTE — COUNSELING
[Fall prevention counseling provided] : Fall prevention counseling provided [Adequate lighting] : Adequate lighting [Use proper foot wear] : Use proper foot wear [Behavioral health counseling provided] : Behavioral health counseling provided [Sleep ___ hours/day] : Sleep [unfilled] hours/day [Engage in a relaxing activity] : Engage in a relaxing activity [Plan in advance] : Plan in advance [AUDIT-C Screening administered and reviewed] : AUDIT-C Screening administered and reviewed [Potential consequences of obesity discussed] : Potential consequences of obesity discussed [Benefits of weight loss discussed] : Benefits of weight loss discussed [Encouraged to increase physical activity] : Encouraged to increase physical activity [Weigh Self Weekly] : weigh self weekly [Decrease Portions] : decrease portions [None] : None [Good understanding] : Patient has a good understanding of lifestyle changes and steps needed to achieve self management goal [No throw rugs] : No throw rugs [FreeTextEntry2] : former smoker

## 2022-06-08 NOTE — HEALTH RISK ASSESSMENT
[Former] : Former [1 or 2 (0 pts)] : 1 or 2 (0 points) [Never (0 pts)] : Never (0 points) [No] : In the past 12 months have you used drugs other than those required for medical reasons? No [No falls in past year] : Patient reported no falls in the past year [0] : 2) Feeling down, depressed, or hopeless: Not at all (0) [PHQ-2 Negative - No further assessment needed] : PHQ-2 Negative - No further assessment needed [Patient/Caregiver not ready to engage] : , patient/caregiver not ready to engage [I will adhere to the patient's wishes.] : I will adhere to the patient's wishes. [Time Spent: ___ minutes] : Time Spent: [unfilled] minutes [de-identified] : Former smoker [Audit-CScore] : 0 [de-identified] : average [de-identified] : susan [facility]] : 8 [[id number]] : 0 [AdvancecareDate] : 06/22

## 2022-06-09 ENCOUNTER — RX RENEWAL (OUTPATIENT)
Age: 67
End: 2022-06-09

## 2022-06-09 ENCOUNTER — LABORATORY RESULT (OUTPATIENT)
Age: 67
End: 2022-06-09

## 2022-06-09 LAB
ALBUMIN SERPL ELPH-MCNC: 4.4 G/DL
ALP BLD-CCNC: 126 U/L
ALT SERPL-CCNC: 16 U/L
ANION GAP SERPL CALC-SCNC: 18 MMOL/L
APPEARANCE: ABNORMAL
AST SERPL-CCNC: 16 U/L
BASOPHILS # BLD AUTO: 0.07 K/UL
BASOPHILS NFR BLD AUTO: 0.7 %
BILIRUB SERPL-MCNC: 0.2 MG/DL
BILIRUBIN URINE: NEGATIVE
BLOOD URINE: ABNORMAL
BUN SERPL-MCNC: 36 MG/DL
CALCIUM SERPL-MCNC: 9.9 MG/DL
CHLORIDE SERPL-SCNC: 99 MMOL/L
CHOLEST SERPL-MCNC: 158 MG/DL
CO2 SERPL-SCNC: 19 MMOL/L
COLOR: YELLOW
CREAT SERPL-MCNC: 1.88 MG/DL
EGFR: 39 ML/MIN/1.73M2
EOSINOPHIL # BLD AUTO: 0.48 K/UL
EOSINOPHIL NFR BLD AUTO: 4.7 %
ERYTHROCYTE [SEDIMENTATION RATE] IN BLOOD BY WESTERGREN METHOD: 100 MM/HR
GLUCOSE QUALITATIVE U: NEGATIVE
GLUCOSE SERPL-MCNC: 166 MG/DL
HCT VFR BLD CALC: 36.7 %
HDLC SERPL-MCNC: 41 MG/DL
HGB BLD-MCNC: 10.5 G/DL
IMM GRANULOCYTES NFR BLD AUTO: 0.4 %
KETONES URINE: NEGATIVE
LDLC SERPL CALC-MCNC: 88 MG/DL
LEUKOCYTE ESTERASE URINE: ABNORMAL
LYMPHOCYTES # BLD AUTO: 1.85 K/UL
LYMPHOCYTES NFR BLD AUTO: 18.3 %
MAN DIFF?: NORMAL
MCHC RBC-ENTMCNC: 21.2 PG
MCHC RBC-ENTMCNC: 28.6 GM/DL
MCV RBC AUTO: 74.1 FL
MONOCYTES # BLD AUTO: 0.51 K/UL
MONOCYTES NFR BLD AUTO: 5 %
NEUTROPHILS # BLD AUTO: 7.16 K/UL
NEUTROPHILS NFR BLD AUTO: 70.9 %
NITRITE URINE: NEGATIVE
NONHDLC SERPL-MCNC: 117 MG/DL
PH URINE: 6.5
PLATELET # BLD AUTO: 408 K/UL
POTASSIUM SERPL-SCNC: 4.8 MMOL/L
PROT SERPL-MCNC: 8.1 G/DL
PROTEIN URINE: ABNORMAL
PSA SERPL-MCNC: 0.89 NG/ML
RBC # BLD: 4.95 M/UL
RBC # FLD: 22.8 %
SODIUM SERPL-SCNC: 136 MMOL/L
SPECIFIC GRAVITY URINE: 1.02
TRIGL SERPL-MCNC: 144 MG/DL
TSH SERPL-ACNC: 4.02 UIU/ML
UROBILINOGEN URINE: NORMAL
WBC # FLD AUTO: 10.11 K/UL

## 2022-06-14 RX ORDER — CIPROFLOXACIN HYDROCHLORIDE 500 MG/1
500 TABLET, FILM COATED ORAL
Qty: 14 | Refills: 0 | Status: DISCONTINUED | COMMUNITY
Start: 2022-06-14 | End: 2022-06-14

## 2022-06-14 RX ORDER — LEVOFLOXACIN 500 MG/1
500 TABLET, FILM COATED ORAL
Qty: 5 | Refills: 0 | Status: DISCONTINUED | COMMUNITY
Start: 2022-06-14 | End: 2022-06-14

## 2022-06-15 ENCOUNTER — NON-APPOINTMENT (OUTPATIENT)
Age: 67
End: 2022-06-15

## 2022-06-15 ENCOUNTER — APPOINTMENT (OUTPATIENT)
Dept: SURGICAL ONCOLOGY | Facility: CLINIC | Age: 67
End: 2022-06-15
Payer: MEDICARE

## 2022-06-15 VITALS
TEMPERATURE: 97.8 F | HEART RATE: 87 BPM | BODY MASS INDEX: 47.44 KG/M2 | WEIGHT: 313 LBS | SYSTOLIC BLOOD PRESSURE: 87 MMHG | HEIGHT: 68 IN | OXYGEN SATURATION: 98 % | DIASTOLIC BLOOD PRESSURE: 53 MMHG

## 2022-06-15 PROCEDURE — 99205 OFFICE O/P NEW HI 60 MIN: CPT

## 2022-06-15 NOTE — CONSULT LETTER
[Dear  ___] : Dear  [unfilled], [Sincerely,] : Sincerely, [FreeTextEntry1] : 67 year male presents for an initial consultation.   He was noted to have a suspicious kidney lesion on imaging in December 2021 and was referred for an abdominal MRI in January 2022, with incidental note of a multiple small indeterminate hepatic lesions which are indeterminate but for which metastatic renal cell carcinoma cannot be excluded (lesions noted in segments 4a, 5 and 6).  Subsequent MRI abdomen in April 2022 revealed an endophytic right lower pole renal lesion suspicious for malignancy, evidence of liver metastasis and multiple prominent subcentimeter periportal, retroperitoneal and retrocaval lymph nodes which are nonspecific.\par \par In April 2022 he underwent a right nephrectomy with Dr Jhonathan Shetty. \par \par PET/CT performed on 5/13/22 showed no definitive hypermetabolic evidence of malignant disease.  There are prominent retroperitoneal and iliac chain lymph nodes without corresponding abnormal activity.  No discrete hepatic focus to correspond with liver lesions seen on MRI (continued MRI surveillance is recommended), right anterolateral subcutaneous activity possibly related to abdominal surgery.\par \par On 6/1/22 he underwent a CT-guided biopsy of a representative colt in the inferior right hepatic lobe.  Pathology demonstrated well differentiated hepatocellular carcinoma.  \par \par 6/15/22- he states he is overall feeling well, has lost 70 pounds since November. Denies night sweats, uses Cpap at night. eating well, having normal bowel movements. States he has a UTI and is being placed on antibiotics for it. \par \par His past medical history includes morbid obesity, asthma, atrial fibrillation on xarelto, gout, type 2 diabetes (previously c/b DKA and Charcot's foot, most recent A1c of 6.1% in June 2022), JASON, hypertension, hyperlipidemia, hypothyroidism, renal cell carcinoma.....\par quit smoking 15 yrs ago and stopped drinking in 1987. \par No family history of cancer. \par past surgical left knee replacement, elbow surgery, breast implants in 1988,  April 2022 Right nephrectomy.\par \par Plan\par present to interventional radiology for ablation either RFA or microwave [FreeTextEntry3] : Lennox Zimmerman MD, FACS, FASCRS\par , Department of Surgery\par Director of the [facility]\par , Minimally Invasive/Robotic Cancer Surgery, [facility] & [facility] Divisions\par Division of Surgical Oncology\par

## 2022-06-15 NOTE — ASSESSMENT
[FreeTextEntry1] : 67 year male presents for an initial consultation.   He was noted to have a suspicious kidney lesion on imaging in December 2021 and was referred for an abdominal MRI in January 2022, with incidental note of a multiple small indeterminate hepatic lesions which are indeterminate but for which metastatic renal cell carcinoma cannot be excluded (lesions noted in segments 4a, 5 and 6).  Subsequent MRI abdomen in April 2022 revealed an endophytic right lower pole renal lesion suspicious for malignancy, evidence of liver metastasis and multiple prominent subcentimeter periportal, retroperitoneal and retrocaval lymph nodes which are nonspecific.\par \par In April 2022 he underwent a right nephrectomy with Dr Jhonathan Shetty. \par \par PET/CT performed on 5/13/22 showed no definitive hypermetabolic evidence of malignant disease.  There are prominent retroperitoneal and iliac chain lymph nodes without corresponding abnormal activity.  No discrete hepatic focus to correspond with liver lesions seen on MRI (continued MRI surveillance is recommended), right anterolateral subcutaneous activity possibly related to abdominal surgery.\par \par On 6/1/22 he underwent a CT-guided biopsy of a representative colt in the inferior right hepatic lobe.  Pathology demonstrated well differentiated hepatocellular carcinoma.  \par \par 6/15/22- he states he is overall feeling well, has lost 70 pounds since November. Denies night sweats, uses Cpap at night. eating well, having normal bowel movements. States he has a UTI and is being placed on antibiotics for it. \par \par His past medical history includes morbid obesity, asthma, atrial fibrillation on xarelto, gout, type 2 diabetes (previously c/b DKA and Charcot's foot, most recent A1c of 6.1% in June 2022), JASON, hypertension, hyperlipidemia, hypothyroidism, renal cell carcinoma.....\par quit smoking 15 yrs ago and stopped drinking in 1987. \par No family history of cancer. \par past surgical left knee replacement, elbow surgery, breast implants in 1988,  April 2022 Right nephrectomy.\par \par \par Plan\par present to interventional radiology for ablation either RFA or microwave.

## 2022-06-15 NOTE — HISTORY OF PRESENT ILLNESS
[de-identified] : 67 year male presents for an initial consultation.   He was noted to have a suspicious kidney lesion on imaging in December 2021 and was referred for an abdominal MRI in January 2022, with incidental note of a multiple small indeterminate hepatic lesions which are indeterminate but for which metastatic renal cell carcinoma cannot be excluded (lesions noted in segments 4a, 5 and 6).  Subsequent MRI abdomen in April 2022 revealed an endophytic right lower pole renal lesion suspicious for malignancy, evidence of liver metastasis and multiple prominent subcentimeter periportal, retroperitoneal and retrocaval lymph nodes which are nonspecific.\par \par In April 2022 he underwent a right nephrectomy with Dr Jhonathan Shetty. \par \par PET/CT performed on 5/13/22 showed no definitive hypermetabolic evidence of malignant disease.  There are prominent retroperitoneal and iliac chain lymph nodes without corresponding abnormal activity.  No discrete hepatic focus to correspond with liver lesions seen on MRI (continued MRI surveillance is recommended), right anterolateral subcutaneous activity possibly related to abdominal surgery.\par \par On 6/1/22 he underwent a CT-guided biopsy of a representative colt in the inferior right hepatic lobe.  Pathology demonstrated well differentiated hepatocellular carcinoma.  \par \par 6/15/22- he states he is overall feeling well, has lost 70 pounds since November. Denies night sweats, uses Cpap at night. eating well, having normal bowel movements. States he has a UTI and is being placed on antibiotics for it. \par \par His past medical history includes morbid obesity, asthma, atrial fibrillation on xarelto, gout, type 2 diabetes (previously c/b DKA and Charcot's foot, most recent A1c of 6.1% in June 2022), JASON, hypertension, hyperlipidemia, hypothyroidism, renal cell carcinoma.....\par quit smoking 15 yrs ago and stopped drinking in 1987. \par No family history of cancer. \par past surgical left knee replacement, elbow surgery, breast implants in 1988,  April 2022 Right nephrectomy.

## 2022-06-20 ENCOUNTER — RESULT REVIEW (OUTPATIENT)
Age: 67
End: 2022-06-20

## 2022-06-20 ENCOUNTER — OUTPATIENT (OUTPATIENT)
Dept: OUTPATIENT SERVICES | Facility: HOSPITAL | Age: 67
LOS: 1 days | End: 2022-06-20
Payer: MEDICARE

## 2022-06-20 DIAGNOSIS — C80.1 MALIGNANT (PRIMARY) NEOPLASM, UNSPECIFIED: ICD-10-CM

## 2022-06-20 DIAGNOSIS — Z96.651 PRESENCE OF RIGHT ARTIFICIAL KNEE JOINT: Chronic | ICD-10-CM

## 2022-06-20 PROCEDURE — 88321 CONSLTJ&REPRT SLD PREP ELSWR: CPT

## 2022-06-21 ENCOUNTER — NON-APPOINTMENT (OUTPATIENT)
Age: 67
End: 2022-06-21

## 2022-06-21 ENCOUNTER — OUTPATIENT (OUTPATIENT)
Dept: OUTPATIENT SERVICES | Facility: HOSPITAL | Age: 67
LOS: 1 days | Discharge: HOME | End: 2022-06-21

## 2022-06-21 DIAGNOSIS — Z96.651 PRESENCE OF RIGHT ARTIFICIAL KNEE JOINT: Chronic | ICD-10-CM

## 2022-06-21 DIAGNOSIS — C22.0 LIVER CELL CARCINOMA: ICD-10-CM

## 2022-06-21 LAB — SURGICAL PATHOLOGY STUDY: SIGNIFICANT CHANGE UP

## 2022-06-22 ENCOUNTER — LABORATORY RESULT (OUTPATIENT)
Age: 67
End: 2022-06-22

## 2022-06-22 ENCOUNTER — RESULT REVIEW (OUTPATIENT)
Age: 67
End: 2022-06-22

## 2022-06-22 ENCOUNTER — APPOINTMENT (OUTPATIENT)
Dept: FAMILY MEDICINE | Facility: CLINIC | Age: 67
End: 2022-06-22
Payer: MEDICARE

## 2022-06-22 ENCOUNTER — APPOINTMENT (OUTPATIENT)
Dept: HEMATOLOGY ONCOLOGY | Facility: CLINIC | Age: 67
End: 2022-06-22
Payer: MEDICARE

## 2022-06-22 VITALS
OXYGEN SATURATION: 95 % | DIASTOLIC BLOOD PRESSURE: 70 MMHG | HEART RATE: 69 BPM | RESPIRATION RATE: 16 BRPM | TEMPERATURE: 98 F | SYSTOLIC BLOOD PRESSURE: 130 MMHG | HEIGHT: 68 IN

## 2022-06-22 VITALS
SYSTOLIC BLOOD PRESSURE: 118 MMHG | BODY MASS INDEX: 47.74 KG/M2 | HEART RATE: 86 BPM | DIASTOLIC BLOOD PRESSURE: 74 MMHG | HEIGHT: 68 IN | WEIGHT: 315 LBS | OXYGEN SATURATION: 97 %

## 2022-06-22 LAB
BASOPHILS # BLD AUTO: 0.1 K/UL — SIGNIFICANT CHANGE UP (ref 0–0.2)
BASOPHILS NFR BLD AUTO: 0.6 % — SIGNIFICANT CHANGE UP (ref 0–2)
EOSINOPHIL # BLD AUTO: 0.5 K/UL — SIGNIFICANT CHANGE UP (ref 0–0.5)
EOSINOPHIL NFR BLD AUTO: 5.6 % — SIGNIFICANT CHANGE UP (ref 0–6)
HCT VFR BLD CALC: 36.8 % — LOW (ref 39–50)
HGB BLD-MCNC: 10.9 G/DL — LOW (ref 13–17)
LYMPHOCYTES # BLD AUTO: 1.9 K/UL — SIGNIFICANT CHANGE UP (ref 1–3.3)
LYMPHOCYTES # BLD AUTO: 21.4 % — SIGNIFICANT CHANGE UP (ref 13–44)
MCHC RBC-ENTMCNC: 22.1 PG — LOW (ref 27–34)
MCHC RBC-ENTMCNC: 29.7 G/DL — LOW (ref 32–36)
MCV RBC AUTO: 74.6 FL — LOW (ref 80–100)
MONOCYTES # BLD AUTO: 0.4 K/UL — SIGNIFICANT CHANGE UP (ref 0–0.9)
MONOCYTES NFR BLD AUTO: 4.2 % — SIGNIFICANT CHANGE UP (ref 2–14)
NEUTROPHILS # BLD AUTO: 6.1 K/UL — SIGNIFICANT CHANGE UP (ref 1.8–7.4)
NEUTROPHILS NFR BLD AUTO: 68.2 % — SIGNIFICANT CHANGE UP (ref 43–77)
PLATELET # BLD AUTO: 318 K/UL — SIGNIFICANT CHANGE UP (ref 150–400)
RBC # BLD: 4.94 M/UL — SIGNIFICANT CHANGE UP (ref 4.2–5.8)
RBC # FLD: 20.4 % — HIGH (ref 10.3–14.5)
WBC # BLD: 9 K/UL — SIGNIFICANT CHANGE UP (ref 3.8–10.5)
WBC # FLD AUTO: 9 K/UL — SIGNIFICANT CHANGE UP (ref 3.8–10.5)

## 2022-06-22 PROCEDURE — 99204 OFFICE O/P NEW MOD 45 MIN: CPT

## 2022-06-22 PROCEDURE — 99214 OFFICE O/P EST MOD 30 MIN: CPT

## 2022-06-22 RX ORDER — OXYCODONE AND ACETAMINOPHEN 5; 325 MG/1; MG/1
5-325 TABLET ORAL
Qty: 30 | Refills: 0 | Status: DISCONTINUED | COMMUNITY
Start: 2022-04-23 | End: 2022-06-22

## 2022-06-22 RX ORDER — AMLODIPINE BESYLATE 10 MG/1
10 TABLET ORAL
Qty: 180 | Refills: 0 | Status: DISCONTINUED | COMMUNITY
Start: 2019-04-01 | End: 2022-06-22

## 2022-06-22 NOTE — ASSESSMENT
[FreeTextEntry1] : 67 year old M with h/o anemia, Afib, gout, DM, HTN, HLD, hypothyroid, obesity, JASON and renal cancer s/p right nephrectomy who was diagnosed with HCC in June 2022. \par \par # HCC\par -MRI abd showed 0.7 and 1.2 cm Lt hepatic lobe lesion and 1.1 cm right hepatic lobe lesion\par -no evidence of metastatic disease on PET\par -will refer to Dr. Santos (IR) for possible LDT\par -will consider systemic therapy if pt is not eligible for LDT\par \par \par # RCC\par -s/p right nephrectomy\par -Path showed RCC , clear cell, nuclear grade II, pT1a, Stage I. \par -No indication for adjuvant pembro. \par -will do surveillance\par -CT or MRI i ab/pn and CXR 6 months after sx then annually\par \par \par RTC in 4 weeks

## 2022-06-22 NOTE — PHYSICAL EXAM
[Restricted in physically strenuous activity but ambulatory and able to carry out work of a light or sedentary nature] : Status 1- Restricted in physically strenuous activity but ambulatory and able to carry out work of a light or sedentary nature, e.g., light house work, office work [Obese] : obese [Normal] : affect appropriate [de-identified] : well healed incision

## 2022-06-22 NOTE — ASSESSMENT
[FreeTextEntry1] : 66 y/o M PMH of  Afib, HTN, HLD, hypothyroid, OA,  asthma, DM for 2 week follow up.  \par \par Care plan reviewed\par Seeing Dr. Madison this afternoon (Liver mass) \par \par RTC in 2 months \par \par

## 2022-06-22 NOTE — HISTORY OF PRESENT ILLNESS
[FreeTextEntry1] : 68 y/o M PMH of  Afib, HTN, HLD, hypothyroid, OA,  asthma, DM for 2 week follow up.   [de-identified] : 68 y/o M PMH of  Afib, HTN, HLD, hypothyroid, OA,  asthma, DM for 2 week follow up. Denies fever, chills, sweats, abdominal pain, N/V/D/C, SOB, chest pain and cough. Pt states he is doing well today, and has no acute complaints at this time.

## 2022-06-22 NOTE — HEALTH RISK ASSESSMENT
[Former] : Former [1 or 2 (0 pts)] : 1 or 2 (0 points) [Never (0 pts)] : Never (0 points) [No] : In the past 12 months have you used drugs other than those required for medical reasons? No [No falls in past year] : Patient reported no falls in the past year [0] : 2) Feeling down, depressed, or hopeless: Not at all (0) [PHQ-2 Negative - No further assessment needed] : PHQ-2 Negative - No further assessment needed [Patient/Caregiver not ready to engage] : , patient/caregiver not ready to engage [I will adhere to the patient's wishes.] : I will adhere to the patient's wishes. [Time Spent: ___ minutes] : Time Spent: [unfilled] minutes [de-identified] : Former smoker [Audit-CScore] : 0 [de-identified] : average [de-identified] : susan [facility]] : 8 [[id number]] : 0 [AdvancecareDate] : 06/22

## 2022-06-22 NOTE — PHYSICAL EXAM
[No Acute Distress] : no acute distress [Well Nourished] : well nourished [Well Developed] : well developed [Well-Appearing] : well-appearing [Normal Sclera/Conjunctiva] : normal sclera/conjunctiva [PERRL] : pupils equal round and reactive to light [EOMI] : extraocular movements intact [Normal Outer Ear/Nose] : the outer ears and nose were normal in appearance [Normal Oropharynx] : the oropharynx was normal [No JVD] : no jugular venous distention [No Lymphadenopathy] : no lymphadenopathy [Supple] : supple [Thyroid Normal, No Nodules] : the thyroid was normal and there were no nodules present [No Respiratory Distress] : no respiratory distress  [No Accessory Muscle Use] : no accessory muscle use [Clear to Auscultation] : lungs were clear to auscultation bilaterally [Normal Rate] : normal rate  [Regular Rhythm] : with a regular rhythm [Normal S1, S2] : normal S1 and S2 [No Murmur] : no murmur heard [No Carotid Bruits] : no carotid bruits [No Abdominal Bruit] : a ~M bruit was not heard ~T in the abdomen [No Varicosities] : no varicosities [Pedal Pulses Present] : the pedal pulses are present [No Palpable Aorta] : no palpable aorta [No Extremity Clubbing/Cyanosis] : no extremity clubbing/cyanosis [Soft] : abdomen soft [Non Tender] : non-tender [Non-distended] : non-distended [No Masses] : no abdominal mass palpated [No HSM] : no HSM [Normal Bowel Sounds] : normal bowel sounds [Normal Posterior Cervical Nodes] : no posterior cervical lymphadenopathy [Normal Anterior Cervical Nodes] : no anterior cervical lymphadenopathy [No CVA Tenderness] : no CVA  tenderness [No Spinal Tenderness] : no spinal tenderness [No Joint Swelling] : no joint swelling [Grossly Normal Strength/Tone] : grossly normal strength/tone [No Rash] : no rash [Coordination Grossly Intact] : coordination grossly intact [No Focal Deficits] : no focal deficits [Normal Gait] : normal gait [Deep Tendon Reflexes (DTR)] : deep tendon reflexes were 2+ and symmetric [Normal Affect] : the affect was normal [Normal Insight/Judgement] : insight and judgment were intact [de-identified] : Bilateral leg edema

## 2022-06-22 NOTE — COUNSELING
[Fall prevention counseling provided] : Fall prevention counseling provided [Adequate lighting] : Adequate lighting [No throw rugs] : No throw rugs [Use proper foot wear] : Use proper foot wear [Behavioral health counseling provided] : Behavioral health counseling provided [Sleep ___ hours/day] : Sleep [unfilled] hours/day KEIKO CASTELLON [Engage in a relaxing activity] : Engage in a relaxing activity [Plan in advance] : Plan in advance [AUDIT-C Screening administered and reviewed] : AUDIT-C Screening administered and reviewed [Potential consequences of obesity discussed] : Potential consequences of obesity discussed [Benefits of weight loss discussed] : Benefits of weight loss discussed [Encouraged to increase physical activity] : Encouraged to increase physical activity [Encouraged to use exercise tracking device] : Encouraged to use exercise tracking device [Weigh Self Weekly] : weigh self weekly [Decrease Portions] : decrease portions [None] : None [Good understanding] : Patient has a good understanding of lifestyle changes and steps needed to achieve self management goal [FreeTextEntry2] : former smoker

## 2022-06-22 NOTE — HISTORY OF PRESENT ILLNESS
[Disease: _____________________] : Disease: [unfilled] [de-identified] : 67 year old M with h/o anemia, Afib, gout, DM, HTN, HLD, hypothyroid, obesity, JASON and renal cancer s/p right nephrectomy who was diagnosed with HCC in June 2022. \par \par He was noted to have a suspicious kidney lesion on imaging in December 2021 and was referred for an abdominal MRI in January 2022, with incidental note of a multiple small indeterminate hepatic lesions which are indeterminate but for which metastatic renal cell carcinoma cannot be excluded. Subsequent MRI abdomen in April 2022 revealed an endophytic right lower pole renal lesion suspicious for malignancy, evidence of liver metastasis and multiple prominent subcentimeter periportal, retroperitoneal and retrocaval lymph nodes which are nonspecific.\par \par On 4/20/22 he underwent a right nephrectomy with Dr Jhonathan Shetty.  Path showed RCC , clear cell, nuclear grade II, pT1a, Stage I. No indication for adjuvant pembro. \par \par PET/CT performed on 5/13/22 showed no definitive hypermetabolic evidence of malignant disease. There are prominent retroperitoneal and iliac chain lymph nodes without corresponding abnormal activity. No discrete hepatic focus to correspond with liver lesions seen on MRI (continued MRI surveillance is recommended), right anterolateral subcutaneous activity possibly related to abdominal surgery.\par \par On 6/1/22 he underwent a CT-guided biopsy of a representative mass in the inferior right hepatic lobe. Pathology demonstrated well differentiated hepatocellular carcinoma. Immunohistochemical stains show tumor cells are positive for arginase, focal positive for HSA, CK7 and CA IX, negative for CK20, CDX2, TTF1, Villin, [place]-8, vimentin and AFP. The morphology and immuno profile support the diagnosis of hepatocellular carcinoma.\par \par Pt works as a storage . Feels well. Denies HA. CP, SOB, abd pain, constipation, diarrhea, melena, hematuria, dysuria.  [de-identified] : moderately differentiated HCC

## 2022-06-23 LAB
APPEARANCE: CLEAR
BILIRUBIN URINE: NEGATIVE
BLOOD URINE: ABNORMAL
COLOR: YELLOW
GLUCOSE QUALITATIVE U: NEGATIVE
KETONES URINE: NEGATIVE
LEUKOCYTE ESTERASE URINE: ABNORMAL
NITRITE URINE: NEGATIVE
PH URINE: 6.5
PROTEIN URINE: NORMAL
SPECIFIC GRAVITY URINE: 1.02
UROBILINOGEN URINE: NORMAL

## 2022-06-24 ENCOUNTER — APPOINTMENT (OUTPATIENT)
Dept: INTERVENTIONAL RADIOLOGY/VASCULAR | Facility: CLINIC | Age: 67
End: 2022-06-24

## 2022-06-28 LAB
AFP-TM SERPL-MCNC: 5.6 NG/ML
ALBUMIN SERPL ELPH-MCNC: 4.1 G/DL
ALP BLD-CCNC: 116 U/L
ALT SERPL-CCNC: 14 U/L
ANION GAP SERPL CALC-SCNC: 14 MMOL/L
APTT BLD: 34.7 SEC
AST SERPL-CCNC: 18 U/L
BILIRUB SERPL-MCNC: 0.3 MG/DL
BUN SERPL-MCNC: 17 MG/DL
CALCIUM SERPL-MCNC: 10 MG/DL
CHLORIDE SERPL-SCNC: 103 MMOL/L
CO2 SERPL-SCNC: 23 MMOL/L
CREAT SERPL-MCNC: 1.54 MG/DL
EGFR: 50 ML/MIN/1.73M2
GLUCOSE SERPL-MCNC: 135 MG/DL
HAV IGM SER QL: NONREACTIVE
HBV CORE IGM SER QL: NONREACTIVE
HBV SURFACE AG SER QL: NONREACTIVE
HCV AB SER QL: NONREACTIVE
HCV S/CO RATIO: 0.14 S/CO
INR PPP: 1.29 RATIO
MAGNESIUM SERPL-MCNC: 2.1 MG/DL
POTASSIUM SERPL-SCNC: 4.4 MMOL/L
PROT SERPL-MCNC: 8 G/DL
PT BLD: 15.3 SEC
SODIUM SERPL-SCNC: 140 MMOL/L

## 2022-07-06 ENCOUNTER — APPOINTMENT (OUTPATIENT)
Dept: RHEUMATOLOGY | Facility: CLINIC | Age: 67
End: 2022-07-06

## 2022-07-06 VITALS
RESPIRATION RATE: 17 BRPM | HEIGHT: 69.5 IN | DIASTOLIC BLOOD PRESSURE: 70 MMHG | SYSTOLIC BLOOD PRESSURE: 115 MMHG | HEART RATE: 72 BPM | TEMPERATURE: 98.1 F | OXYGEN SATURATION: 95 %

## 2022-07-06 DIAGNOSIS — M19.041 PRIMARY OSTEOARTHRITIS, RIGHT HAND: ICD-10-CM

## 2022-07-06 DIAGNOSIS — M19.042 PRIMARY OSTEOARTHRITIS, RIGHT HAND: ICD-10-CM

## 2022-07-06 DIAGNOSIS — M17.31 UNILATERAL POST-TRAUMATIC OSTEOARTHRITIS, RIGHT KNEE: ICD-10-CM

## 2022-07-06 PROCEDURE — 20550 NJX 1 TENDON SHEATH/LIGAMENT: CPT

## 2022-07-06 PROCEDURE — 99214 OFFICE O/P EST MOD 30 MIN: CPT | Mod: 25

## 2022-07-06 PROCEDURE — 36415 COLL VENOUS BLD VENIPUNCTURE: CPT

## 2022-07-06 RX ORDER — PANTOPRAZOLE 40 MG/1
40 TABLET, DELAYED RELEASE ORAL
Qty: 30 | Refills: 0 | Status: DISCONTINUED | COMMUNITY
Start: 2022-01-26 | End: 2022-07-06

## 2022-07-06 RX ORDER — SUCRALFATE 1 G/1
1 TABLET ORAL
Qty: 120 | Refills: 0 | Status: DISCONTINUED | COMMUNITY
Start: 2022-01-26 | End: 2022-07-06

## 2022-07-06 RX ORDER — AMIODARONE HYDROCHLORIDE 200 MG/1
200 TABLET ORAL
Qty: 30 | Refills: 0 | Status: DISCONTINUED | COMMUNITY
Start: 2021-10-16 | End: 2022-07-06

## 2022-07-07 LAB
ALBUMIN SERPL ELPH-MCNC: 4 G/DL
ALP BLD-CCNC: 113 U/L
ALT SERPL-CCNC: 16 U/L
ANION GAP SERPL CALC-SCNC: 11 MMOL/L
AST SERPL-CCNC: 18 U/L
BILIRUB SERPL-MCNC: 0.2 MG/DL
BUN SERPL-MCNC: 16 MG/DL
CALCIUM SERPL-MCNC: 9.7 MG/DL
CHLORIDE SERPL-SCNC: 104 MMOL/L
CO2 SERPL-SCNC: 23 MMOL/L
CREAT SERPL-MCNC: 1.56 MG/DL
EGFR: 49 ML/MIN/1.73M2
GLUCOSE SERPL-MCNC: 133 MG/DL
POTASSIUM SERPL-SCNC: 4.4 MMOL/L
PROT SERPL-MCNC: 7.8 G/DL
SODIUM SERPL-SCNC: 138 MMOL/L
URATE SERPL-MCNC: 5.4 MG/DL

## 2022-07-11 ENCOUNTER — RX RENEWAL (OUTPATIENT)
Age: 67
End: 2022-07-11

## 2022-07-13 ENCOUNTER — APPOINTMENT (OUTPATIENT)
Dept: GASTROENTEROLOGY | Facility: CLINIC | Age: 67
End: 2022-07-13

## 2022-07-13 VITALS
BODY MASS INDEX: 45.61 KG/M2 | WEIGHT: 315 LBS | OXYGEN SATURATION: 98 % | TEMPERATURE: 97.5 F | HEART RATE: 81 BPM | SYSTOLIC BLOOD PRESSURE: 136 MMHG | RESPIRATION RATE: 18 BRPM | HEIGHT: 69.5 IN | DIASTOLIC BLOOD PRESSURE: 90 MMHG

## 2022-07-13 PROCEDURE — 99205 OFFICE O/P NEW HI 60 MIN: CPT

## 2022-07-13 RX ORDER — AMOXICILLIN AND CLAVULANATE POTASSIUM 875; 125 MG/1; MG/1
875-125 TABLET, COATED ORAL
Qty: 14 | Refills: 1 | Status: DISCONTINUED | COMMUNITY
Start: 2022-06-14 | End: 2022-07-13

## 2022-07-14 NOTE — ASSESSMENT
[FreeTextEntry1] : 67 year old M with h/o anemia, Afib, gout, DM, HTN, HLD, hypothyroid, obesity, JASON and renal cancer s/p right nephrectomy who was diagnosed with HCC in June 2022. \par \par HCC\par Discussed at the multidisciplinary brain tumor board.\par The tumor board discussion some of the lesions suggestive of metastasis rather than HCC.\par Biopsy lesions followed by discussion at hepatobiliary tumor board.\par

## 2022-07-14 NOTE — HISTORY OF PRESENT ILLNESS
[Disease: _____________________] : Disease: [unfilled] [de-identified] : 67 year old M BMI of 46, high waist circumference  DM, HTN, HLD,renal cancer s/p right nephrectomy for RCC and HCC which was diagnosed in June 2022.\par Other medical issues include Afib, gout,JASON, hypothyroidism.  Patient is status post ablation for his A. fib.\par \par \par On 6/1/22 he underwent a CT-guided biopsy of a representative mass in the inferior right hepatic lobe. Pathology demonstrated well differentiated hepatocellular carcinoma. Immunohistochemical stains show tumor cells are positive for arginase, focal positive for HSA, CK7 and CA IX, negative for CK20, CDX2, TTF1, Villin, [place]-8, vimentin and AFP. The morphology and immuno profile support the diagnosis of hepatocellular carcinoma.\par Sampling of background liver(Limited).  Per report" although connective tissue stains trichrome and reticulin are not available on either block the immunohistochemical stains performed on block A1 give opportunity to assess parenchymal as well as portal tract architecture of the background liver.  Focal fibrous septa are present with a distribution consistent with portal to portal bridging fibrosis.  Definitive nodular transformation of the parenchyma is not present and adjacent lobular architecture in the tissue core of block A1 is focally preserved.  There is mild lymphocytic inflammation in the portal tracts, and a rare ballooned hepatocyte is observed.  The background liver is negative for steatosis, Margarette Denk body formation, cholestasis or evidence of bile duct damage.  Although the presence of fibrous septa indicate the presence of advanced liver disease and the possibility of stage IV fibrosis cannot be excluded the limited sampling of background liver and evidence of partial preservation of lobular architecture may instead reflect stage III fibrosis without progression to stage IV.  The limited sampling of\par Liver does not permit more definitive assessment of fibrosis stage"\par AFP was noted to be 5.6 on June 22, 2022.\par \par Patient denies any history of liver disease.\par Denies family history of liver disease.\par Denies history of alcohol intake.  Patient states  he stopped drinking in his 30s in the mid 1980s.\par He does have multiple tattoos all over his body but viral hepatitis panel is negative.\par Patient also has had breast implants again in the 1980s.  He states he had them as a part of her back that he lost and then never got around removing them.  He has no complications or issues from them.\par \par \par RCC history per oncology note:\par He was noted to have a suspicious kidney lesion on imaging in December 2021 and was referred for an abdominal MRI in January 2022, with incidental note of a multiple small indeterminate hepatic lesions which are indeterminate but for which metastatic renal cell carcinoma cannot be excluded. Subsequent MRI abdomen in April 2022 revealed an endophytic right lower pole renal lesion suspicious for malignancy, evidence of liver metastasis and multiple prominent subcentimeter periportal, retroperitoneal and retrocaval lymph nodes which are nonspecific.\par \par On 4/20/22 he underwent a right nephrectomy with Dr Jhonathan Shetty.  Path showed RCC , clear cell, nuclear grade II, pT1a, Stage I. No indication for adjuvant pembro. \par PET/CT performed on 5/13/22 showed no definitive hypermetabolic evidence of malignant disease. There are prominent retroperitoneal and iliac chain lymph nodes without corresponding abnormal activity. No discrete hepatic focus to correspond with liver lesions seen on MRI (continued MRI surveillance is recommended), right anterolateral subcutaneous activity possibly related to abdominal surgery.\par \par  [de-identified] : moderately differentiated HCC

## 2022-07-20 ENCOUNTER — LABORATORY RESULT (OUTPATIENT)
Age: 67
End: 2022-07-20

## 2022-07-22 ENCOUNTER — RX RENEWAL (OUTPATIENT)
Age: 67
End: 2022-07-22

## 2022-07-22 LAB
AFP-TM SERPL-MCNC: 8.7 NG/ML
ALBUMIN SERPL ELPH-MCNC: 4.2 G/DL
ALP BLD-CCNC: 115 U/L
ALT SERPL-CCNC: 21 U/L
ANION GAP SERPL CALC-SCNC: 10 MMOL/L
AST SERPL-CCNC: 18 U/L
BASOPHILS # BLD AUTO: 0.07 K/UL
BASOPHILS NFR BLD AUTO: 0.7 %
BILIRUB INDIRECT SERPL-MCNC: 0.2 MG/DL
BILIRUB SERPL-MCNC: 0.2 MG/DL
BUN SERPL-MCNC: 29 MG/DL
CALCIUM SERPL-MCNC: 9.8 MG/DL
CHLORIDE SERPL-SCNC: 103 MMOL/L
CO2 SERPL-SCNC: 26 MMOL/L
CREAT SERPL-MCNC: 1.71 MG/DL
EGFR: 44 ML/MIN/1.73M2
EOSINOPHIL # BLD AUTO: 0.54 K/UL
EOSINOPHIL NFR BLD AUTO: 5.2 %
GLUCOSE SERPL-MCNC: 100 MG/DL
HCT VFR BLD CALC: 38.5 %
HGB BLD-MCNC: 11.3 G/DL
IMM GRANULOCYTES NFR BLD AUTO: 0.3 %
INR PPP: 1.55 RATIO
LYMPHOCYTES # BLD AUTO: 2.12 K/UL
LYMPHOCYTES NFR BLD AUTO: 20.5 %
MAN DIFF?: NORMAL
MCHC RBC-ENTMCNC: 22.4 PG
MCHC RBC-ENTMCNC: 29.4 GM/DL
MCV RBC AUTO: 76.4 FL
MONOCYTES # BLD AUTO: 0.55 K/UL
MONOCYTES NFR BLD AUTO: 5.3 %
NEUTROPHILS # BLD AUTO: 7.04 K/UL
NEUTROPHILS NFR BLD AUTO: 68 %
PLATELET # BLD AUTO: 337 K/UL
POTASSIUM SERPL-SCNC: 5 MMOL/L
PROT SERPL-MCNC: 7.9 G/DL
PT BLD: 18.2 SEC
RBC # BLD: 5.04 M/UL
RBC # FLD: 21.9 %
SODIUM SERPL-SCNC: 139 MMOL/L
WBC # FLD AUTO: 10.35 K/UL

## 2022-07-27 ENCOUNTER — APPOINTMENT (OUTPATIENT)
Dept: HEMATOLOGY ONCOLOGY | Facility: CLINIC | Age: 67
End: 2022-07-27

## 2022-07-27 ENCOUNTER — TRANSCRIPTION ENCOUNTER (OUTPATIENT)
Age: 67
End: 2022-07-27

## 2022-07-27 VITALS
BODY MASS INDEX: 45.61 KG/M2 | WEIGHT: 315 LBS | SYSTOLIC BLOOD PRESSURE: 173 MMHG | HEART RATE: 58 BPM | HEIGHT: 69.5 IN | DIASTOLIC BLOOD PRESSURE: 103 MMHG | OXYGEN SATURATION: 98 %

## 2022-07-27 PROCEDURE — 99214 OFFICE O/P EST MOD 30 MIN: CPT

## 2022-07-27 NOTE — PHYSICAL EXAM
[Restricted in physically strenuous activity but ambulatory and able to carry out work of a light or sedentary nature] : Status 1- Restricted in physically strenuous activity but ambulatory and able to carry out work of a light or sedentary nature, e.g., light house work, office work [Obese] : obese [Normal] : affect appropriate [de-identified] : well healed incisions

## 2022-07-27 NOTE — ASSESSMENT
[FreeTextEntry1] : 67 year old M with h/o anemia, Afib, gout, DM, HTN, HLD, hypothyroid, obesity, JASON and renal cancer s/p right nephrectomy who was diagnosed with HCC in June 2022. \par \par # HCC\par -MRI abd showed 0.7 and 1.2 cm Lt hepatic lobe lesion and 1.1 cm right hepatic lobe lesion\par -no evidence of metastatic disease on PET\par -pt seen by hepatology >> the case was discussed at TB. pending bx of liver lesion as one of the lesion appear to be different from HCC. Given hx of RCC , will need bx to r/o mets.\par -Pt will also follow up with IR for LDT\par -will defer systemic therapy for now as pt is eligible for LDT and being evaluated for possible liver transplant\par -follow up in 3 months\par \par \par # RCC\par -s/p right nephrectomy\par -Path showed RCC , clear cell, nuclear grade II, pT1a, Stage I. \par -No indication for adjuvant pembro. \par -will do surveillance\par -CT or MRI i ab/pn and CXR 6 months after sx then annually\par \par \par RTC in 3 months

## 2022-07-27 NOTE — HISTORY OF PRESENT ILLNESS
[Disease: _____________________] : Disease: [unfilled] [de-identified] : 67 year old M with h/o anemia, Afib, gout, DM, HTN, HLD, hypothyroid, obesity, JASON and renal cancer s/p right nephrectomy who was diagnosed with HCC in June 2022. \par \par He was noted to have a suspicious kidney lesion on imaging in December 2021 and was referred for an abdominal MRI in January 2022, with incidental note of a multiple small indeterminate hepatic lesions which are indeterminate but for which metastatic renal cell carcinoma cannot be excluded. Subsequent MRI abdomen in April 2022 revealed an endophytic right lower pole renal lesion suspicious for malignancy, evidence of liver metastasis and multiple prominent subcentimeter periportal, retroperitoneal and retrocaval lymph nodes which are nonspecific.\par \par On 4/20/22 he underwent a right nephrectomy with Dr Jhonathan Shetty.  Path showed RCC , clear cell, nuclear grade II, pT1a, Stage I. No indication for adjuvant pembro. \par \par PET/CT performed on 5/13/22 showed no definitive hypermetabolic evidence of malignant disease. There are prominent retroperitoneal and iliac chain lymph nodes without corresponding abnormal activity. No discrete hepatic focus to correspond with liver lesions seen on MRI (continued MRI surveillance is recommended), right anterolateral subcutaneous activity possibly related to abdominal surgery.\par \par On 6/1/22 he underwent a CT-guided biopsy of a representative mass in the inferior right hepatic lobe. Pathology demonstrated well differentiated hepatocellular carcinoma. Immunohistochemical stains show tumor cells are positive for arginase, focal positive for HSA, CK7 and CA IX, negative for CK20, CDX2, TTF1, Villin, [place]-8, vimentin and AFP. The morphology and immuno profile support the diagnosis of hepatocellular carcinoma.\par \par Pt works as a storage . Feels well. Denies HA. CP, SOB, abd pain, constipation, diarrhea, melena, hematuria, dysuria.  [de-identified] : moderately differentiated HCC [de-identified] : 7/28/22: Angel is here for follow up for HCC. Pt's case was discussed at [facility]. Some of the lesions suggestive of metastasis rather than HCC. Pending  bx then re discussion at TB to assess for transplant eligibility. Pt will also follow up with IR for LDT. Denies HA. CP, SOB, abd pain, constipation, diarrhea, melena, hematuria, dysuria. \par

## 2022-08-08 ENCOUNTER — RESULT CHARGE (OUTPATIENT)
Age: 67
End: 2022-08-08

## 2022-08-09 RX ORDER — TRIAMCINOLONE ACETONIDE 40 MG/ML
40 SUSPENSION INTRA-ARTERIAL; INTRAMUSCULAR
Qty: 1 | Refills: 0 | Status: COMPLETED | OUTPATIENT
Start: 2022-08-09

## 2022-08-09 RX ORDER — LIDOCAINE HYDROCHLORIDE 10 MG/ML
1 INJECTION, SOLUTION INFILTRATION; PERINEURAL
Refills: 0 | Status: COMPLETED | OUTPATIENT
Start: 2022-08-09

## 2022-08-09 RX ADMIN — TRIAMCINOLONE ACETONIDE 0 MG/ML: 40 INJECTION, SUSPENSION INTRA-ARTICULAR; INTRAMUSCULAR at 00:00

## 2022-08-09 RX ADMIN — Medication %: at 00:00

## 2022-08-10 ENCOUNTER — APPOINTMENT (OUTPATIENT)
Dept: GASTROENTEROLOGY | Facility: CLINIC | Age: 67
End: 2022-08-10

## 2022-08-10 VITALS
HEIGHT: 69 IN | RESPIRATION RATE: 17 BRPM | DIASTOLIC BLOOD PRESSURE: 95 MMHG | SYSTOLIC BLOOD PRESSURE: 150 MMHG | WEIGHT: 315 LBS | OXYGEN SATURATION: 97 % | HEART RATE: 65 BPM | BODY MASS INDEX: 46.65 KG/M2 | TEMPERATURE: 98 F

## 2022-08-10 PROCEDURE — 99215 OFFICE O/P EST HI 40 MIN: CPT

## 2022-08-10 NOTE — ADDENDUM
[FreeTextEntry1] : I, Maggie Pierce NP, acted as scribe for ABIODUN Gerard for this patient encounter.

## 2022-08-10 NOTE — ASSESSMENT
[FreeTextEntry1] : EVERETT GEORGE is a 67 year old male with a PMH significant for anemia, Afib, gout, DM, HTN, HLD, hypothyroid, obesity, JASON and renal cancer s/p right nephrectomy who was diagnosed with HCC in June 2022. \par \par HCC\par -MRI abd showed 0.7 and 1.2 cm Lt hepatic lobe lesion and 1.1 cm right hepatic lobe lesion\par -no evidence of metastatic disease on PET\par -case was discussed at TB. pending bx of liver lesion as one of the lesion appear to be different from HCC. Given hx of RCC , will need bx to r/o mets.\par -Pt will also follow up with IR for LDT\par -Pending Liver biopsy, follow up with IR on status\par -Follow up in office after liver biopsy to discuss results

## 2022-08-10 NOTE — HISTORY OF PRESENT ILLNESS
[de-identified] : EVERETT GEORGE is a 67 year old male with a PMH significant for anemia, Afib, gout, DM, HTN, HLD, hypothyroid, obesity, JASON and renal cancer s/p right nephrectomy who was diagnosed with HCC in June 2022. \par \par He was noted to have a suspicious kidney lesion on imaging in December 2021 and was referred for an abdominal MRI in January 2022, with incidental note of a multiple small indeterminate hepatic lesions which are indeterminate but for which metastatic renal cell carcinoma cannot be excluded. Subsequent MRI abdomen in April 2022 revealed an endophytic right lower pole renal lesion suspicious for malignancy, evidence of liver metastasis and multiple prominent subcentimeter periportal, retroperitoneal and retrocaval lymph nodes which are nonspecific.\par \par On 4/20/22 he underwent a right nephrectomy with Dr Jhonathan Shetty. Path showed RCC, clear cell, nuclear grade II, pT1a, Stage I. No indication for adjuvant pembro. \par \par PET/CT performed on 5/13/22 showed no definitive hypermetabolic evidence of malignant disease. There are prominent retroperitoneal and iliac chain lymph nodes without corresponding abnormal activity. No discrete hepatic focus to correspond with liver lesions seen on MRI (continued MRI surveillance is recommended), right anterolateral subcutaneous activity possibly related to abdominal surgery.\par \par On 6/1/22 he underwent a CT-guided biopsy of a representative mass in the inferior right hepatic lobe. Pathology demonstrated well differentiated hepatocellular carcinoma. Immunohistochemical stains show tumor cells are positive for arginase, focal positive for HSA, CK7 and CA IX, negative for CK20, CDX2, TTF1, Villin, [place]-8, vimentin and AFP. The morphology and immuno profile support the diagnosis of hepatocellular carcinoma.\par \par 8/10/22: Pt's case was discussed at [facility]. Some of the lesions suggestive of metastasis rather than HCC. Pending bx then re discussion at TB to assess for transplant eligibility. Pt will also follow up with IR for LDT.\par Labs 7/20/22: bilirubin 0.2, AST 18, ALT 21, AlkPhos 115, INR 1.55, AFP 8.7\par Denies fatigue, malaise, arthralgias, myalgias, pruritus, recent infection, abdominal pain or distension, jaundice, hematemesis, hematochezia, dark urine, confusion, unintentional weight loss or gain. Denies family history of liver disease, sudden death or late trimester abortions.

## 2022-08-10 NOTE — PHYSICAL EXAM
[General Appearance - Alert] : alert [General Appearance - In No Acute Distress] : in no acute distress [Sclera] : the sclera and conjunctiva were normal [Outer Ear] : the ears and nose were normal in appearance [Oropharynx] : the oropharynx was normal [Neck Appearance] : the appearance of the neck was normal [Neck Cervical Mass (___cm)] : no neck mass was observed [Jugular Venous Distention Increased] : there was no jugular-venous distention [Thyroid Diffuse Enlargement] : the thyroid was not enlarged [Thyroid Nodule] : there were no palpable thyroid nodules [Auscultation Breath Sounds / Voice Sounds] : lungs were clear to auscultation bilaterally [Heart Rate And Rhythm] : heart rate was normal and rhythm regular [Bowel Sounds] : normal bowel sounds [Abdomen Soft] : soft [Abdomen Tenderness] : non-tender [Abdomen Mass (___ Cm)] : no abdominal mass palpated [Abnormal Walk] : normal gait [Nail Clubbing] : no clubbing  or cyanosis of the fingernails [Musculoskeletal - Swelling] : no joint swelling seen [Motor Tone] : muscle strength and tone were normal [Skin Color & Pigmentation] : normal skin color and pigmentation [Skin Turgor] : normal skin turgor [] : no rash [No Focal Deficits] : no focal deficits [Oriented To Time, Place, And Person] : oriented to person, place, and time [Impaired Insight] : insight and judgment were intact [Affect] : the affect was normal [Scleral Icterus] : No Scleral Icterus [Hepatojugular Reflux] : patient did not have a sustained hepatojugular reflux [Spider Angioma] : No spider angioma(s) were observed [Abdominal  Ascites] : no ascites [Splenomegaly] : no splenomegaly [Non-Tender] : non-tender [Smooth] : smooth [Scrotal Edema] : Scrotum is not edematous [Asterixis] : no asterixis observed [Jaundice] : No jaundice [Palmar Erythema] : no Palmar Erythema [Depression] : no depression [Hallucinations] : ~T no ~M hallucinations [Delusions] : no ~T delusions [Suicidal Ideation] : no suicidal ideation [Homicidal Ideation] : no homicidal ideations [Preoccupiation With Violent Thoughts] : no preoccupation with violent thoughts [Nonpitting Edema] : nonpitting edema present [___ +] : bilateral [unfilled]+ pitting edema to the ankles

## 2022-08-17 ENCOUNTER — APPOINTMENT (OUTPATIENT)
Dept: FAMILY MEDICINE | Facility: CLINIC | Age: 67
End: 2022-08-17

## 2022-08-17 ENCOUNTER — OUTPATIENT (OUTPATIENT)
Dept: OUTPATIENT SERVICES | Facility: HOSPITAL | Age: 67
LOS: 1 days | End: 2022-08-17
Payer: MEDICARE

## 2022-08-17 VITALS
BODY MASS INDEX: 46.65 KG/M2 | OXYGEN SATURATION: 98 % | SYSTOLIC BLOOD PRESSURE: 138 MMHG | TEMPERATURE: 98 F | HEART RATE: 68 BPM | DIASTOLIC BLOOD PRESSURE: 82 MMHG | WEIGHT: 315 LBS | HEIGHT: 69 IN | RESPIRATION RATE: 15 BRPM

## 2022-08-17 VITALS
OXYGEN SATURATION: 97 % | DIASTOLIC BLOOD PRESSURE: 94 MMHG | HEIGHT: 67.5 IN | HEART RATE: 59 BPM | SYSTOLIC BLOOD PRESSURE: 172 MMHG | TEMPERATURE: 98 F | RESPIRATION RATE: 16 BRPM | WEIGHT: 315 LBS

## 2022-08-17 DIAGNOSIS — Z01.818 ENCOUNTER FOR OTHER PREPROCEDURAL EXAMINATION: ICD-10-CM

## 2022-08-17 DIAGNOSIS — G47.33 OBSTRUCTIVE SLEEP APNEA (ADULT) (PEDIATRIC): ICD-10-CM

## 2022-08-17 DIAGNOSIS — Z90.5 ACQUIRED ABSENCE OF KIDNEY: Chronic | ICD-10-CM

## 2022-08-17 DIAGNOSIS — I48.91 UNSPECIFIED ATRIAL FIBRILLATION: ICD-10-CM

## 2022-08-17 DIAGNOSIS — Z29.9 ENCOUNTER FOR PROPHYLACTIC MEASURES, UNSPECIFIED: ICD-10-CM

## 2022-08-17 DIAGNOSIS — I10 ESSENTIAL (PRIMARY) HYPERTENSION: ICD-10-CM

## 2022-08-17 DIAGNOSIS — Z98.890 OTHER SPECIFIED POSTPROCEDURAL STATES: Chronic | ICD-10-CM

## 2022-08-17 DIAGNOSIS — C22.0 LIVER CELL CARCINOMA: ICD-10-CM

## 2022-08-17 DIAGNOSIS — Z98.49 CATARACT EXTRACTION STATUS, UNSPECIFIED EYE: Chronic | ICD-10-CM

## 2022-08-17 DIAGNOSIS — Z96.651 PRESENCE OF RIGHT ARTIFICIAL KNEE JOINT: Chronic | ICD-10-CM

## 2022-08-17 DIAGNOSIS — Z96.652 PRESENCE OF LEFT ARTIFICIAL KNEE JOINT: Chronic | ICD-10-CM

## 2022-08-17 DIAGNOSIS — E11.9 TYPE 2 DIABETES MELLITUS WITHOUT COMPLICATIONS: ICD-10-CM

## 2022-08-17 DIAGNOSIS — J45.909 UNSPECIFIED ASTHMA, UNCOMPLICATED: ICD-10-CM

## 2022-08-17 LAB
A1C WITH ESTIMATED AVERAGE GLUCOSE RESULT: 6.8 % — HIGH (ref 4–5.6)
ALBUMIN SERPL ELPH-MCNC: 3.9 G/DL — SIGNIFICANT CHANGE UP (ref 3.3–5.2)
ALP SERPL-CCNC: 114 U/L — SIGNIFICANT CHANGE UP (ref 40–120)
ALT FLD-CCNC: 20 U/L — SIGNIFICANT CHANGE UP
ANION GAP SERPL CALC-SCNC: 11 MMOL/L — SIGNIFICANT CHANGE UP (ref 5–17)
APTT BLD: 40.9 SEC — HIGH (ref 27.5–35.5)
AST SERPL-CCNC: 23 U/L — SIGNIFICANT CHANGE UP
BASOPHILS # BLD AUTO: 0.07 K/UL — SIGNIFICANT CHANGE UP (ref 0–0.2)
BASOPHILS NFR BLD AUTO: 0.8 % — SIGNIFICANT CHANGE UP (ref 0–2)
BILIRUB SERPL-MCNC: 0.2 MG/DL — LOW (ref 0.4–2)
BUN SERPL-MCNC: 18.5 MG/DL — SIGNIFICANT CHANGE UP (ref 8–20)
CALCIUM SERPL-MCNC: 9.3 MG/DL — SIGNIFICANT CHANGE UP (ref 8.4–10.5)
CHLORIDE SERPL-SCNC: 104 MMOL/L — SIGNIFICANT CHANGE UP (ref 98–107)
CO2 SERPL-SCNC: 23 MMOL/L — SIGNIFICANT CHANGE UP (ref 22–29)
CREAT SERPL-MCNC: 1.64 MG/DL — HIGH (ref 0.5–1.3)
EGFR: 46 ML/MIN/1.73M2 — LOW
EOSINOPHIL # BLD AUTO: 0.49 K/UL — SIGNIFICANT CHANGE UP (ref 0–0.5)
EOSINOPHIL NFR BLD AUTO: 5.5 % — SIGNIFICANT CHANGE UP (ref 0–6)
ESTIMATED AVERAGE GLUCOSE: 148 MG/DL — HIGH (ref 68–114)
GLUCOSE SERPL-MCNC: 140 MG/DL — HIGH (ref 70–99)
HCT VFR BLD CALC: 38.5 % — LOW (ref 39–50)
HGB BLD-MCNC: 11.5 G/DL — LOW (ref 13–17)
IMM GRANULOCYTES NFR BLD AUTO: 0.3 % — SIGNIFICANT CHANGE UP (ref 0–1.5)
INR BLD: 1.47 RATIO — HIGH (ref 0.88–1.16)
LYMPHOCYTES # BLD AUTO: 1.8 K/UL — SIGNIFICANT CHANGE UP (ref 1–3.3)
LYMPHOCYTES # BLD AUTO: 20.1 % — SIGNIFICANT CHANGE UP (ref 13–44)
MCHC RBC-ENTMCNC: 22.7 PG — LOW (ref 27–34)
MCHC RBC-ENTMCNC: 29.9 GM/DL — LOW (ref 32–36)
MCV RBC AUTO: 76.1 FL — LOW (ref 80–100)
MONOCYTES # BLD AUTO: 0.52 K/UL — SIGNIFICANT CHANGE UP (ref 0–0.9)
MONOCYTES NFR BLD AUTO: 5.8 % — SIGNIFICANT CHANGE UP (ref 2–14)
NEUTROPHILS # BLD AUTO: 6.03 K/UL — SIGNIFICANT CHANGE UP (ref 1.8–7.4)
NEUTROPHILS NFR BLD AUTO: 67.5 % — SIGNIFICANT CHANGE UP (ref 43–77)
PLATELET # BLD AUTO: 340 K/UL — SIGNIFICANT CHANGE UP (ref 150–400)
POTASSIUM SERPL-MCNC: 4.5 MMOL/L — SIGNIFICANT CHANGE UP (ref 3.5–5.3)
POTASSIUM SERPL-SCNC: 4.5 MMOL/L — SIGNIFICANT CHANGE UP (ref 3.5–5.3)
PROT SERPL-MCNC: 7.9 G/DL — SIGNIFICANT CHANGE UP (ref 6.6–8.7)
PROTHROM AB SERPL-ACNC: 17.1 SEC — HIGH (ref 10.5–13.4)
RBC # BLD: 5.06 M/UL — SIGNIFICANT CHANGE UP (ref 4.2–5.8)
RBC # FLD: 19.4 % — HIGH (ref 10.3–14.5)
SODIUM SERPL-SCNC: 138 MMOL/L — SIGNIFICANT CHANGE UP (ref 135–145)
WBC # BLD: 8.94 K/UL — SIGNIFICANT CHANGE UP (ref 3.8–10.5)
WBC # FLD AUTO: 8.94 K/UL — SIGNIFICANT CHANGE UP (ref 3.8–10.5)

## 2022-08-17 PROCEDURE — 93010 ELECTROCARDIOGRAM REPORT: CPT

## 2022-08-17 PROCEDURE — 80053 COMPREHEN METABOLIC PANEL: CPT

## 2022-08-17 PROCEDURE — 85610 PROTHROMBIN TIME: CPT

## 2022-08-17 PROCEDURE — 85025 COMPLETE CBC W/AUTO DIFF WBC: CPT

## 2022-08-17 PROCEDURE — 85730 THROMBOPLASTIN TIME PARTIAL: CPT

## 2022-08-17 PROCEDURE — 36415 COLL VENOUS BLD VENIPUNCTURE: CPT

## 2022-08-17 PROCEDURE — G0463: CPT

## 2022-08-17 PROCEDURE — 83036 HEMOGLOBIN GLYCOSYLATED A1C: CPT

## 2022-08-17 PROCEDURE — 99215 OFFICE O/P EST HI 40 MIN: CPT | Mod: 25

## 2022-08-17 PROCEDURE — 93005 ELECTROCARDIOGRAM TRACING: CPT

## 2022-08-17 RX ORDER — SODIUM CHLORIDE 9 MG/ML
3 INJECTION INTRAMUSCULAR; INTRAVENOUS; SUBCUTANEOUS ONCE
Refills: 0 | Status: DISCONTINUED | OUTPATIENT
Start: 2022-08-24 | End: 2022-09-07

## 2022-08-17 NOTE — H&P PST ADULT - HISTORY OF PRESENT ILLNESS
67 year old M with h/o anemia, Afib, gout, DM, HTN, HLD, hypothyroid, obesity, JASON and renal cancer s/p right nephrectomy ( On 4/20/22 he underwent a right nephrectomy with Dr Jhonathan Shetty. Path showed RCC , clear cell, nuclear grade II, pT1a, Stage I ) who was diagnosed with HCC in June 2022, here is here for PST for a Liver Biopsy with Anesthesia by Dr. Santos on 8/24/22. No Covid vaccine , covid test is on 8/21/22. Medical and cardiac clearance pending     On 6/1/22 he underwent a CT-guided biopsy of a representative mass in the inferior right hepatic lobe. Pathology demonstrated well differentiated hepatocellular carcinoma. Immunohistochemical stains show tumor cells are positive for arginase, focal positive for HSA, CK7 and CA IX, negative for CK20, CDX2, TTF1, Villin, [place]-8, vimentin and AFP. The morphology and immuno profile support the diagnosis of hepatocellular carcinoma.

## 2022-08-17 NOTE — H&P PST ADULT - ASSESSMENT
67 year old M with h/o anemia, Afib, gout, DM, HTN, HLD, hypothyroid, obesity, JASON and renal cancer s/p right nephrectomy ( On 22 he underwent a right nephrectomy with Dr Jhonathan Shetty. Path showed RCC , clear cell, nuclear grade II, pT1a, Stage I ) who was diagnosed with HCC in 2022, here is here for PST for a Liver Biopsy with Anesthesia by Dr. Santos on 22. No Covid vaccine , covid test is on 22. Medical and cardiac clearance pending     medications reviewed, instructions given on what medications to take and what not to take. medications reviewed, instructions given on what medications to take and what not to take ( Amlodipine, Levothyroxine, Amiodarone, Ramipril)  Asked the patient to consult with PCP/cardiologist about holding ASA ( he is taking 1000+ mg of ASA for Pain)  and stop/Hold it accordingly, failure to do so may result in cancellation or postponement of your surgery, pt  agreed and verbalized understanding. He was asked to hold Xarelto for 48 hrs before the procedure, last dose is in 22 PM.  All other meds can be continued till the night before surgery.     CAPRINI VTE 2.0 SCORE [CLOT updated 2019]    AGE RELATED RISK FACTORS                                                       MOBILITY RELATED FACTORS  [ ] Age 41-60 years                                            (1 Point)                    [ ] Bed rest                                                        (1 Point)  [x ] Age: 61-74 years                                           (2 Points)                  [ ] Plaster cast                                                   (2 Points)  [ ] Age= 75 years                                              (3 Points)                    [ ] Bed bound for more than 72 hours                 (2 Points)    DISEASE RELATED RISK FACTORS                                               GENDER SPECIFIC FACTORS  [ x] Edema in the lower extremities                       (1 Point)              [ ] Pregnancy                                                     (1 Point)  [ ] Varicose veins                                               (1 Point)                     [ ] Post-partum < 6 weeks                                   (1 Point)             [x ] BMI > 25 Kg/m2                                            (1 Point)                     [ ] Hormonal therapy  or oral contraception          (1 Point)                 [ ] Sepsis (in the previous month)                        (1 Point)               [ ] History of pregnancy complications                 (1 point)  [ ] Pneumonia or serious lung disease                                               [ ] Unexplained or recurrent                     (1 Point)           (in the previous month)                               (1 Point)  [ ] Abnormal pulmonary function test                     (1 Point)                 SURGERY RELATED RISK FACTORS  [ ] Acute myocardial infarction                              (1 Point)               [ ]  Section                                             (1 Point)  [ ] Congestive heart failure (in the previous month)  (1 Point)      [x ] Minor surgery                                                  (1 Point)   [ ] Inflammatory bowel disease                             (1 Point)               [ ] Arthroscopic surgery                                        (2 Points)  [ ] Central venous access                                      (2 Points)                [ ] General surgery lasting more than 45 minutes (2 points)  [x ] Malignancy- Present or previous                   (2 Points)                [ ] Elective arthroplasty                                         (5 points)    [ ] Stroke (in the previous month)                          (5 Points)                                                                                                                                                           HEMATOLOGY RELATED FACTORS                                                 TRAUMA RELATED RISK FACTORS  [ ] Prior episodes of VTE                                     (3 Points)                [ ] Fracture of the hip, pelvis, or leg                       (5 Points)  [ ] Positive family history for VTE                         (3 Points)             [ ] Acute spinal cord injury (in the previous month)  (5 Points)  [ ] Prothrombin 11397 A                                     (3 Points)               [ ] Paralysis  (less than 1 month)                             (5 Points)  [ ] Factor V Leiden                                             (3 Points)                  [ ] Multiple Trauma within 1 month                        (5 Points)  [ ] Lupus anticoagulants                                     (3 Points)                                                           [ ] Anticardiolipin antibodies                               (3 Points)                                                       [ ] High homocysteine in the blood                      (3 Points)                                             [ ] Other congenital or acquired thrombophilia      (3 Points)                                                [ ] Heparin induced thrombocytopenia                  (3 Points)                                     Total Score [     6     ]  OPIOID RISK TOOL    CAMILO EACH BOX THAT APPLIES AND ADD TOTALS AT THE END    FAMILY HISTORY OF SUBSTANCE ABUSE                 FEMALE         MALE                                                Alcohol                             [  ]1 pt          [  ]3pts                                               Illegal Drugs                     [  ]2 pts        [  ]3pts                                               Rx Drugs                           [  ]4 pts        [  ]4 pts    PERSONAL HISTORY OF SUBSTANCE ABUSE                                                                                          Alcohol                             [  ]3 pts       [  ]3 pts                                               Illegal Drugs                     [  ]4 pts        [  ]4 pts                                               Rx Drugs                           [  ]5 pts        [  ]5 pts    AGE BETWEEN 16-45 YEARS                                      [  ]1 pt         [  ]1 pt    HISTORY OF PREADOLESCENT   SEXUAL ABUSE                                                             [  ]3 pts        [  ]0pts    PSYCHOLOGICAL DISEASE                     ADD, OCD, Bipolar, Schizophrenia        [  ]2 pts         [  ]2 pts                      Depression                                               [  ]1 pt           [  ]1 pt           SCORING TOTAL   (add numbers and type here)              ( 0)                                     A score of 3 or lower indicated LOW risk for future opioid abuse  A score of 4 to 7 indicated moderate risk for future opioid abuse  A score of 8 or higher indicates a high risk for opioid abuse

## 2022-08-17 NOTE — H&P PST ADULT - NSICDXPASTSURGICALHX_GEN_ALL_CORE_FT
PAST SURGICAL HISTORY:  H/O cataract extraction left 2020    H/O elbow surgery right 1987    H/O right nephrectomy 2022    History of left knee replacement 2015

## 2022-08-17 NOTE — REVIEW OF SYSTEMS
[Joint Pain] : joint pain [Joint Stiffness] : joint stiffness [Negative] : Gastrointestinal [Chest Pain] : no chest pain [Palpitations] : no palpitations [Dysuria] : no dysuria [Incontinence] : no incontinence [Hematuria] : no hematuria [Frequency] : no frequency [Back Pain] : no back pain [Joint Swelling] : no joint swelling [Itching] : no itching [Skin Rash] : no skin rash [Headache] : no headache [Dizziness] : no dizziness [Memory Loss] : no memory loss [FreeTextEntry9] : arm, shoulder, knee, neck

## 2022-08-17 NOTE — HISTORY OF PRESENT ILLNESS
[Atrial Fibrillation] : atrial fibrillation [Sleep Apnea] : sleep apnea [No Adverse Anesthesia Reaction] : no adverse anesthesia reaction in self or family member [Diabetes] : diabetes [Asthma] : asthma [COPD] : COPD [Aortic Stenosis] : no aortic stenosis [Coronary Artery Disease] : no coronary artery disease [Recent Myocardial Infarction] : no recent myocardial infarction [Implantable Device/Pacemaker] : no implantable device/pacemaker [Smoker] : not a smoker [Chronic Anticoagulation] : no chronic anticoagulation [Chronic Kidney Disease] : no chronic kidney disease [FreeTextEntry1] : Hepatocellular Lesion Biopsy [FreeTextEntry2] : 08/24/2022 [FreeTextEntry3] : Dr. Miranda [FreeTextEntry4] : Pt is a 66 yo M presents for medical clearance of a biopsy for hepatocellular lesion. PMHx sig for anemia, HTN, HLD, Hypothyroidism, polyarthralgia, a fib, DM. Pts labs were ordered and placed. The patient was getting a kidney CT and saw 3 small lesions on the liver and wanted to biopsy.

## 2022-08-17 NOTE — ASSESSMENT
[No Further Testing Recommended] : no further testing recommended [FreeTextEntry4] : Pt is a 66 yo M presents for medical clearance of a biopsy for hepatocellular lesion. PMHx sig for anemia, HTN, HLD, Hypothyroidism, polyarthralgia, a fib, DM. Pts labs were ordered and placed. The patient was getting a kidney CT and saw 3 small lesions on the liver and wanted to biopsy.

## 2022-08-17 NOTE — H&P PST ADULT - ENDOCRINE
Present prior to admission per patient, but not revealed to staff until following initial surgery for ileostomy closure, now s/p left back abscess I&D on 8/5/2020    - Cont Vanc given surrounding erythema/induration. No further drainage necessary given lack of purulence or fluctuance today  - Follow cultures  - Local wound care postop    negative

## 2022-08-17 NOTE — H&P PST ADULT - NSICDXPASTMEDICALHX_GEN_ALL_CORE_FT
PAST MEDICAL HISTORY:  Afib on Xarelto    Asthma     Degenerative joint disease     Diabetes mellitus     HTN (hypertension)     Obstructive sleep apnea use BIPAP

## 2022-08-17 NOTE — PLAN
[FreeTextEntry1] : Pt is a 66 yo M presents for medical clearance of a biopsy for hepatocellular lesion. PMHx sig for anemia, HTN, HLD, Hypothyroidism, polyarthralgia, a fib, DM. Pts labs were ordered and placed. The patient was getting a kidney CT and saw 3 small lesions on the liver and Biopsy indicated\par \par Patient is clear for Procedure\par \par \par

## 2022-08-17 NOTE — H&P PST ADULT - PROBLEM SELECTOR PLAN 7
Liver Biopsy with Anesthesia by Dr. Santos on 8/24/22. No Covid vaccine , covid test is on 8/21/22. Medical and cardiac clearance pending

## 2022-08-18 LAB
ALBUMIN SERPL ELPH-MCNC: 4 G/DL
ALP BLD-CCNC: 118 U/L
ALT SERPL-CCNC: 22 U/L
ANION GAP SERPL CALC-SCNC: 13 MMOL/L
APTT 2H P 1:4 NP PPP: 37.6 SEC
APTT 2H P INC PPP: 37.5 SEC
APTT IMM NP/PRE NP PPP: 36.4 SEC
APTT INV RATIO PPP: 39.5 SEC
AST SERPL-CCNC: 23 U/L
BASOPHILS # BLD AUTO: 0.08 K/UL
BASOPHILS NFR BLD AUTO: 0.8 %
BILIRUB SERPL-MCNC: 0.2 MG/DL
BUN SERPL-MCNC: 18 MG/DL
CALCIUM SERPL-MCNC: 9.7 MG/DL
CHLORIDE SERPL-SCNC: 102 MMOL/L
CO2 SERPL-SCNC: 23 MMOL/L
CREAT SERPL-MCNC: 1.61 MG/DL
EGFR: 47 ML/MIN/1.73M2
EOSINOPHIL # BLD AUTO: 0.52 K/UL
EOSINOPHIL NFR BLD AUTO: 5.4 %
ESTIMATED AVERAGE GLUCOSE: 143 MG/DL
GLUCOSE SERPL-MCNC: 123 MG/DL
HBA1C MFR BLD HPLC: 6.6 %
HCT VFR BLD CALC: 40.1 %
HGB BLD-MCNC: 11.8 G/DL
IMM GRANULOCYTES NFR BLD AUTO: 0.4 %
LYMPHOCYTES # BLD AUTO: 2.3 K/UL
LYMPHOCYTES NFR BLD AUTO: 23.9 %
MAN DIFF?: NORMAL
MCHC RBC-ENTMCNC: 22.9 PG
MCHC RBC-ENTMCNC: 29.4 GM/DL
MCV RBC AUTO: 77.9 FL
MONOCYTES # BLD AUTO: 0.62 K/UL
MONOCYTES NFR BLD AUTO: 6.4 %
NEUTROPHILS # BLD AUTO: 6.07 K/UL
NEUTROPHILS NFR BLD AUTO: 63.1 %
NPP NORMAL POOLED PLASMA: 33.7 SECS
PLATELET # BLD AUTO: 360 K/UL
POTASSIUM SERPL-SCNC: 4.7 MMOL/L
PROT SERPL-MCNC: 7.8 G/DL
RBC # BLD: 5.15 M/UL
RBC # FLD: 19.7 %
SODIUM SERPL-SCNC: 138 MMOL/L
WBC # FLD AUTO: 9.63 K/UL

## 2022-08-24 ENCOUNTER — APPOINTMENT (OUTPATIENT)
Dept: FAMILY MEDICINE | Facility: CLINIC | Age: 67
End: 2022-08-24

## 2022-08-24 ENCOUNTER — RESULT REVIEW (OUTPATIENT)
Age: 67
End: 2022-08-24

## 2022-08-24 ENCOUNTER — OUTPATIENT (OUTPATIENT)
Dept: OUTPATIENT SERVICES | Facility: HOSPITAL | Age: 67
LOS: 1 days | End: 2022-08-24
Payer: MEDICARE

## 2022-08-24 VITALS
WEIGHT: 315 LBS | DIASTOLIC BLOOD PRESSURE: 98 MMHG | RESPIRATION RATE: 20 BRPM | OXYGEN SATURATION: 96 % | HEART RATE: 70 BPM | HEIGHT: 69 IN | TEMPERATURE: 98 F | SYSTOLIC BLOOD PRESSURE: 177 MMHG

## 2022-08-24 DIAGNOSIS — Z98.49 CATARACT EXTRACTION STATUS, UNSPECIFIED EYE: Chronic | ICD-10-CM

## 2022-08-24 DIAGNOSIS — Z90.5 ACQUIRED ABSENCE OF KIDNEY: Chronic | ICD-10-CM

## 2022-08-24 DIAGNOSIS — Z98.890 OTHER SPECIFIED POSTPROCEDURAL STATES: Chronic | ICD-10-CM

## 2022-08-24 DIAGNOSIS — C22.0 LIVER CELL CARCINOMA: ICD-10-CM

## 2022-08-24 DIAGNOSIS — Z96.652 PRESENCE OF LEFT ARTIFICIAL KNEE JOINT: Chronic | ICD-10-CM

## 2022-08-24 PROCEDURE — 77012 CT SCAN FOR NEEDLE BIOPSY: CPT

## 2022-08-24 PROCEDURE — 76942 ECHO GUIDE FOR BIOPSY: CPT

## 2022-08-24 PROCEDURE — C1729: CPT

## 2022-08-24 PROCEDURE — 47000 NEEDLE BIOPSY OF LIVER PERQ: CPT

## 2022-08-24 PROCEDURE — 88307 TISSUE EXAM BY PATHOLOGIST: CPT

## 2022-08-24 PROCEDURE — 88307 TISSUE EXAM BY PATHOLOGIST: CPT | Mod: 26

## 2022-08-24 PROCEDURE — 76942 ECHO GUIDE FOR BIOPSY: CPT | Mod: 26

## 2022-08-24 PROCEDURE — 82962 GLUCOSE BLOOD TEST: CPT

## 2022-08-24 NOTE — ASU DISCHARGE PLAN (ADULT/PEDIATRIC) - ASU DC SPECIAL INSTRUCTIONSFT
Biopsy Discharge    Discharge Instructions  - You have had a biopsy of your liver  - You may shower in 24 hours. No soaking or swimming until the site is completely healed.  - Keep the area covered and dry for the next 24 hours.  - Do not perform any heavy lifting for the next few days or until the site is healed.  - You may resume your normal diet.  - You may resume your normal medications however you should wait 48 hours before restarting aspirin, plavix, or blood thinners.  - It is normal to experience some pain over the site for the next few days. You may take apply ice to the area (20 minutes on, 20 minutes off) and take Tylenol for that pain. Do not take more frequently than every 6 hours and do not exceed more than 3000mg of Tylenol in a 24 hour period.    - You were given conscious sedation which may make you drowsy, therefore you need someone to stay with you until the morning following the procedure.  - Do not drive, engage in heavy lifting or strenuous activity, or drink any alcoholic beverages for the next 24 hours.   - You may resume normal activity in 24 hours.    Notify your primary physician and/or Interventional Radiology IMMEDIATELY if you experience any of the following       - Fever of 101F or 38C       - Chills or Rigors/ Shakes       - Swelling and/or Redness in the area around the biopsy site       - Worsening Pain       - Blood soaked bandages or worsening bleeding       - Lightheadedness and/or dizziness upon standing       - Chest Pain/ Tightness       - Shortness of Breath       - Difficulty walking    If you have a problem that you believe requires IMMEDIATE attention, please go to your NEAREST Emergency Room. If you believe your problem can safely wait until you speak to a physician, please call Interventional Radiology for any concerns.    During Normal Weekday Business Hours- You can contact the Interventional Radiology department during normal business hours via telephone.  During Evenings and Weekends- If you need to contact Interventional Radiology during off hours, do so by calling the hospital and requesting to be connected to the Interventional Radiologist on call.

## 2022-08-24 NOTE — ASU DISCHARGE PLAN (ADULT/PEDIATRIC) - NS MD DC FALL RISK RISK
For information on Fall & Injury Prevention, visit: [email or web address] OR  [email or web address] OR  https://www.cdc.gov/steadi/patient.html

## 2022-08-24 NOTE — PROGRESS NOTE ADULT - SUBJECTIVE AND OBJECTIVE BOX
IR Post Procedure Note    Diagnosis: Left Liver Lesion    Procedure: Left Liver Lesion Biopsy    : Casa Santos MD    Contrast: None    Anesthesia: 1% Lidocaine Subcutaneous, Sedation administered by Anesthesiology/ Moderate Sedation    Estimated Blood Loss: Less than 10cc    Specimens: Specimens identified, labeled, confirmed and sent to lab. Adequacy of sample confirmed by Cytopathology Team.    Complications: No Immediate Complications    Anticoagulation: Resume in 48 Hours    Findings & Plan: 3 18g core bx of Seg IV rim enhancing liver lesion obtained w Bard biopsy needle under US guidance. No hematoma or complications on post procedure US.      Please call Interventional Radiology with any questions, concerns, or issues.

## 2022-08-31 LAB — SURGICAL PATHOLOGY STUDY: SIGNIFICANT CHANGE UP

## 2022-09-01 ENCOUNTER — NON-APPOINTMENT (OUTPATIENT)
Age: 67
End: 2022-09-01

## 2022-09-14 ENCOUNTER — APPOINTMENT (OUTPATIENT)
Dept: ENDOCRINOLOGY | Facility: CLINIC | Age: 67
End: 2022-09-14

## 2022-09-28 ENCOUNTER — APPOINTMENT (OUTPATIENT)
Dept: FAMILY MEDICINE | Facility: CLINIC | Age: 67
End: 2022-09-28

## 2022-10-26 ENCOUNTER — RESULT REVIEW (OUTPATIENT)
Age: 67
End: 2022-10-26

## 2022-10-26 ENCOUNTER — APPOINTMENT (OUTPATIENT)
Dept: HEMATOLOGY ONCOLOGY | Facility: CLINIC | Age: 67
End: 2022-10-26
Payer: SELF-PAY

## 2022-10-26 ENCOUNTER — OUTPATIENT (OUTPATIENT)
Dept: OUTPATIENT SERVICES | Facility: HOSPITAL | Age: 67
LOS: 1 days | Discharge: HOME | End: 2022-10-26

## 2022-10-26 VITALS
WEIGHT: 315 LBS | DIASTOLIC BLOOD PRESSURE: 100 MMHG | OXYGEN SATURATION: 98 % | SYSTOLIC BLOOD PRESSURE: 180 MMHG | BODY MASS INDEX: 46.65 KG/M2 | HEIGHT: 69 IN | HEART RATE: 60 BPM

## 2022-10-26 DIAGNOSIS — Z90.5 ACQUIRED ABSENCE OF KIDNEY: Chronic | ICD-10-CM

## 2022-10-26 DIAGNOSIS — Z96.652 PRESENCE OF LEFT ARTIFICIAL KNEE JOINT: Chronic | ICD-10-CM

## 2022-10-26 DIAGNOSIS — C22.0 LIVER CELL CARCINOMA: ICD-10-CM

## 2022-10-26 DIAGNOSIS — Z98.890 OTHER SPECIFIED POSTPROCEDURAL STATES: Chronic | ICD-10-CM

## 2022-10-26 DIAGNOSIS — Z98.49 CATARACT EXTRACTION STATUS, UNSPECIFIED EYE: Chronic | ICD-10-CM

## 2022-10-26 PROBLEM — E11.9 TYPE 2 DIABETES MELLITUS WITHOUT COMPLICATIONS: Chronic | Status: ACTIVE | Noted: 2022-08-17

## 2022-10-26 PROBLEM — I48.91 UNSPECIFIED ATRIAL FIBRILLATION: Chronic | Status: ACTIVE | Noted: 2018-12-18

## 2022-10-26 PROBLEM — J45.909 UNSPECIFIED ASTHMA, UNCOMPLICATED: Chronic | Status: ACTIVE | Noted: 2022-08-17

## 2022-10-26 PROBLEM — G47.33 OBSTRUCTIVE SLEEP APNEA (ADULT) (PEDIATRIC): Chronic | Status: ACTIVE | Noted: 2022-08-17

## 2022-10-26 LAB
BASOPHILS # BLD AUTO: 0.1 K/UL — SIGNIFICANT CHANGE UP (ref 0–0.2)
BASOPHILS NFR BLD AUTO: 0.9 % — SIGNIFICANT CHANGE UP (ref 0–2)
EOSINOPHIL # BLD AUTO: 0.4 K/UL — SIGNIFICANT CHANGE UP (ref 0–0.5)
EOSINOPHIL NFR BLD AUTO: 3.9 % — SIGNIFICANT CHANGE UP (ref 0–6)
HCT VFR BLD CALC: 45.2 % — SIGNIFICANT CHANGE UP (ref 39–50)
HGB BLD-MCNC: 13.5 G/DL — SIGNIFICANT CHANGE UP (ref 13–17)
LYMPHOCYTES # BLD AUTO: 1.9 K/UL — SIGNIFICANT CHANGE UP (ref 1–3.3)
LYMPHOCYTES # BLD AUTO: 17.1 % — SIGNIFICANT CHANGE UP (ref 13–44)
MCHC RBC-ENTMCNC: 23.9 PG — LOW (ref 27–34)
MCHC RBC-ENTMCNC: 29.9 G/DL — LOW (ref 32–36)
MCV RBC AUTO: 79.9 FL — LOW (ref 80–100)
MONOCYTES # BLD AUTO: 0.4 K/UL — SIGNIFICANT CHANGE UP (ref 0–0.9)
MONOCYTES NFR BLD AUTO: 3.9 % — SIGNIFICANT CHANGE UP (ref 2–14)
NEUTROPHILS # BLD AUTO: 8.4 K/UL — HIGH (ref 1.8–7.4)
NEUTROPHILS NFR BLD AUTO: 74.2 % — SIGNIFICANT CHANGE UP (ref 43–77)
PLATELET # BLD AUTO: 355 K/UL — SIGNIFICANT CHANGE UP (ref 150–400)
RBC # BLD: 5.66 M/UL — SIGNIFICANT CHANGE UP (ref 4.2–5.8)
RBC # FLD: 17.7 % — HIGH (ref 10.3–14.5)
WBC # BLD: 11.4 K/UL — HIGH (ref 3.8–10.5)
WBC # FLD AUTO: 11.4 K/UL — HIGH (ref 3.8–10.5)

## 2022-10-26 PROCEDURE — 99214 OFFICE O/P EST MOD 30 MIN: CPT

## 2022-10-26 NOTE — HISTORY OF PRESENT ILLNESS
[Disease: _____________________] : Disease: [unfilled] [de-identified] : 67 year old M with h/o anemia, Afib, gout, DM, HTN, HLD, hypothyroid, obesity, JASON and renal cancer s/p right nephrectomy who was diagnosed with HCC in June 2022. \par \par He was noted to have a suspicious kidney lesion on imaging in December 2021 and was referred for an abdominal MRI in January 2022, with incidental note of a multiple small indeterminate hepatic lesions which are indeterminate but for which metastatic renal cell carcinoma cannot be excluded. Subsequent MRI abdomen in April 2022 revealed an endophytic right lower pole renal lesion suspicious for malignancy, evidence of liver metastasis and multiple prominent subcentimeter periportal, retroperitoneal and retrocaval lymph nodes which are nonspecific.\par \par On 4/20/22 he underwent a right nephrectomy with Dr Jhonathan Shetty.  Path showed RCC , clear cell, nuclear grade II, pT1a, Stage I. No indication for adjuvant pembro. \par \par PET/CT performed on 5/13/22 showed no definitive hypermetabolic evidence of malignant disease. There are prominent retroperitoneal and iliac chain lymph nodes without corresponding abnormal activity. No discrete hepatic focus to correspond with liver lesions seen on MRI (continued MRI surveillance is recommended), right anterolateral subcutaneous activity possibly related to abdominal surgery.\par \par On 6/1/22 he underwent a CT-guided biopsy of a representative mass in the inferior right hepatic lobe. Pathology demonstrated well differentiated hepatocellular carcinoma. Immunohistochemical stains show tumor cells are positive for arginase, focal positive for HSA, CK7 and CA IX, negative for CK20, CDX2, TTF1, Villin, [place]-8, vimentin and AFP. The morphology and immuno profile support the diagnosis of hepatocellular carcinoma.\par \par Pt works as a storage . Feels well. Denies HA. CP, SOB, abd pain, constipation, diarrhea, melena, hematuria, dysuria.  [de-identified] : moderately differentiated HCC [de-identified] : 7/28/22: Angel is here for follow up for HCC. Pt's case was discussed at [facility]. Some of the lesions suggestive of metastasis rather than HCC. Pending  bx then re discussion at TB to assess for transplant eligibility. Pt will also follow up with IR for LDT. Denies HA. CP, SOB, abd pain, constipation, diarrhea, melena, hematuria, dysuria. \par \par \par 10/25/22: : Angel is here for follow up for HCC.Bx of one of the liver lesions was neg or malignancy. mix bibrous tissue with mixed inflammation and liver cirrhosis. No evidence of metastatic RCC. Will obtain interval MRI. Pt to follow up with IR for LDT. Pt will also follow up with IR for LDT. Denies HA. CP, SOB, abd pain, constipation, diarrhea, melena, hematuria, dysuria. \par

## 2022-10-26 NOTE — PHYSICAL EXAM
[Restricted in physically strenuous activity but ambulatory and able to carry out work of a light or sedentary nature] : Status 1- Restricted in physically strenuous activity but ambulatory and able to carry out work of a light or sedentary nature, e.g., light house work, office work [Obese] : obese [Normal] : affect appropriate [de-identified] : well healed incisions

## 2022-10-26 NOTE — ASSESSMENT
[FreeTextEntry1] : 67 year old M with h/o anemia, Afib, gout, DM, HTN, HLD, hypothyroid, obesity, JASON and renal cancer s/p right nephrectomy who was diagnosed with HCC in June 2022. \par \par # HCC\par -MRI abd showed 0.7 and 1.2 cm Lt hepatic lobe lesion and 1.1 cm right hepatic lobe lesion\par -no evidence of metastatic disease on PET\par -pt seen by hepatology >> the case was discussed at TB. pending bx of liver lesion as one of the lesion appear to be different from HCC. >> bx of the lesion on 8/24 showed benign bile duct proliferation in background of fibrous tissue withmixed inflammation and liver cirrhosis\par -will obtain interval MRI of liver\par -Pt will also follow up with IR for LDT \par -will defer systemic therapy for now as pt is eligible for LDT and being evaluated for possible liver transplant\par -follow up in 3 months\par \par \par # RCC\par -s/p right nephrectomy\par -Path showed RCC , clear cell, nuclear grade II, pT1a, Stage I. \par -No indication for adjuvant pembro. \par -will do surveillance\par -CT or MRI i ab/pn and CXR 6 months after sx then annually\par \par \par RTC with in 3 months

## 2022-11-01 LAB
AFP-TM SERPL-MCNC: 19.9 NG/ML
ALBUMIN SERPL ELPH-MCNC: 4 G/DL
ALP BLD-CCNC: 119 U/L
ALT SERPL-CCNC: 19 U/L
ANION GAP SERPL CALC-SCNC: 14 MMOL/L
AST SERPL-CCNC: 17 U/L
BILIRUB SERPL-MCNC: 0.2 MG/DL
BUN SERPL-MCNC: 24 MG/DL
CALCIUM SERPL-MCNC: 10.2 MG/DL
CHLORIDE SERPL-SCNC: 102 MMOL/L
CO2 SERPL-SCNC: 24 MMOL/L
CREAT SERPL-MCNC: 1.61 MG/DL
EGFR: 47 ML/MIN/1.73M2
GLUCOSE SERPL-MCNC: 210 MG/DL
MAGNESIUM SERPL-MCNC: 2.2 MG/DL
POTASSIUM SERPL-SCNC: 4.9 MMOL/L
PROT SERPL-MCNC: 7.7 G/DL
SODIUM SERPL-SCNC: 140 MMOL/L

## 2022-11-23 ENCOUNTER — APPOINTMENT (OUTPATIENT)
Dept: RHEUMATOLOGY | Facility: CLINIC | Age: 67
End: 2022-11-23

## 2023-01-04 ENCOUNTER — APPOINTMENT (OUTPATIENT)
Dept: RHEUMATOLOGY | Facility: CLINIC | Age: 68
End: 2023-01-04

## 2023-01-12 ENCOUNTER — RESULT REVIEW (OUTPATIENT)
Age: 68
End: 2023-01-12

## 2023-01-12 ENCOUNTER — OUTPATIENT (OUTPATIENT)
Dept: OUTPATIENT SERVICES | Facility: HOSPITAL | Age: 68
LOS: 1 days | Discharge: HOME | End: 2023-01-12

## 2023-01-12 ENCOUNTER — APPOINTMENT (OUTPATIENT)
Dept: HEMATOLOGY ONCOLOGY | Facility: CLINIC | Age: 68
End: 2023-01-12
Payer: SELF-PAY

## 2023-01-12 VITALS
SYSTOLIC BLOOD PRESSURE: 179 MMHG | OXYGEN SATURATION: 97 % | TEMPERATURE: 98.1 F | WEIGHT: 315 LBS | HEART RATE: 74 BPM | BODY MASS INDEX: 46.65 KG/M2 | DIASTOLIC BLOOD PRESSURE: 94 MMHG | HEIGHT: 69 IN

## 2023-01-12 DIAGNOSIS — Z90.5 ACQUIRED ABSENCE OF KIDNEY: Chronic | ICD-10-CM

## 2023-01-12 DIAGNOSIS — Z96.652 PRESENCE OF LEFT ARTIFICIAL KNEE JOINT: Chronic | ICD-10-CM

## 2023-01-12 DIAGNOSIS — C22.0 LIVER CELL CARCINOMA: ICD-10-CM

## 2023-01-12 DIAGNOSIS — Z98.890 OTHER SPECIFIED POSTPROCEDURAL STATES: Chronic | ICD-10-CM

## 2023-01-12 DIAGNOSIS — Z98.49 CATARACT EXTRACTION STATUS, UNSPECIFIED EYE: Chronic | ICD-10-CM

## 2023-01-12 LAB
BASOPHILS # BLD AUTO: 0.1 K/UL — SIGNIFICANT CHANGE UP (ref 0–0.2)
BASOPHILS NFR BLD AUTO: 1.4 % — SIGNIFICANT CHANGE UP (ref 0–2)
EOSINOPHIL # BLD AUTO: 0.4 K/UL — SIGNIFICANT CHANGE UP (ref 0–0.5)
EOSINOPHIL NFR BLD AUTO: 4 % — SIGNIFICANT CHANGE UP (ref 0–6)
HCT VFR BLD CALC: 40.8 % — SIGNIFICANT CHANGE UP (ref 39–50)
HGB BLD-MCNC: 12.8 G/DL — LOW (ref 13–17)
LYMPHOCYTES # BLD AUTO: 1.9 K/UL — SIGNIFICANT CHANGE UP (ref 1–3.3)
LYMPHOCYTES # BLD AUTO: 18.9 % — SIGNIFICANT CHANGE UP (ref 13–44)
MCHC RBC-ENTMCNC: 26 PG — LOW (ref 27–34)
MCHC RBC-ENTMCNC: 31.4 G/DL — LOW (ref 32–36)
MCV RBC AUTO: 82.9 FL — SIGNIFICANT CHANGE UP (ref 80–100)
MONOCYTES # BLD AUTO: 0.4 K/UL — SIGNIFICANT CHANGE UP (ref 0–0.9)
MONOCYTES NFR BLD AUTO: 4.1 % — SIGNIFICANT CHANGE UP (ref 2–14)
NEUTROPHILS # BLD AUTO: 7.1 K/UL — SIGNIFICANT CHANGE UP (ref 1.8–7.4)
NEUTROPHILS NFR BLD AUTO: 71.5 % — SIGNIFICANT CHANGE UP (ref 43–77)
PLATELET # BLD AUTO: 278 K/UL — SIGNIFICANT CHANGE UP (ref 150–400)
RBC # BLD: 4.92 M/UL — SIGNIFICANT CHANGE UP (ref 4.2–5.8)
RBC # FLD: 18 % — HIGH (ref 10.3–14.5)
WBC # BLD: 9.9 K/UL — SIGNIFICANT CHANGE UP (ref 3.8–10.5)
WBC # FLD AUTO: 9.9 K/UL — SIGNIFICANT CHANGE UP (ref 3.8–10.5)

## 2023-01-12 PROCEDURE — 99215 OFFICE O/P EST HI 40 MIN: CPT

## 2023-01-13 ENCOUNTER — NON-APPOINTMENT (OUTPATIENT)
Age: 68
End: 2023-01-13

## 2023-01-17 LAB
AFP-TM SERPL-MCNC: 50 NG/ML
ALBUMIN SERPL ELPH-MCNC: 4.1 G/DL
ALP BLD-CCNC: 120 U/L
ALT SERPL-CCNC: 25 U/L
ANION GAP SERPL CALC-SCNC: 12 MMOL/L
AST SERPL-CCNC: 26 U/L
BILIRUB SERPL-MCNC: 0.3 MG/DL
BUN SERPL-MCNC: 19 MG/DL
CALCIUM SERPL-MCNC: 9.6 MG/DL
CHLORIDE SERPL-SCNC: 105 MMOL/L
CO2 SERPL-SCNC: 24 MMOL/L
CREAT SERPL-MCNC: 1.35 MG/DL
EGFR: 58 ML/MIN/1.73M2
GLUCOSE SERPL-MCNC: 162 MG/DL
MAGNESIUM SERPL-MCNC: 2.1 MG/DL
POTASSIUM SERPL-SCNC: 4.8 MMOL/L
PROT SERPL-MCNC: 7.8 G/DL
SODIUM SERPL-SCNC: 141 MMOL/L

## 2023-01-18 ENCOUNTER — APPOINTMENT (OUTPATIENT)
Dept: FAMILY MEDICINE | Facility: CLINIC | Age: 68
End: 2023-01-18

## 2023-01-18 ENCOUNTER — NON-APPOINTMENT (OUTPATIENT)
Age: 68
End: 2023-01-18

## 2023-01-18 DIAGNOSIS — L02.611 CUTANEOUS ABSCESS OF RIGHT FOOT: ICD-10-CM

## 2023-01-18 DIAGNOSIS — K76.9 LIVER DISEASE, UNSPECIFIED: ICD-10-CM

## 2023-01-18 DIAGNOSIS — M65.331 TRIGGER FINGER, RIGHT MIDDLE FINGER: ICD-10-CM

## 2023-01-18 DIAGNOSIS — M21.949 UNSPECIFIED ACQUIRED DEFORMITY OF HAND, UNSPECIFIED HAND: ICD-10-CM

## 2023-01-18 DIAGNOSIS — E11.10 TYPE 2 DIABETES MELLITUS WITH KETOACIDOSIS WITHOUT COMA: ICD-10-CM

## 2023-01-18 DIAGNOSIS — Z85.528 PERSONAL HISTORY OF OTHER MALIGNANT NEOPLASM OF KIDNEY: ICD-10-CM

## 2023-01-18 DIAGNOSIS — M25.552 PAIN IN RIGHT HIP: ICD-10-CM

## 2023-01-18 DIAGNOSIS — E11.610 TYPE 2 DIABETES MELLITUS WITH DIABETIC NEUROPATHIC ARTHROPATHY: ICD-10-CM

## 2023-01-18 DIAGNOSIS — R63.8 OTHER SYMPTOMS AND SIGNS CONCERNING FOOD AND FLUID INTAKE: ICD-10-CM

## 2023-01-18 DIAGNOSIS — N39.0 URINARY TRACT INFECTION, SITE NOT SPECIFIED: ICD-10-CM

## 2023-01-18 DIAGNOSIS — M25.551 PAIN IN RIGHT HIP: ICD-10-CM

## 2023-01-18 DIAGNOSIS — N20.0 CALCULUS OF KIDNEY: ICD-10-CM

## 2023-01-18 DIAGNOSIS — E13.638 OTHER SPECIFIED DIABETES MELLITUS WITH OTHER ORAL COMPLICATIONS: ICD-10-CM

## 2023-01-18 DIAGNOSIS — M25.512 PAIN IN RIGHT SHOULDER: ICD-10-CM

## 2023-01-18 DIAGNOSIS — M15.1 HEBERDEN'S NODES (WITH ARTHROPATHY): ICD-10-CM

## 2023-01-18 DIAGNOSIS — M25.511 PAIN IN RIGHT SHOULDER: ICD-10-CM

## 2023-01-18 DIAGNOSIS — Z86.2 PERSONAL HISTORY OF DISEASES OF THE BLOOD AND BLOOD-FORMING ORGANS AND CERTAIN DISORDERS INVOLVING THE IMMUNE MECHANISM: ICD-10-CM

## 2023-01-18 DIAGNOSIS — R79.89 OTHER SPECIFIED ABNORMAL FINDINGS OF BLOOD CHEMISTRY: ICD-10-CM

## 2023-01-18 DIAGNOSIS — Z20.822 CONTACT WITH AND (SUSPECTED) EXPOSURE TO COVID-19: ICD-10-CM

## 2023-01-18 NOTE — ASSESSMENT
[FreeTextEntry1] : 67 year old M with h/o anemia, Afib, gout, DM, HTN, HLD, hypothyroid, obesity, JASON and renal cancer s/p right nephrectomy who was diagnosed with HCC in June 2022. \par \par # HCC\par -MRI abd showed 0.7 and 1.2 cm Lt hepatic lobe lesion and 1.1 cm right hepatic lobe lesion\par -no evidence of metastatic disease on PET\par -pt seen by hepatology >> the case was discussed at TB. pending bx of liver lesion as one of the lesion appear to be different from HCC. >> bx of the lesion on 8/24 showed benign bile duct proliferation in background of fibrous tissue with mixed inflammation and liver cirrhosis\par - The patient still has signfiicant insurance issues that have hindered further wor up including MRI and IR intervention. I have made my  aware so that hopefully patient can move forward with imaging and treatment. \par -will obtain interval MRI of liver\par -Pt will also follow up with IR for LDT \par -will defer systemic therapy for now as pt is eligible for LDT and being evaluated for possible liver transplant\par -follow up in 3 months\par \par \par # RCC\par -s/p right nephrectomy\par -Path showed RCC , clear cell, nuclear grade II, pT1a, Stage I. \par -No indication for adjuvant pembro. \par -will do surveillance\par -CT or MRI i ab/pn and CXR 6 months after sx then annually\par \par \par RTC with in 3 months

## 2023-01-18 NOTE — PHYSICAL EXAM
[Restricted in physically strenuous activity but ambulatory and able to carry out work of a light or sedentary nature] : Status 1- Restricted in physically strenuous activity but ambulatory and able to carry out work of a light or sedentary nature, e.g., light house work, office work [Obese] : obese [Normal] : affect appropriate [de-identified] : well healed incisions

## 2023-01-18 NOTE — HISTORY OF PRESENT ILLNESS
[Disease: _____________________] : Disease: [unfilled] [de-identified] : 67 year old M with h/o anemia, Afib, gout, DM, HTN, HLD, hypothyroid, obesity, JASON and renal cancer s/p right nephrectomy who was diagnosed with HCC in June 2022. \par \par He was noted to have a suspicious kidney lesion on imaging in December 2021 and was referred for an abdominal MRI in January 2022, with incidental note of a multiple small indeterminate hepatic lesions which are indeterminate but for which metastatic renal cell carcinoma cannot be excluded. Subsequent MRI abdomen in April 2022 revealed an endophytic right lower pole renal lesion suspicious for malignancy, evidence of liver metastasis and multiple prominent subcentimeter periportal, retroperitoneal and retrocaval lymph nodes which are nonspecific.\par \par On 4/20/22 he underwent a right nephrectomy with Dr Jhonathan Shetty.  Path showed RCC , clear cell, nuclear grade II, pT1a, Stage I. No indication for adjuvant pembro. \par \par PET/CT performed on 5/13/22 showed no definitive hypermetabolic evidence of malignant disease. There are prominent retroperitoneal and iliac chain lymph nodes without corresponding abnormal activity. No discrete hepatic focus to correspond with liver lesions seen on MRI (continued MRI surveillance is recommended), right anterolateral subcutaneous activity possibly related to abdominal surgery.\par \par On 6/1/22 he underwent a CT-guided biopsy of a representative mass in the inferior right hepatic lobe. Pathology demonstrated well differentiated hepatocellular carcinoma. Immunohistochemical stains show tumor cells are positive for arginase, focal positive for HSA, CK7 and CA IX, negative for CK20, CDX2, TTF1, Villin, [place]-8, vimentin and AFP. The morphology and immuno profile support the diagnosis of hepatocellular carcinoma.\par \par Pt works as a storage . Feels well. Denies HA. CP, SOB, abd pain, constipation, diarrhea, melena, hematuria, dysuria.  [de-identified] : moderately differentiated HCC [de-identified] : 7/28/22: Angel is here for follow up for HCC. Pt's case was discussed at TB. Some of the lesions suggestive of metastasis rather than HCC. Pending  bx then re discussion at TB to assess for transplant eligibility. Pt will also follow up with IR for LDT. Denies HA. CP, SOB, abd pain, constipation, diarrhea, melena, hematuria, dysuria. \par \par \par 10/25/22: : Angel is here for follow up for HCC.Bx of one of the liver lesions was neg or malignancy. mix bibrous tissue with mixed inflammation and liver cirrhosis. No evidence of metastatic RCC. Will obtain interval MRI. Pt to follow up with IR for LDT. Pt will also follow up with IR for LDT. Denies HA. CP, SOB, abd pain, constipation, diarrhea, melena, hematuria, dysuria. \par \par \par 1/12/22: Care transferred from Dr. Miller to Dr. Stratton. Patient seen by urology for nephrolithiasis requiring antibiotics. Planning on going in for a procedure. since last visit, the patient's insurance issues have not been resolved, and he has not been approved for further treatment. \par

## 2023-01-18 NOTE — HISTORY OF PRESENT ILLNESS
[Aortic Stenosis] : no aortic stenosis [Atrial Fibrillation] : atrial fibrillation [Recent Myocardial Infarction] : no recent myocardial infarction [Implantable Device/Pacemaker] : no implantable device/pacemaker [Asthma] : asthma [COPD] : no COPD [Sleep Apnea] : sleep apnea [Chronic Anticoagulation] : chronic anticoagulation [FreeTextEntry2] : 1/24/23

## 2023-01-19 RX ORDER — RIVAROXABAN 20 MG/1
20 TABLET, FILM COATED ORAL
Qty: 90 | Refills: 1 | Status: ACTIVE | COMMUNITY
Start: 2019-04-01 | End: 1900-01-01

## 2023-01-19 RX ORDER — ASPIRIN/CALCIUM/MAG/ALUMINUM 500 MG
500 TABLET ORAL
Qty: 180 | Refills: 0 | Status: ACTIVE | COMMUNITY
Start: 1900-01-01 | End: 1900-01-01

## 2023-01-19 RX ORDER — FLASH GLUCOSE SENSOR
KIT MISCELLANEOUS
Qty: 2 | Refills: 2 | Status: ACTIVE | COMMUNITY
Start: 2022-01-17 | End: 1900-01-01

## 2023-01-19 RX ORDER — FLASH GLUCOSE SCANNING READER
EACH MISCELLANEOUS
Qty: 2 | Refills: 3 | Status: ACTIVE | COMMUNITY
Start: 2022-02-15 | End: 1900-01-01

## 2023-01-25 NOTE — DISCHARGE NOTE ADULT - CARE PROVIDER_API CALL
Adrián Guy), Critical Care Medicine; HospicePalliative Medicine; Pulmonary Disease; Sleep Medicine  [address] [facility] [address]  [place], [place] [id number]  Phone: [phone]  Fax: [phone]    viktor donato  Phone: (   )    -  Fax: (   )    -    Laura Rodriguez), Cardiovascular Disease; Internal Medicine  [address]  [place] [address]  Phone: [phone]  Fax: [phone] Hydroxyzine Pregnancy And Lactation Text: This medication is not safe during pregnancy and should not be taken. It is also excreted in breast milk and breast feeding isn't recommended.

## 2023-02-01 ENCOUNTER — OUTPATIENT (OUTPATIENT)
Dept: OUTPATIENT SERVICES | Facility: HOSPITAL | Age: 68
LOS: 1 days | End: 2023-02-01

## 2023-02-01 ENCOUNTER — APPOINTMENT (OUTPATIENT)
Dept: MRI IMAGING | Facility: CLINIC | Age: 68
End: 2023-02-01
Payer: MEDICARE

## 2023-02-01 ENCOUNTER — RESULT REVIEW (OUTPATIENT)
Age: 68
End: 2023-02-01

## 2023-02-01 DIAGNOSIS — Z96.652 PRESENCE OF LEFT ARTIFICIAL KNEE JOINT: Chronic | ICD-10-CM

## 2023-02-01 DIAGNOSIS — K76.9 LIVER DISEASE, UNSPECIFIED: ICD-10-CM

## 2023-02-01 DIAGNOSIS — Z98.49 CATARACT EXTRACTION STATUS, UNSPECIFIED EYE: Chronic | ICD-10-CM

## 2023-02-01 DIAGNOSIS — Z90.5 ACQUIRED ABSENCE OF KIDNEY: Chronic | ICD-10-CM

## 2023-02-01 DIAGNOSIS — Z98.890 OTHER SPECIFIED POSTPROCEDURAL STATES: Chronic | ICD-10-CM

## 2023-02-01 PROCEDURE — 74183 MRI ABD W/O CNTR FLWD CNTR: CPT | Mod: 26

## 2023-02-05 ENCOUNTER — RX RENEWAL (OUTPATIENT)
Age: 68
End: 2023-02-05

## 2023-02-06 ENCOUNTER — RX RENEWAL (OUTPATIENT)
Age: 68
End: 2023-02-06

## 2023-03-05 ENCOUNTER — INPATIENT (INPATIENT)
Facility: HOSPITAL | Age: 68
LOS: 10 days | Discharge: HOME | DRG: 872 | End: 2023-03-16
Attending: INTERNAL MEDICINE | Admitting: HOSPITALIST
Payer: MEDICARE

## 2023-03-05 VITALS
HEART RATE: 74 BPM | SYSTOLIC BLOOD PRESSURE: 148 MMHG | HEIGHT: 69 IN | RESPIRATION RATE: 22 BRPM | TEMPERATURE: 103 F | DIASTOLIC BLOOD PRESSURE: 90 MMHG | OXYGEN SATURATION: 100 % | WEIGHT: 250 LBS

## 2023-03-05 DIAGNOSIS — Z98.49 CATARACT EXTRACTION STATUS, UNSPECIFIED EYE: Chronic | ICD-10-CM

## 2023-03-05 DIAGNOSIS — Z98.890 OTHER SPECIFIED POSTPROCEDURAL STATES: Chronic | ICD-10-CM

## 2023-03-05 DIAGNOSIS — Z90.5 ACQUIRED ABSENCE OF KIDNEY: Chronic | ICD-10-CM

## 2023-03-05 DIAGNOSIS — Z96.652 PRESENCE OF LEFT ARTIFICIAL KNEE JOINT: Chronic | ICD-10-CM

## 2023-03-05 LAB
ALBUMIN SERPL ELPH-MCNC: 3.7 G/DL — SIGNIFICANT CHANGE UP (ref 3.3–5.2)
ALP SERPL-CCNC: 103 U/L — SIGNIFICANT CHANGE UP (ref 40–120)
ALT FLD-CCNC: 25 U/L — SIGNIFICANT CHANGE UP
ANION GAP SERPL CALC-SCNC: 12 MMOL/L — SIGNIFICANT CHANGE UP (ref 5–17)
APTT BLD: 33.9 SEC — SIGNIFICANT CHANGE UP (ref 27.5–35.5)
AST SERPL-CCNC: 26 U/L — SIGNIFICANT CHANGE UP
BASOPHILS # BLD AUTO: 0.04 K/UL — SIGNIFICANT CHANGE UP (ref 0–0.2)
BASOPHILS NFR BLD AUTO: 0.2 % — SIGNIFICANT CHANGE UP (ref 0–2)
BILIRUB SERPL-MCNC: 0.6 MG/DL — SIGNIFICANT CHANGE UP (ref 0.4–2)
BUN SERPL-MCNC: 18.1 MG/DL — SIGNIFICANT CHANGE UP (ref 8–20)
CALCIUM SERPL-MCNC: 9 MG/DL — SIGNIFICANT CHANGE UP (ref 8.4–10.5)
CHLORIDE SERPL-SCNC: 101 MMOL/L — SIGNIFICANT CHANGE UP (ref 96–108)
CO2 SERPL-SCNC: 24 MMOL/L — SIGNIFICANT CHANGE UP (ref 22–29)
CREAT SERPL-MCNC: 1.57 MG/DL — HIGH (ref 0.5–1.3)
EGFR: 49 ML/MIN/1.73M2 — LOW
EOSINOPHIL # BLD AUTO: 0 K/UL — SIGNIFICANT CHANGE UP (ref 0–0.5)
EOSINOPHIL NFR BLD AUTO: 0 % — SIGNIFICANT CHANGE UP (ref 0–6)
GLUCOSE SERPL-MCNC: 152 MG/DL — HIGH (ref 70–99)
HCT VFR BLD CALC: 39.5 % — SIGNIFICANT CHANGE UP (ref 39–50)
HGB BLD-MCNC: 12.5 G/DL — LOW (ref 13–17)
IMM GRANULOCYTES NFR BLD AUTO: 0.8 % — SIGNIFICANT CHANGE UP (ref 0–0.9)
INR BLD: 1.6 RATIO — HIGH (ref 0.88–1.16)
LACTATE BLDV-MCNC: 2.2 MMOL/L — HIGH (ref 0.5–2)
LYMPHOCYTES # BLD AUTO: 0.67 K/UL — LOW (ref 1–3.3)
LYMPHOCYTES # BLD AUTO: 3.2 % — LOW (ref 13–44)
MCHC RBC-ENTMCNC: 26 PG — LOW (ref 27–34)
MCHC RBC-ENTMCNC: 31.6 GM/DL — LOW (ref 32–36)
MCV RBC AUTO: 82.3 FL — SIGNIFICANT CHANGE UP (ref 80–100)
MONOCYTES # BLD AUTO: 0.67 K/UL — SIGNIFICANT CHANGE UP (ref 0–0.9)
MONOCYTES NFR BLD AUTO: 3.2 % — SIGNIFICANT CHANGE UP (ref 2–14)
NEUTROPHILS # BLD AUTO: 19.33 K/UL — HIGH (ref 1.8–7.4)
NEUTROPHILS NFR BLD AUTO: 92.6 % — HIGH (ref 43–77)
NT-PROBNP SERPL-SCNC: 940 PG/ML — HIGH (ref 0–300)
PLATELET # BLD AUTO: 265 K/UL — SIGNIFICANT CHANGE UP (ref 150–400)
POTASSIUM SERPL-MCNC: 4.5 MMOL/L — SIGNIFICANT CHANGE UP (ref 3.5–5.3)
POTASSIUM SERPL-SCNC: 4.5 MMOL/L — SIGNIFICANT CHANGE UP (ref 3.5–5.3)
PROT SERPL-MCNC: 7.5 G/DL — SIGNIFICANT CHANGE UP (ref 6.6–8.7)
PROTHROM AB SERPL-ACNC: 18.6 SEC — HIGH (ref 10.5–13.4)
RBC # BLD: 4.8 M/UL — SIGNIFICANT CHANGE UP (ref 4.2–5.8)
RBC # FLD: 17.2 % — HIGH (ref 10.3–14.5)
SODIUM SERPL-SCNC: 136 MMOL/L — SIGNIFICANT CHANGE UP (ref 135–145)
TROPONIN T SERPL-MCNC: <0.01 NG/ML — SIGNIFICANT CHANGE UP (ref 0–0.06)
WBC # BLD: 20.88 K/UL — HIGH (ref 3.8–10.5)
WBC # FLD AUTO: 20.88 K/UL — HIGH (ref 3.8–10.5)

## 2023-03-05 PROCEDURE — 71045 X-RAY EXAM CHEST 1 VIEW: CPT | Mod: 26

## 2023-03-05 PROCEDURE — 99285 EMERGENCY DEPT VISIT HI MDM: CPT

## 2023-03-05 PROCEDURE — 93010 ELECTROCARDIOGRAM REPORT: CPT

## 2023-03-05 RX ORDER — ACETAMINOPHEN 500 MG
975 TABLET ORAL ONCE
Refills: 0 | Status: COMPLETED | OUTPATIENT
Start: 2023-03-05 | End: 2023-03-05

## 2023-03-05 RX ORDER — SODIUM CHLORIDE 9 MG/ML
2200 INJECTION INTRAMUSCULAR; INTRAVENOUS; SUBCUTANEOUS ONCE
Refills: 0 | Status: COMPLETED | OUTPATIENT
Start: 2023-03-05 | End: 2023-03-05

## 2023-03-05 RX ORDER — CEFEPIME 1 G/1
2000 INJECTION, POWDER, FOR SOLUTION INTRAMUSCULAR; INTRAVENOUS ONCE
Refills: 0 | Status: COMPLETED | OUTPATIENT
Start: 2023-03-05 | End: 2023-03-05

## 2023-03-05 RX ORDER — VANCOMYCIN HCL 1 G
1500 VIAL (EA) INTRAVENOUS ONCE
Refills: 0 | Status: COMPLETED | OUTPATIENT
Start: 2023-03-05 | End: 2023-03-05

## 2023-03-05 RX ADMIN — SODIUM CHLORIDE 2200 MILLILITER(S): 9 INJECTION INTRAMUSCULAR; INTRAVENOUS; SUBCUTANEOUS at 23:31

## 2023-03-05 RX ADMIN — SODIUM CHLORIDE 2200 MILLILITER(S): 9 INJECTION INTRAMUSCULAR; INTRAVENOUS; SUBCUTANEOUS at 23:16

## 2023-03-05 RX ADMIN — Medication 975 MILLIGRAM(S): at 22:56

## 2023-03-05 RX ADMIN — CEFEPIME 2000 MILLIGRAM(S): 1 INJECTION, POWDER, FOR SOLUTION INTRAMUSCULAR; INTRAVENOUS at 22:56

## 2023-03-05 RX ADMIN — Medication 975 MILLIGRAM(S): at 23:31

## 2023-03-05 RX ADMIN — Medication 300 MILLIGRAM(S): at 23:31

## 2023-03-05 NOTE — ED PROVIDER NOTE - OBJECTIVE STATEMENT
67y M w/ hx renal cancer s/p right nephrectomy, hepatocellular carcinoma, anemia, Afib on Xarelto, gout, DM, HTN, HLD, hypothyroid, obesity, JASON; presents for generalized weakness. Pt is poor historian. Says he called EMS because he couldn't get up. Denies hitting his head or sustaining any injuries. Contrary to triage, pt denying back pain. Noted to be febrile to 103 rectal on arrival. Says he is not currently on chemo or other cancer treatment. Reports feeling short of breath but otherwise unable to articulate specific complaints. 67y M w/ hx renal cancer s/p right nephrectomy, hepatocellular carcinoma, anemia, Afib on Xarelto, gout, DM, HTN, HLD, hypothyroid, obesity, JASON; presents for generalized weakness. Pt is poor historian. Says he called EMS because he couldn't get up. Denies hitting his head or sustaining any injuries. Contrary to triage, pt denying back pain. Noted to be febrile to 103 rectal on arrival. Says he is not currently on chemo or other cancer treatment. Reports feeling short of breath but otherwise unable to articulate specific complaints. Reportedly hypoxic to 63% on RA per EMS; satting 90% on RA on arrival in the ED.

## 2023-03-05 NOTE — ED PROVIDER NOTE - NSICDXPASTMEDICALHX_GEN_ALL_CORE_FT
PAST MEDICAL HISTORY:  Afib on Xarelto    Asthma     Degenerative joint disease     Diabetes mellitus     Hepatocellular carcinoma     HTN (hypertension)     Obstructive sleep apnea use BIPAP    Renal cell carcinoma

## 2023-03-05 NOTE — ED PROVIDER NOTE - CLINICAL SUMMARY MEDICAL DECISION MAKING FREE TEXT BOX
67y M w/ hx renal and liver cancer, Afib on Xarelto, DM, HTN, presents for generalized weakness. Noted to be febrile to 103 rectal on arrival. Also mildly hypoxic to 90% on RA. Noted to have extensive RLE cellulitis. Sepsis workup initiated. Treated with fluids, empiric antibiotics. Labs and cultures sent. No pneumonia on CXR. Admitted to medicine.

## 2023-03-05 NOTE — ED PROVIDER NOTE - PHYSICAL EXAMINATION
Constitutional: Awake, alert, in no acute distress. Morbidly obese  Eyes: no scleral icterus  HENT: normocephalic, atraumatic, moist oral mucosa  Neck: supple  CV: RRR, no murmur  Pulm: +mildly tachypneic, +diminished breath sounds at bases  Abdomen: soft, non-tender, non-distended  : no rash  Extremities: no edema, no deformity  Skin: +erythema/warmth/swelling of right lower extremity extending from foot up to thigh, no crepitus or fluctuance  Neuro: AAOx3, moving all extremities equally

## 2023-03-05 NOTE — ED ADULT TRIAGE NOTE - CHIEF COMPLAINT QUOTE
BIBEMS c/o lower back pain beginning today. HX 1 L kidney, reports "sometimes having difficulty urinating". Upon arrival to ED, patient on NRB, per EMS, SPO2 63% on RA. EKG in progress.

## 2023-03-06 DIAGNOSIS — L03.115 CELLULITIS OF RIGHT LOWER LIMB: ICD-10-CM

## 2023-03-06 LAB
ALBUMIN SERPL ELPH-MCNC: 3.4 G/DL — SIGNIFICANT CHANGE UP (ref 3.3–5.2)
ALP SERPL-CCNC: 100 U/L — SIGNIFICANT CHANGE UP (ref 40–120)
ALT FLD-CCNC: 23 U/L — SIGNIFICANT CHANGE UP
ANION GAP SERPL CALC-SCNC: 12 MMOL/L — SIGNIFICANT CHANGE UP (ref 5–17)
APPEARANCE UR: CLEAR — SIGNIFICANT CHANGE UP
AST SERPL-CCNC: 26 U/L — SIGNIFICANT CHANGE UP
BASOPHILS # BLD AUTO: 0.04 K/UL — SIGNIFICANT CHANGE UP (ref 0–0.2)
BASOPHILS NFR BLD AUTO: 0.2 % — SIGNIFICANT CHANGE UP (ref 0–2)
BILIRUB SERPL-MCNC: 0.6 MG/DL — SIGNIFICANT CHANGE UP (ref 0.4–2)
BILIRUB UR-MCNC: NEGATIVE — SIGNIFICANT CHANGE UP
BUN SERPL-MCNC: 19.4 MG/DL — SIGNIFICANT CHANGE UP (ref 8–20)
CALCIUM SERPL-MCNC: 8.8 MG/DL — SIGNIFICANT CHANGE UP (ref 8.4–10.5)
CHLORIDE SERPL-SCNC: 101 MMOL/L — SIGNIFICANT CHANGE UP (ref 96–108)
CO2 SERPL-SCNC: 23 MMOL/L — SIGNIFICANT CHANGE UP (ref 22–29)
COLOR SPEC: YELLOW — SIGNIFICANT CHANGE UP
CREAT SERPL-MCNC: 1.6 MG/DL — HIGH (ref 0.5–1.3)
DIFF PNL FLD: ABNORMAL
EGFR: 48 ML/MIN/1.73M2 — LOW
EOSINOPHIL # BLD AUTO: 0 K/UL — SIGNIFICANT CHANGE UP (ref 0–0.5)
EOSINOPHIL NFR BLD AUTO: 0 % — SIGNIFICANT CHANGE UP (ref 0–6)
EPI CELLS # UR: SIGNIFICANT CHANGE UP
GLUCOSE BLDC GLUCOMTR-MCNC: 114 MG/DL — HIGH (ref 70–99)
GLUCOSE BLDC GLUCOMTR-MCNC: 114 MG/DL — HIGH (ref 70–99)
GLUCOSE BLDC GLUCOMTR-MCNC: 118 MG/DL — HIGH (ref 70–99)
GLUCOSE BLDC GLUCOMTR-MCNC: 166 MG/DL — HIGH (ref 70–99)
GLUCOSE SERPL-MCNC: 124 MG/DL — HIGH (ref 70–99)
GLUCOSE UR QL: NEGATIVE MG/DL — SIGNIFICANT CHANGE UP
HCT VFR BLD CALC: 42.4 % — SIGNIFICANT CHANGE UP (ref 39–50)
HGB BLD-MCNC: 12.9 G/DL — LOW (ref 13–17)
IMM GRANULOCYTES NFR BLD AUTO: 0.8 % — SIGNIFICANT CHANGE UP (ref 0–0.9)
KETONES UR-MCNC: NEGATIVE — SIGNIFICANT CHANGE UP
LACTATE BLDV-MCNC: 2.7 MMOL/L — HIGH (ref 0.5–2)
LACTATE SERPL-SCNC: 1.7 MMOL/L — SIGNIFICANT CHANGE UP (ref 0.5–2)
LEUKOCYTE ESTERASE UR-ACNC: NEGATIVE — SIGNIFICANT CHANGE UP
LYMPHOCYTES # BLD AUTO: 1.17 K/UL — SIGNIFICANT CHANGE UP (ref 1–3.3)
LYMPHOCYTES # BLD AUTO: 6.4 % — LOW (ref 13–44)
MCHC RBC-ENTMCNC: 26 PG — LOW (ref 27–34)
MCHC RBC-ENTMCNC: 30.4 GM/DL — LOW (ref 32–36)
MCV RBC AUTO: 85.3 FL — SIGNIFICANT CHANGE UP (ref 80–100)
MONOCYTES # BLD AUTO: 0.77 K/UL — SIGNIFICANT CHANGE UP (ref 0–0.9)
MONOCYTES NFR BLD AUTO: 4.2 % — SIGNIFICANT CHANGE UP (ref 2–14)
NEUTROPHILS # BLD AUTO: 16.28 K/UL — HIGH (ref 1.8–7.4)
NEUTROPHILS NFR BLD AUTO: 88.4 % — HIGH (ref 43–77)
NITRITE UR-MCNC: NEGATIVE — SIGNIFICANT CHANGE UP
PH UR: 6 — SIGNIFICANT CHANGE UP (ref 5–8)
PLATELET # BLD AUTO: 222 K/UL — SIGNIFICANT CHANGE UP (ref 150–400)
POTASSIUM SERPL-MCNC: 4.1 MMOL/L — SIGNIFICANT CHANGE UP (ref 3.5–5.3)
POTASSIUM SERPL-SCNC: 4.1 MMOL/L — SIGNIFICANT CHANGE UP (ref 3.5–5.3)
PROT SERPL-MCNC: 7.4 G/DL — SIGNIFICANT CHANGE UP (ref 6.6–8.7)
PROT UR-MCNC: 30 MG/DL
RAPID RVP RESULT: SIGNIFICANT CHANGE UP
RBC # BLD: 4.97 M/UL — SIGNIFICANT CHANGE UP (ref 4.2–5.8)
RBC # FLD: 17.3 % — HIGH (ref 10.3–14.5)
RBC CASTS # UR COMP ASSIST: ABNORMAL /HPF (ref 0–4)
SARS-COV-2 RNA SPEC QL NAA+PROBE: SIGNIFICANT CHANGE UP
SODIUM SERPL-SCNC: 136 MMOL/L — SIGNIFICANT CHANGE UP (ref 135–145)
SP GR SPEC: 1.01 — SIGNIFICANT CHANGE UP (ref 1.01–1.02)
UROBILINOGEN FLD QL: NEGATIVE MG/DL — SIGNIFICANT CHANGE UP
WBC # BLD: 18.41 K/UL — HIGH (ref 3.8–10.5)
WBC # FLD AUTO: 18.41 K/UL — HIGH (ref 3.8–10.5)
WBC UR QL: NEGATIVE /HPF — SIGNIFICANT CHANGE UP (ref 0–5)

## 2023-03-06 PROCEDURE — 99223 1ST HOSP IP/OBS HIGH 75: CPT

## 2023-03-06 RX ORDER — GLUCAGON INJECTION, SOLUTION 0.5 MG/.1ML
1 INJECTION, SOLUTION SUBCUTANEOUS ONCE
Refills: 0 | Status: DISCONTINUED | OUTPATIENT
Start: 2023-03-06 | End: 2023-03-16

## 2023-03-06 RX ORDER — ATORVASTATIN CALCIUM 80 MG/1
20 TABLET, FILM COATED ORAL AT BEDTIME
Refills: 0 | Status: DISCONTINUED | OUTPATIENT
Start: 2023-03-06 | End: 2023-03-16

## 2023-03-06 RX ORDER — DEXTROSE 50 % IN WATER 50 %
25 SYRINGE (ML) INTRAVENOUS ONCE
Refills: 0 | Status: DISCONTINUED | OUTPATIENT
Start: 2023-03-06 | End: 2023-03-16

## 2023-03-06 RX ORDER — ALLOPURINOL 300 MG
300 TABLET ORAL DAILY
Refills: 0 | Status: DISCONTINUED | OUTPATIENT
Start: 2023-03-06 | End: 2023-03-16

## 2023-03-06 RX ORDER — ASPIRIN/CALCIUM CARB/MAGNESIUM 324 MG
325 TABLET ORAL DAILY
Refills: 0 | Status: DISCONTINUED | OUTPATIENT
Start: 2023-03-06 | End: 2023-03-08

## 2023-03-06 RX ORDER — INSULIN LISPRO 100/ML
4 VIAL (ML) SUBCUTANEOUS
Refills: 0 | Status: DISCONTINUED | OUTPATIENT
Start: 2023-03-06 | End: 2023-03-16

## 2023-03-06 RX ORDER — AMLODIPINE BESYLATE 2.5 MG/1
10 TABLET ORAL DAILY
Refills: 0 | Status: DISCONTINUED | OUTPATIENT
Start: 2023-03-06 | End: 2023-03-16

## 2023-03-06 RX ORDER — DULOXETINE HYDROCHLORIDE 30 MG/1
60 CAPSULE, DELAYED RELEASE ORAL DAILY
Refills: 0 | Status: DISCONTINUED | OUTPATIENT
Start: 2023-03-06 | End: 2023-03-16

## 2023-03-06 RX ORDER — VANCOMYCIN HCL 1 G
1250 VIAL (EA) INTRAVENOUS EVERY 24 HOURS
Refills: 0 | Status: DISCONTINUED | OUTPATIENT
Start: 2023-03-07 | End: 2023-03-09

## 2023-03-06 RX ORDER — PIPERACILLIN AND TAZOBACTAM 4; .5 G/20ML; G/20ML
3.38 INJECTION, POWDER, LYOPHILIZED, FOR SOLUTION INTRAVENOUS EVERY 8 HOURS
Refills: 0 | Status: DISCONTINUED | OUTPATIENT
Start: 2023-03-06 | End: 2023-03-11

## 2023-03-06 RX ORDER — LANOLIN ALCOHOL/MO/W.PET/CERES
3 CREAM (GRAM) TOPICAL AT BEDTIME
Refills: 0 | Status: DISCONTINUED | OUTPATIENT
Start: 2023-03-06 | End: 2023-03-16

## 2023-03-06 RX ORDER — DEXTROSE 50 % IN WATER 50 %
15 SYRINGE (ML) INTRAVENOUS ONCE
Refills: 0 | Status: DISCONTINUED | OUTPATIENT
Start: 2023-03-06 | End: 2023-03-16

## 2023-03-06 RX ORDER — LISINOPRIL 2.5 MG/1
40 TABLET ORAL DAILY
Refills: 0 | Status: DISCONTINUED | OUTPATIENT
Start: 2023-03-06 | End: 2023-03-16

## 2023-03-06 RX ORDER — VANCOMYCIN HCL 1 G
750 VIAL (EA) INTRAVENOUS EVERY 12 HOURS
Refills: 0 | Status: DISCONTINUED | OUTPATIENT
Start: 2023-03-06 | End: 2023-03-06

## 2023-03-06 RX ORDER — AMIODARONE HYDROCHLORIDE 400 MG/1
200 TABLET ORAL DAILY
Refills: 0 | Status: DISCONTINUED | OUTPATIENT
Start: 2023-03-06 | End: 2023-03-16

## 2023-03-06 RX ORDER — ASPIRIN/CALCIUM CARB/MAGNESIUM 324 MG
1 TABLET ORAL
Qty: 0 | Refills: 0 | DISCHARGE

## 2023-03-06 RX ORDER — VANCOMYCIN HCL 1 G
1750 VIAL (EA) INTRAVENOUS EVERY 24 HOURS
Refills: 0 | Status: DISCONTINUED | OUTPATIENT
Start: 2023-03-06 | End: 2023-03-06

## 2023-03-06 RX ORDER — INSULIN GLARGINE 100 [IU]/ML
10 INJECTION, SOLUTION SUBCUTANEOUS AT BEDTIME
Refills: 0 | Status: DISCONTINUED | OUTPATIENT
Start: 2023-03-06 | End: 2023-03-16

## 2023-03-06 RX ORDER — SODIUM CHLORIDE 9 MG/ML
1000 INJECTION, SOLUTION INTRAVENOUS
Refills: 0 | Status: DISCONTINUED | OUTPATIENT
Start: 2023-03-06 | End: 2023-03-16

## 2023-03-06 RX ORDER — RIVAROXABAN 15 MG-20MG
15 KIT ORAL
Refills: 0 | Status: DISCONTINUED | OUTPATIENT
Start: 2023-03-06 | End: 2023-03-16

## 2023-03-06 RX ORDER — SODIUM CHLORIDE 9 MG/ML
1000 INJECTION INTRAMUSCULAR; INTRAVENOUS; SUBCUTANEOUS ONCE
Refills: 0 | Status: COMPLETED | OUTPATIENT
Start: 2023-03-06 | End: 2023-03-06

## 2023-03-06 RX ORDER — INSULIN LISPRO 100/ML
VIAL (ML) SUBCUTANEOUS
Refills: 0 | Status: DISCONTINUED | OUTPATIENT
Start: 2023-03-06 | End: 2023-03-16

## 2023-03-06 RX ORDER — LEVOTHYROXINE SODIUM 125 MCG
50 TABLET ORAL DAILY
Refills: 0 | Status: DISCONTINUED | OUTPATIENT
Start: 2023-03-06 | End: 2023-03-16

## 2023-03-06 RX ORDER — ACETAMINOPHEN 500 MG
650 TABLET ORAL EVERY 6 HOURS
Refills: 0 | Status: DISCONTINUED | OUTPATIENT
Start: 2023-03-06 | End: 2023-03-16

## 2023-03-06 RX ORDER — TAMSULOSIN HYDROCHLORIDE 0.4 MG/1
0.4 CAPSULE ORAL AT BEDTIME
Refills: 0 | Status: DISCONTINUED | OUTPATIENT
Start: 2023-03-06 | End: 2023-03-16

## 2023-03-06 RX ORDER — INFLUENZA VIRUS VACCINE 15; 15; 15; 15 UG/.5ML; UG/.5ML; UG/.5ML; UG/.5ML
0.7 SUSPENSION INTRAMUSCULAR ONCE
Refills: 0 | Status: DISCONTINUED | OUTPATIENT
Start: 2023-03-06 | End: 2023-03-16

## 2023-03-06 RX ORDER — ONDANSETRON 8 MG/1
4 TABLET, FILM COATED ORAL EVERY 8 HOURS
Refills: 0 | Status: DISCONTINUED | OUTPATIENT
Start: 2023-03-06 | End: 2023-03-16

## 2023-03-06 RX ADMIN — Medication 4 UNIT(S): at 09:18

## 2023-03-06 RX ADMIN — RIVAROXABAN 15 MILLIGRAM(S): KIT at 17:14

## 2023-03-06 RX ADMIN — Medication 4 UNIT(S): at 14:02

## 2023-03-06 RX ADMIN — Medication 325 MILLIGRAM(S): at 14:20

## 2023-03-06 RX ADMIN — Medication 50 MICROGRAM(S): at 05:29

## 2023-03-06 RX ADMIN — TAMSULOSIN HYDROCHLORIDE 0.4 MILLIGRAM(S): 0.4 CAPSULE ORAL at 22:01

## 2023-03-06 RX ADMIN — Medication 4 UNIT(S): at 16:57

## 2023-03-06 RX ADMIN — LISINOPRIL 40 MILLIGRAM(S): 2.5 TABLET ORAL at 05:28

## 2023-03-06 RX ADMIN — PIPERACILLIN AND TAZOBACTAM 25 GRAM(S): 4; .5 INJECTION, POWDER, LYOPHILIZED, FOR SOLUTION INTRAVENOUS at 05:30

## 2023-03-06 RX ADMIN — ATORVASTATIN CALCIUM 20 MILLIGRAM(S): 80 TABLET, FILM COATED ORAL at 22:00

## 2023-03-06 RX ADMIN — AMLODIPINE BESYLATE 10 MILLIGRAM(S): 2.5 TABLET ORAL at 05:29

## 2023-03-06 RX ADMIN — AMIODARONE HYDROCHLORIDE 200 MILLIGRAM(S): 400 TABLET ORAL at 05:29

## 2023-03-06 RX ADMIN — SODIUM CHLORIDE 2000 MILLILITER(S): 9 INJECTION INTRAMUSCULAR; INTRAVENOUS; SUBCUTANEOUS at 02:22

## 2023-03-06 RX ADMIN — INSULIN GLARGINE 10 UNIT(S): 100 INJECTION, SOLUTION SUBCUTANEOUS at 22:01

## 2023-03-06 RX ADMIN — Medication 2: at 22:02

## 2023-03-06 RX ADMIN — Medication 300 MILLIGRAM(S): at 14:20

## 2023-03-06 RX ADMIN — PIPERACILLIN AND TAZOBACTAM 25 GRAM(S): 4; .5 INJECTION, POWDER, LYOPHILIZED, FOR SOLUTION INTRAVENOUS at 16:57

## 2023-03-06 RX ADMIN — Medication 250 MILLIGRAM(S): at 16:48

## 2023-03-06 RX ADMIN — PIPERACILLIN AND TAZOBACTAM 25 GRAM(S): 4; .5 INJECTION, POWDER, LYOPHILIZED, FOR SOLUTION INTRAVENOUS at 22:01

## 2023-03-06 RX ADMIN — DULOXETINE HYDROCHLORIDE 60 MILLIGRAM(S): 30 CAPSULE, DELAYED RELEASE ORAL at 14:20

## 2023-03-06 NOTE — PROGRESS NOTE ADULT - ASSESSMENT
68 y/o M w/ PMH of morbid obesity, pAF (on xarelto), CKD III, HTN, HLD, DMII, JASON, RCC s/p rt nephrectomy, HCC, gout, hypothyroid came in c/o RLE redness found to have RLE cellulitis.  Initial  w/u significant for wbc 20K and lactic acidosis and admitted for sepsis 2/2 RLE cellulitis.        Sepsis 2/2 RLE cellulitis  - Leukocytosis trending down and lactic acidosis resolved s/p IVFs  - c/w Gentle hydration for today   - c/w broad spectrum IV abx with MRSA coverage pending cultures  - No crepitus or fluctuance on exam  - If spikes recurring fevers will order sono of LE or CT but noncon in light of CKD s/p nephrectomy   - Bcx/sensitivities pending   - ID following and recs noted       AFib   - On Xarelto  - c/w Amiodarone       CKD-3b / RCC s/p nephrectomy  - Cr. Stable  - Avoid nephrotoxins given ckd and s/p nephrectomy   - Renally dose meds as indicated   - F/u with Dr. Stratton and Dr. Shetty as scheduled outpt       HTN / HLD  - c/w home meds       DM-2  - Hold oral agents while hospitalized  - Basal/bolus regimen with coverage scale  - titrate insulin regimen to maintain BG<180      HCC  - f/u w/ Dr. Stratton at [facility] as scheduled       Gout  - c/w Allopurinol       Hypothyroidism  - c/w Synthroid       VTE ppx: xarelto     Dispo: remains acute requiring inpt hospitalization.  68 y/o M w/ PMH of morbid obesity, pAF (on xarelto), CKD III, HTN, HLD, DMII, RCC s/p rt nephrectomy, HCC, gout, hypothyroid came in c/o RLE redness found to have RLE cellulitis.  Initial  w/u significant for wbc 20K and lactic acidosis and admitted for sepsis 2/2 RLE cellulitis.        Sepsis 2/2 RLE cellulitis  - Leukocytosis trending down and lactic acidosis resolved s/p IVFs  - c/w Gentle hydration for today   - c/w broad spectrum IV abx with MRSA coverage pending cultures  - No crepitus or fluctuance on exam  - If spikes recurring fevers will order sono of LE or CT but noncon in light of CKD s/p nephrectomy   - Bcx/sensitivities pending   - ID following and recs noted       AFib   - On Xarelto  - c/w Amiodarone       CKD-3b / RCC s/p nephrectomy  - Cr. Stable  - Avoid nephrotoxins given ckd and s/p nephrectomy   - Renally dose meds as indicated   - F/u with Dr. Stratton and Dr. Shetty as scheduled outpt       HTN / HLD  - c/w home meds       DM-2  - Hold oral agents while hospitalized  - Basal/bolus regimen with coverage scale  - titrate insulin regimen to maintain BG<180      HCC  - f/u w/ Dr. Stratton at [facility] as scheduled       Gout  - c/w Allopurinol       Hypothyroidism  - c/w Synthroid       VTE ppx: xarelto     Dispo: remains acute requiring inpt hospitalization.

## 2023-03-06 NOTE — CONSULT NOTE ADULT - ASSESSMENT
66 y/o male with hx of Afib on Xarelto, obesity, CKD-3, HTN, HLD, DM-2, Hypothyroidism, JASON, Gout, RCC s/p right nephrectomy in 4/22 with no chemo/XRT, known HCC s/p liver biopsy 6/22 currently being followed at [facility]. Admitted on 3/6 with RLE cellulitis, marked leukocytosis 21K and fever. started on empiric piperacillin-tazobactam and vancomycin     RLE cellulitis  Leukocytosis  Fever   Obesity     - Continue piperacillin-tazobactam and vanco pending cultures  - Monitor vanco through; dose adjustment per pharmacy protocol   - Monitor closely, if no improvement consider imaging RLE   - follow admission BCx drawn 3/5   - Check MRSA nasal swab   - RVP neg   - Prior available micro data reviewed   - Trend leukocytosis - slowly trending down   - monitor temp curve     d/w Dr. Sierra

## 2023-03-06 NOTE — PATIENT PROFILE ADULT - FALL HARM RISK - HARM RISK INTERVENTIONS

## 2023-03-06 NOTE — CONSULT NOTE ADULT - SUBJECTIVE AND OBJECTIVE BOX
[facility]  INFECTIOUS DISEASES at [place] and [place]  =====================================================                               Joe Oviedo MD*    Merlene Ghosh MD*                             Fuentes Luke MD*     Martha Pollard MD*            Diplomates American Board of Internal Medicine & Infectious Diseases                * [place] Office - Appt - Tel  [phone] Fax [phone]                * [place] Office - Appt - Tel [phone] Fax [phone]                                  Hospital Consult line:  [phone]  =====================================================      MRN-[id number]  EVERETT ARIEL        CC: Patient is a 67y old  Male who presents with a chief complaint of RLE cellulitis  Sepsis (06 Mar 2023 02:18)    HPI:  66 y/o male with hx of Afib on xarelto, obesity, CKD-3, HTN, HLD, DM-2, Hypothyroidism, JASON, Gout, RCC s/p right nephrectomy in 4/22 with no chemo/XRT, known HCC s/p liver biopsy 6/22 currently being followed at [facility]. Patient presents to the ED c/o right leg pain, and generalized weakness for the past 2-3 days. Denies fever, chills, N/V, CP, SOB. In the ED patient noted to be febrile, tachypneic, not hypotensive. Right leg with diffuse erythema, warmth. No crepitus, no fluctuance. Lactate 2.2, and Leucocytosis. Given IVF bolus based on IBW, pan cultured, and given broad spectrum Abx. Denies any other complaints at this time.  (06 Mar 2023 02:18)    As above - Admitted for RLE cellulitis associated with fever and marked leukocytosis of 21K. Started empirically on piperacillin-tazobactam + vancomycin. Patient denies trauma. No pets at home. He was not on abx at home.     On my assessment feeling better. No further Fever. Tolerating abx w/o noticeable side effects. No N, V, diarrhea, chills, rash.     ______________________________________________________  PAST MEDICAL & SURGICAL HISTORY:  Afib on Xarelto    HTN (hypertension)    Degenerative joint disease    Asthma    Obstructive sleep apnea use BIPAP    Diabetes mellitus    Renal cell carcinoma    Hepatocellular carcinoma    History of left knee replacement  2015    H/O elbow surgery  right 1987    H/O cataract extraction  left 2020    H/O right nephrectomy  2022      Social History:  denies smoking  denies etoh  denies illicit drugs   Lives with friends  No pets      FAMILY HISTORY:  Family history of cerebrovascular accident (CVA) (Mother)    Family history of essential hypertension (Sibling)    Family history of diabetes mellitus (Sibling)        ______________________________________________________  Allergies    fish (Unknown)  No Known Drug Allergies  shellfish (Vomiting; Nausea)    Intolerances        ______________________________________________________  MEDICATIONS:  Antibiotics:  piperacillin/tazobactam IVPB.. 3.375 Gram(s) IV Intermittent every 8 hours  vancomycin  IVPB 750 milliGRAM(s) IV Intermittent every 12 hours    Other medications:  allopurinol 300 milliGRAM(s) Oral daily  aMIOdarone    Tablet 200 milliGRAM(s) Oral daily  amLODIPine   Tablet 10 milliGRAM(s) Oral daily  aspirin enteric coated 325 milliGRAM(s) Oral daily  atorvastatin 20 milliGRAM(s) Oral at bedtime  dextrose 5%. 1000 milliLiter(s) IV Continuous <Continuous>  dextrose 50% Injectable 25 Gram(s) IV Push once  DULoxetine 60 milliGRAM(s) Oral daily  glucagon  Injectable 1 milliGRAM(s) IntraMuscular once  influenza  Vaccine (HIGH DOSE) 0.7 milliLiter(s) IntraMuscular once  insulin glargine Injectable (LANTUS) 10 Unit(s) SubCutaneous at bedtime  insulin lispro (ADMELOG) corrective regimen sliding scale   SubCutaneous Before meals and at bedtime  insulin lispro Injectable (ADMELOG) 4 Unit(s) SubCutaneous before breakfast  insulin lispro Injectable (ADMELOG) 4 Unit(s) SubCutaneous before lunch  insulin lispro Injectable (ADMELOG) 4 Unit(s) SubCutaneous before dinner  levothyroxine 50 MICROGram(s) Oral daily  lisinopril 40 milliGRAM(s) Oral daily  rivaroxaban 15 milliGRAM(s) Oral with dinner  tamsulosin 0.4 milliGRAM(s) Oral at bedtime    ______________________________________________________  REVIEW OF SYSTEMS:  CONSTITUTIONAL: as per HPI   HEENT:  No diplopia or blurred vision.  No earache, sore throat or runny nose.  CARDIOVASCULAR:  No chest pain  RESPIRATORY:  No cough, shortness of breath  GASTROINTESTINAL:  No nausea, vomiting, abdominal pain or diarrhea.  GENITOURINARY:  No dysuria, frequency or urgency. No blood in urine  MUSCULOSKELETAL:  no joint aches, no muscle pain  SKIN:  as per HPI   NEUROLOGIC:  No headaches, seizures  PSYCHIATRIC:  No disorder of thought or mood.  ENDOCRINE:  No heat or cold intolerance  HEMATOLOGICAL:  No easy bruising or bleeding.     _____________________________________________________  PHYSICAL EXAM:  GEN: Obese. Awake, alert, oriented x 3. Lying comfortable in bed, in no acute distress   HEENT: normocephalic and atraumatic. PERRL.  Anicteric sclerae. Moist mucous membranes. No mucosal lesions. No nasal discharge.   NECK: Supple.   LUNGS: eupneic, CTA B/L, no adventitious sounds  HEART: RRR, no m/r/g  ABDOMEN: Soft, NT, ND, no HSM, no palpable masses.  +BS.    : , no Crowley catheter  EXTREMITIES: well perfuseda.  MSK: No joint deformity or swelling  LYMPH: no palpable cervical, supraclavicular lymph nodes  NEUROLOGIC: Grossly no focal deficits   PSYCHIATRIC: Appropriate affect and mood.  SKIN: RLE with erythema (between knee and ankle), warmth, discomfort to palpation, and small wound/scab on lateral aspect.   LINES: PIV     ______________________________________________________  Height (cm): 175.3 (03-05 @ 20:46)  Weight (kg): 113.4 (03-05 @ 20:46)  BMI (kg/m2): 36.9 (03-05 @ 20:46)  BSA (m2): 2.27 (03-05 @ 20:46)    Vitals:  T(F): 98.8 (06 Mar 2023 11:36), Max: 103 (05 Mar 2023 20:46)  HR: 66 (06 Mar 2023 11:36)  BP: 129/74 (06 Mar 2023 11:36)  RR: 18 (06 Mar 2023 11:36)  SpO2: 98% (06 Mar 2023 11:36) (93% - 100%)  temp max in last 48H T(F): , Max: 103 (03-05-23 @ 20:46)    Current Antibiotics:  piperacillin/tazobactam IVPB.. 3.375 Gram(s) IV Intermittent every 8 hours  vancomycin  IVPB 750 milliGRAM(s) IV Intermittent every 12 hours    Other medications:  allopurinol 300 milliGRAM(s) Oral daily  aMIOdarone    Tablet 200 milliGRAM(s) Oral daily  amLODIPine   Tablet 10 milliGRAM(s) Oral daily  aspirin enteric coated 325 milliGRAM(s) Oral daily  atorvastatin 20 milliGRAM(s) Oral at bedtime  dextrose 5%. 1000 milliLiter(s) IV Continuous <Continuous>  dextrose 50% Injectable 25 Gram(s) IV Push once  DULoxetine 60 milliGRAM(s) Oral daily  glucagon  Injectable 1 milliGRAM(s) IntraMuscular once  influenza  Vaccine (HIGH DOSE) 0.7 milliLiter(s) IntraMuscular once  insulin glargine Injectable (LANTUS) 10 Unit(s) SubCutaneous at bedtime  insulin lispro (ADMELOG) corrective regimen sliding scale   SubCutaneous Before meals and at bedtime  insulin lispro Injectable (ADMELOG) 4 Unit(s) SubCutaneous before breakfast  insulin lispro Injectable (ADMELOG) 4 Unit(s) SubCutaneous before lunch  insulin lispro Injectable (ADMELOG) 4 Unit(s) SubCutaneous before dinner  levothyroxine 50 MICROGram(s) Oral daily  lisinopril 40 milliGRAM(s) Oral daily  rivaroxaban 15 milliGRAM(s) Oral with dinner  tamsulosin 0.4 milliGRAM(s) Oral at bedtime                            12.9   18.41 )-----------( 222      ( 06 Mar 2023 08:34 )             42.4     03-06    136  |  101  |  19.4  ----------------------------<  124<H>  4.1   |  23.0  |  1.60<H>    Ca    8.8      06 Mar 2023 08:34    TPro  7.4  /  Alb  3.4  /  TBili  0.6  /  DBili  x   /  AST  26  /  ALT  23  /  AlkPhos  100  03-06    RECENT CULTURES:  03-05 @ 22:50    RVP with SARS-CoV-2   NotDetec      WBC Count: 18.41 K/uL (03-06-23 @ 08:34)  WBC Count: 20.88 K/uL (03-05-23 @ 22:50)    Creatinine, Serum: 1.60 mg/dL (03-06-23 @ 08:34)  Creatinine, Serum: 1.57 mg/dL (03-05-23 @ 22:50)       SARS-CoV-2: NotDetec (03-05-23 @ 22:50)    ______________________________________________________  RADIOLOGY  < from: Xray Chest 1 View- PORTABLE-Urgent (03.05.23 @ 22:07) >  INTERPRETATION:  AP chest on March 5, 2023 at 9:50 PM. Patient has sepsis.    Heart magnified by technique but possibly enlarged.    Lungs are clear.    Chest is similar to December 18, 2018.    IMPRESSION: No acute finding or change.    < end of copied text >   [facility]  INFECTIOUS DISEASES at [place] and [place]  =====================================================                               Joe Oviedo MD*    Merlene Ghosh MD*                             Fuentes Luke MD*     Martha Pollard MD*            Diplomates American Board of Internal Medicine & Infectious Diseases                * [place] Office - Appt - Tel  [phone] Fax [phone]                * [place] Office - Appt - Tel [phone] Fax [phone]                                  Hospital Consult line:  [phone]  =====================================================      MRN-[id number]  EVERETT ARIEL        CC: Patient is a 67y old  Male who presents with a chief complaint of RLE cellulitis  Sepsis (06 Mar 2023 02:18)    HPI:  66 y/o male with hx of Afib on xarelto, obesity, CKD-3, HTN, HLD, DM-2, Hypothyroidism, JASON, Gout, RCC s/p right nephrectomy in 4/22 with no chemo/XRT, known HCC s/p liver biopsy 6/22 currently being followed at [facility]. Patient presents to the ED c/o right leg pain, and generalized weakness for the past 2-3 days. Denies fever, chills, N/V, CP, SOB. In the ED patient noted to be febrile, tachypneic, not hypotensive. Right leg with diffuse erythema, warmth. No crepitus, no fluctuance. Lactate 2.2, and Leucocytosis. Given IVF bolus based on IBW, pan cultured, and given broad spectrum Abx. Denies any other complaints at this time.  (06 Mar 2023 02:18)    As above - Admitted for RLE cellulitis associated with fever and marked leukocytosis of 21K. Started empirically on piperacillin-tazobactam + vancomycin. Patient denies trauma. No pets at home. He was not on abx at home.     On my assessment feeling better, but remains febrile. Tolerating abx w/o noticeable side effects. No N, V, diarrhea, chills, rash.     ______________________________________________________  PAST MEDICAL & SURGICAL HISTORY:  Afib on Xarelto    HTN (hypertension)    Degenerative joint disease    Asthma    Obstructive sleep apnea use BIPAP    Diabetes mellitus    Renal cell carcinoma    Hepatocellular carcinoma    History of left knee replacement  2015    H/O elbow surgery  right 1987    H/O cataract extraction  left 2020    H/O right nephrectomy  2022      Social History:  denies smoking  denies etoh  denies illicit drugs   Lives with friends  No pets      FAMILY HISTORY:  Family history of cerebrovascular accident (CVA) (Mother)    Family history of essential hypertension (Sibling)    Family history of diabetes mellitus (Sibling)        ______________________________________________________  Allergies    fish (Unknown)  No Known Drug Allergies  shellfish (Vomiting; Nausea)    Intolerances        ______________________________________________________  MEDICATIONS:  Antibiotics:  piperacillin/tazobactam IVPB.. 3.375 Gram(s) IV Intermittent every 8 hours  vancomycin  IVPB 750 milliGRAM(s) IV Intermittent every 12 hours    Other medications:  allopurinol 300 milliGRAM(s) Oral daily  aMIOdarone    Tablet 200 milliGRAM(s) Oral daily  amLODIPine   Tablet 10 milliGRAM(s) Oral daily  aspirin enteric coated 325 milliGRAM(s) Oral daily  atorvastatin 20 milliGRAM(s) Oral at bedtime  dextrose 5%. 1000 milliLiter(s) IV Continuous <Continuous>  dextrose 50% Injectable 25 Gram(s) IV Push once  DULoxetine 60 milliGRAM(s) Oral daily  glucagon  Injectable 1 milliGRAM(s) IntraMuscular once  influenza  Vaccine (HIGH DOSE) 0.7 milliLiter(s) IntraMuscular once  insulin glargine Injectable (LANTUS) 10 Unit(s) SubCutaneous at bedtime  insulin lispro (ADMELOG) corrective regimen sliding scale   SubCutaneous Before meals and at bedtime  insulin lispro Injectable (ADMELOG) 4 Unit(s) SubCutaneous before breakfast  insulin lispro Injectable (ADMELOG) 4 Unit(s) SubCutaneous before lunch  insulin lispro Injectable (ADMELOG) 4 Unit(s) SubCutaneous before dinner  levothyroxine 50 MICROGram(s) Oral daily  lisinopril 40 milliGRAM(s) Oral daily  rivaroxaban 15 milliGRAM(s) Oral with dinner  tamsulosin 0.4 milliGRAM(s) Oral at bedtime    ______________________________________________________  REVIEW OF SYSTEMS:  CONSTITUTIONAL: as per HPI   HEENT:  No diplopia or blurred vision.  No earache, sore throat or runny nose.  CARDIOVASCULAR:  No chest pain  RESPIRATORY:  No cough, shortness of breath  GASTROINTESTINAL:  No nausea, vomiting, abdominal pain or diarrhea.  GENITOURINARY:  No dysuria, frequency or urgency. No blood in urine  MUSCULOSKELETAL:  no joint aches, no muscle pain  SKIN:  as per HPI   NEUROLOGIC:  No headaches, seizures  PSYCHIATRIC:  No disorder of thought or mood.  ENDOCRINE:  No heat or cold intolerance  HEMATOLOGICAL:  No easy bruising or bleeding.     _____________________________________________________  PHYSICAL EXAM:  GEN: Obese. Awake, alert, oriented x 3. Lying comfortable in bed, in no acute distress   HEENT: normocephalic and atraumatic. PERRL.  Anicteric sclerae. Moist mucous membranes. No mucosal lesions. No nasal discharge.   NECK: Supple.   LUNGS: eupneic, CTA B/L, no adventitious sounds  HEART: RRR, no m/r/g  ABDOMEN: Soft, NT, ND, no HSM, no palpable masses.  +BS.    : , no Crowley catheter  EXTREMITIES: well perfuseda.  MSK: No joint deformity or swelling  LYMPH: no palpable cervical, supraclavicular lymph nodes  NEUROLOGIC: Grossly no focal deficits   PSYCHIATRIC: Appropriate affect and mood.  SKIN: RLE with erythema (between knee and ankle), warmth, discomfort to palpation, and small wound/scab on lateral aspect.   LINES: PIV     ______________________________________________________  Height (cm): 175.3 (03-05 @ 20:46)  Weight (kg): 113.4 (03-05 @ 20:46)  BMI (kg/m2): 36.9 (03-05 @ 20:46)  BSA (m2): 2.27 (03-05 @ 20:46)    Vitals:  T(F): 98.8 (06 Mar 2023 11:36), Max: 103 (05 Mar 2023 20:46)  HR: 66 (06 Mar 2023 11:36)  BP: 129/74 (06 Mar 2023 11:36)  RR: 18 (06 Mar 2023 11:36)  SpO2: 98% (06 Mar 2023 11:36) (93% - 100%)  temp max in last 48H T(F): , Max: 103 (03-05-23 @ 20:46)    Current Antibiotics:  piperacillin/tazobactam IVPB.. 3.375 Gram(s) IV Intermittent every 8 hours  vancomycin  IVPB 750 milliGRAM(s) IV Intermittent every 12 hours    Other medications:  allopurinol 300 milliGRAM(s) Oral daily  aMIOdarone    Tablet 200 milliGRAM(s) Oral daily  amLODIPine   Tablet 10 milliGRAM(s) Oral daily  aspirin enteric coated 325 milliGRAM(s) Oral daily  atorvastatin 20 milliGRAM(s) Oral at bedtime  dextrose 5%. 1000 milliLiter(s) IV Continuous <Continuous>  dextrose 50% Injectable 25 Gram(s) IV Push once  DULoxetine 60 milliGRAM(s) Oral daily  glucagon  Injectable 1 milliGRAM(s) IntraMuscular once  influenza  Vaccine (HIGH DOSE) 0.7 milliLiter(s) IntraMuscular once  insulin glargine Injectable (LANTUS) 10 Unit(s) SubCutaneous at bedtime  insulin lispro (ADMELOG) corrective regimen sliding scale   SubCutaneous Before meals and at bedtime  insulin lispro Injectable (ADMELOG) 4 Unit(s) SubCutaneous before breakfast  insulin lispro Injectable (ADMELOG) 4 Unit(s) SubCutaneous before lunch  insulin lispro Injectable (ADMELOG) 4 Unit(s) SubCutaneous before dinner  levothyroxine 50 MICROGram(s) Oral daily  lisinopril 40 milliGRAM(s) Oral daily  rivaroxaban 15 milliGRAM(s) Oral with dinner  tamsulosin 0.4 milliGRAM(s) Oral at bedtime                            12.9   18.41 )-----------( 222      ( 06 Mar 2023 08:34 )             42.4     03-06    136  |  101  |  19.4  ----------------------------<  124<H>  4.1   |  23.0  |  1.60<H>    Ca    8.8      06 Mar 2023 08:34    TPro  7.4  /  Alb  3.4  /  TBili  0.6  /  DBili  x   /  AST  26  /  ALT  23  /  AlkPhos  100  03-06    RECENT CULTURES:  03-05 @ 22:50    RVP with SARS-CoV-2   NotDetec      WBC Count: 18.41 K/uL (03-06-23 @ 08:34)  WBC Count: 20.88 K/uL (03-05-23 @ 22:50)    Creatinine, Serum: 1.60 mg/dL (03-06-23 @ 08:34)  Creatinine, Serum: 1.57 mg/dL (03-05-23 @ 22:50)       SARS-CoV-2: NotDetec (03-05-23 @ 22:50)    ______________________________________________________  RADIOLOGY  < from: Xray Chest 1 View- PORTABLE-Urgent (03.05.23 @ 22:07) >  INTERPRETATION:  AP chest on March 5, 2023 at 9:50 PM. Patient has sepsis.    Heart magnified by technique but possibly enlarged.    Lungs are clear.    Chest is similar to December 18, 2018.    IMPRESSION: No acute finding or change.    < end of copied text >

## 2023-03-06 NOTE — PROGRESS NOTE ADULT - SUBJECTIVE AND OBJECTIVE BOX
Chief Complaint:  RLE cellulitis     SUBJECTIVE / OVERNIGHT EVENTS:  no acute events reported overnight.  pt offers no acute ocmplains at this time.       I&O's Summary        PHYSICAL EXAM:  Vital Signs Last 24 Hrs  T(C): 37.1 (06 Mar 2023 11:36), Max: 39.4 (05 Mar 2023 20:46)  T(F): 98.8 (06 Mar 2023 11:36), Max: 103 (05 Mar 2023 20:46)  HR: 66 (06 Mar 2023 11:36) (66 - 78)  BP: 129/74 (06 Mar 2023 11:36) (129/74 - 177/100)  BP(mean): 109 (06 Mar 2023 02:18) (109 - 109)  RR: 18 (06 Mar 2023 11:36) (18 - 22)  SpO2: 98% (06 Mar 2023 11:36) (93% - 100%)    Parameters below as of 06 Mar 2023 11:36  Patient On (Oxygen Delivery Method): nasal cannula  O2 Flow (L/min): 3        GENERAL: pt examined bedside, laying comfortably in bed in NAD  HEENT: NC/AT, moist oral mucosa, clear conjunctiva, sclera nonicteric  RESPIRATORY: Normal respiratory effort, no wheezing, rhonchi, rales  CARDIOVASCULAR: RRR, normal S1 and S2  ABDOMEN: soft, obese, NT/ND, normoactive bowel sounds, no rebound/guarding  EXTREMITIES: No cynaosis, no clubbing, b/l LE pitting edema L>R, pulses are 1+ b/l   PSYCH: affect flat but cooperative   SKIN: b/l LE stasis like dermatitis skin changes, RLE with superimposed erythema from ankle to below the knee and streaking noted along lymphatic track along medial rt thigh, warm to touch, no crepitus on palpation, minimal pain to palpation         LABS:                        12.9   18.41 )-----------( 222      ( 06 Mar 2023 08:34 )             42.4     03-06    136  |  101  |  19.4  ----------------------------<  124<H>  4.1   |  23.0  |  1.60<H>    Ca    8.8      06 Mar 2023 08:34    TPro  7.4  /  Alb  3.4  /  TBili  0.6  /  DBili  x   /  AST  26  /  ALT  23  /  AlkPhos  100  03-06    PT/INR - ( 05 Mar 2023 22:50 )   PT: 18.6 sec;   INR: 1.60 ratio         PTT - ( 05 Mar 2023 22:50 )  PTT:33.9 sec  CARDIAC MARKERS ( 05 Mar 2023 22:50 )  x     / <0.01 ng/mL / x     / x     / x              CAPILLARY BLOOD GLUCOSE      POCT Blood Glucose.: 114 mg/dL (06 Mar 2023 13:07)  POCT Blood Glucose.: 118 mg/dL (06 Mar 2023 07:39)        RADIOLOGY & ADDITIONAL TESTS:    < from: Xray Chest 1 View- PORTABLE-Urgent (03.05.23 @ 22:07) >  IMPRESSION: No acute finding or change.    < end of copied text >        MEDICATIONS  (STANDING):  allopurinol 300 milliGRAM(s) Oral daily  aMIOdarone    Tablet 200 milliGRAM(s) Oral daily  amLODIPine   Tablet 10 milliGRAM(s) Oral daily  aspirin enteric coated 325 milliGRAM(s) Oral daily  atorvastatin 20 milliGRAM(s) Oral at bedtime  dextrose 5%. 1000 milliLiter(s) (50 mL/Hr) IV Continuous <Continuous>  dextrose 50% Injectable 25 Gram(s) IV Push once  DULoxetine 60 milliGRAM(s) Oral daily  glucagon  Injectable 1 milliGRAM(s) IntraMuscular once  influenza  Vaccine (HIGH DOSE) 0.7 milliLiter(s) IntraMuscular once  insulin glargine Injectable (LANTUS) 10 Unit(s) SubCutaneous at bedtime  insulin lispro (ADMELOG) corrective regimen sliding scale   SubCutaneous Before meals and at bedtime  insulin lispro Injectable (ADMELOG) 4 Unit(s) SubCutaneous before breakfast  insulin lispro Injectable (ADMELOG) 4 Unit(s) SubCutaneous before lunch  insulin lispro Injectable (ADMELOG) 4 Unit(s) SubCutaneous before dinner  levothyroxine 50 MICROGram(s) Oral daily  lisinopril 40 milliGRAM(s) Oral daily  piperacillin/tazobactam IVPB.. 3.375 Gram(s) IV Intermittent every 8 hours  rivaroxaban 15 milliGRAM(s) Oral with dinner  tamsulosin 0.4 milliGRAM(s) Oral at bedtime  vancomycin  IVPB 750 milliGRAM(s) IV Intermittent every 12 hours    MEDICATIONS  (PRN):  acetaminophen     Tablet .. 650 milliGRAM(s) Oral every 6 hours PRN Temp greater or equal to 38C (100.4F), Mild Pain (1 - 3)  aluminum hydroxide/magnesium hydroxide/simethicone Suspension 30 milliLiter(s) Oral every 4 hours PRN Dyspepsia  dextrose Oral Gel 15 Gram(s) Oral once PRN Blood Glucose LESS THAN 70 milliGRAM(s)/deciliter  melatonin 3 milliGRAM(s) Oral at bedtime PRN Insomnia  ondansetron Injectable 4 milliGRAM(s) IV Push every 8 hours PRN Nausea and/or Vomiting  oxycodone    5 mG/acetaminophen 325 mG 1 Tablet(s) Oral every 4 hours PRN Moderate Pain (4 - 6)

## 2023-03-06 NOTE — H&P ADULT - MUSCULOSKELETAL COMMENTS
diffuse RLE erythema, warmth, from ankle above knee to medial thigh, venous stasis dermatitis, no crepitus/fluctuance

## 2023-03-06 NOTE — H&P ADULT - CONVERSATION DETAILS
Discussed plan for admission for treatment of RLE cellulitis, severe sepsis. Risk of decompensation needing ICU eval, pressers, possible intubation and mechanical ventilation. Patient is aware of severity of illnesses, and complicating multiple comorbidities. He would like all attempts of being kept alive and wishes to be Full Code.

## 2023-03-06 NOTE — H&P ADULT - ASSESSMENT
66 y/o male with RLE cellulitis, Severe sepsis w/o septic shock, hx of Afib, CKD-3, HTN, HLD, DM-2, Obesity, JASON, RCC s/p right nephrectomy, HCC s/p Biopsy in 6/22, Gout, Hypothyroidism    RLE cellulitis/Severe sepsis:  -Admit to medical bed  -Sepsis order set  -MAP/SBP above goal  -Repeat lactate slightly higher, will give additional 1 liter NS and repeat  -Lactate likely will not clear based on cirrhosis noted on previous imaging  -Cap refill normal  -Trend WBC/Temp  -Broad spectrum IV abx with MRSA coverage pending cultures  -No crepitus, no fluctuance on exam  -May need imaging of RLE if fails to improve, high risk of MEL with prior nephrectomy and CKD  -ID eval-called  -Anti-pyretics  -DVT-on Xarelto already  -OOB, ambulate w/assistance, fall risk    Afib:  -Cont. Xarelto, denies falls/bleeding, hbg stable  -Cont. Amiodarone     CKD-3:  -Cr. Stable  -Avoid nephrotoxins  -Renally dose meds as indicated     HTN:  -DASH diet  -Cont. o/p regimen with holding parameters    HLD:  -Statin  -Cardiac diet     DM-2:  -Hold oral agents  -Basal/bolus regimen with coverage scale  -Accu checks AC/HS  -ADA diet    RCC s/p nephrectomy:  -F/u with Dr. Stratton and Dr. Shetty as scheduled     HCC:  -F/U w/ Dr. Stratton at [facility] as scheduled     Gout:  -Cont. Allopurinol     Hypothyroidism:  -Cont. Synthroid     Discussed with ED staff, patient who agrees with plan of care as outlined above.

## 2023-03-06 NOTE — H&P ADULT - HISTORY OF PRESENT ILLNESS
66 y/o male with hx of Afib on xarelto, obesity, CKD-3, HTN, HLD, DM-2, Hypothyroidism, JASON, Gout, RCC s/p right nephrectomy in 4/22 with no chemo/XRT, known HCC s/p liver biopsy 6/22 currently being followed at [facility]. Patient presents to the ED c/o right leg pain, and generalized weakness for the past 2-3 days. Denies fever, chills, N/V, CP, SOB. In the ED patient noted to be febrile, tachypneic, not hypotensive. Right leg with diffuse erythema, warmth. No crepitus, no fluctuance. Lactate 2.2, and Leucocytosis. Given IVF bolus based on IBW, pan cultured, and given broad spectrum Abx. Denies any other complaints at this time.

## 2023-03-06 NOTE — CHART NOTE - NSCHARTNOTEFT_GEN_A_CORE
Called by RN for patient requesting CPAP overnight. Patient with history of JASON, seen at bedside and states that he only remembers the first number of his settings - 18. Currently denies SOB, chest pain, difficulty breathing.     Vitals  T(C): 36.3 (03-06-23 @ 16:02), Max: 39.4 (03-06-23 @ 02:18)  HR: 80 (03-06-23 @ 16:02) (66 - 80)  BP: 146/79 (03-06-23 @ 16:02) (129/74 - 177/100)  RR: 18 (03-06-23 @ 16:02) (18 - 18)  SpO2: 97% (03-06-23 @ 16:02) (93% - 98%) on 3 L NC    JASON  -VSS and patient asymptomatic   -chart thoroughly reviewed for CPAP or BiPAP settings  -No previous orders or settings found  -Discussed settings with respiratory therapistDaryl  -Ordered NIV nocturnal CPAP/Expiratory pressure 12. FiO2 to be titrated by RT.  -Will continue to monitor patient   -RN to notify provider with any changes in patient status.

## 2023-03-06 NOTE — PATIENT PROFILE ADULT - FUNCTIONAL ASSESSMENT - BASIC MOBILITY 6.
3-calculated by average/Not able to assess (calculate score using [facility] averaging method)

## 2023-03-07 LAB
A1C WITH ESTIMATED AVERAGE GLUCOSE RESULT: 7.4 % — HIGH (ref 4–5.6)
ALBUMIN SERPL ELPH-MCNC: 3.3 G/DL — SIGNIFICANT CHANGE UP (ref 3.3–5.2)
ALP SERPL-CCNC: 96 U/L — SIGNIFICANT CHANGE UP (ref 40–120)
ALT FLD-CCNC: 26 U/L — SIGNIFICANT CHANGE UP
ANION GAP SERPL CALC-SCNC: 11 MMOL/L — SIGNIFICANT CHANGE UP (ref 5–17)
AST SERPL-CCNC: 31 U/L — SIGNIFICANT CHANGE UP
BASOPHILS # BLD AUTO: 0.06 K/UL — SIGNIFICANT CHANGE UP (ref 0–0.2)
BASOPHILS NFR BLD AUTO: 0.4 % — SIGNIFICANT CHANGE UP (ref 0–2)
BILIRUB SERPL-MCNC: 0.4 MG/DL — SIGNIFICANT CHANGE UP (ref 0.4–2)
BUN SERPL-MCNC: 21.2 MG/DL — HIGH (ref 8–20)
CALCIUM SERPL-MCNC: 8.8 MG/DL — SIGNIFICANT CHANGE UP (ref 8.4–10.5)
CHLORIDE SERPL-SCNC: 101 MMOL/L — SIGNIFICANT CHANGE UP (ref 96–108)
CO2 SERPL-SCNC: 24 MMOL/L — SIGNIFICANT CHANGE UP (ref 22–29)
CREAT SERPL-MCNC: 1.53 MG/DL — HIGH (ref 0.5–1.3)
EGFR: 50 ML/MIN/1.73M2 — LOW
EOSINOPHIL # BLD AUTO: 0.09 K/UL — SIGNIFICANT CHANGE UP (ref 0–0.5)
EOSINOPHIL NFR BLD AUTO: 0.6 % — SIGNIFICANT CHANGE UP (ref 0–6)
ESTIMATED AVERAGE GLUCOSE: 166 MG/DL — HIGH (ref 68–114)
GLUCOSE BLDC GLUCOMTR-MCNC: 136 MG/DL — HIGH (ref 70–99)
GLUCOSE BLDC GLUCOMTR-MCNC: 155 MG/DL — HIGH (ref 70–99)
GLUCOSE BLDC GLUCOMTR-MCNC: 166 MG/DL — HIGH (ref 70–99)
GLUCOSE BLDC GLUCOMTR-MCNC: 176 MG/DL — HIGH (ref 70–99)
GLUCOSE SERPL-MCNC: 149 MG/DL — HIGH (ref 70–99)
HCT VFR BLD CALC: 36.3 % — LOW (ref 39–50)
HGB BLD-MCNC: 11.4 G/DL — LOW (ref 13–17)
IMM GRANULOCYTES NFR BLD AUTO: 0.8 % — SIGNIFICANT CHANGE UP (ref 0–0.9)
INR BLD: 1.54 RATIO — HIGH (ref 0.88–1.16)
LYMPHOCYTES # BLD AUTO: 1.05 K/UL — SIGNIFICANT CHANGE UP (ref 1–3.3)
LYMPHOCYTES # BLD AUTO: 6.9 % — LOW (ref 13–44)
MAGNESIUM SERPL-MCNC: 2.3 MG/DL — SIGNIFICANT CHANGE UP (ref 1.6–2.6)
MCHC RBC-ENTMCNC: 25.7 PG — LOW (ref 27–34)
MCHC RBC-ENTMCNC: 31.4 GM/DL — LOW (ref 32–36)
MCV RBC AUTO: 81.8 FL — SIGNIFICANT CHANGE UP (ref 80–100)
MELD SCORE WITH DIALYSIS: 26 POINTS — SIGNIFICANT CHANGE UP
MELD SCORE WITHOUT DIALYSIS: 17 POINTS — SIGNIFICANT CHANGE UP
MONOCYTES # BLD AUTO: 0.79 K/UL — SIGNIFICANT CHANGE UP (ref 0–0.9)
MONOCYTES NFR BLD AUTO: 5.2 % — SIGNIFICANT CHANGE UP (ref 2–14)
MRSA PCR RESULT.: SIGNIFICANT CHANGE UP
NEUTROPHILS # BLD AUTO: 13.11 K/UL — HIGH (ref 1.8–7.4)
NEUTROPHILS NFR BLD AUTO: 86.1 % — HIGH (ref 43–77)
PHOSPHATE SERPL-MCNC: 2.2 MG/DL — LOW (ref 2.4–4.7)
PLATELET # BLD AUTO: 234 K/UL — SIGNIFICANT CHANGE UP (ref 150–400)
POTASSIUM SERPL-MCNC: 3.9 MMOL/L — SIGNIFICANT CHANGE UP (ref 3.5–5.3)
POTASSIUM SERPL-SCNC: 3.9 MMOL/L — SIGNIFICANT CHANGE UP (ref 3.5–5.3)
PROT SERPL-MCNC: 7.1 G/DL — SIGNIFICANT CHANGE UP (ref 6.6–8.7)
PROTHROM AB SERPL-ACNC: 18 SEC — HIGH (ref 10.5–13.4)
RBC # BLD: 4.44 M/UL — SIGNIFICANT CHANGE UP (ref 4.2–5.8)
RBC # FLD: 17.1 % — HIGH (ref 10.3–14.5)
S AUREUS DNA NOSE QL NAA+PROBE: SIGNIFICANT CHANGE UP
SODIUM SERPL-SCNC: 135 MMOL/L — SIGNIFICANT CHANGE UP (ref 135–145)
VANCOMYCIN TROUGH SERPL-MCNC: <4 UG/ML — LOW (ref 10–20)
WBC # BLD: 15.22 K/UL — HIGH (ref 3.8–10.5)
WBC # FLD AUTO: 15.22 K/UL — HIGH (ref 3.8–10.5)

## 2023-03-07 PROCEDURE — 99232 SBSQ HOSP IP/OBS MODERATE 35: CPT

## 2023-03-07 PROCEDURE — 73700 CT LOWER EXTREMITY W/O DYE: CPT | Mod: 26,RT

## 2023-03-07 PROCEDURE — 99233 SBSQ HOSP IP/OBS HIGH 50: CPT

## 2023-03-07 RX ORDER — SODIUM,POTASSIUM PHOSPHATES 278-250MG
1 POWDER IN PACKET (EA) ORAL EVERY 4 HOURS
Refills: 0 | Status: COMPLETED | OUTPATIENT
Start: 2023-03-07 | End: 2023-03-07

## 2023-03-07 RX ADMIN — Medication 4 UNIT(S): at 16:02

## 2023-03-07 RX ADMIN — PIPERACILLIN AND TAZOBACTAM 25 GRAM(S): 4; .5 INJECTION, POWDER, LYOPHILIZED, FOR SOLUTION INTRAVENOUS at 13:33

## 2023-03-07 RX ADMIN — Medication 300 MILLIGRAM(S): at 11:39

## 2023-03-07 RX ADMIN — TAMSULOSIN HYDROCHLORIDE 0.4 MILLIGRAM(S): 0.4 CAPSULE ORAL at 22:51

## 2023-03-07 RX ADMIN — Medication 325 MILLIGRAM(S): at 11:39

## 2023-03-07 RX ADMIN — Medication 2: at 11:43

## 2023-03-07 RX ADMIN — PIPERACILLIN AND TAZOBACTAM 25 GRAM(S): 4; .5 INJECTION, POWDER, LYOPHILIZED, FOR SOLUTION INTRAVENOUS at 05:43

## 2023-03-07 RX ADMIN — Medication 166.67 MILLIGRAM(S): at 09:51

## 2023-03-07 RX ADMIN — Medication 1 PACKET(S): at 13:33

## 2023-03-07 RX ADMIN — ATORVASTATIN CALCIUM 20 MILLIGRAM(S): 80 TABLET, FILM COATED ORAL at 22:51

## 2023-03-07 RX ADMIN — Medication 1 PACKET(S): at 11:41

## 2023-03-07 RX ADMIN — AMLODIPINE BESYLATE 10 MILLIGRAM(S): 2.5 TABLET ORAL at 05:42

## 2023-03-07 RX ADMIN — LISINOPRIL 40 MILLIGRAM(S): 2.5 TABLET ORAL at 05:42

## 2023-03-07 RX ADMIN — RIVAROXABAN 15 MILLIGRAM(S): KIT at 17:25

## 2023-03-07 RX ADMIN — DULOXETINE HYDROCHLORIDE 60 MILLIGRAM(S): 30 CAPSULE, DELAYED RELEASE ORAL at 11:39

## 2023-03-07 RX ADMIN — AMIODARONE HYDROCHLORIDE 200 MILLIGRAM(S): 400 TABLET ORAL at 05:42

## 2023-03-07 RX ADMIN — PIPERACILLIN AND TAZOBACTAM 25 GRAM(S): 4; .5 INJECTION, POWDER, LYOPHILIZED, FOR SOLUTION INTRAVENOUS at 22:52

## 2023-03-07 RX ADMIN — Medication 50 MICROGRAM(S): at 05:42

## 2023-03-07 RX ADMIN — Medication 2: at 16:03

## 2023-03-07 RX ADMIN — INSULIN GLARGINE 10 UNIT(S): 100 INJECTION, SOLUTION SUBCUTANEOUS at 22:51

## 2023-03-07 RX ADMIN — Medication 4 UNIT(S): at 08:03

## 2023-03-07 RX ADMIN — Medication 2: at 08:04

## 2023-03-07 RX ADMIN — Medication 4 UNIT(S): at 11:40

## 2023-03-07 NOTE — PROGRESS NOTE ADULT - SUBJECTIVE AND OBJECTIVE BOX
Chief Complaint:  RLE cellulitis     SUBJECTIVE / OVERNIGHT EVENTS:  no acute events reported overnight.  pt offers no acute ocmplains at this time.       I&O's Summary        PHYSICAL EXAM:  Vital Signs Last 24 Hrs  T(C): 37.1 (07 Mar 2023 05:00), Max: 37.2 (06 Mar 2023 21:56)  T(F): 98.7 (07 Mar 2023 05:00), Max: 98.9 (06 Mar 2023 21:56)  HR: 76 (07 Mar 2023 05:00) (66 - 85)  BP: 146/84 (07 Mar 2023 05:00) (129/74 - 159/81)  BP(mean): --  RR: 18 (07 Mar 2023 05:00) (18 - 18)  SpO2: 95% (07 Mar 2023 05:00) (94% - 98%)    Parameters below as of 06 Mar 2023 21:56  Patient On (Oxygen Delivery Method): nasal cannula  O2 Flow (L/min): 3          GENERAL: pt examined bedside, laying comfortably in bed in NAD  HEENT: NC/AT, moist oral mucosa, clear conjunctiva, sclera nonicteric  RESPIRATORY: Normal respiratory effort, no wheezing, rhonchi, rales  CARDIOVASCULAR: RRR, normal S1 and S2  ABDOMEN: soft, obese, NT/ND, normoactive bowel sounds, no rebound/guarding  EXTREMITIES: No cynaosis, no clubbing, b/l LE pitting edema L>R, pulses are 1+ b/l   PSYCH: affect flat but cooperative   SKIN: b/l LE stasis like dermatitis skin changes, RLE with superimposed erythema from ankle to below the knee and streaking noted along lymphatic track along medial rt thigh, warm to touch, no crepitus on palpation, minimal pain to palpation         LABS:                                      11.4   15.22 )-----------( 234      ( 07 Mar 2023 06:21 )             36.3       03-07    135  |  101  |  21.2<H>  ----------------------------<  149<H>  3.9   |  24.0  |  1.53<H>    Ca    8.8      07 Mar 2023 06:21  Phos  2.2     03-07  Mg     2.3     03-07    TPro  7.1  /  Alb  3.3  /  TBili  0.4  /  DBili  x   /  AST  31  /  ALT  26  /  AlkPhos  96  03-07      CAPILLARY BLOOD GLUCOSE      POCT Blood Glucose.: 114 mg/dL (06 Mar 2023 13:07)  POCT Blood Glucose.: 118 mg/dL (06 Mar 2023 07:39)        RADIOLOGY & ADDITIONAL TESTS:    < from: Xray Chest 1 View- PORTABLE-Urgent (03.05.23 @ 22:07) >  IMPRESSION: No acute finding or change.    < end of copied text >        MEDICATIONS  (STANDING):  allopurinol 300 milliGRAM(s) Oral daily  aMIOdarone    Tablet 200 milliGRAM(s) Oral daily  amLODIPine   Tablet 10 milliGRAM(s) Oral daily  aspirin enteric coated 325 milliGRAM(s) Oral daily  atorvastatin 20 milliGRAM(s) Oral at bedtime  dextrose 5%. 1000 milliLiter(s) (50 mL/Hr) IV Continuous <Continuous>  dextrose 50% Injectable 25 Gram(s) IV Push once  DULoxetine 60 milliGRAM(s) Oral daily  glucagon  Injectable 1 milliGRAM(s) IntraMuscular once  influenza  Vaccine (HIGH DOSE) 0.7 milliLiter(s) IntraMuscular once  insulin glargine Injectable (LANTUS) 10 Unit(s) SubCutaneous at bedtime  insulin lispro (ADMELOG) corrective regimen sliding scale   SubCutaneous Before meals and at bedtime  insulin lispro Injectable (ADMELOG) 4 Unit(s) SubCutaneous before breakfast  insulin lispro Injectable (ADMELOG) 4 Unit(s) SubCutaneous before lunch  insulin lispro Injectable (ADMELOG) 4 Unit(s) SubCutaneous before dinner  levothyroxine 50 MICROGram(s) Oral daily  lisinopril 40 milliGRAM(s) Oral daily  piperacillin/tazobactam IVPB.. 3.375 Gram(s) IV Intermittent every 8 hours  rivaroxaban 15 milliGRAM(s) Oral with dinner  tamsulosin 0.4 milliGRAM(s) Oral at bedtime  vancomycin  IVPB 750 milliGRAM(s) IV Intermittent every 12 hours    MEDICATIONS  (PRN):  acetaminophen     Tablet .. 650 milliGRAM(s) Oral every 6 hours PRN Temp greater or equal to 38C (100.4F), Mild Pain (1 - 3)  aluminum hydroxide/magnesium hydroxide/simethicone Suspension 30 milliLiter(s) Oral every 4 hours PRN Dyspepsia  dextrose Oral Gel 15 Gram(s) Oral once PRN Blood Glucose LESS THAN 70 milliGRAM(s)/deciliter  melatonin 3 milliGRAM(s) Oral at bedtime PRN Insomnia  ondansetron Injectable 4 milliGRAM(s) IV Push every 8 hours PRN Nausea and/or Vomiting  oxycodone    5 mG/acetaminophen 325 mG 1 Tablet(s) Oral every 4 hours PRN Moderate Pain (4 - 6)                                     Chief Complaint:  RLE cellulitis     SUBJECTIVE / OVERNIGHT EVENTS:  no acute events reported overnight.  pt offers no acute ocmplains at this time.       I&O's Summary        PHYSICAL EXAM:  Vital Signs Last 24 Hrs  T(C): 37.1 (07 Mar 2023 05:00), Max: 37.2 (06 Mar 2023 21:56)  T(F): 98.7 (07 Mar 2023 05:00), Max: 98.9 (06 Mar 2023 21:56)  HR: 76 (07 Mar 2023 05:00) (66 - 85)  BP: 146/84 (07 Mar 2023 05:00) (129/74 - 159/81)  BP(mean): --  RR: 18 (07 Mar 2023 05:00) (18 - 18)  SpO2: 95% (07 Mar 2023 05:00) (94% - 98%)    Parameters below as of 06 Mar 2023 21:56  Patient On (Oxygen Delivery Method): nasal cannula  O2 Flow (L/min): 3          GENERAL: pt examined bedside, laying comfortably in bed in NAD  HEENT: NC/AT, moist oral mucosa, clear conjunctiva, sclera nonicteric  RESPIRATORY: Normal respiratory effort, no wheezing, rhonchi, rales  CARDIOVASCULAR: RRR, normal S1 and S2  ABDOMEN: soft, obese, NT/ND, normoactive bowel sounds, no rebound/guarding  EXTREMITIES: No cynaosis, no clubbing, b/l LE pitting edema L>R, pulses are 1+ b/l   PSYCH: affect flat but cooperative   SKIN: b/l LE stasis like dermatitis skin changes, RLE with superimposed erythema now involving foot up to knee w/ streaking along lymphatic track medial rt thigh, warmer than LLE, no crepitus on palpation, minimal pain to palpation         LABS:                                      11.4   15.22 )-----------( 234      ( 07 Mar 2023 06:21 )             36.3       03-07    135  |  101  |  21.2<H>  ----------------------------<  149<H>  3.9   |  24.0  |  1.53<H>    Ca    8.8      07 Mar 2023 06:21  Phos  2.2     03-07  Mg     2.3     03-07    TPro  7.1  /  Alb  3.3  /  TBili  0.4  /  DBili  x   /  AST  31  /  ALT  26  /  AlkPhos  96  03-07      CAPILLARY BLOOD GLUCOSE      POCT Blood Glucose.: 114 mg/dL (06 Mar 2023 13:07)  POCT Blood Glucose.: 118 mg/dL (06 Mar 2023 07:39)        RADIOLOGY & ADDITIONAL TESTS:    < from: Xray Chest 1 View- PORTABLE-Urgent (03.05.23 @ 22:07) >  IMPRESSION: No acute finding or change.    < end of copied text >        MEDICATIONS  (STANDING):  allopurinol 300 milliGRAM(s) Oral daily  aMIOdarone    Tablet 200 milliGRAM(s) Oral daily  amLODIPine   Tablet 10 milliGRAM(s) Oral daily  aspirin enteric coated 325 milliGRAM(s) Oral daily  atorvastatin 20 milliGRAM(s) Oral at bedtime  dextrose 5%. 1000 milliLiter(s) (50 mL/Hr) IV Continuous <Continuous>  dextrose 50% Injectable 25 Gram(s) IV Push once  DULoxetine 60 milliGRAM(s) Oral daily  glucagon  Injectable 1 milliGRAM(s) IntraMuscular once  influenza  Vaccine (HIGH DOSE) 0.7 milliLiter(s) IntraMuscular once  insulin glargine Injectable (LANTUS) 10 Unit(s) SubCutaneous at bedtime  insulin lispro (ADMELOG) corrective regimen sliding scale   SubCutaneous Before meals and at bedtime  insulin lispro Injectable (ADMELOG) 4 Unit(s) SubCutaneous before breakfast  insulin lispro Injectable (ADMELOG) 4 Unit(s) SubCutaneous before lunch  insulin lispro Injectable (ADMELOG) 4 Unit(s) SubCutaneous before dinner  levothyroxine 50 MICROGram(s) Oral daily  lisinopril 40 milliGRAM(s) Oral daily  piperacillin/tazobactam IVPB.. 3.375 Gram(s) IV Intermittent every 8 hours  rivaroxaban 15 milliGRAM(s) Oral with dinner  tamsulosin 0.4 milliGRAM(s) Oral at bedtime  vancomycin  IVPB 750 milliGRAM(s) IV Intermittent every 12 hours    MEDICATIONS  (PRN):  acetaminophen     Tablet .. 650 milliGRAM(s) Oral every 6 hours PRN Temp greater or equal to 38C (100.4F), Mild Pain (1 - 3)  aluminum hydroxide/magnesium hydroxide/simethicone Suspension 30 milliLiter(s) Oral every 4 hours PRN Dyspepsia  dextrose Oral Gel 15 Gram(s) Oral once PRN Blood Glucose LESS THAN 70 milliGRAM(s)/deciliter  melatonin 3 milliGRAM(s) Oral at bedtime PRN Insomnia  ondansetron Injectable 4 milliGRAM(s) IV Push every 8 hours PRN Nausea and/or Vomiting  oxycodone    5 mG/acetaminophen 325 mG 1 Tablet(s) Oral every 4 hours PRN Moderate Pain (4 - 6)

## 2023-03-07 NOTE — PHYSICAL THERAPY INITIAL EVALUATION ADULT - PLANNED THERAPY INTERVENTIONS, PT EVAL
3y2m female, vaccination up to date, pw fever x 3 days, also cough today.  mom also reports pt pointing to throat.  c/o abd pain, but no vomiting.  no prior abd surg. 3y2m female, vaccination up to date, pw fever x 3 days, also cough today.  mom also reports pt pointing to throat.  c/o abd pain per mom, states ongoing for about 1 wk, mom felt pt's abd was hard and gave her a massage on and pt had a BM and appeared hard in nature , but no vomiting.  no prior abd surg. stairs/gait training/transfer training

## 2023-03-07 NOTE — PHYSICAL THERAPY INITIAL EVALUATION ADULT - SITTING BALANCE: DYNAMIC
good balance
You can access the FollowMyHealth Patient Portal offered by [facility] by registering at the following website: [email or web address]. By joining [employer]’s FollowMyHealth portal, you will also be able to view your health information using other applications (apps) compatible with our system.

## 2023-03-07 NOTE — PHYSICAL THERAPY INITIAL EVALUATION ADULT - RANGE OF MOTION EXAMINATION, REHAB EVAL
except left shoulder flexion/ abduction to 90 deg. left hand ROM through PROM/bilateral upper extremity ROM was WFL (within functional limits)/bilateral lower extremity ROM was WFL (within functional limits)

## 2023-03-07 NOTE — PHYSICAL THERAPY INITIAL EVALUATION ADULT - PERTINENT HX OF CURRENT PROBLEM, REHAB EVAL
68 y/o male with hx of Afib on Xarelto, obesity, CKD-3, HTN, HLD, DM-2, Hypothyroidism, JASON, Gout, RCC s/p right nephrectomy in 4/22 with no chemo/XRT, known HCC s/p liver biopsy 6/22 currently being followed at [facility]. Admitted on 3/6 with RLE cellulitis, marked leukocytosis 21K and fever. started on empiric piperacillin-tazobactam and vancomycin

## 2023-03-07 NOTE — PHYSICAL THERAPY INITIAL EVALUATION ADULT - ADDITIONAL COMMENTS
Pt reports living with friends in a house with 3 ALLEY c rail, and resides on the main level. Pt amb without device or SAC and is independent with functional mobility, ADLs, and IADLs. Pt drives and is currently working. Pt has support of friends. Pt owns RW and SAC.

## 2023-03-07 NOTE — PROCEDURE NOTE - ADDITIONAL PROCEDURE DETAILS
#20G 10CM 28CIRC BARD POWER GLIDE MIDLINE inserted with ultrasound guidance.   Good flash, ns flush right cephalic vein.   Patient tolerated well.
Called to evaluate patient for peripheral access.  20G Peripheral IV placed in left forearm using ultrasound, 3 attempts, good flow, can be accessed.

## 2023-03-07 NOTE — PROGRESS NOTE ADULT - SUBJECTIVE AND OBJECTIVE BOX
[facility]  INFECTIOUS DISEASES at [place] and [place]  ===============================================================                               Joe Oviedo MD*     Merlene Ghosh MD*                         Fuentes Luke MD*       Martha Pollard MD*            Diplomates American Board of Internal Medicine & Infectious Diseases                * [place] Office - Appt - Tel  [phone] Fax [phone]                * [place] Office - Appt - Tel [phone] Fax [phone]                                  Hospital Consult line:  [phone]  ==============================================================    AR[place] [id number]    Follow up: RLE cellulitis     Currently afebrile  hemodynamically stable   No acute events     I have personally reviewed the labs and data; pertinent labs and data are listed in this note; please see below.     _______________________________________________________________  REVIEW OF SYSTEMS  Feeling better. No side effects form abx - No N, V, D, GI upset, rash.   ________________________________________________________________  Allergies:  fish (Unknown)  No Known Drug Allergies  shellfish (Vomiting; Nausea)      ________________________________________________________________  PHYSICAL EXAM  GEN: NAD, lying in bed. Obese  HEENT: Anicteric sclerae, . Moist mucous membranes. No mucosal lesions.   LUNGS: eupneic.   :  No Crowley catheter  EXTREMITIES:  RLE - erythema/warmth and mild discomfort to touch now extending from below the knee to foot. No fluctuance, or discharge   PSYCHIATRIC: Appropriate affect and mood  SKIN: as described above.   LINES: no central lines, only peripheral access c/d/i  ________________________________________________________________  Vitals:  T(F): 98.1 (07 Mar 2023 11:02), Max: 98.9 (06 Mar 2023 21:56)  HR: 82 (07 Mar 2023 11:02)  BP: 132/76 (07 Mar 2023 11:02)  RR: 18 (07 Mar 2023 11:02)  SpO2: 98% (07 Mar 2023 11:02) (94% - 98%)  temp max in last 48H T(F): , Max: 103 (03-05-23 @ 20:46)    Current Antibiotics:  piperacillin/tazobactam IVPB.. 3.375 Gram(s) IV Intermittent every 8 hours  vancomycin  IVPB 1250 milliGRAM(s) IV Intermittent every 24 hours    Other medications:  allopurinol 300 milliGRAM(s) Oral daily  aMIOdarone    Tablet 200 milliGRAM(s) Oral daily  amLODIPine   Tablet 10 milliGRAM(s) Oral daily  aspirin enteric coated 325 milliGRAM(s) Oral daily  atorvastatin 20 milliGRAM(s) Oral at bedtime  dextrose 5%. 1000 milliLiter(s) IV Continuous <Continuous>  dextrose 50% Injectable 25 Gram(s) IV Push once  DULoxetine 60 milliGRAM(s) Oral daily  glucagon  Injectable 1 milliGRAM(s) IntraMuscular once  influenza  Vaccine (HIGH DOSE) 0.7 milliLiter(s) IntraMuscular once  insulin glargine Injectable (LANTUS) 10 Unit(s) SubCutaneous at bedtime  insulin lispro (ADMELOG) corrective regimen sliding scale   SubCutaneous Before meals and at bedtime  insulin lispro Injectable (ADMELOG) 4 Unit(s) SubCutaneous before breakfast  insulin lispro Injectable (ADMELOG) 4 Unit(s) SubCutaneous before lunch  insulin lispro Injectable (ADMELOG) 4 Unit(s) SubCutaneous before dinner  levothyroxine 50 MICROGram(s) Oral daily  lisinopril 40 milliGRAM(s) Oral daily  potassium phosphate / sodium phosphate Powder (PHOS-NaK) 1 Packet(s) Oral every 4 hours  rivaroxaban 15 milliGRAM(s) Oral with dinner  tamsulosin 0.4 milliGRAM(s) Oral at bedtime                            11.4   15.22 )-----------( 234      ( 07 Mar 2023 06:21 )             36.3     03-07    135  |  101  |  21.2<H>  ----------------------------<  149<H>  3.9   |  24.0  |  1.53<H>    Ca    8.8      07 Mar 2023 06:21  Phos  2.2     03-07  Mg     2.3     03-07    TPro  7.1  /  Alb  3.3  /  TBili  0.4  /  DBili  x   /  AST  31  /  ALT  26  /  AlkPhos  96  03-07    RECENT CULTURES:  03-05 @ 22:50    RVP with SARS-CoV-2   NotDetec      03-05 @ 22:00 .Blood Blood-Peripheral     No growth to date.      03-05 @ 21:50 .Blood Blood-Peripheral     No growth to date.        WBC Count: 15.22 K/uL (03-07-23 @ 06:21)  WBC Count: 18.41 K/uL (03-06-23 @ 08:34)  WBC Count: 20.88 K/uL (03-05-23 @ 22:50)    Creatinine, Serum: 1.53 mg/dL (03-07-23 @ 06:21)  Creatinine, Serum: 1.60 mg/dL (03-06-23 @ 08:34)  Creatinine, Serum: 1.57 mg/dL (03-05-23 @ 22:50)      SARS-CoV-2: NotDetec (03-05-23 @ 22:50)    ________________________________________________________________  RADIOLOGY  < from: Xray Chest 1 View- PORTABLE-Urgent (03.05.23 @ 22:07) >  INTERPRETATION:  AP chest on March 5, 2023 at 9:50 PM. Patient has sepsis.    Heart magnified by technique but possibly enlarged.    Lungs are clear.    Chest is similar to December 18, 2018.    IMPRESSION: No acute finding or change.    < end of copied text >

## 2023-03-07 NOTE — PROGRESS NOTE ADULT - ASSESSMENT
68 y/o male with hx of Afib on Xarelto, obesity, CKD-3, HTN, HLD, DM-2, Hypothyroidism, JASON, Gout, RCC s/p right nephrectomy in 4/22 with no chemo/XRT, known HCC s/p liver biopsy 6/22 currently being followed at [facility]. Admitted on 3/6 with RLE cellulitis, marked leukocytosis 21K and fever. started on empiric piperacillin-tazobactam and vancomycin     RLE cellulitis  Leukocytosis  Fever   Obesity     - Erythema now extending to right foot as well   - Continue piperacillin-tazobactam and vanco pending cultures  - Monitor vanco through; dose adjustment per pharmacy protocol   - Vanco level subtherapeutic   - Check CT RLE w/o contrast (given CKD)  - BCX NGTD   - Check MRSA nasal swab (ordered)  - RVP neg   - Prior available micro data reviewed   - Trend leukocytosis - slowly trending down   - monitor temp curve     d/w Dr. Sierra

## 2023-03-07 NOTE — PROGRESS NOTE ADULT - ASSESSMENT
66 y/o M w/ PMH of morbid obesity, pAF (on xarelto), CKD III, HTN, HLD, DMII, RCC s/p rt nephrectomy, HCC, gout, hypothyroid came in c/o RLE redness found to have RLE cellulitis.  Initial  w/u significant for wbc 20K and lactic acidosis and admitted for sepsis 2/2 RLE cellulitis.        Sepsis 2/2 RLE cellulitis  - Leukocytosis trending down and lactic acidosis resolved s/p IVFs  - Remains afebrile  - c/w broad spectrum IV abx with MRSA coverage pending cultures  - No crepitus or fluctuance on exam  - If spikes recurring fevers will order sono of LE or CT but noncon in light of CKD s/p nephrectomy   - Bcx/sensitivities pending   - ID following and recs noted       AFib   - On Xarelto  - c/w Amiodarone       CKD-3b / RCC s/p nephrectomy  - Cr. Stable  - Avoid nephrotoxins given ckd and s/p nephrectomy   - Renally dose meds as indicated   - F/u with Dr. Stratton and Dr. Shetty as scheduled outpt       HTN / HLD  - c/w home meds       DM-2  - A1c 7.4  - Hold oral agents while hospitalized  - Basal/bolus regimen with coverage scale  - titrate insulin regimen to maintain BG<180      Normocytic anemia   - Likely of chronic dx in the setting of CKD  - Will check iron panel   - Hemodynamically stable and no active bleeding   - Monitor CBC and transfuse as needed      HCC  - f/u w/ Dr. Stratton at [facility] as scheduled       Gout  - c/w Allopurinol       Hypothyroidism  - c/w Synthroid       VTE ppx: xarelto     Dispo: remains acute requiring inpt hospitalization.  68 y/o M w/ PMH of morbid obesity, pAF (on xarelto), CKD III, HTN, HLD, DMII, RCC s/p rt nephrectomy, HCC, gout, hypothyroid came in c/o RLE redness found to have RLE cellulitis.  Initial  w/u significant for wbc 20K and lactic acidosis and admitted for sepsis 2/2 RLE cellulitis.        Sepsis 2/2 RLE cellulitis  - Leukocytosis trending down and lactic acidosis resolved s/p IVFs  - Remains afebrile however cellulitis spreading despite broad spec abxs  - c/w Vanco/Zosyn pending blood cxs/sensitivities   - Discussed w/ ID and will order CT noncon of RLE given worsening clinical exam   - Trend CBC and monitor temperature   - ID following and recs noted       AFib   - On Xarelto  - INR likely elevated 2/2 doac    - c/w Amiodarone       CKD-3b / RCC s/p nephrectomy  - Cr. Stable  - Avoid nephrotoxins given ckd and s/p nephrectomy   - Renally dose meds as indicated   - F/u with Dr. Stratton and Dr. Shetty as scheduled outpt       HTN / HLD  - c/w home meds       DM-2  - A1c 7.4  - Hold oral agents while hospitalized  - Basal/bolus regimen with coverage scale  - titrate insulin regimen to maintain BG<180      Normocytic anemia   - Likely of chronic dx in the setting of CKD  - Will check iron panel   - Hemodynamically stable and no active bleeding   - Monitor CBC and transfuse as needed      HCC  - f/u w/ Dr. Stratton at [facility] as scheduled       Gout  - c/w Allopurinol       Hypothyroidism  - c/w Synthroid       VTE ppx: xarelto     Dispo: remains acute requiring inpt hospitalization.

## 2023-03-08 LAB
ANION GAP SERPL CALC-SCNC: 11 MMOL/L — SIGNIFICANT CHANGE UP (ref 5–17)
BASOPHILS # BLD AUTO: 0.06 K/UL — SIGNIFICANT CHANGE UP (ref 0–0.2)
BASOPHILS NFR BLD AUTO: 0.5 % — SIGNIFICANT CHANGE UP (ref 0–2)
BUN SERPL-MCNC: 21.3 MG/DL — HIGH (ref 8–20)
CALCIUM SERPL-MCNC: 8.8 MG/DL — SIGNIFICANT CHANGE UP (ref 8.4–10.5)
CHLORIDE SERPL-SCNC: 99 MMOL/L — SIGNIFICANT CHANGE UP (ref 96–108)
CO2 SERPL-SCNC: 23 MMOL/L — SIGNIFICANT CHANGE UP (ref 22–29)
CREAT SERPL-MCNC: 1.44 MG/DL — HIGH (ref 0.5–1.3)
EGFR: 54 ML/MIN/1.73M2 — LOW
EOSINOPHIL # BLD AUTO: 0.24 K/UL — SIGNIFICANT CHANGE UP (ref 0–0.5)
EOSINOPHIL NFR BLD AUTO: 2 % — SIGNIFICANT CHANGE UP (ref 0–6)
FERRITIN SERPL-MCNC: 187 NG/ML — SIGNIFICANT CHANGE UP (ref 30–400)
GLUCOSE BLDC GLUCOMTR-MCNC: 128 MG/DL — HIGH (ref 70–99)
GLUCOSE BLDC GLUCOMTR-MCNC: 167 MG/DL — HIGH (ref 70–99)
GLUCOSE BLDC GLUCOMTR-MCNC: 173 MG/DL — HIGH (ref 70–99)
GLUCOSE BLDC GLUCOMTR-MCNC: 176 MG/DL — HIGH (ref 70–99)
GLUCOSE SERPL-MCNC: 153 MG/DL — HIGH (ref 70–99)
HCT VFR BLD CALC: 36 % — LOW (ref 39–50)
HGB BLD-MCNC: 11 G/DL — LOW (ref 13–17)
IMM GRANULOCYTES NFR BLD AUTO: 0.7 % — SIGNIFICANT CHANGE UP (ref 0–0.9)
IRON SATN MFR SERPL: 15 UG/DL — LOW (ref 59–158)
IRON SATN MFR SERPL: 5 % — LOW (ref 16–55)
LYMPHOCYTES # BLD AUTO: 1.15 K/UL — SIGNIFICANT CHANGE UP (ref 1–3.3)
LYMPHOCYTES # BLD AUTO: 9.8 % — LOW (ref 13–44)
MCHC RBC-ENTMCNC: 25.5 PG — LOW (ref 27–34)
MCHC RBC-ENTMCNC: 30.6 GM/DL — LOW (ref 32–36)
MCV RBC AUTO: 83.3 FL — SIGNIFICANT CHANGE UP (ref 80–100)
MONOCYTES # BLD AUTO: 0.7 K/UL — SIGNIFICANT CHANGE UP (ref 0–0.9)
MONOCYTES NFR BLD AUTO: 6 % — SIGNIFICANT CHANGE UP (ref 2–14)
NEUTROPHILS # BLD AUTO: 9.52 K/UL — HIGH (ref 1.8–7.4)
NEUTROPHILS NFR BLD AUTO: 81 % — HIGH (ref 43–77)
PHOSPHATE SERPL-MCNC: 2.7 MG/DL — SIGNIFICANT CHANGE UP (ref 2.4–4.7)
PLATELET # BLD AUTO: 253 K/UL — SIGNIFICANT CHANGE UP (ref 150–400)
POTASSIUM SERPL-MCNC: 4.1 MMOL/L — SIGNIFICANT CHANGE UP (ref 3.5–5.3)
POTASSIUM SERPL-SCNC: 4.1 MMOL/L — SIGNIFICANT CHANGE UP (ref 3.5–5.3)
RBC # BLD: 4.32 M/UL — SIGNIFICANT CHANGE UP (ref 4.2–5.8)
RBC # FLD: 17.2 % — HIGH (ref 10.3–14.5)
SODIUM SERPL-SCNC: 133 MMOL/L — LOW (ref 135–145)
TIBC SERPL-MCNC: 325 UG/DL — SIGNIFICANT CHANGE UP (ref 220–430)
TRANSFERRIN SERPL-MCNC: 227 MG/DL — SIGNIFICANT CHANGE UP (ref 180–329)
VANCOMYCIN TROUGH SERPL-MCNC: 5.9 UG/ML — LOW (ref 10–20)
WBC # BLD: 11.75 K/UL — HIGH (ref 3.8–10.5)
WBC # FLD AUTO: 11.75 K/UL — HIGH (ref 3.8–10.5)

## 2023-03-08 PROCEDURE — 99232 SBSQ HOSP IP/OBS MODERATE 35: CPT

## 2023-03-08 PROCEDURE — 99233 SBSQ HOSP IP/OBS HIGH 50: CPT

## 2023-03-08 RX ORDER — PANTOPRAZOLE SODIUM 20 MG/1
40 TABLET, DELAYED RELEASE ORAL
Refills: 0 | Status: DISCONTINUED | OUTPATIENT
Start: 2023-03-08 | End: 2023-03-16

## 2023-03-08 RX ORDER — ASPIRIN/CALCIUM CARB/MAGNESIUM 324 MG
81 TABLET ORAL DAILY
Refills: 0 | Status: DISCONTINUED | OUTPATIENT
Start: 2023-03-08 | End: 2023-03-16

## 2023-03-08 RX ADMIN — ATORVASTATIN CALCIUM 20 MILLIGRAM(S): 80 TABLET, FILM COATED ORAL at 21:30

## 2023-03-08 RX ADMIN — PIPERACILLIN AND TAZOBACTAM 25 GRAM(S): 4; .5 INJECTION, POWDER, LYOPHILIZED, FOR SOLUTION INTRAVENOUS at 21:28

## 2023-03-08 RX ADMIN — RIVAROXABAN 15 MILLIGRAM(S): KIT at 17:25

## 2023-03-08 RX ADMIN — Medication 50 MICROGRAM(S): at 05:43

## 2023-03-08 RX ADMIN — LISINOPRIL 40 MILLIGRAM(S): 2.5 TABLET ORAL at 05:43

## 2023-03-08 RX ADMIN — TAMSULOSIN HYDROCHLORIDE 0.4 MILLIGRAM(S): 0.4 CAPSULE ORAL at 21:30

## 2023-03-08 RX ADMIN — Medication 2: at 21:29

## 2023-03-08 RX ADMIN — Medication 81 MILLIGRAM(S): at 11:49

## 2023-03-08 RX ADMIN — PANTOPRAZOLE SODIUM 40 MILLIGRAM(S): 20 TABLET, DELAYED RELEASE ORAL at 08:56

## 2023-03-08 RX ADMIN — INSULIN GLARGINE 10 UNIT(S): 100 INJECTION, SOLUTION SUBCUTANEOUS at 21:34

## 2023-03-08 RX ADMIN — Medication 4 UNIT(S): at 07:58

## 2023-03-08 RX ADMIN — Medication 4 UNIT(S): at 11:50

## 2023-03-08 RX ADMIN — DULOXETINE HYDROCHLORIDE 60 MILLIGRAM(S): 30 CAPSULE, DELAYED RELEASE ORAL at 11:49

## 2023-03-08 RX ADMIN — PIPERACILLIN AND TAZOBACTAM 25 GRAM(S): 4; .5 INJECTION, POWDER, LYOPHILIZED, FOR SOLUTION INTRAVENOUS at 05:43

## 2023-03-08 RX ADMIN — PIPERACILLIN AND TAZOBACTAM 25 GRAM(S): 4; .5 INJECTION, POWDER, LYOPHILIZED, FOR SOLUTION INTRAVENOUS at 15:28

## 2023-03-08 RX ADMIN — Medication 300 MILLIGRAM(S): at 11:48

## 2023-03-08 RX ADMIN — AMLODIPINE BESYLATE 10 MILLIGRAM(S): 2.5 TABLET ORAL at 05:43

## 2023-03-08 RX ADMIN — AMIODARONE HYDROCHLORIDE 200 MILLIGRAM(S): 400 TABLET ORAL at 05:43

## 2023-03-08 RX ADMIN — Medication 2: at 07:57

## 2023-03-08 RX ADMIN — Medication 166.67 MILLIGRAM(S): at 10:59

## 2023-03-08 RX ADMIN — Medication 4 UNIT(S): at 16:29

## 2023-03-08 RX ADMIN — Medication 2: at 11:49

## 2023-03-08 NOTE — PROGRESS NOTE ADULT - ASSESSMENT
66 y/o M w/ PMH of morbid obesity, pAF (on xarelto), CKD III, HTN, HLD, DMII, RCC s/p rt nephrectomy, HCC, gout, hypothyroid came in c/o RLE redness found to have RLE cellulitis.  Initial  w/u significant for wbc 20K and lactic acidosis and admitted for sepsis 2/2 RLE cellulitis.        Sepsis 2/2 RLE cellulitis  - Leukocytosis trending down and lactic acidosis resolved s/p IVFs  - Remains afebrile however cellulitis spreading despite broad spec abxs  - c/w Vanco/Zosyn pending blood cxs/sensitivities   - Discussed w/ ID and will order CT noncon of RLE given worsening clinical exam   - Trend CBC and monitor temperature   - ID following and recs noted       AFib   - On Xarelto  - INR likely elevated 2/2 doac    - c/w Amiodarone       CKD-3b / RCC s/p nephrectomy  - Cr. Stable  - Avoid nephrotoxins given ckd and s/p nephrectomy   - Renally dose meds as indicated   - F/u with Dr. Stratton and Dr. Shetty as scheduled outpt       HTN / HLD  - c/w home meds       DM-2  - A1c 7.4  - Hold oral agents while hospitalized  - Basal/bolus regimen with coverage scale  - titrate insulin regimen to maintain BG<180      Normocytic anemia   - Likely of chronic dx in the setting of CKD  - Will check iron panel   - Hemodynamically stable and no active bleeding   - Monitor CBC and transfuse as needed      HCC  - f/u w/ Dr. Stratton at [facility] as scheduled       Gout  - c/w Allopurinol       Hypothyroidism  - c/w Synthroid       VTE ppx: xarelto     Dispo: remains acute requiring inpt hospitalization.  66 y/o M w/ PMH of morbid obesity, pAF (on xarelto), CKD III, HTN, HLD, DMII, RCC s/p rt nephrectomy, HCC, gout, hypothyroid came in c/o RLE redness found to have RLE cellulitis.  Initial  w/u significant for wbc 20K and lactic acidosis and admitted for sepsis 2/2 RLE cellulitis.  Clinically RLE worse despite improvement in labs and no fevers.        Sepsis 2/2 RLE cellulitis  - Leukocytosis continues trending down, LA resolved and remains afebrile   - Pt nontoxic appearing however RLE exam worse then yesterday   - No blistering or crepitus and WBC improving making nec fasc unlikely but if suspicion increases will order cpk, add clinda and consult surgery   - Pulses palpable, no paresthesias and pain stable overall to suggest compartment syndrome but will monitor closely and if suspicion increases will order cpk and consult surgery      - Bcxs NGTD x2   - CT of RLE (noncon in light of renal dx) shows no well formed collection and no gas reported   - c/w Vanco/Zosyn pending blood cxs/sensitivities   - Trend CBC and monitor temperature   - ID following and recs noted       AFib   - On Xarelto  - INR likely elevated 2/2 doac    - c/w Amiodarone       CKD-3b / RCC s/p nephrectomy  - Cr. Stable  - Avoid nephrotoxins given ckd and s/p nephrectomy   - Renally dose meds as indicated   - F/u with Dr. Stratton and Dr. Shetty as scheduled outpt       HTN / HLD  - c/w home meds       DM-2  - A1c 7.4  - Hold oral agents while hospitalized  - Basal/bolus regimen with coverage scale  - titrate insulin regimen to maintain BG<180      Normocytic anemia   - Likely of chronic dx in the setting of CKD  - Iron panel reviewed   - Hemodynamically stable and no active bleeding   - Monitor CBC and transfuse as needed      HCC  - f/u w/ Dr. Stratton at [facility] as scheduled       Gout  - c/w Allopurinol       Hypothyroidism  - c/w Synthroid       VTE ppx: xarelto     Dispo: remains acute requiring inpt hospitalization. Low threshold for upgrade to SDU.

## 2023-03-08 NOTE — PROGRESS NOTE ADULT - SUBJECTIVE AND OBJECTIVE BOX
Chief Complaint:  RLE cellulitis     SUBJECTIVE / OVERNIGHT EVENTS:  no acute events reported overnight.  Pt c/o of worsening RLE swelling and feels pain and pressure to RLE that he wasnt feeling before.  He reports pain is mild to moderate but denies paresthesias.        I&O's Summary        PHYSICAL EXAM:  Vital Signs Last 24 Hrs  T(C): 36.3 (08 Mar 2023 04:55), Max: 37.4 (07 Mar 2023 21:00)  T(F): 97.3 (08 Mar 2023 04:55), Max: 99.3 (07 Mar 2023 21:00)  HR: 74 (08 Mar 2023 04:55) (73 - 82)  BP: 156/99 (08 Mar 2023 04:55) (132/76 - 163/88)  BP(mean): 109 (07 Mar 2023 21:00) (109 - 109)  RR: 18 (08 Mar 2023 04:55) (18 - 19)  SpO2: 97% (08 Mar 2023 04:55) (94% - 98%)    Parameters below as of 08 Mar 2023 04:55  Patient On (Oxygen Delivery Method): BiPAP/CPAP      GENERAL: pt examined bedside, appears uncomfortable but in NAD  HEENT: NC/AT, moist oral mucosa, clear conjunctiva, sclera nonicteric  RESPIRATORY: Normal respiratory effort, no wheezing, rhonchi, rales  CARDIOVASCULAR: RRR, normal S1 and S2  ABDOMEN: soft, obese, NT/ND, +BS, no rebound/guarding  EXTREMITIES: No cynaosis, no clubbing, b/l LE pitting edema L>>>R, pulses are 1+ b/l   SKIN: b/l LE stasis like dermatitis skin changes, RLE with superimposed erythema edema now involving entire rt foot and distal rt thigh anteriorly and 3/4 of posterior/medial rt thigh, feels tense on post thigh and calf but softer pretibially, warm to touch, no blistering noted, no crepitus to palpation but moderate pain to palpation   no crepitus on palpation, minimal pain to palpation         LABS:                                      11.4   15.22 )-----------( 234      ( 07 Mar 2023 06:21 )             36.3       03-07    135  |  101  |  21.2<H>  ----------------------------<  149<H>  3.9   |  24.0  |  1.53<H>    Ca    8.8      07 Mar 2023 06:21  Phos  2.2     03-07  Mg     2.3     03-07    TPro  7.1  /  Alb  3.3  /  TBili  0.4  /  DBili  x   /  AST  31  /  ALT  26  /  AlkPhos  96  03-07      CAPILLARY BLOOD GLUCOSE      POCT Blood Glucose.: 114 mg/dL (06 Mar 2023 13:07)  POCT Blood Glucose.: 118 mg/dL (06 Mar 2023 07:39)        RADIOLOGY & ADDITIONAL TESTS:    < from: Xray Chest 1 View- PORTABLE-Urgent (03.05.23 @ 22:07) >  IMPRESSION: No acute finding or change.    < end of copied text >        MEDICATIONS  (STANDING):  allopurinol 300 milliGRAM(s) Oral daily  aMIOdarone    Tablet 200 milliGRAM(s) Oral daily  amLODIPine   Tablet 10 milliGRAM(s) Oral daily  aspirin enteric coated 325 milliGRAM(s) Oral daily  atorvastatin 20 milliGRAM(s) Oral at bedtime  dextrose 5%. 1000 milliLiter(s) (50 mL/Hr) IV Continuous <Continuous>  dextrose 50% Injectable 25 Gram(s) IV Push once  DULoxetine 60 milliGRAM(s) Oral daily  glucagon  Injectable 1 milliGRAM(s) IntraMuscular once  influenza  Vaccine (HIGH DOSE) 0.7 milliLiter(s) IntraMuscular once  insulin glargine Injectable (LANTUS) 10 Unit(s) SubCutaneous at bedtime  insulin lispro (ADMELOG) corrective regimen sliding scale   SubCutaneous Before meals and at bedtime  insulin lispro Injectable (ADMELOG) 4 Unit(s) SubCutaneous before breakfast  insulin lispro Injectable (ADMELOG) 4 Unit(s) SubCutaneous before lunch  insulin lispro Injectable (ADMELOG) 4 Unit(s) SubCutaneous before dinner  levothyroxine 50 MICROGram(s) Oral daily  lisinopril 40 milliGRAM(s) Oral daily  piperacillin/tazobactam IVPB.. 3.375 Gram(s) IV Intermittent every 8 hours  rivaroxaban 15 milliGRAM(s) Oral with dinner  tamsulosin 0.4 milliGRAM(s) Oral at bedtime  vancomycin  IVPB 750 milliGRAM(s) IV Intermittent every 12 hours    MEDICATIONS  (PRN):  acetaminophen     Tablet .. 650 milliGRAM(s) Oral every 6 hours PRN Temp greater or equal to 38C (100.4F), Mild Pain (1 - 3)  aluminum hydroxide/magnesium hydroxide/simethicone Suspension 30 milliLiter(s) Oral every 4 hours PRN Dyspepsia  dextrose Oral Gel 15 Gram(s) Oral once PRN Blood Glucose LESS THAN 70 milliGRAM(s)/deciliter  melatonin 3 milliGRAM(s) Oral at bedtime PRN Insomnia  ondansetron Injectable 4 milliGRAM(s) IV Push every 8 hours PRN Nausea and/or Vomiting  oxycodone    5 mG/acetaminophen 325 mG 1 Tablet(s) Oral every 4 hours PRN Moderate Pain (4 - 6)                                     Chief Complaint:  RLE cellulitis     SUBJECTIVE / OVERNIGHT EVENTS:  no acute events reported overnight.  Pt c/o of worsening RLE swelling and feels pain and pressure to RLE that he wasnt feeling before.  He reports pain is mild to moderate but denies paresthesias.        I&O's Summary        PHYSICAL EXAM:  Vital Signs Last 24 Hrs  T(C): 36.3 (08 Mar 2023 04:55), Max: 37.4 (07 Mar 2023 21:00)  T(F): 97.3 (08 Mar 2023 04:55), Max: 99.3 (07 Mar 2023 21:00)  HR: 74 (08 Mar 2023 04:55) (73 - 82)  BP: 156/99 (08 Mar 2023 04:55) (132/76 - 163/88)  BP(mean): 109 (07 Mar 2023 21:00) (109 - 109)  RR: 18 (08 Mar 2023 04:55) (18 - 19)  SpO2: 97% (08 Mar 2023 04:55) (94% - 98%)    Parameters below as of 08 Mar 2023 04:55  Patient On (Oxygen Delivery Method): BiPAP/CPAP      GENERAL: pt examined bedside, appears uncomfortable but in NAD  HEENT: NC/AT, moist oral mucosa, clear conjunctiva, sclera nonicteric  RESPIRATORY: Normal respiratory effort, no wheezing, rhonchi, rales  CARDIOVASCULAR: RRR, normal S1 and S2  ABDOMEN: soft, obese, NT/ND, +BS, no rebound/guarding  EXTREMITIES: No cynaosis, no clubbing, b/l LE pitting edema L>>>R, pulses are 1+ b/l   SKIN: b/l LE stasis like dermatitis skin changes, RLE with superimposed erythema edema now involving entire rt foot and distal rt thigh anteriorly and 3/4 of posterior/medial rt thigh, feels tense on post thigh and calf but softer pretibially, warm to touch, no blistering noted, no crepitus to palpation but increased pain to palpation as compared to yesterday         LABS:                                           11.0   11.75 )-----------( 253      ( 08 Mar 2023 05:01 )             36.0       03-08    133<L>  |  99  |  21.3<H>  ----------------------------<  153<H>  4.1   |  23.0  |  1.44<H>    Ca    8.8      08 Mar 2023 05:01  Phos  2.7     03-08  Mg     2.3     03-07    TPro  7.1  /  Alb  3.3  /  TBili  0.4  /  DBili  x   /  AST  31  /  ALT  26  /  AlkPhos  96  03-07      CAPILLARY BLOOD GLUCOSE      POCT Blood Glucose.: 114 mg/dL (06 Mar 2023 13:07)  POCT Blood Glucose.: 118 mg/dL (06 Mar 2023 07:39)        RADIOLOGY & ADDITIONAL TESTS:    < from: Xray Chest 1 View- PORTABLE-Urgent (03.05.23 @ 22:07) >  IMPRESSION: No acute finding or change.    < end of copied text >      < from: CT Lower Extremity No Cont, Right (03.07.23 @ 12:40) >  FINDINGS: There is diffuse subcutaneous soft tissue infiltration   extending from the level of the distal thigh through the foot, consistent   with the patient's history of cellulitis. There are no well-formed   collections. There is no CT evidence of osteomyelitis.    < end of copied text >      MEDICATIONS  (STANDING):  allopurinol 300 milliGRAM(s) Oral daily  aMIOdarone    Tablet 200 milliGRAM(s) Oral daily  amLODIPine   Tablet 10 milliGRAM(s) Oral daily  aspirin enteric coated 325 milliGRAM(s) Oral daily  atorvastatin 20 milliGRAM(s) Oral at bedtime  dextrose 5%. 1000 milliLiter(s) (50 mL/Hr) IV Continuous <Continuous>  dextrose 50% Injectable 25 Gram(s) IV Push once  DULoxetine 60 milliGRAM(s) Oral daily  glucagon  Injectable 1 milliGRAM(s) IntraMuscular once  influenza  Vaccine (HIGH DOSE) 0.7 milliLiter(s) IntraMuscular once  insulin glargine Injectable (LANTUS) 10 Unit(s) SubCutaneous at bedtime  insulin lispro (ADMELOG) corrective regimen sliding scale   SubCutaneous Before meals and at bedtime  insulin lispro Injectable (ADMELOG) 4 Unit(s) SubCutaneous before breakfast  insulin lispro Injectable (ADMELOG) 4 Unit(s) SubCutaneous before lunch  insulin lispro Injectable (ADMELOG) 4 Unit(s) SubCutaneous before dinner  levothyroxine 50 MICROGram(s) Oral daily  lisinopril 40 milliGRAM(s) Oral daily  piperacillin/tazobactam IVPB.. 3.375 Gram(s) IV Intermittent every 8 hours  rivaroxaban 15 milliGRAM(s) Oral with dinner  tamsulosin 0.4 milliGRAM(s) Oral at bedtime  vancomycin  IVPB 750 milliGRAM(s) IV Intermittent every 12 hours    MEDICATIONS  (PRN):  acetaminophen     Tablet .. 650 milliGRAM(s) Oral every 6 hours PRN Temp greater or equal to 38C (100.4F), Mild Pain (1 - 3)  aluminum hydroxide/magnesium hydroxide/simethicone Suspension 30 milliLiter(s) Oral every 4 hours PRN Dyspepsia  dextrose Oral Gel 15 Gram(s) Oral once PRN Blood Glucose LESS THAN 70 milliGRAM(s)/deciliter  melatonin 3 milliGRAM(s) Oral at bedtime PRN Insomnia  ondansetron Injectable 4 milliGRAM(s) IV Push every 8 hours PRN Nausea and/or Vomiting  oxycodone    5 mG/acetaminophen 325 mG 1 Tablet(s) Oral every 4 hours PRN Moderate Pain (4 - 6)

## 2023-03-08 NOTE — PROGRESS NOTE ADULT - SUBJECTIVE AND OBJECTIVE BOX
[facility]  INFECTIOUS DISEASES at [place] and [place]  ===============================================================                               Joe Oviedo MD*     Merlene Ghosh MD*                         Fuentes Luke MD*       Martha Pollard MD*            Diplomates American Board of Internal Medicine & Infectious Diseases                * [place] Office - Appt - Tel  [phone] Fax [phone]                * [place] Office - Appt - Tel [phone] Fax [phone]                                  Hospital Consult line:  [phone]  ==============================================================    [place]ET[place] GEOR[place] [id number]    Follow up: RLE cellulitis    Afebrile   hemodynamically stable  No acute events     I have personally reviewed the labs and data; pertinent labs and data are listed in this note; please see below.     _______________________________________________________________  REVIEW OF SYSTEMS  Worsening erythema in right foot, also new localizing swelling on thigh. Tolerating abx w/o side effects. NO N, V, D  ________________________________________________________________  Allergies:  fish (Unknown)  No Known Drug Allergies  shellfish (Vomiting; Nausea)        ________________________________________________________________  PHYSICAL EXAM  GEN: in NAD, lying in bed. Obese  LUNGS: eupneic.   : No Crowley catheter  PSYCHIATRIC: Appropriate affect and mood  SKIN: RLE with worsening erythema on foot but improved more proximally; discrete bulging area on posteriomedial aspect of thigh just above the popliteal fossa  LINES: PIV  ________________________________________________________________  Vitals:  T(F): 97.8 (08 Mar 2023 11:23), Max: 99.3 (07 Mar 2023 21:00)  HR: 82 (08 Mar 2023 11:23)  BP: 143/84 (08 Mar 2023 11:23)  RR: 19 (08 Mar 2023 11:23)  SpO2: 96% (08 Mar 2023 11:23) (94% - 97%)  temp max in last 48H T(F): , Max: 99.3 (03-07-23 @ 21:00)    Current Antibiotics:  piperacillin/tazobactam IVPB.. 3.375 Gram(s) IV Intermittent every 8 hours  vancomycin  IVPB 1250 milliGRAM(s) IV Intermittent every 24 hours    Other medications:  allopurinol 300 milliGRAM(s) Oral daily  aMIOdarone    Tablet 200 milliGRAM(s) Oral daily  amLODIPine   Tablet 10 milliGRAM(s) Oral daily  aspirin  chewable 81 milliGRAM(s) Oral daily  atorvastatin 20 milliGRAM(s) Oral at bedtime  dextrose 5%. 1000 milliLiter(s) IV Continuous <Continuous>  dextrose 50% Injectable 25 Gram(s) IV Push once  DULoxetine 60 milliGRAM(s) Oral daily  glucagon  Injectable 1 milliGRAM(s) IntraMuscular once  influenza  Vaccine (HIGH DOSE) 0.7 milliLiter(s) IntraMuscular once  insulin glargine Injectable (LANTUS) 10 Unit(s) SubCutaneous at bedtime  insulin lispro (ADMELOG) corrective regimen sliding scale   SubCutaneous Before meals and at bedtime  insulin lispro Injectable (ADMELOG) 4 Unit(s) SubCutaneous before breakfast  insulin lispro Injectable (ADMELOG) 4 Unit(s) SubCutaneous before lunch  insulin lispro Injectable (ADMELOG) 4 Unit(s) SubCutaneous before dinner  levothyroxine 50 MICROGram(s) Oral daily  lisinopril 40 milliGRAM(s) Oral daily  pantoprazole    Tablet 40 milliGRAM(s) Oral before breakfast  rivaroxaban 15 milliGRAM(s) Oral with dinner  tamsulosin 0.4 milliGRAM(s) Oral at bedtime                            11.0   11.75 )-----------( 253      ( 08 Mar 2023 05:01 )             36.0     03-08    133<L>  |  99  |  21.3<H>  ----------------------------<  153<H>  4.1   |  23.0  |  1.44<H>    Ca    8.8      08 Mar 2023 05:01  Phos  2.7     03-08  Mg     2.3     03-07    TPro  7.1  /  Alb  3.3  /  TBili  0.4  /  DBili  x   /  AST  31  /  ALT  26  /  AlkPhos  96  03-07    RECENT CULTURES:  03-05 @ 22:50    RVP with SARS-CoV-2   NotDetec      03-05 @ 22:00 .Blood Blood-Peripheral     No growth to date.        03-05 @ 21:50 .Blood Blood-Peripheral     No growth to date.        WBC Count: 11.75 K/uL (03-08-23 @ 05:01)  WBC Count: 15.22 K/uL (03-07-23 @ 06:21)  WBC Count: 18.41 K/uL (03-06-23 @ 08:34)  WBC Count: 20.88 K/uL (03-05-23 @ 22:50)    Creatinine, Serum: 1.44 mg/dL (03-08-23 @ 05:01)  Creatinine, Serum: 1.53 mg/dL (03-07-23 @ 06:21)  Creatinine, Serum: 1.60 mg/dL (03-06-23 @ 08:34)  Creatinine, Serum: 1.57 mg/dL (03-05-23 @ 22:50)       SARS-CoV-2: NotDetec (03-05-23 @ 22:50)    ________________________________________________________________  RADIOLOGY  < from: CT Lower Extremity No Cont, Right (03.07.23 @ 12:40) >  INTERPRETATION:  EXAMINATION: CT of the right lower extremity without   contrast    CLINICAL INFORMATION: Right lower extremity cellulitis    TECHNIQUE: Axial CT images were obtained through the right lower   extremity from the level of the hip through the foot. Coronal and   sagittal reformatted images were made.    FINDINGS: There is diffuse subcutaneous soft tissue infiltration   extending from the level of the distal thigh through the foot, consistent   with the patient's history of cellulitis. There are no well-formed   collections. There is no CT evidence of osteomyelitis.    There are osteoarthritic changes at the sacroiliac joints, hips, right   knee, midfoot, tarsometatarsal joints, and first metatarsal-phalangeal   joint. There are vascular calcifications.    IMPRESSION: Cellulitic changes, as above. No well-formed collections. No   CT evidence of osteomyelitis.    < end of copied text >

## 2023-03-08 NOTE — PHARMACOTHERAPY INTERVENTION NOTE - COMMENTS
Vancomycin level 5.9 after 1 dose of 1250 mg. Discussed with ID, will check AM level (pre 3rd dose), if subtherapeutic will consider transition to daptomycin.

## 2023-03-08 NOTE — PROGRESS NOTE ADULT - ASSESSMENT
68 y/o male with hx of Afib on Xarelto, obesity, CKD-3, HTN, HLD, DM-2, Hypothyroidism, JASON, Gout, RCC s/p right nephrectomy in 4/22 with no chemo/XRT, known HCC s/p liver biopsy 6/22 currently being followed at [facility]. Admitted on 3/6 with RLE cellulitis, marked leukocytosis 21K and fever. started on empiric piperacillin-tazobactam and vancomycin     RLE cellulitis  Leukocytosis  Fever   Obesity     - Erythema not improving, actually slightly worse today   - Vanco dose adjusted yesterday   - Monitor vanco through; dose adjustment per pharmacy protocol   - CT RLE w/o evidence of collections   - Cont piperacillin-tazobactam   - BCX NGTD   - MRSA nasal swab neg   - RVP neg   - Prior available micro data reviewed   - Trend leukocytosis - slowly trending down   - monitor temp curve     d/w Dr. Sierra

## 2023-03-09 LAB
ALBUMIN SERPL ELPH-MCNC: 3.2 G/DL — LOW (ref 3.3–5.2)
ALP SERPL-CCNC: 112 U/L — SIGNIFICANT CHANGE UP (ref 40–120)
ALT FLD-CCNC: 67 U/L — HIGH
ANION GAP SERPL CALC-SCNC: 11 MMOL/L — SIGNIFICANT CHANGE UP (ref 5–17)
AST SERPL-CCNC: 56 U/L — HIGH
BASOPHILS # BLD AUTO: 0.06 K/UL — SIGNIFICANT CHANGE UP (ref 0–0.2)
BASOPHILS # BLD AUTO: 0.08 K/UL — SIGNIFICANT CHANGE UP (ref 0–0.2)
BASOPHILS NFR BLD AUTO: 0.5 % — SIGNIFICANT CHANGE UP (ref 0–2)
BASOPHILS NFR BLD AUTO: 0.6 % — SIGNIFICANT CHANGE UP (ref 0–2)
BILIRUB SERPL-MCNC: 0.4 MG/DL — SIGNIFICANT CHANGE UP (ref 0.4–2)
BUN SERPL-MCNC: 21.2 MG/DL — HIGH (ref 8–20)
CALCIUM SERPL-MCNC: 9 MG/DL — SIGNIFICANT CHANGE UP (ref 8.4–10.5)
CHLORIDE SERPL-SCNC: 101 MMOL/L — SIGNIFICANT CHANGE UP (ref 96–108)
CK SERPL-CCNC: 70 U/L — SIGNIFICANT CHANGE UP (ref 30–200)
CK SERPL-CCNC: 85 U/L — SIGNIFICANT CHANGE UP (ref 30–200)
CO2 SERPL-SCNC: 24 MMOL/L — SIGNIFICANT CHANGE UP (ref 22–29)
CREAT SERPL-MCNC: 1.44 MG/DL — HIGH (ref 0.5–1.3)
EGFR: 54 ML/MIN/1.73M2 — LOW
EOSINOPHIL # BLD AUTO: 0.23 K/UL — SIGNIFICANT CHANGE UP (ref 0–0.5)
EOSINOPHIL # BLD AUTO: 0.25 K/UL — SIGNIFICANT CHANGE UP (ref 0–0.5)
EOSINOPHIL NFR BLD AUTO: 1.8 % — SIGNIFICANT CHANGE UP (ref 0–6)
EOSINOPHIL NFR BLD AUTO: 2.1 % — SIGNIFICANT CHANGE UP (ref 0–6)
GLUCOSE BLDC GLUCOMTR-MCNC: 116 MG/DL — HIGH (ref 70–99)
GLUCOSE BLDC GLUCOMTR-MCNC: 175 MG/DL — HIGH (ref 70–99)
GLUCOSE BLDC GLUCOMTR-MCNC: 183 MG/DL — HIGH (ref 70–99)
GLUCOSE BLDC GLUCOMTR-MCNC: 231 MG/DL — HIGH (ref 70–99)
GLUCOSE SERPL-MCNC: 184 MG/DL — HIGH (ref 70–99)
HCT VFR BLD CALC: 37.1 % — LOW (ref 39–50)
HCT VFR BLD CALC: 39 % — SIGNIFICANT CHANGE UP (ref 39–50)
HGB BLD-MCNC: 11.6 G/DL — LOW (ref 13–17)
HGB BLD-MCNC: 11.8 G/DL — LOW (ref 13–17)
IMM GRANULOCYTES NFR BLD AUTO: 0.9 % — SIGNIFICANT CHANGE UP (ref 0–0.9)
IMM GRANULOCYTES NFR BLD AUTO: 1.2 % — HIGH (ref 0–0.9)
LACTATE BLDV-MCNC: 1.1 MMOL/L — SIGNIFICANT CHANGE UP (ref 0.5–2)
LACTATE SERPL-SCNC: 1.1 MMOL/L — SIGNIFICANT CHANGE UP (ref 0.5–2)
LYMPHOCYTES # BLD AUTO: 1.18 K/UL — SIGNIFICANT CHANGE UP (ref 1–3.3)
LYMPHOCYTES # BLD AUTO: 1.22 K/UL — SIGNIFICANT CHANGE UP (ref 1–3.3)
LYMPHOCYTES # BLD AUTO: 10.8 % — LOW (ref 13–44)
LYMPHOCYTES # BLD AUTO: 8.9 % — LOW (ref 13–44)
MAGNESIUM SERPL-MCNC: 2.3 MG/DL — SIGNIFICANT CHANGE UP (ref 1.6–2.6)
MCHC RBC-ENTMCNC: 25.3 PG — LOW (ref 27–34)
MCHC RBC-ENTMCNC: 25.9 PG — LOW (ref 27–34)
MCHC RBC-ENTMCNC: 30.3 GM/DL — LOW (ref 32–36)
MCHC RBC-ENTMCNC: 31.3 GM/DL — LOW (ref 32–36)
MCV RBC AUTO: 82.8 FL — SIGNIFICANT CHANGE UP (ref 80–100)
MCV RBC AUTO: 83.7 FL — SIGNIFICANT CHANGE UP (ref 80–100)
MONOCYTES # BLD AUTO: 0.7 K/UL — SIGNIFICANT CHANGE UP (ref 0–0.9)
MONOCYTES # BLD AUTO: 0.75 K/UL — SIGNIFICANT CHANGE UP (ref 0–0.9)
MONOCYTES NFR BLD AUTO: 5.5 % — SIGNIFICANT CHANGE UP (ref 2–14)
MONOCYTES NFR BLD AUTO: 6.4 % — SIGNIFICANT CHANGE UP (ref 2–14)
NEUTROPHILS # BLD AUTO: 11.23 K/UL — HIGH (ref 1.8–7.4)
NEUTROPHILS # BLD AUTO: 8.67 K/UL — HIGH (ref 1.8–7.4)
NEUTROPHILS NFR BLD AUTO: 79.3 % — HIGH (ref 43–77)
NEUTROPHILS NFR BLD AUTO: 82 % — HIGH (ref 43–77)
PHOSPHATE SERPL-MCNC: 3 MG/DL — SIGNIFICANT CHANGE UP (ref 2.4–4.7)
PLATELET # BLD AUTO: 288 K/UL — SIGNIFICANT CHANGE UP (ref 150–400)
PLATELET # BLD AUTO: 321 K/UL — SIGNIFICANT CHANGE UP (ref 150–400)
POTASSIUM SERPL-MCNC: 4.1 MMOL/L — SIGNIFICANT CHANGE UP (ref 3.5–5.3)
POTASSIUM SERPL-SCNC: 4.1 MMOL/L — SIGNIFICANT CHANGE UP (ref 3.5–5.3)
PROT SERPL-MCNC: 7.2 G/DL — SIGNIFICANT CHANGE UP (ref 6.6–8.7)
RBC # BLD: 4.48 M/UL — SIGNIFICANT CHANGE UP (ref 4.2–5.8)
RBC # BLD: 4.66 M/UL — SIGNIFICANT CHANGE UP (ref 4.2–5.8)
RBC # FLD: 17.2 % — HIGH (ref 10.3–14.5)
RBC # FLD: 17.3 % — HIGH (ref 10.3–14.5)
SODIUM SERPL-SCNC: 136 MMOL/L — SIGNIFICANT CHANGE UP (ref 135–145)
VANCOMYCIN TROUGH SERPL-MCNC: 7.2 UG/ML — LOW (ref 10–20)
WBC # BLD: 10.94 K/UL — HIGH (ref 3.8–10.5)
WBC # BLD: 13.7 K/UL — HIGH (ref 3.8–10.5)
WBC # FLD AUTO: 10.94 K/UL — HIGH (ref 3.8–10.5)
WBC # FLD AUTO: 13.7 K/UL — HIGH (ref 3.8–10.5)

## 2023-03-09 PROCEDURE — 99233 SBSQ HOSP IP/OBS HIGH 50: CPT

## 2023-03-09 PROCEDURE — 70450 CT HEAD/BRAIN W/O DYE: CPT | Mod: 26,77

## 2023-03-09 PROCEDURE — 93926 LOWER EXTREMITY STUDY: CPT | Mod: 26,RT

## 2023-03-09 PROCEDURE — 70450 CT HEAD/BRAIN W/O DYE: CPT | Mod: 26

## 2023-03-09 PROCEDURE — 73700 CT LOWER EXTREMITY W/O DYE: CPT | Mod: 26,RT

## 2023-03-09 PROCEDURE — 93970 EXTREMITY STUDY: CPT | Mod: 26

## 2023-03-09 RX ORDER — DAPTOMYCIN 500 MG/10ML
450 INJECTION, POWDER, LYOPHILIZED, FOR SOLUTION INTRAVENOUS EVERY 24 HOURS
Refills: 0 | Status: COMPLETED | OUTPATIENT
Start: 2023-03-09 | End: 2023-03-15

## 2023-03-09 RX ADMIN — Medication 4 UNIT(S): at 11:55

## 2023-03-09 RX ADMIN — Medication 81 MILLIGRAM(S): at 11:58

## 2023-03-09 RX ADMIN — AMIODARONE HYDROCHLORIDE 200 MILLIGRAM(S): 400 TABLET ORAL at 05:31

## 2023-03-09 RX ADMIN — LISINOPRIL 40 MILLIGRAM(S): 2.5 TABLET ORAL at 05:32

## 2023-03-09 RX ADMIN — Medication 4 UNIT(S): at 16:43

## 2023-03-09 RX ADMIN — PIPERACILLIN AND TAZOBACTAM 25 GRAM(S): 4; .5 INJECTION, POWDER, LYOPHILIZED, FOR SOLUTION INTRAVENOUS at 23:22

## 2023-03-09 RX ADMIN — Medication 2: at 07:59

## 2023-03-09 RX ADMIN — AMLODIPINE BESYLATE 10 MILLIGRAM(S): 2.5 TABLET ORAL at 05:32

## 2023-03-09 RX ADMIN — Medication 4 UNIT(S): at 07:59

## 2023-03-09 RX ADMIN — RIVAROXABAN 15 MILLIGRAM(S): KIT at 16:45

## 2023-03-09 RX ADMIN — PANTOPRAZOLE SODIUM 40 MILLIGRAM(S): 20 TABLET, DELAYED RELEASE ORAL at 05:32

## 2023-03-09 RX ADMIN — Medication 4: at 21:45

## 2023-03-09 RX ADMIN — PIPERACILLIN AND TAZOBACTAM 25 GRAM(S): 4; .5 INJECTION, POWDER, LYOPHILIZED, FOR SOLUTION INTRAVENOUS at 05:31

## 2023-03-09 RX ADMIN — ATORVASTATIN CALCIUM 20 MILLIGRAM(S): 80 TABLET, FILM COATED ORAL at 21:43

## 2023-03-09 RX ADMIN — Medication 2: at 11:53

## 2023-03-09 RX ADMIN — DULOXETINE HYDROCHLORIDE 60 MILLIGRAM(S): 30 CAPSULE, DELAYED RELEASE ORAL at 11:55

## 2023-03-09 RX ADMIN — INSULIN GLARGINE 10 UNIT(S): 100 INJECTION, SOLUTION SUBCUTANEOUS at 21:44

## 2023-03-09 RX ADMIN — TAMSULOSIN HYDROCHLORIDE 0.4 MILLIGRAM(S): 0.4 CAPSULE ORAL at 21:43

## 2023-03-09 RX ADMIN — DAPTOMYCIN 118 MILLIGRAM(S): 500 INJECTION, POWDER, LYOPHILIZED, FOR SOLUTION INTRAVENOUS at 11:53

## 2023-03-09 RX ADMIN — Medication 300 MILLIGRAM(S): at 11:54

## 2023-03-09 RX ADMIN — Medication 166.67 MILLIGRAM(S): at 08:30

## 2023-03-09 RX ADMIN — Medication 50 MICROGRAM(S): at 05:32

## 2023-03-09 RX ADMIN — PIPERACILLIN AND TAZOBACTAM 25 GRAM(S): 4; .5 INJECTION, POWDER, LYOPHILIZED, FOR SOLUTION INTRAVENOUS at 14:44

## 2023-03-09 NOTE — CONSULT NOTE ADULT - ASSESSMENT
ASSESSMENT: Patient is a 67y old male with worsening cellulitis     PLAN:    - patient compartment are soft , no pain on passive/active flexion , motor and sensory intact , DP/PT signals   no acute surgical intervention required at this time   - recommend leg elevation above the level of the heart   - IV abx per ID   - Q 4 compartment checks   - monitor CKMP   - recommend venous US to r/o DVT   - pain control  - DVT ppx  - OOB/ambulate  - strict I/Os  - Plan discussed and seen with Attending, Dr. Sheikh      ASSESSMENT: Patient is a 67y old male with worsening cellulitis     PLAN:    - patient compartment are soft , no pain on passive/active flexion , motor and sensory intact , DP/PT signals   no acute surgical intervention required at this time   - recommend leg elevation above the level of the heart   - IV abx per ID   - monitor CKMP   - recommend venous US to r/o DVT   - pain control  - DVT ppx  - OOB/ambulate  - strict I/Os  - call us if any questions or concern   - Plan discussed and seen with Attending, Dr. Sheikh

## 2023-03-09 NOTE — PROGRESS NOTE ADULT - ASSESSMENT
66 y/o M w/ PMH of morbid obesity, pAF (on xarelto), CKD III, HTN, HLD, DMII, RCC s/p rt nephrectomy, HCC, gout, hypothyroid came in c/o RLE redness found to have RLE cellulitis.  Initial  w/u significant for wbc 20K and lactic acidosis and admitted for sepsis 2/2 RLE cellulitis.  Clinically RLE worse despite improvement in labs and no fevers.        Sepsis 2/2 RLE cellulitis  - Clinically worse despite improved leukocytosis and remains afebrile w/ resolved LA  - No blistering, unable to test for crepitus given severe pain, now w/ paresthesias  - Stat CPK ordered, repeat CT RLE ordered and discussed w/ ID and will change abxs to dapto   - Unable to do CT w/ contrast due to renal fxn and s/p nephrectomy   - Pending CT RLE will consult surgery   - Bcxs NGTD x2   - Prior CT of RLE (noncon in light of renal dx) shows no well formed collection and no gas reported   - Trend CBC and monitor temperature   - ID following and recs noted       AFib   - On Xarelto  - INR likely elevated 2/2 doac    - c/w Amiodarone       CKD-3b / RCC s/p nephrectomy  - Cr. Stable  - Avoid nephrotoxins given ckd and s/p nephrectomy   - Renally dose meds as indicated   - F/u with Dr. Stratton and Dr. Shetty as scheduled outpt       HTN / HLD  - c/w home meds       DM-2  - A1c 7.4  - Hold oral agents while hospitalized  - Basal/bolus regimen with coverage scale  - titrate insulin regimen to maintain BG<180      Normocytic anemia   - Likely of chronic dx in the setting of CKD  - Iron panel reviewed   - Hemodynamically stable and no active bleeding   - Monitor CBC and transfuse as needed      HCC  - f/u w/ Dr. Stratton at [facility] as scheduled       Gout  - c/w Allopurinol       Hypothyroidism  - c/w Synthroid       VTE ppx: xarelto     Dispo: remains acute requiring inpt hospitalization. Low threshold for upgrade to SDU.

## 2023-03-09 NOTE — CONSULT NOTE ADULT - SUBJECTIVE AND OBJECTIVE BOX
HPI 67 M with PMHX afib on Xarelto , obesity, CKD-3, HTN, HLD, DM-2, Hypothyroidism, JASON, Gout, RCC s/p right nephrectomy in 4/22 with no chemo/XRT admitted to medicine servoce of cellulitis , patient has worsening cellulitis in physical exam despite Abx , surgery was consulted to r/o compartment syndrome       H&P "66 y/o male with hx of Afib on xarelto, obesity, CKD-3, HTN, HLD, DM-2, Hypothyroidism, JASON, Gout, RCC s/p right nephrectomy in 4/22 with no chemo/XRT, known HCC s/p liver biopsy 6/22 currently being followed at [facility]. Patient presents to the ED c/o right leg pain, and generalized weakness for the past 2-3 days. Denies fever, chills, N/V, CP, SOB. In the ED patient noted to be febrile, tachypneic, not hypotensive. Right leg with diffuse erythema, warmth. No crepitus, no fluctuance. Lactate 2.2, and Leucocytosis. Given IVF bolus based on IBW, pan cultured, and given broad spectrum Abx. Denies any other complaints at this time.     ROS: 10-system review is otherwise negative except HPI above.      PAST MEDICAL & SURGICAL HISTORY:  Afib  on Xarelto      HTN (hypertension)      Degenerative joint disease      Asthma      Obstructive sleep apnea  use BIPAP      Diabetes mellitus      Renal cell carcinoma      Hepatocellular carcinoma      History of left knee replacement  2015      H/O elbow surgery  right 1987      H/O cataract extraction  left 2020      H/O right nephrectomy  2022        FAMILY HISTORY:  Family history of cerebrovascular accident (CVA) (Mother)    Family history of essential hypertension (Sibling)    Family history of diabetes mellitus (Sibling)      Family history not pertinent as reviewed with the patient.    SOCIAL HISTORY:  Denies any toxic habits    ALLERGIES: NKA fish (Unknown)  No Known Drug Allergies  shellfish (Vomiting; Nausea)      HOME MEDICATIONS:   Home Medications:  allopurinol 300 mg oral tablet: 1 tab(s) orally once a day (24 Aug 2022 08:07)  amiodarone 200 mg oral tablet: 1 tab(s) orally once a day (24 Aug 2022 08:07)  amLODIPine 10 mg oral tablet: 1 tab(s) orally once a day (24 Aug 2022 08:07)  atorvastatin 20 mg oral tablet: 1 tab(s) orally once a day (24 Aug 2022 08:07)  DULoxetine 60 mg oral delayed release capsule: 1 cap(s) orally 2 times a day (24 Aug 2022 08:07)  Flomax 0.4 mg oral capsule: 1 cap(s) orally once a day (24 Aug 2022 08:07)  glimepiride 2 mg oral tablet: 1 tab(s) orally once a day (24 Aug 2022 08:07)  levothyroxine 50 mcg (0.05 mg) oral tablet: 1 tab(s) orally once a day (24 Aug 2022 08:07)  ProAir HFA 90 mcg/inh inhalation aerosol: 2 puff(s) inhaled 4 times a day, As Needed sob/wheezing/ COPD (24 Aug 2022 08:07)  ramipril 10 mg oral capsule: 1 cap(s) orally once a day (24 Aug 2022 08:07)  Xarelto 20 mg oral tablet: 1 tab(s) orally once a day (in the evening) (24 Aug 2022 08:07)      --------------------------------------------------------------------------------------------    PHYSICAL EXAM:   General: NAD, Lying in bed comfortably  Neuro: A+Ox3  HEENT: EOMI, PERRLA, MMM  Cardio: RRR  Resp: Non labored breathing on RA  GI/Abd: Soft, NT/ND, no rebound/guarding, no masses palpated  Vascular: RLE doppler DP/PT   Pelvis: stable  Musculoskeletal: RLE compartment are soft, motor and sensory intact ,  redness and swelling , skin tear on front of the foot   --------------------------------------------------------------------------------------------    LABS                 11.8   13.70  )----------(  321       ( 09 Mar 2023 19:10 )               39.0      136    |  101    |  21.2   ----------------------------<  184        ( 09 Mar 2023 05:38 )  4.1     |  24.0   |  1.44     Ca    9.0        ( 09 Mar 2023 05:38 )  Phos  3.0       ( 09 Mar 2023 05:38 )  Mg     2.3       ( 09 Mar 2023 05:38 )    TPro  7.2    /  Alb  3.2    /  TBili  0.4    /  DBili  x      /  AST  56     /  ALT  67     /  AlkPhos  112    ( 09 Mar 2023 05:38 )    LIVER FUNCTIONS - ( 09 Mar 2023 05:38 )  Alb: 3.2 g/dL / Pro: 7.2 g/dL / ALK PHOS: 112 U/L / ALT: 67 U/L / AST: 56 U/L / GGT: x               CAPILLARY BLOOD GLUCOSE    CARDIAC MARKERS ( 09 Mar 2023 19:10 )  x     / x     / 85 U/L / x     / x      CARDIAC MARKERS ( 09 Mar 2023 05:38 )  x     / x     / 70 U/L / x     / x              19:10 - VBG - pH:       | pCO2:       | pO2:       | Lactate: 1.10     --------------------------------------------------------------------------------------------  IMAGING  < from: US Duplex Arterial Lower Ext Ltd, Right (03.09.23 @ 13:34) >  IMPRESSION:    Diffuse calcified plaque without evidence of hemodynamically significant   stenosis or occlusion in the arteries of the right lower extremity.    High velocity low impedance waveforms throughout the right lower   extremity likely reflect local inflammatory process (cellulitis).    --- End of Report ---    < end of copied text >

## 2023-03-09 NOTE — CHART NOTE - NSCHARTNOTEFT_GEN_A_CORE
Called to bedside by RN for examination of the RLE. Pt seen at seen sitting in reclined chair. Pt reports that his RLE is more edematous than previously w/ increased tenderness to palpation. He reports paresthesia of the R foot which has been steady throughout the day. He denies numbness.       Physical Exam:  EXTREMITIES:  RLE cellulitis, diffuse erythema and edema from R foot up to mid thigh, R calf extremely tight on exam, skin blanching, warm to touch, R calf and upper thigh exquisitely tender to light palpation, open blister on the dorsal aspect of the R foot w/ serous discharge, +2 dorsalis pedis artery pulse vs +1 posterior tibial artery pulse (handheld doppler used)       A/P:  Pt's RLE is grossly edematous compared to previous exams. Erythema is extending past marked area from earlier this morning. Despite imaging showing no signs of vascular compromise, there is serious concern for compartment syndrome.  - Surgical team consulted for evaluation and recommendations  - CBC w/ diff, CPK, Lactate ordered STAT  - Q4 vital checks  - VSS  - Signed out to night team to monitor and follow up

## 2023-03-09 NOTE — CHART NOTE - NSCHARTNOTEFT_GEN_A_CORE
RRT called to radiology to evaluate pt s/p fall w/ head strike. Pt found on the floor alert and oriented x 4. Pt assisted up by staff and back into bed where he was assessed. He stood up to urinate in his urinal, at which point he slipped and fell backwards, striking his head on the wall. He denies losing consciousness. He reports a HA and     ROS: No chest pain, palpitations, SOB, lightheadedness, dizziness, headache, nausea/vomiting, fevers/chills, abdominal pain, dysuria or increased urinary frequency.    Vital Signs Last 24 Hrs  T(C): 37.2 (09 Mar 2023 12:17), Max: 37.6 (08 Mar 2023 21:35)  T(F): 99 (09 Mar 2023 12:17), Max: 99.6 (08 Mar 2023 21:35)  HR: 79 (09 Mar 2023 12:17) (68 - 80)  BP: 121/74 (09 Mar 2023 12:17) (121/74 - 159/89)  BP(mean): 106 (08 Mar 2023 21:35) (106 - 106)  RR: 19 (09 Mar 2023 12:17) (18 - 22)  SpO2: 92% (09 Mar 2023 12:17) (92% - 97%)    Parameters below as of 09 Mar 2023 12:17  Patient On (Oxygen Delivery Method): nasal cannula    GENERAL: NAD  HEAD: Atraumatic, Normocephalic  EYES: EOMI, PERRLA, conjunctiva and sclera clear  NERVOUS SYSTEM: A&Ox4, Good concentration, no dysarthria, no facial droop, CN II-XII are grossly intact, strength is equal b/l, follow commands  EXTREMITIES:  RLE cellulitis, diffuse erythema and edema from R foot up to mid thigh, open blister on the dorsal aspect of the R foot w/ bleeding, chronic skin changes of the foot    - CTH urgent w/ repeat in 6 hours  - VSS  - RN to notify for any changes RR team called to radiology to evaluate pt s/p fall w/ head strike. Pt found on the floor alert and oriented x 4. Pt assisted up by staff and back into bed where he was assessed. He stood up to urinate in his urinal, at which point he slipped and fell backwards, striking his head on the wall. He denies losing consciousness. He reports a HA and neck pain which he states he experienced at baseline. He denies any changes in his pain.    ROS: No chest pain, palpitations, SOB, lightheadedness, dizziness, headache, nausea/vomiting, fevers/chills, abdominal pain, dysuria or increased urinary frequency.    Vital Signs Last 24 Hrs  T(C): 37.2 (09 Mar 2023 12:17), Max: 37.6 (08 Mar 2023 21:35)  T(F): 99 (09 Mar 2023 12:17), Max: 99.6 (08 Mar 2023 21:35)  HR: 79 (09 Mar 2023 12:17) (68 - 80)  BP: 121/74 (09 Mar 2023 12:17) (121/74 - 159/89)  BP(mean): 106 (08 Mar 2023 21:35) (106 - 106)  RR: 19 (09 Mar 2023 12:17) (18 - 22)  SpO2: 92% (09 Mar 2023 12:17) (92% - 97%)    Parameters below as of 09 Mar 2023 12:17  Patient On (Oxygen Delivery Method): nasal cannula    GENERAL: NAD  HEAD: Atraumatic, Normocephalic  EYES: EOMI, PERRLA, conjunctiva and sclera clear  NERVOUS SYSTEM: A&Ox4, Good concentration, no dysarthria, no facial droop, CN II-XII are grossly intact, strength is equal b/l, follow commands  EXTREMITIES:  RLE cellulitis, diffuse erythema and edema from R foot up to mid thigh, open blister on the dorsal aspect of the R foot w/ bleeding, chronic skin changes of the foot    - CTH urgent w/ repeat in 6 hours  - VSS  - RN to notify for any changes

## 2023-03-09 NOTE — PROGRESS NOTE ADULT - SUBJECTIVE AND OBJECTIVE BOX
Chief Complaint:  RLE cellulitis     SUBJECTIVE / OVERNIGHT EVENTS:  no acute events reported overnight.  Pt continues c/o of worsening RLE swelling, pain 10/10 in med thigh and foot.  pt is also reporting new paresthesias to rt foot and rt lower leg.        I&O's Summary        PHYSICAL EXAM:  Vital Signs Last 24 Hrs  T(C): 36.8 (09 Mar 2023 05:03), Max: 37.6 (08 Mar 2023 21:35)  T(F): 98.2 (09 Mar 2023 05:03), Max: 99.6 (08 Mar 2023 21:35)  HR: 78 (09 Mar 2023 05:03) (68 - 82)  BP: 159/89 (09 Mar 2023 05:03) (130/72 - 159/89)  BP(mean): 106 (08 Mar 2023 21:35) (106 - 106)  RR: 18 (09 Mar 2023 05:03) (18 - 22)  SpO2: 92% (09 Mar 2023 05:03) (92% - 97%)    Parameters below as of 08 Mar 2023 21:35  Patient On (Oxygen Delivery Method): BiPAP/CPAP    O2 Concentration (%): 28      GENERAL: pt examined bedside, appears uncomfortable but in NAD  HEENT: NC/AT, moist oral mucosa, clear conjunctiva, sclera nonicteric  RESPIRATORY: Normal respiratory effort, no wheezing, rhonchi, rales  CARDIOVASCULAR: RRR, normal S1 and S2  ABDOMEN: soft, obese, NT/ND, +BS, no rebound/guarding  EXTREMITIES: No cynaosis, no clubbing, b/l LE pitting edema L>>>>R, pulses are 1+ LLE unable to assess pulses of RLE due to pain  SKIN: b/l LE stasis like dermatitis skin changes, RLE with superimposed erythema edema worsening involving entire rt foot and distal rt thigh anteriorly and 3/4 of posterior/medial rt thigh very tender out of proportion to clinical findings, lower leg circumferentially tense, no blistering noted, unable to assess for crepitus due to severe pain to palpation        LABS:                                           11.6   10.94 )-----------( 288      ( 09 Mar 2023 05:38 )             37.1       03-09    136  |  101  |  21.2<H>  ----------------------------<  184<H>  4.1   |  24.0  |  1.44<H>    Ca    9.0      09 Mar 2023 05:38  Phos  3.0     03-09  Mg     2.3     03-09    TPro  7.2  /  Alb  3.2<L>  /  TBili  0.4  /  DBili  x   /  AST  56<H>  /  ALT  67<H>  /  AlkPhos  112  03-09    CAPILLARY BLOOD GLUCOSE      POCT Blood Glucose.: 114 mg/dL (06 Mar 2023 13:07)  POCT Blood Glucose.: 118 mg/dL (06 Mar 2023 07:39)        RADIOLOGY & ADDITIONAL TESTS:    < from: Xray Chest 1 View- PORTABLE-Urgent (03.05.23 @ 22:07) >  IMPRESSION: No acute finding or change.    < end of copied text >      < from: CT Lower Extremity No Cont, Right (03.07.23 @ 12:40) >  FINDINGS: There is diffuse subcutaneous soft tissue infiltration   extending from the level of the distal thigh through the foot, consistent   with the patient's history of cellulitis. There are no well-formed   collections. There is no CT evidence of osteomyelitis.    < end of copied text >      MEDICATIONS  (STANDING):  allopurinol 300 milliGRAM(s) Oral daily  aMIOdarone    Tablet 200 milliGRAM(s) Oral daily  amLODIPine   Tablet 10 milliGRAM(s) Oral daily  aspirin enteric coated 325 milliGRAM(s) Oral daily  atorvastatin 20 milliGRAM(s) Oral at bedtime  dextrose 5%. 1000 milliLiter(s) (50 mL/Hr) IV Continuous <Continuous>  dextrose 50% Injectable 25 Gram(s) IV Push once  DULoxetine 60 milliGRAM(s) Oral daily  glucagon  Injectable 1 milliGRAM(s) IntraMuscular once  influenza  Vaccine (HIGH DOSE) 0.7 milliLiter(s) IntraMuscular once  insulin glargine Injectable (LANTUS) 10 Unit(s) SubCutaneous at bedtime  insulin lispro (ADMELOG) corrective regimen sliding scale   SubCutaneous Before meals and at bedtime  insulin lispro Injectable (ADMELOG) 4 Unit(s) SubCutaneous before breakfast  insulin lispro Injectable (ADMELOG) 4 Unit(s) SubCutaneous before lunch  insulin lispro Injectable (ADMELOG) 4 Unit(s) SubCutaneous before dinner  levothyroxine 50 MICROGram(s) Oral daily  lisinopril 40 milliGRAM(s) Oral daily  piperacillin/tazobactam IVPB.. 3.375 Gram(s) IV Intermittent every 8 hours  rivaroxaban 15 milliGRAM(s) Oral with dinner  tamsulosin 0.4 milliGRAM(s) Oral at bedtime  vancomycin  IVPB 750 milliGRAM(s) IV Intermittent every 12 hours    MEDICATIONS  (PRN):  acetaminophen     Tablet .. 650 milliGRAM(s) Oral every 6 hours PRN Temp greater or equal to 38C (100.4F), Mild Pain (1 - 3)  aluminum hydroxide/magnesium hydroxide/simethicone Suspension 30 milliLiter(s) Oral every 4 hours PRN Dyspepsia  dextrose Oral Gel 15 Gram(s) Oral once PRN Blood Glucose LESS THAN 70 milliGRAM(s)/deciliter  melatonin 3 milliGRAM(s) Oral at bedtime PRN Insomnia  ondansetron Injectable 4 milliGRAM(s) IV Push every 8 hours PRN Nausea and/or Vomiting  oxycodone    5 mG/acetaminophen 325 mG 1 Tablet(s) Oral every 4 hours PRN Moderate Pain (4 - 6)

## 2023-03-09 NOTE — CONSULT NOTE ADULT - ATTENDING COMMENTS
I have seen and examined the patient with the resident.  I agree with the above.  67M with PMHx HTN, HLD, RCC s/p nephrectomy, newly-diagnosed liver mass concerning for HCC, CKD3, Atrial fibrillation on Xarelto, consulted to the surgical service for compartment syndrome of the RLE.  Patient is currently being treated for RLE cellulitis.  He has no complaints of pain or tenderness.  On exam, he has 2+ pitting edema with blanching erythema extending up beyond his knee.  He has no pain on passive flexion/extension, can move his foot on his own with no pain, has no neurological symptoms, no parasthesias or paralysis, and has a palpable pulse.  Currently there is a very low suspicion for compartment syndrome, especially since cellulitis is an infection above the fascial layers.  For now, continue ABx, obtain a venous duplex, obtain and trend CPK levels, and we will follow as needed.

## 2023-03-09 NOTE — PROGRESS NOTE ADULT - ASSESSMENT
66 y/o male with hx of Afib on Xarelto, obesity, CKD-3, HTN, HLD, DM-2, Hypothyroidism, JASON, Gout, RCC s/p right nephrectomy in 4/22 with no chemo/XRT, known HCC s/p liver biopsy 6/22 currently being followed at [facility]. Admitted on 3/6 with RLE cellulitis, marked leukocytosis 21K and fever. started on empiric piperacillin-tazobactam and vancomycin     RLE cellulitis  Leukocytosis  Fever   Obesity     - Worsening RLE findings    - Discontinue vanco   - Start daptomycin adjusted to GFR  - Check baseline CPK   - CT RLE w/o evidence of collections   - Cont piperacillin-tazobactam for now   - BCX NGTD   - MRSA nasal swab neg   - RVP neg   - Prior available micro data reviewed   - Trend leukocytosis - slowly trending down   - monitor temp curve     d/w Dr. Sierra and ASP/clinical pharmacist

## 2023-03-10 LAB
ALBUMIN SERPL ELPH-MCNC: 3.1 G/DL — LOW (ref 3.3–5.2)
ALP SERPL-CCNC: 124 U/L — HIGH (ref 40–120)
ALT FLD-CCNC: 73 U/L — HIGH
ANION GAP SERPL CALC-SCNC: 10 MMOL/L — SIGNIFICANT CHANGE UP (ref 5–17)
AST SERPL-CCNC: 51 U/L — HIGH
BASOPHILS # BLD AUTO: 0.07 K/UL — SIGNIFICANT CHANGE UP (ref 0–0.2)
BASOPHILS NFR BLD AUTO: 0.6 % — SIGNIFICANT CHANGE UP (ref 0–2)
BILIRUB SERPL-MCNC: 0.4 MG/DL — SIGNIFICANT CHANGE UP (ref 0.4–2)
BUN SERPL-MCNC: 16.7 MG/DL — SIGNIFICANT CHANGE UP (ref 8–20)
CALCIUM SERPL-MCNC: 9 MG/DL — SIGNIFICANT CHANGE UP (ref 8.4–10.5)
CHLORIDE SERPL-SCNC: 101 MMOL/L — SIGNIFICANT CHANGE UP (ref 96–108)
CK SERPL-CCNC: 63 U/L — SIGNIFICANT CHANGE UP (ref 30–200)
CO2 SERPL-SCNC: 26 MMOL/L — SIGNIFICANT CHANGE UP (ref 22–29)
CREAT SERPL-MCNC: 1.24 MG/DL — SIGNIFICANT CHANGE UP (ref 0.5–1.3)
EGFR: 65 ML/MIN/1.73M2 — SIGNIFICANT CHANGE UP
EOSINOPHIL # BLD AUTO: 0.21 K/UL — SIGNIFICANT CHANGE UP (ref 0–0.5)
EOSINOPHIL NFR BLD AUTO: 1.7 % — SIGNIFICANT CHANGE UP (ref 0–6)
GLUCOSE BLDC GLUCOMTR-MCNC: 154 MG/DL — HIGH (ref 70–99)
GLUCOSE BLDC GLUCOMTR-MCNC: 170 MG/DL — HIGH (ref 70–99)
GLUCOSE BLDC GLUCOMTR-MCNC: 175 MG/DL — HIGH (ref 70–99)
GLUCOSE BLDC GLUCOMTR-MCNC: 199 MG/DL — HIGH (ref 70–99)
GLUCOSE SERPL-MCNC: 198 MG/DL — HIGH (ref 70–99)
HCT VFR BLD CALC: 38 % — LOW (ref 39–50)
HGB BLD-MCNC: 11.5 G/DL — LOW (ref 13–17)
IMM GRANULOCYTES NFR BLD AUTO: 1.3 % — HIGH (ref 0–0.9)
LACTATE BLDV-MCNC: 2.2 MMOL/L — HIGH (ref 0.5–2)
LACTATE SERPL-SCNC: 1.3 MMOL/L — SIGNIFICANT CHANGE UP (ref 0.5–2)
LYMPHOCYTES # BLD AUTO: 1.07 K/UL — SIGNIFICANT CHANGE UP (ref 1–3.3)
LYMPHOCYTES # BLD AUTO: 8.7 % — LOW (ref 13–44)
MCHC RBC-ENTMCNC: 25.7 PG — LOW (ref 27–34)
MCHC RBC-ENTMCNC: 30.3 GM/DL — LOW (ref 32–36)
MCV RBC AUTO: 84.8 FL — SIGNIFICANT CHANGE UP (ref 80–100)
MONOCYTES # BLD AUTO: 0.69 K/UL — SIGNIFICANT CHANGE UP (ref 0–0.9)
MONOCYTES NFR BLD AUTO: 5.6 % — SIGNIFICANT CHANGE UP (ref 2–14)
NEUTROPHILS # BLD AUTO: 10.12 K/UL — HIGH (ref 1.8–7.4)
NEUTROPHILS NFR BLD AUTO: 82.1 % — HIGH (ref 43–77)
PLATELET # BLD AUTO: 327 K/UL — SIGNIFICANT CHANGE UP (ref 150–400)
POTASSIUM SERPL-MCNC: 4 MMOL/L — SIGNIFICANT CHANGE UP (ref 3.5–5.3)
POTASSIUM SERPL-SCNC: 4 MMOL/L — SIGNIFICANT CHANGE UP (ref 3.5–5.3)
PROT SERPL-MCNC: 7.2 G/DL — SIGNIFICANT CHANGE UP (ref 6.6–8.7)
RBC # BLD: 4.48 M/UL — SIGNIFICANT CHANGE UP (ref 4.2–5.8)
RBC # FLD: 17.1 % — HIGH (ref 10.3–14.5)
SODIUM SERPL-SCNC: 137 MMOL/L — SIGNIFICANT CHANGE UP (ref 135–145)
WBC # BLD: 12.32 K/UL — HIGH (ref 3.8–10.5)
WBC # FLD AUTO: 12.32 K/UL — HIGH (ref 3.8–10.5)

## 2023-03-10 PROCEDURE — 99232 SBSQ HOSP IP/OBS MODERATE 35: CPT

## 2023-03-10 PROCEDURE — 99233 SBSQ HOSP IP/OBS HIGH 50: CPT

## 2023-03-10 RX ORDER — SODIUM CHLORIDE 9 MG/ML
1000 INJECTION INTRAMUSCULAR; INTRAVENOUS; SUBCUTANEOUS
Refills: 0 | Status: COMPLETED | OUTPATIENT
Start: 2023-03-10 | End: 2023-03-10

## 2023-03-10 RX ADMIN — Medication 81 MILLIGRAM(S): at 12:44

## 2023-03-10 RX ADMIN — PIPERACILLIN AND TAZOBACTAM 25 GRAM(S): 4; .5 INJECTION, POWDER, LYOPHILIZED, FOR SOLUTION INTRAVENOUS at 14:18

## 2023-03-10 RX ADMIN — Medication 4 UNIT(S): at 07:55

## 2023-03-10 RX ADMIN — Medication 300 MILLIGRAM(S): at 12:44

## 2023-03-10 RX ADMIN — DAPTOMYCIN 118 MILLIGRAM(S): 500 INJECTION, POWDER, LYOPHILIZED, FOR SOLUTION INTRAVENOUS at 08:42

## 2023-03-10 RX ADMIN — PIPERACILLIN AND TAZOBACTAM 25 GRAM(S): 4; .5 INJECTION, POWDER, LYOPHILIZED, FOR SOLUTION INTRAVENOUS at 21:24

## 2023-03-10 RX ADMIN — LISINOPRIL 40 MILLIGRAM(S): 2.5 TABLET ORAL at 05:27

## 2023-03-10 RX ADMIN — DULOXETINE HYDROCHLORIDE 60 MILLIGRAM(S): 30 CAPSULE, DELAYED RELEASE ORAL at 12:44

## 2023-03-10 RX ADMIN — SODIUM CHLORIDE 75 MILLILITER(S): 9 INJECTION INTRAMUSCULAR; INTRAVENOUS; SUBCUTANEOUS at 07:54

## 2023-03-10 RX ADMIN — Medication 2: at 21:24

## 2023-03-10 RX ADMIN — Medication 4 UNIT(S): at 12:09

## 2023-03-10 RX ADMIN — Medication 50 MICROGRAM(S): at 05:27

## 2023-03-10 RX ADMIN — TAMSULOSIN HYDROCHLORIDE 0.4 MILLIGRAM(S): 0.4 CAPSULE ORAL at 21:23

## 2023-03-10 RX ADMIN — PANTOPRAZOLE SODIUM 40 MILLIGRAM(S): 20 TABLET, DELAYED RELEASE ORAL at 05:26

## 2023-03-10 RX ADMIN — Medication 2: at 12:09

## 2023-03-10 RX ADMIN — Medication 4 UNIT(S): at 17:19

## 2023-03-10 RX ADMIN — INSULIN GLARGINE 10 UNIT(S): 100 INJECTION, SOLUTION SUBCUTANEOUS at 21:24

## 2023-03-10 RX ADMIN — ATORVASTATIN CALCIUM 20 MILLIGRAM(S): 80 TABLET, FILM COATED ORAL at 21:23

## 2023-03-10 RX ADMIN — Medication 2: at 07:54

## 2023-03-10 RX ADMIN — Medication 2: at 17:18

## 2023-03-10 RX ADMIN — AMLODIPINE BESYLATE 10 MILLIGRAM(S): 2.5 TABLET ORAL at 05:36

## 2023-03-10 RX ADMIN — AMIODARONE HYDROCHLORIDE 200 MILLIGRAM(S): 400 TABLET ORAL at 05:27

## 2023-03-10 RX ADMIN — PIPERACILLIN AND TAZOBACTAM 25 GRAM(S): 4; .5 INJECTION, POWDER, LYOPHILIZED, FOR SOLUTION INTRAVENOUS at 05:27

## 2023-03-10 NOTE — PROGRESS NOTE ADULT - ASSESSMENT
66 y/o M w/ PMH of morbid obesity, pAF (on xarelto), CKD III, HTN, HLD, DMII, RCC s/p rt nephrectomy, HCC, gout, hypothyroid came in c/o RLE redness found to have RLE cellulitis.  Initial  w/u significant for wbc 20K and lactic acidosis and admitted for sepsis 2/2 RLE cellulitis.  Hospital course complicated by mechanical fall yesterday w/ subsequent head trauma but no LOC.  CT head x2 negative for acute pathology.  Severe edema still present of RLE but no signs of compartment at this time.        Sepsis 2/2 RLE cellulitis  - Profound edema of RLE noted but stable   - Blistering noted, some open some closed; c/w wound care   - Leukocytosis trending up today but could have been reactive s/p fall yesterday and now trending down  - Mild lactic acidosis resolved s/p IVFs   - Still no crepitus on exam and paresthesias resolved   - Mx CPK levels nL  - Unable to do CT w/ contrast due to renal fxn and s/p nephrectomy   - Repeat CT RLE revewed and noted above; no collection or gas reported    - RLE arterial doppler w/ no signs of compartment syndrome   - Bcxs NGTD x2   - c/w daptomycin and zosyn as per ID   - Trend CBC and monitor temperature   - ID following and recs noted       AFib   - On Xarelto  - INR likely elevated 2/2 doac    - c/w Amiodarone       CKD-3b / RCC s/p nephrectomy  - Cr. Stable  - Avoid nephrotoxins given ckd and s/p nephrectomy   - Renally dose meds as indicated   - F/u with Dr. Stratton and Dr. Shetty as scheduled outpt       HTN / HLD  - c/w home meds       DM-2  - A1c 7.4  - Hold oral agents while hospitalized  - Basal/bolus regimen with coverage scale  - titrate insulin regimen to maintain BG<180      Normocytic anemia   - Likely of chronic dx in the setting of CKD  - Iron panel reviewed   - Hemodynamically stable and no active bleeding   - Monitor CBC and transfuse as needed      HCC  - f/u w/ Dr. Stratton at [facility] as scheduled       Gout  - c/w Allopurinol       Hypothyroidism  - c/w Synthroid       VTE ppx: xarelto     Dispo: remains acute requiring inpt hospitalization.  66 y/o M w/ PMH of morbid obesity, pAF (on xarelto), CKD III, HTN, HLD, DMII, RCC s/p rt nephrectomy, HCC, gout, hypothyroid came in c/o RLE redness found to have RLE cellulitis.  Initial  w/u significant for wbc 20K and lactic acidosis and admitted for sepsis 2/2 RLE cellulitis.  Hospital course complicated by mechanical fall yesterday w/ subsequent head trauma but no LOC.  CT head x2 negative for acute pathology.  Severe edema still present of RLE but no signs of compartment at this time.        Sepsis 2/2 RLE cellulitis  - Profound edema of RLE noted but stable   - Blistering noted, some open some closed; c/w wound care   - Leukocytosis trending up today but could have been reactive s/p fall yesterday and now trending down  - Mild lactic acidosis resolved s/p IVFs   - Still no crepitus on exam and paresthesias resolved   - Mx CPK levels nL  - Unable to do CT w/ contrast due to renal fxn and s/p nephrectomy   - Repeat CT RLE revewed and noted above; no collection or gas reported    - RLE arterial doppler w/ no signs of compartment syndrome   - Bcxs NGTD x2   - c/w daptomycin and zosyn as per ID   - Trend CBC and monitor temperature   - ID following and recs noted       AFib   - On Xarelto  - INR likely elevated 2/2 doac    - c/w Amiodarone       CKD-3b / RCC s/p nephrectomy  - Cr. Stable  - Avoid nephrotoxins given ckd and s/p nephrectomy   - Renally dose meds as indicated   - F/u with Dr. Stratton and Dr. Shetty as scheduled outpt       HTN / HLD  - c/w home meds       DM-2  - A1c 7.4  - Hold oral agents while hospitalized  - Basal/bolus regimen with coverage scale  - titrate insulin regimen to maintain BG<180      Normocytic anemia   - Likely of chronic dx in the setting of CKD  - Iron panel reviewed   - Hemodynamically stable and no active bleeding   - Monitor CBC and transfuse as needed      Transaminitis   - Asymptomatic, benign abd exam  - Tbili nL   - Has hx of HCC   - Trend LFTs and if worsen or symptomatic consider RUQ US      HCC  - f/u w/ Dr. Stratton at [facility] as scheduled       Gout  - c/w Allopurinol       Hypothyroidism  - c/w Synthroid       VTE ppx: xarelto     Dispo: remains acute requiring inpt hospitalization.

## 2023-03-10 NOTE — PROGRESS NOTE ADULT - SUBJECTIVE AND OBJECTIVE BOX
Chief Complaint:  RLE cellulitis     SUBJECTIVE / OVERNIGHT EVENTS:  no acute events reported overnight.  Pt continues c/o RLE pain and swelling but feels some improvement this morning.  More RLE blistering noted.  No parasthesias at this time.        I&O's Summary        PHYSICAL EXAM:  Vital Signs Last 24 Hrs  T(C): 36.9 (10 Mar 2023 11:34), Max: 37.2 (10 Mar 2023 05:03)  T(F): 98.5 (10 Mar 2023 11:34), Max: 99 (10 Mar 2023 05:03)  HR: 74 (10 Mar 2023 11:34) (69 - 90)  BP: 162/88 (10 Mar 2023 11:34) (133/76 - 164/91)  BP(mean): 106 (10 Mar 2023 00:31) (106 - 106)  RR: 18 (10 Mar 2023 11:34) (17 - 19)  SpO2: 93% (10 Mar 2023 11:34) (93% - 99%)    Parameters below as of 10 Mar 2023 11:34  Patient On (Oxygen Delivery Method): room air        GENERAL: pt examined bedside, appears uncomfortable but in NAD  HEENT: NC/AT, moist oral mucosa, clear conjunctiva, sclera nonicteric  RESPIRATORY: Normal respiratory effort, no wheezing, rhonchi, rales  CARDIOVASCULAR: RRR, normal S1 and S2  ABDOMEN: soft, obese, NT/ND, +BS, no rebound/guarding  EXTREMITIES: No cynaosis, no clubbing, b/l LE pitting edema R>>>L slightly improved from yesterday, pulses are 1+ LLE unable to assess pulses of RLE but audible w/ doppler   SKIN: b/l LE stasis like dermatitis skin changes, RLE with superimposed erythema edema from toes, circumferential lower leg, above knee and 1/2 of thigh posteriomedially w/ pain to palpation, warm to touch, feels tight but no crepitus, open blister to dorsum of right foot, closed blisters noted at medial maleolus and postereomedial thigh        LABS:                                         11.5   12.32 )-----------( 327      ( 10 Mar 2023 05:46 )             38.0       03-10    137  |  101  |  16.7  ----------------------------<  198<H>  4.0   |  26.0  |  1.24    Ca    9.0      10 Mar 2023 05:46  Phos  3.0     03-09  Mg     2.3     03-09    TPro  7.2  /  Alb  3.1<L>  /  TBili  0.4  /  DBili  x   /  AST  51<H>  /  ALT  73<H>  /  AlkPhos  124<H>  03-10        CAPILLARY BLOOD GLUCOSE      POCT Blood Glucose.: 114 mg/dL (06 Mar 2023 13:07)  POCT Blood Glucose.: 118 mg/dL (06 Mar 2023 07:39)        RADIOLOGY & ADDITIONAL TESTS:    < from: Xray Chest 1 View- PORTABLE-Urgent (03.05.23 @ 22:07) >  IMPRESSION: No acute finding or change.    < end of copied text >      < from: CT Lower Extremity No Cont, Right (03.07.23 @ 12:40) >  FINDINGS: There is diffuse subcutaneous soft tissue infiltration   extending from the level of the distal thigh through the foot, consistent   with the patient's history of cellulitis. There are no well-formed   collections. There is no CT evidence of osteomyelitis.    < end of copied text >      < from: CT Lower Extremity No Cont, Right (03.09.23 @ 10:03) >  IMPRESSION:    Subcutaneous edema and skin thickening throughout the right lower   extremity which is circumferential and confluent from the level of the   proximal tibias/fibula to the level of the foot. This may be related to   underlying cellulitis. Evaluation for underlying fluid collection is   limited by lack of intravenous contrast. No subcutaneous air. No CT   evidence of osteomyelitis.    < end of copied text >      < from: US Duplex Arterial Lower Ext Ltd, Right (03.09.23 @ 13:34) >  IMPRESSION:    Diffuse calcified plaque without evidence of hemodynamically significant   stenosis or occlusion in the arteries of the right lower extremity.    High velocity low impedance waveforms throughout the right lower   extremity likely reflect local inflammatory process (cellulitis).    < end of copied text >    < from: US Duplex Venous Lower Ext Complete, Bilateral (03.09.23 @ 23:53) >  IMPRESSION:  No evidence of deep venous thrombosis in either lower extremity.  Limited visualized of the calf veins    < end of copied text >        MEDICATIONS  (STANDING):  allopurinol 300 milliGRAM(s) Oral daily  aMIOdarone    Tablet 200 milliGRAM(s) Oral daily  amLODIPine   Tablet 10 milliGRAM(s) Oral daily  aspirin enteric coated 325 milliGRAM(s) Oral daily  atorvastatin 20 milliGRAM(s) Oral at bedtime  dextrose 5%. 1000 milliLiter(s) (50 mL/Hr) IV Continuous <Continuous>  dextrose 50% Injectable 25 Gram(s) IV Push once  DULoxetine 60 milliGRAM(s) Oral daily  glucagon  Injectable 1 milliGRAM(s) IntraMuscular once  influenza  Vaccine (HIGH DOSE) 0.7 milliLiter(s) IntraMuscular once  insulin glargine Injectable (LANTUS) 10 Unit(s) SubCutaneous at bedtime  insulin lispro (ADMELOG) corrective regimen sliding scale   SubCutaneous Before meals and at bedtime  insulin lispro Injectable (ADMELOG) 4 Unit(s) SubCutaneous before breakfast  insulin lispro Injectable (ADMELOG) 4 Unit(s) SubCutaneous before lunch  insulin lispro Injectable (ADMELOG) 4 Unit(s) SubCutaneous before dinner  levothyroxine 50 MICROGram(s) Oral daily  lisinopril 40 milliGRAM(s) Oral daily  piperacillin/tazobactam IVPB.. 3.375 Gram(s) IV Intermittent every 8 hours  rivaroxaban 15 milliGRAM(s) Oral with dinner  tamsulosin 0.4 milliGRAM(s) Oral at bedtime  vancomycin  IVPB 750 milliGRAM(s) IV Intermittent every 12 hours    MEDICATIONS  (PRN):  acetaminophen     Tablet .. 650 milliGRAM(s) Oral every 6 hours PRN Temp greater or equal to 38C (100.4F), Mild Pain (1 - 3)  aluminum hydroxide/magnesium hydroxide/simethicone Suspension 30 milliLiter(s) Oral every 4 hours PRN Dyspepsia  dextrose Oral Gel 15 Gram(s) Oral once PRN Blood Glucose LESS THAN 70 milliGRAM(s)/deciliter  melatonin 3 milliGRAM(s) Oral at bedtime PRN Insomnia  ondansetron Injectable 4 milliGRAM(s) IV Push every 8 hours PRN Nausea and/or Vomiting  oxycodone    5 mG/acetaminophen 325 mG 1 Tablet(s) Oral every 4 hours PRN Moderate Pain (4 - 6)

## 2023-03-10 NOTE — PROGRESS NOTE ADULT - SUBJECTIVE AND OBJECTIVE BOX
[facility]  INFECTIOUS DISEASES at [place] and [place]  ===============================================================                               Joe Oviedo MD*     Merlene Ghosh MD*                         Fuentes Luke MD*       Martha Pollard MD*            Diplomates American Board of Internal Medicine & Infectious Diseases                * [place] Office - Appt - Tel  [phone] Fax [phone]                * [place] Office - Appt - Tel [phone] Fax [phone]                                  Hospital Consult line:  [phone]  ==============================================================    EVERETT GEORGE [id number]    Follow up: RLE cellulitis    Feel yesterday - injured his right foot - peeled off some skin   Afebrile hemodynamically stable     I have personally reviewed the labs and data; pertinent labs and data are listed in this note; please see below.     _______________________________________________________________  REVIEW OF SYSTEMS  Tolerating abx w/o side effects. Persistent pain in RLE   ________________________________________________________________  Allergies:  fish (Unknown)  No Known Drug Allergies  shellfish (Vomiting; Nausea)      ________________________________________________________________  PHYSICAL EXAM  GEN: NAD, lying in bed. Obese  NECK: Supple.   LUNGS: eupneic. CTA B/L.  HEART: RRR, no m/r/gNo Crowley catheter  EXTREMITIES: RLE - markedly edematous with erythema, TTP. Area of skin loss of foot dorsum. Bullae formation above the ankle. Overall not worsening since yesterday.   NEUROLOGIC: Grossly no focal deficits   SKIN: as above. Dry akin in UEs  LINES: PIV  ________________________________________________________________  Vitals:  T(F): 98.5 (10 Mar 2023 11:34), Max: 99 (10 Mar 2023 05:03)  HR: 74 (10 Mar 2023 11:34)  BP: 162/88 (10 Mar 2023 11:34)  RR: 18 (10 Mar 2023 11:34)  SpO2: 93% (10 Mar 2023 11:34) (93% - 99%)  temp max in last 48H T(F): , Max: 99.6 (03-08-23 @ 21:35)    Current Antibiotics:  DAPTOmycin IVPB 450 milliGRAM(s) IV Intermittent every 24 hours  piperacillin/tazobactam IVPB.. 3.375 Gram(s) IV Intermittent every 8 hours    Other medications:  allopurinol 300 milliGRAM(s) Oral daily  aMIOdarone    Tablet 200 milliGRAM(s) Oral daily  amLODIPine   Tablet 10 milliGRAM(s) Oral daily  aspirin  chewable 81 milliGRAM(s) Oral daily  atorvastatin 20 milliGRAM(s) Oral at bedtime  dextrose 5%. 1000 milliLiter(s) IV Continuous <Continuous>  dextrose 50% Injectable 25 Gram(s) IV Push once  DULoxetine 60 milliGRAM(s) Oral daily  glucagon  Injectable 1 milliGRAM(s) IntraMuscular once  influenza  Vaccine (HIGH DOSE) 0.7 milliLiter(s) IntraMuscular once  insulin glargine Injectable (LANTUS) 10 Unit(s) SubCutaneous at bedtime  insulin lispro (ADMELOG) corrective regimen sliding scale   SubCutaneous Before meals and at bedtime  insulin lispro Injectable (ADMELOG) 4 Unit(s) SubCutaneous before breakfast  insulin lispro Injectable (ADMELOG) 4 Unit(s) SubCutaneous before lunch  insulin lispro Injectable (ADMELOG) 4 Unit(s) SubCutaneous before dinner  levothyroxine 50 MICROGram(s) Oral daily  lisinopril 40 milliGRAM(s) Oral daily  pantoprazole    Tablet 40 milliGRAM(s) Oral before breakfast  rivaroxaban 15 milliGRAM(s) Oral with dinner  tamsulosin 0.4 milliGRAM(s) Oral at bedtime                            11.5   12.32 )-----------( 327      ( 10 Mar 2023 05:46 )             38.0     03-10    137  |  101  |  16.7  ----------------------------<  198<H>  4.0   |  26.0  |  1.24    Ca    9.0      10 Mar 2023 05:46  Phos  3.0     03-09  Mg     2.3     03-09    TPro  7.2  /  Alb  3.1<L>  /  TBili  0.4  /  DBili  x   /  AST  51<H>  /  ALT  73<H>  /  AlkPhos  124<H>  03-10    RECENT CULTURES:  03-05 @ 22:50    RVP with SARS-CoV-2   NotDetec      03-05 @ 22:00 .Blood Blood-Peripheral     No growth to date.        03-05 @ 21:50 .Blood Blood-Peripheral     No growth to date.          WBC Count: 12.32 K/uL (03-10-23 @ 05:46)  WBC Count: 13.70 K/uL (03-09-23 @ 19:10)  WBC Count: 10.94 K/uL (03-09-23 @ 05:38)  WBC Count: 11.75 K/uL (03-08-23 @ 05:01)  WBC Count: 15.22 K/uL (03-07-23 @ 06:21)  WBC Count: 18.41 K/uL (03-06-23 @ 08:34)  WBC Count: 20.88 K/uL (03-05-23 @ 22:50)    Creatinine, Serum: 1.24 mg/dL (03-10-23 @ 05:46)  Creatinine, Serum: 1.44 mg/dL (03-09-23 @ 05:38)  Creatinine, Serum: 1.44 mg/dL (03-08-23 @ 05:01)  Creatinine, Serum: 1.53 mg/dL (03-07-23 @ 06:21)  Creatinine, Serum: 1.60 mg/dL (03-06-23 @ 08:34)  Creatinine, Serum: 1.57 mg/dL (03-05-23 @ 22:50)       SARS-CoV-2: NotDetec (03-05-23 @ 22:50)    ________________________________________________________________  RADIOLOGY  < from: US Duplex Venous Lower Ext Complete, Bilateral (03.09.23 @ 23:53) >  FINDINGS:    RIGHT:  Normal compressibility of the RIGHT common femoral, femoral and popliteal   veins.  Doppler examination shows normal spontaneous and phasic flow.  Limited visualized of the RIGHT calf veins. No RIGHT calf vein thrombosis   detected.    LEFT:  Normal compressibility of the LEFT common femoral, femoral and popliteal   veins.  Doppler examination shows normal spontaneous and phasic flow.  Limited visualized of the LEFT calf veins. No LEFT calf vein thrombosis   detected.    IMPRESSION:  No evidence of deep venous thrombosis in either lower extremity.  Limited visualized of the calf veins    < end of copied text >    < from: CT Lower Extremity No Cont, Right (03.09.23 @ 10:03) >  FINDINGS:    Evaluation for underlying fluid collection is limited by the lack of   intravenous contrast. There is edema within the subcutaneous fat   laterally along the right thigh with overlying cutaneous thickening.   Circumferential confluent subcutaneous edema is seen from the level of   the proximal tibias/fibula to the level of the dorsum of the foot with   associated cutaneous thickening. Findings are nonspecific but may be   related to cellulitis. There is no definite reticulation or stranding   within the deep myofascial planes. Mildly prominent right inguinal lymph   nodes are seen which are likely reactive. There is no subcutaneous air.    There is no evidence of osseous erosion or destruction to suggest   osteomyelitis. There is mild right hip arthrosis. There is moderate   tricompartmental arthrosis of the kneewithout joint effusion. There is   severe talonavicular, naviculocuneiform, and first metatarsophalangeal   joint arthrosis. There is hallux valgus. Chronic appearing erosive   changes along the medial head of the first metatarsal may be related to   underlying gout or enthesopathic change. Tarsometatarsal arthrosis is   seen, most prominent at the second tarsometatarsal joint.    There is no disproportionate fatty atrophy of the musculature. There is   atherosclerotic disease.    There is a fat-containing left inguinal hernia.    There is partially imaged left knee arthroplasty.    IMPRESSION:    Subcutaneous edema and skin thickening throughout the right lower   extremity which is circumferential and confluent from the level of the   proximal tibias/fibula to the level of the foot. This may be related to   underlying cellulitis. Evaluation for underlying fluid collection is   limited by lack of intravenous contrast. No subcutaneous air. No CT   evidence of osteomyelitis.    < end of copied text >

## 2023-03-11 LAB
ALBUMIN SERPL ELPH-MCNC: 2.7 G/DL — LOW (ref 3.3–5.2)
ALP SERPL-CCNC: 133 U/L — HIGH (ref 40–120)
ALT FLD-CCNC: 89 U/L — HIGH
ANION GAP SERPL CALC-SCNC: 12 MMOL/L — SIGNIFICANT CHANGE UP (ref 5–17)
AST SERPL-CCNC: 62 U/L — HIGH
BASOPHILS # BLD AUTO: 0.08 K/UL — SIGNIFICANT CHANGE UP (ref 0–0.2)
BASOPHILS NFR BLD AUTO: 0.6 % — SIGNIFICANT CHANGE UP (ref 0–2)
BILIRUB DIRECT SERPL-MCNC: 0.2 MG/DL — SIGNIFICANT CHANGE UP (ref 0–0.3)
BILIRUB INDIRECT FLD-MCNC: 0.2 MG/DL — SIGNIFICANT CHANGE UP (ref 0.2–1)
BILIRUB SERPL-MCNC: 0.4 MG/DL — SIGNIFICANT CHANGE UP (ref 0.4–2)
BUN SERPL-MCNC: 15.7 MG/DL — SIGNIFICANT CHANGE UP (ref 8–20)
CALCIUM SERPL-MCNC: 8.7 MG/DL — SIGNIFICANT CHANGE UP (ref 8.4–10.5)
CHLORIDE SERPL-SCNC: 100 MMOL/L — SIGNIFICANT CHANGE UP (ref 96–108)
CO2 SERPL-SCNC: 24 MMOL/L — SIGNIFICANT CHANGE UP (ref 22–29)
CREAT SERPL-MCNC: 1.2 MG/DL — SIGNIFICANT CHANGE UP (ref 0.5–1.3)
CULTURE RESULTS: SIGNIFICANT CHANGE UP
CULTURE RESULTS: SIGNIFICANT CHANGE UP
EGFR: 67 ML/MIN/1.73M2 — SIGNIFICANT CHANGE UP
EOSINOPHIL # BLD AUTO: 0.23 K/UL — SIGNIFICANT CHANGE UP (ref 0–0.5)
EOSINOPHIL NFR BLD AUTO: 1.8 % — SIGNIFICANT CHANGE UP (ref 0–6)
GLUCOSE BLDC GLUCOMTR-MCNC: 139 MG/DL — HIGH (ref 70–99)
GLUCOSE BLDC GLUCOMTR-MCNC: 143 MG/DL — HIGH (ref 70–99)
GLUCOSE BLDC GLUCOMTR-MCNC: 195 MG/DL — HIGH (ref 70–99)
GLUCOSE BLDC GLUCOMTR-MCNC: 213 MG/DL — HIGH (ref 70–99)
GLUCOSE SERPL-MCNC: 229 MG/DL — HIGH (ref 70–99)
HCT VFR BLD CALC: 35.8 % — LOW (ref 39–50)
HGB BLD-MCNC: 11 G/DL — LOW (ref 13–17)
IMM GRANULOCYTES NFR BLD AUTO: 1.5 % — HIGH (ref 0–0.9)
LYMPHOCYTES # BLD AUTO: 1.23 K/UL — SIGNIFICANT CHANGE UP (ref 1–3.3)
LYMPHOCYTES # BLD AUTO: 9.5 % — LOW (ref 13–44)
MCHC RBC-ENTMCNC: 25.6 PG — LOW (ref 27–34)
MCHC RBC-ENTMCNC: 30.7 GM/DL — LOW (ref 32–36)
MCV RBC AUTO: 83.3 FL — SIGNIFICANT CHANGE UP (ref 80–100)
MONOCYTES # BLD AUTO: 0.83 K/UL — SIGNIFICANT CHANGE UP (ref 0–0.9)
MONOCYTES NFR BLD AUTO: 6.4 % — SIGNIFICANT CHANGE UP (ref 2–14)
NEUTROPHILS # BLD AUTO: 10.39 K/UL — HIGH (ref 1.8–7.4)
NEUTROPHILS NFR BLD AUTO: 80.2 % — HIGH (ref 43–77)
PLATELET # BLD AUTO: 336 K/UL — SIGNIFICANT CHANGE UP (ref 150–400)
POTASSIUM SERPL-MCNC: 4.1 MMOL/L — SIGNIFICANT CHANGE UP (ref 3.5–5.3)
POTASSIUM SERPL-SCNC: 4.1 MMOL/L — SIGNIFICANT CHANGE UP (ref 3.5–5.3)
PROT SERPL-MCNC: 7 G/DL — SIGNIFICANT CHANGE UP (ref 6.6–8.7)
RBC # BLD: 4.3 M/UL — SIGNIFICANT CHANGE UP (ref 4.2–5.8)
RBC # FLD: 17.2 % — HIGH (ref 10.3–14.5)
SODIUM SERPL-SCNC: 136 MMOL/L — SIGNIFICANT CHANGE UP (ref 135–145)
SPECIMEN SOURCE: SIGNIFICANT CHANGE UP
SPECIMEN SOURCE: SIGNIFICANT CHANGE UP
WBC # BLD: 12.95 K/UL — HIGH (ref 3.8–10.5)
WBC # FLD AUTO: 12.95 K/UL — HIGH (ref 3.8–10.5)

## 2023-03-11 PROCEDURE — 99233 SBSQ HOSP IP/OBS HIGH 50: CPT

## 2023-03-11 RX ADMIN — Medication 81 MILLIGRAM(S): at 12:03

## 2023-03-11 RX ADMIN — ATORVASTATIN CALCIUM 20 MILLIGRAM(S): 80 TABLET, FILM COATED ORAL at 22:04

## 2023-03-11 RX ADMIN — PIPERACILLIN AND TAZOBACTAM 25 GRAM(S): 4; .5 INJECTION, POWDER, LYOPHILIZED, FOR SOLUTION INTRAVENOUS at 05:18

## 2023-03-11 RX ADMIN — PANTOPRAZOLE SODIUM 40 MILLIGRAM(S): 20 TABLET, DELAYED RELEASE ORAL at 06:05

## 2023-03-11 RX ADMIN — INSULIN GLARGINE 10 UNIT(S): 100 INJECTION, SOLUTION SUBCUTANEOUS at 22:03

## 2023-03-11 RX ADMIN — Medication 4 UNIT(S): at 16:38

## 2023-03-11 RX ADMIN — Medication 300 MILLIGRAM(S): at 12:03

## 2023-03-11 RX ADMIN — RIVAROXABAN 15 MILLIGRAM(S): KIT at 16:39

## 2023-03-11 RX ADMIN — DAPTOMYCIN 118 MILLIGRAM(S): 500 INJECTION, POWDER, LYOPHILIZED, FOR SOLUTION INTRAVENOUS at 10:02

## 2023-03-11 RX ADMIN — AMLODIPINE BESYLATE 10 MILLIGRAM(S): 2.5 TABLET ORAL at 05:17

## 2023-03-11 RX ADMIN — DULOXETINE HYDROCHLORIDE 60 MILLIGRAM(S): 30 CAPSULE, DELAYED RELEASE ORAL at 12:03

## 2023-03-11 RX ADMIN — Medication 4: at 08:15

## 2023-03-11 RX ADMIN — AMIODARONE HYDROCHLORIDE 200 MILLIGRAM(S): 400 TABLET ORAL at 05:17

## 2023-03-11 RX ADMIN — Medication 4 UNIT(S): at 12:02

## 2023-03-11 RX ADMIN — LISINOPRIL 40 MILLIGRAM(S): 2.5 TABLET ORAL at 05:17

## 2023-03-11 RX ADMIN — Medication 50 MICROGRAM(S): at 05:17

## 2023-03-11 RX ADMIN — TAMSULOSIN HYDROCHLORIDE 0.4 MILLIGRAM(S): 0.4 CAPSULE ORAL at 22:04

## 2023-03-11 RX ADMIN — Medication 2: at 22:05

## 2023-03-11 RX ADMIN — Medication 4 UNIT(S): at 08:16

## 2023-03-11 NOTE — PROGRESS NOTE ADULT - ASSESSMENT
66 y/o M w/ PMH of morbid obesity, pAF (on xarelto), CKD III, HTN, HLD, DMII, RCC s/p rt nephrectomy, HCC, gout, hypothyroid came in c/o RLE redness found to have RLE cellulitis.  Initial  w/u significant for wbc 20K and lactic acidosis and admitted for sepsis 2/2 RLE cellulitis.  Hospital course complicated by mechanical fall yesterday w/ subsequent head trauma but no LOC.  CT head x2 negative for acute pathology.  Severe edema still present of RLE but no signs of compartment at this time or forming abscess on repeat dry CT extremities. ID team follows and adjusting Abx.        Sepsis 2/2 not purulent RLE cellulitis supperimposed on chronic  venous insuffiencey   - as per pt no worsening of edema or pain  - ID consult noted  - elevation on RLE   - c/w daptomycin, zosyn was stopped on 03/11    Mechanical fall while in hospital   - Hospital course complicated by mechanical fall yesterday w/ subsequent head trauma but no LOC.  CT head x2 negative for acute changes/bleeding  - back on doac  - fall precautions     AFib   - On Xarelto  - INR likely elevated 2/2 doac    - c/w Amiodarone     CKD-3b / RCC s/p nephrectomy  - Cr. Stable  - Avoid nephrotoxins given ckd and s/p nephrectomy   - Renally dose meds as indicated   - F/u with Dr. Stratton and Dr. Shetty as scheduled outpt     HTN / HLD  - c/w home meds     DM-2  - A1c 7.4, c/w Basal 10, ac 4 units, mdss, will adjust base on poc    Normocytic anemia   - Likely of chronic dx in the setting of CKD  - Iron panel reviewed   - Hemodynamically stable and no active bleeding   - Monitor CBC and transfuse as needed    Transaminitis   - Asymptomatic, benign abd exam  - Tbili nL   - Has hx of HCC   - Trend LFTs and if worsen or symptomatic consider RUQ US    HCC  - f/u w/ Dr. Stratton at [facility] as scheduled     Gout  - c/w Allopurinol     Hypothyroidism  - c/w Synthroid       VTE ppx: xarelto   Dispo: remains acute requiring inpt hospitalization.

## 2023-03-11 NOTE — PROGRESS NOTE ADULT - ASSESSMENT
68 y/o male with hx of Afib on Xarelto, obesity, CKD-3, HTN, HLD, DM-2, Hypothyroidism, JASON, Gout, RCC s/p right nephrectomy in 4/22 with no chemo/XRT, known HCC s/p liver biopsy 6/22 currently being followed at [facility]. Admitted on 3/6 with RLE cellulitis, marked leukocytosis 21K and fever. started on empiric piperacillin-tazobactam and vancomycin     RLE cellulitis  Leukocytosis  Fever   Obesity     - Overall stable RLE findings    - Continue dapto adjusted to GFR  - CPK normal   - CT RLE w/o evidence of collections   - Surgery consulted - no evidence of compartment syndrome   -     - BCX NGTD   - MRSA nasal swab neg   - RVP neg   - Prior available micro data reviewed    AS ABOVE LOWER EXT  CELLULITIS  SOFT TISSUE INFECTION  SUPERIMPOSED ON VENOUS STASIS CHANGES AND VENOUS INSUFFICIENY    WHEN ELEVATING LEG EDEMA DN ERYTHEMA DECREASED  NEED TO ELEVATE AND CONSIDER COMPRESSIVE ACE BANDAGE  CONSIDER EVENTUAL UNNA BOOT    MOST LIKELY STAPH STREP  CNA D/C ZOSYN  CONTINUE DAPTOMYCIN

## 2023-03-11 NOTE — PROGRESS NOTE ADULT - SUBJECTIVE AND OBJECTIVE BOX
[facility] Division of Hospital Medicine    Chief Complaint:  RLE cellulitis     SUBJECTIVE: reports feeling about the same, no new pain, pt has unroofed blister on R foot     OVERNIGHT EVENTS: none reported    Patient denies chest pain, SOB, abd pain, N/V, fever, chills, dysuria or any other complaints. All remainder ROS negative.     MEDICATIONS  (STANDING):  allopurinol 300 milliGRAM(s) Oral daily  aMIOdarone    Tablet 200 milliGRAM(s) Oral daily  amLODIPine   Tablet 10 milliGRAM(s) Oral daily  aspirin  chewable 81 milliGRAM(s) Oral daily  atorvastatin 20 milliGRAM(s) Oral at bedtime  DAPTOmycin IVPB 450 milliGRAM(s) IV Intermittent every 24 hours  dextrose 5%. 1000 milliLiter(s) (50 mL/Hr) IV Continuous <Continuous>  dextrose 50% Injectable 25 Gram(s) IV Push once  DULoxetine 60 milliGRAM(s) Oral daily  glucagon  Injectable 1 milliGRAM(s) IntraMuscular once  influenza  Vaccine (HIGH DOSE) 0.7 milliLiter(s) IntraMuscular once  insulin glargine Injectable (LANTUS) 10 Unit(s) SubCutaneous at bedtime  insulin lispro (ADMELOG) corrective regimen sliding scale   SubCutaneous Before meals and at bedtime  insulin lispro Injectable (ADMELOG) 4 Unit(s) SubCutaneous before lunch  insulin lispro Injectable (ADMELOG) 4 Unit(s) SubCutaneous before dinner  insulin lispro Injectable (ADMELOG) 4 Unit(s) SubCutaneous before breakfast  levothyroxine 50 MICROGram(s) Oral daily  lisinopril 40 milliGRAM(s) Oral daily  pantoprazole    Tablet 40 milliGRAM(s) Oral before breakfast  rivaroxaban 15 milliGRAM(s) Oral with dinner  tamsulosin 0.4 milliGRAM(s) Oral at bedtime    MEDICATIONS  (PRN):  acetaminophen     Tablet .. 650 milliGRAM(s) Oral every 6 hours PRN Temp greater or equal to 38C (100.4F), Mild Pain (1 - 3)  aluminum hydroxide/magnesium hydroxide/simethicone Suspension 30 milliLiter(s) Oral every 4 hours PRN Dyspepsia  dextrose Oral Gel 15 Gram(s) Oral once PRN Blood Glucose LESS THAN 70 milliGRAM(s)/deciliter  melatonin 3 milliGRAM(s) Oral at bedtime PRN Insomnia  ondansetron Injectable 4 milliGRAM(s) IV Push every 8 hours PRN Nausea and/or Vomiting  oxycodone    5 mG/acetaminophen 325 mG 1 Tablet(s) Oral every 4 hours PRN Moderate Pain (4 - 6)        I&O's Summary    10 Mar 2023 07:01  -  11 Mar 2023 07:00  --------------------------------------------------------  IN: 800 mL / OUT: 2200 mL / NET: -1400 mL    11 Mar 2023 06:01  -  11 Mar 2023 16:01  --------------------------------------------------------  IN: 0 mL / OUT: 1000 mL / NET: -1000 mL        PHYSICAL EXAM:  Vital Signs Last 24 Hrs  T(C): 37.1 (11 Mar 2023 11:34), Max: 37.1 (11 Mar 2023 11:34)  T(F): 98.8 (11 Mar 2023 11:34), Max: 98.8 (11 Mar 2023 11:34)  HR: 72 (11 Mar 2023 11:34) (62 - 88)  BP: 141/87 (11 Mar 2023 11:34) (141/87 - 168/72)  BP(mean): --  RR: 19 (11 Mar 2023 11:34) (18 - 19)  SpO2: 92% (11 Mar 2023 11:34) (92% - 98%)    Parameters below as of 11 Mar 2023 04:40  Patient On (Oxygen Delivery Method): room air            CONSTITUTIONAL: NAD, well-developed, well-groomed  ENMT: Moist oral mucosa, no pharyngeal injection or exudates; normal dentition; No JVD  RESPIRATORY: Normal respiratory effort; lungs are clear to auscultation bilaterally  CARDIOVASCULAR: Regular rate and rhythm, normal S1 and S2, no murmur/rub/gallop; R> L not pitting edema; Peripheral pulses are 2+ bilaterally  ABDOMEN: Nontender to palpation, normoactive bowel sounds, no rebound/guarding; No hepatosplenomegaly  MUSCLOSKELETAL:  no clubbing or cyanosis of digits; no joint swelling or tenderness to palpation  PSYCH: A+O to person, place, and time; affect appropriate  NEUROLOGY: CN 2-12 are intact and symmetric; no gross sensory deficits; was observed moving all 4 ext against gravity cooperating with exam.   SKIN: redness, swelling and unroofed blister over dorsum of R foot, no apparent fluctuations   LABS:                        11.0   12.95 )-----------( 336      ( 11 Mar 2023 05:02 )             35.8     03-11    136  |  100  |  15.7  ----------------------------<  229<H>  4.1   |  24.0  |  1.20    Ca    8.7      11 Mar 2023 05:02    TPro  7.0  /  Alb  2.7<L>  /  TBili  0.4  /  DBili  0.2  /  AST  62<H>  /  ALT  89<H>  /  AlkPhos  133<H>  03-11      CARDIAC MARKERS ( 10 Mar 2023 05:46 )  x     / x     / 63 U/L / x     / x      CARDIAC MARKERS ( 09 Mar 2023 19:10 )  x     / x     / 85 U/L / x     / x              CAPILLARY BLOOD GLUCOSE      POCT Blood Glucose.: 139 mg/dL (11 Mar 2023 12:00)  POCT Blood Glucose.: 213 mg/dL (11 Mar 2023 08:02)  POCT Blood Glucose.: 175 mg/dL (10 Mar 2023 21:23)  POCT Blood Glucose.: 199 mg/dL (10 Mar 2023 16:43)        RADIOLOGY & ADDITIONAL TESTS:  Results Reviewed:   Imaging Personally Reviewed:  Electrocardiogram Personally Reviewed:

## 2023-03-11 NOTE — PROGRESS NOTE ADULT - SUBJECTIVE AND OBJECTIVE BOX
[facility]  INFECTIOUS DISEASES at [place] and [place]  ===============================================================                               Joe Oviedo MD*     Merlene Ghosh MD*                         Fuentes Luke MD*       Martha Pollard MD*            Diplomates American Board of Internal Medicine & Infectious Diseases                * [place] Office - Appt - Tel  [phone] Fax [phone]                * [place] Office - Appt - Tel [phone] Fax [phone]                                  Hospital Consult line:  [phone]  ==============================================================    EVERETT GEORGE [id number]    Follow up: RLE cellulitis    FELL  - injured his right foot - peeled off some skin   Afebrile hemodynamically stable     I have personally reviewed the labs and data; pertinent labs and data are listed in this note; please see below.     _______________________________________________________________  REVIEW OF SYSTEMS  Tolerating abx w/o side effects. Persistent pain in RLE   ________________________________________________________________  Allergies:  fish (Unknown)  No Known Drug Allergies  shellfish (Vomiting; Nausea)      ________________________________________________________________  PHYSICAL EXAM  GEN: NAD, lying in bed. Obese  NECK: Supple.   LUNGS: eupneic. CTA B/L.  HEART: RRR, no m/r/gNo Crowley catheter  EXTREMITIES: RLE - markedly edematous with erythema, TTP. Area of skin loss of foot dorsum. Bullae formation above the ankle.    DEPENDENT  ERYTHEMA AND EDEMA   NEUROLOGIC: Grossly no focal deficits   SKIN: as above. Dry akin in UEs  LINES: PIV  ________________________________________________________________  Vitals:   Vital Signs Last 24 Hrs  T(C): 36.7 (11 Mar 2023 04:40), Max: 36.9 (10 Mar 2023 11:34)  T(F): 98.1 (11 Mar 2023 04:40), Max: 98.5 (10 Mar 2023 11:34)  HR: 62 (11 Mar 2023 04:40) (62 - 88)  BP: 168/72 (11 Mar 2023 04:40) (149/74 - 168/72)  BP(mean): --  RR: 18 (11 Mar 2023 04:40) (18 - 19)  SpO2: 94% (11 Mar 2023 04:40) (93% - 98%)    Parameters below as of 11 Mar 2023 04:40  Patient On (Oxygen Delivery Method): room air      Current Antibiotics:  DAPTOmycin IVPB 450 milliGRAM(s) IV Intermittent every 24 hours  piperacillin/tazobactam IVPB.. 3.375 Gram(s) IV Intermittent every 8 hours    Other medications:  allopurinol 300 milliGRAM(s) Oral daily  aMIOdarone    Tablet 200 milliGRAM(s) Oral daily  amLODIPine   Tablet 10 milliGRAM(s) Oral daily  aspirin  chewable 81 milliGRAM(s) Oral daily  atorvastatin 20 milliGRAM(s) Oral at bedtime  dextrose 5%. 1000 milliLiter(s) IV Continuous <Continuous>  dextrose 50% Injectable 25 Gram(s) IV Push once  DULoxetine 60 milliGRAM(s) Oral daily  glucagon  Injectable 1 milliGRAM(s) IntraMuscular once  influenza  Vaccine (HIGH DOSE) 0.7 milliLiter(s) IntraMuscular once  insulin glargine Injectable (LANTUS) 10 Unit(s) SubCutaneous at bedtime  insulin lispro (ADMELOG) corrective regimen sliding scale   SubCutaneous Before meals and at bedtime  insulin lispro Injectable (ADMELOG) 4 Unit(s) SubCutaneous before breakfast  insulin lispro Injectable (ADMELOG) 4 Unit(s) SubCutaneous before lunch  insulin lispro Injectable (ADMELOG) 4 Unit(s) SubCutaneous before dinner  levothyroxine 50 MICROGram(s) Oral daily  lisinopril 40 milliGRAM(s) Oral daily  pantoprazole    Tablet 40 milliGRAM(s) Oral before breakfast  rivaroxaban 15 milliGRAM(s) Oral with dinner  tamsulosin 0.4 milliGRAM(s) Oral at bedtime                              11.0   12.95 )-----------( 336      ( 11 Mar 2023 05:02 )             35.8   03-11    136  |  100  |  15.7  ----------------------------<  229<H>  4.1   |  24.0  |  1.20    Ca    8.7      11 Mar 2023 05:02    TPro  7.0  /  Alb  2.7<L>  /  TBili  0.4  /  DBili  0.2  /  AST  62<H>  /  ALT  89<H>  /  AlkPhos  133<H>  03-11    RECENT CULTURES:  03-05 @ 22:50    RVP with SARS-CoV-2   [facility]      03-05 @ 22:00 .Blood Blood-Peripheral     No growth to date.        03-05 @ 21:50 .Blood Blood-Peripheral     No growth to date.          WBC Count: 12.32 K/uL (03-10-23 @ 05:46)  WBC Count: 13.70 K/uL (03-09-23 @ 19:10)  WBC Count: 10.94 K/uL (03-09-23 @ 05:38)  WBC Count: 11.75 K/uL (03-08-23 @ 05:01)  WBC Count: 15.22 K/uL (03-07-23 @ 06:21)  WBC Count: 18.41 K/uL (03-06-23 @ 08:34)  WBC Count: 20.88 K/uL (03-05-23 @ 22:50)    Creatinine, Serum: 1.24 mg/dL (03-10-23 @ 05:46)  Creatinine, Serum: 1.44 mg/dL (03-09-23 @ 05:38)  Creatinine, Serum: 1.44 mg/dL (03-08-23 @ 05:01)  Creatinine, Serum: 1.53 mg/dL (03-07-23 @ 06:21)  Creatinine, Serum: 1.60 mg/dL (03-06-23 @ 08:34)  Creatinine, Serum: 1.57 mg/dL (03-05-23 @ 22:50)       SARS-CoV-2: NotDetec (03-05-23 @ 22:50)    ________________________________________________________________  RADIOLOGY  < from: US Duplex Venous Lower Ext Complete, Bilateral (03.09.23 @ 23:53) >  FINDINGS:    RIGHT:  Normal compressibility of the RIGHT common femoral, femoral and popliteal   veins.  Doppler examination shows normal spontaneous and phasic flow.  Limited visualized of the RIGHT calf veins. No RIGHT calf vein thrombosis   detected.    LEFT:  Normal compressibility of the LEFT common femoral, femoral and popliteal   veins.  Doppler examination shows normal spontaneous and phasic flow.  Limited visualized of the LEFT calf veins. No LEFT calf vein thrombosis   detected.    IMPRESSION:  No evidence of deep venous thrombosis in either lower extremity.  Limited visualized of the calf veins    < end of copied text >    < from: CT Lower Extremity No Cont, Right (03.09.23 @ 10:03) >  FINDINGS:    Evaluation for underlying fluid collection is limited by the lack of   intravenous contrast. There is edema within the subcutaneous fat   laterally along the right thigh with overlying cutaneous thickening.   Circumferential confluent subcutaneous edema is seen from the level of   the proximal tibias/fibula to the level of the dorsum of the foot with   associated cutaneous thickening. Findings are nonspecific but may be   related to cellulitis. There is no definite reticulation or stranding   within the deep myofascial planes. Mildly prominent right inguinal lymph   nodes are seen which are likely reactive. There is no subcutaneous air.    There is no evidence of osseous erosion or destruction to suggest   osteomyelitis. There is mild right hip arthrosis. There is moderate   tricompartmental arthrosis of the kneewithout joint effusion. There is   severe talonavicular, naviculocuneiform, and first metatarsophalangeal   joint arthrosis. There is hallux valgus. Chronic appearing erosive   changes along the medial head of the first metatarsal may be related to   underlying gout or enthesopathic change. Tarsometatarsal arthrosis is   seen, most prominent at the second tarsometatarsal joint.    There is no disproportionate fatty atrophy of the musculature. There is   atherosclerotic disease.    There is a fat-containing left inguinal hernia.    There is partially imaged left knee arthroplasty.    IMPRESSION:    Subcutaneous edema and skin thickening throughout the right lower   extremity which is circumferential and confluent from the level of the   proximal tibias/fibula to the level of the foot. This may be related to   underlying cellulitis. Evaluation for underlying fluid collection is   limited by lack of intravenous contrast. No subcutaneous air. No CT   evidence of osteomyelitis.    < end of copied text >

## 2023-03-12 LAB
ANION GAP SERPL CALC-SCNC: 11 MMOL/L — SIGNIFICANT CHANGE UP (ref 5–17)
BUN SERPL-MCNC: 16.6 MG/DL — SIGNIFICANT CHANGE UP (ref 8–20)
CALCIUM SERPL-MCNC: 9.3 MG/DL — SIGNIFICANT CHANGE UP (ref 8.4–10.5)
CHLORIDE SERPL-SCNC: 99 MMOL/L — SIGNIFICANT CHANGE UP (ref 96–108)
CO2 SERPL-SCNC: 26 MMOL/L — SIGNIFICANT CHANGE UP (ref 22–29)
CREAT SERPL-MCNC: 1.19 MG/DL — SIGNIFICANT CHANGE UP (ref 0.5–1.3)
EGFR: 68 ML/MIN/1.73M2 — SIGNIFICANT CHANGE UP
GLUCOSE BLDC GLUCOMTR-MCNC: 120 MG/DL — HIGH (ref 70–99)
GLUCOSE BLDC GLUCOMTR-MCNC: 158 MG/DL — HIGH (ref 70–99)
GLUCOSE BLDC GLUCOMTR-MCNC: 175 MG/DL — HIGH (ref 70–99)
GLUCOSE BLDC GLUCOMTR-MCNC: 193 MG/DL — HIGH (ref 70–99)
GLUCOSE SERPL-MCNC: 182 MG/DL — HIGH (ref 70–99)
HCT VFR BLD CALC: 36.5 % — LOW (ref 39–50)
HGB BLD-MCNC: 11.3 G/DL — LOW (ref 13–17)
MCHC RBC-ENTMCNC: 25.8 PG — LOW (ref 27–34)
MCHC RBC-ENTMCNC: 31 GM/DL — LOW (ref 32–36)
MCV RBC AUTO: 83.3 FL — SIGNIFICANT CHANGE UP (ref 80–100)
PLATELET # BLD AUTO: 382 K/UL — SIGNIFICANT CHANGE UP (ref 150–400)
POTASSIUM SERPL-MCNC: 4.2 MMOL/L — SIGNIFICANT CHANGE UP (ref 3.5–5.3)
POTASSIUM SERPL-SCNC: 4.2 MMOL/L — SIGNIFICANT CHANGE UP (ref 3.5–5.3)
RBC # BLD: 4.38 M/UL — SIGNIFICANT CHANGE UP (ref 4.2–5.8)
RBC # FLD: 17 % — HIGH (ref 10.3–14.5)
SODIUM SERPL-SCNC: 136 MMOL/L — SIGNIFICANT CHANGE UP (ref 135–145)
WBC # BLD: 11.67 K/UL — HIGH (ref 3.8–10.5)
WBC # FLD AUTO: 11.67 K/UL — HIGH (ref 3.8–10.5)

## 2023-03-12 PROCEDURE — 99233 SBSQ HOSP IP/OBS HIGH 50: CPT

## 2023-03-12 PROCEDURE — 99232 SBSQ HOSP IP/OBS MODERATE 35: CPT

## 2023-03-12 RX ADMIN — RIVAROXABAN 15 MILLIGRAM(S): KIT at 16:36

## 2023-03-12 RX ADMIN — LISINOPRIL 40 MILLIGRAM(S): 2.5 TABLET ORAL at 06:44

## 2023-03-12 RX ADMIN — Medication 50 MICROGRAM(S): at 06:45

## 2023-03-12 RX ADMIN — INSULIN GLARGINE 10 UNIT(S): 100 INJECTION, SOLUTION SUBCUTANEOUS at 22:19

## 2023-03-12 RX ADMIN — TAMSULOSIN HYDROCHLORIDE 0.4 MILLIGRAM(S): 0.4 CAPSULE ORAL at 22:19

## 2023-03-12 RX ADMIN — DULOXETINE HYDROCHLORIDE 60 MILLIGRAM(S): 30 CAPSULE, DELAYED RELEASE ORAL at 11:56

## 2023-03-12 RX ADMIN — AMIODARONE HYDROCHLORIDE 200 MILLIGRAM(S): 400 TABLET ORAL at 06:44

## 2023-03-12 RX ADMIN — Medication 300 MILLIGRAM(S): at 11:57

## 2023-03-12 RX ADMIN — Medication 2: at 11:57

## 2023-03-12 RX ADMIN — Medication 81 MILLIGRAM(S): at 12:01

## 2023-03-12 RX ADMIN — ATORVASTATIN CALCIUM 20 MILLIGRAM(S): 80 TABLET, FILM COATED ORAL at 22:19

## 2023-03-12 RX ADMIN — PANTOPRAZOLE SODIUM 40 MILLIGRAM(S): 20 TABLET, DELAYED RELEASE ORAL at 06:45

## 2023-03-12 RX ADMIN — DAPTOMYCIN 118 MILLIGRAM(S): 500 INJECTION, POWDER, LYOPHILIZED, FOR SOLUTION INTRAVENOUS at 08:57

## 2023-03-12 RX ADMIN — AMLODIPINE BESYLATE 10 MILLIGRAM(S): 2.5 TABLET ORAL at 06:45

## 2023-03-12 RX ADMIN — Medication 2: at 07:57

## 2023-03-12 RX ADMIN — Medication 2: at 22:20

## 2023-03-12 RX ADMIN — Medication 4 UNIT(S): at 11:57

## 2023-03-12 RX ADMIN — Medication 4 UNIT(S): at 07:58

## 2023-03-12 RX ADMIN — Medication 4 UNIT(S): at 16:37

## 2023-03-12 NOTE — PROGRESS NOTE ADULT - ASSESSMENT
68 y/o M w/ PMH of morbid obesity, pAF (on xarelto), CKD III, HTN, HLD, DMII, RCC s/p rt nephrectomy, HCC, gout, hypothyroid came in c/o RLE redness found to have RLE cellulitis.  Initial  w/u significant for wbc 20K and lactic acidosis and admitted for sepsis 2/2 RLE cellulitis.  Hospital course complicated by mechanical fall yesterday w/ subsequent head trauma but no LOC.  CT head x2 negative for acute pathology.  Severe edema still present of RLE but no signs of compartment at this time or forming abscess on repeat dry CT extremities. ID team follows and adjusting Abx.        Sepsis 2/2 not purulent RLE cellulitis supperimposed on chronic  venous insuffiencey   - as per pt no worsening of edema or pain  - ID consult noted  - elevation on RLE, daily dressing   - c/w daptomycin, zosyn was stopped on 03/11    Mechanical fall while in hospital   - Hospital course complicated by mechanical fall yesterday w/ subsequent head trauma but no LOC.  CT head x2 negative for acute changes/bleeding  - back on doac  - fall precautions     AFib   - On Xarelto  - INR likely elevated 2/2 doac    - c/w Amiodarone     CKD-3b / RCC s/p nephrectomy  - Cr. Stable  - Avoid nephrotoxins given ckd and s/p nephrectomy   - Renally dose meds as indicated   - F/u with Dr. Stratton and Dr. Shetty as scheduled outpt     HTN / HLD  - c/w home meds     DM-2  - A1c 7.4, c/w Basal 10, ac 4 units, mdss, will adjust base on poc    Normocytic anemia   - Likely of chronic dx in the setting of CKD  - Iron panel reviewed   - Hemodynamically stable and no active bleeding   - Monitor CBC and transfuse as needed    Transaminitis   - Asymptomatic, benign abd exam   - Has hx of HCC   - OP f/u     HCC  - f/u w/ Dr. Stratton at [facility] as scheduled     Gout  - c/w Allopurinol     Hypothyroidism  - c/w Synthroid       VTE ppx: xarelto   Dispo: guided by ID team

## 2023-03-12 NOTE — PROGRESS NOTE ADULT - ASSESSMENT
66 y/o male with hx of Afib on Xarelto, obesity, CKD-3, HTN, HLD, DM-2, Hypothyroidism, JASON, Gout, RCC s/p right nephrectomy in 4/22 with no chemo/XRT, known HCC s/p liver biopsy 6/22 currently being followed at [facility]. Admitted on 3/6 with RLE cellulitis, marked leukocytosis 21K and fever. started on empiric piperacillin-tazobactam and vancomycin     RLE cellulitis  Leukocytosis  Fever   Obesity     - Overall stable RLE findings    - Continue dapto adjusted to GFR  - CPK normal   - CT RLE w/o evidence of collections   - Surgery consulted - no evidence of compartment syndrome   -     - BCX NGTD   - MRSA nasal swab neg   - RVP neg   - Prior available micro data reviewed    AS ABOVE LOWER EXT  CELLULITIS  SOFT TISSUE INFECTION  SUPERIMPOSED ON VENOUS STASIS CHANGES AND VENOUS INSUFFICIENY    WHEN ELEVATING LEG EDEMA DN ERYTHEMA DECREASED  NEED TO ELEVATE AND CONSIDER COMPRESSIVE ACE BANDAGE  CONSIDER EVENTUAL UNNA BOOT    MOST LIKELY STAPH STREP  CNA D/C ZOSYN  CONTINUE DAPTOMYCIN

## 2023-03-12 NOTE — PROGRESS NOTE ADULT - SUBJECTIVE AND OBJECTIVE BOX
[facility] Division of Hospital Medicine    Chief Complaint:  cellulitis     SUBJECTIVE: reports feeling better,     OVERNIGHT EVENTS: none reported     Patient denies chest pain, SOB, abd pain, N/V, fever, chills, dysuria or any other complaints. All remainder ROS negative.     MEDICATIONS  (STANDING):  allopurinol 300 milliGRAM(s) Oral daily  aMIOdarone    Tablet 200 milliGRAM(s) Oral daily  amLODIPine   Tablet 10 milliGRAM(s) Oral daily  aspirin  chewable 81 milliGRAM(s) Oral daily  atorvastatin 20 milliGRAM(s) Oral at bedtime  DAPTOmycin IVPB 450 milliGRAM(s) IV Intermittent every 24 hours  dextrose 5%. 1000 milliLiter(s) (50 mL/Hr) IV Continuous <Continuous>  dextrose 50% Injectable 25 Gram(s) IV Push once  DULoxetine 60 milliGRAM(s) Oral daily  glucagon  Injectable 1 milliGRAM(s) IntraMuscular once  influenza  Vaccine (HIGH DOSE) 0.7 milliLiter(s) IntraMuscular once  insulin glargine Injectable (LANTUS) 10 Unit(s) SubCutaneous at bedtime  insulin lispro (ADMELOG) corrective regimen sliding scale   SubCutaneous Before meals and at bedtime  insulin lispro Injectable (ADMELOG) 4 Unit(s) SubCutaneous before breakfast  insulin lispro Injectable (ADMELOG) 4 Unit(s) SubCutaneous before lunch  insulin lispro Injectable (ADMELOG) 4 Unit(s) SubCutaneous before dinner  levothyroxine 50 MICROGram(s) Oral daily  lisinopril 40 milliGRAM(s) Oral daily  pantoprazole    Tablet 40 milliGRAM(s) Oral before breakfast  rivaroxaban 15 milliGRAM(s) Oral with dinner  tamsulosin 0.4 milliGRAM(s) Oral at bedtime    MEDICATIONS  (PRN):  acetaminophen     Tablet .. 650 milliGRAM(s) Oral every 6 hours PRN Temp greater or equal to 38C (100.4F), Mild Pain (1 - 3)  aluminum hydroxide/magnesium hydroxide/simethicone Suspension 30 milliLiter(s) Oral every 4 hours PRN Dyspepsia  dextrose Oral Gel 15 Gram(s) Oral once PRN Blood Glucose LESS THAN 70 milliGRAM(s)/deciliter  melatonin 3 milliGRAM(s) Oral at bedtime PRN Insomnia  ondansetron Injectable 4 milliGRAM(s) IV Push every 8 hours PRN Nausea and/or Vomiting  oxycodone    5 mG/acetaminophen 325 mG 1 Tablet(s) Oral every 4 hours PRN Moderate Pain (4 - 6)        I&O's Summary    11 Mar 2023 06:01  -  12 Mar 2023 07:00  --------------------------------------------------------  IN: 0 mL / OUT: 1000 mL / NET: -1000 mL    12 Mar 2023 07:01  -  12 Mar 2023 16:14  --------------------------------------------------------  IN: 50 mL / OUT: 575 mL / NET: -525 mL        PHYSICAL EXAM:  Vital Signs Last 24 Hrs  T(C): 36.9 (12 Mar 2023 11:54), Max: 36.9 (12 Mar 2023 11:54)  T(F): 98.4 (12 Mar 2023 11:54), Max: 98.4 (12 Mar 2023 11:54)  HR: 73 (12 Mar 2023 11:54) (67 - 87)  BP: 162/90 (12 Mar 2023 11:54) (141/80 - 166/72)  BP(mean): --  RR: 18 (12 Mar 2023 11:54) (18 - 19)  SpO2: 93% (12 Mar 2023 11:54) (93% - 99%)      CONSTITUTIONAL: NAD, truncal obesity   ENMT: Moist oral mucosa, no pharyngeal injection or exudates; normal dentition; No JVD  RESPIRATORY: Normal respiratory effort; lungs are clear to auscultation bilaterally  CARDIOVASCULAR: Regular rate and rhythm, normal S1 and S2, no murmur/rub/gallop; R> L not pitting edema; Peripheral pulses are 2+ bilaterally  ABDOMEN: Nontender to palpation, normoactive bowel sounds, no rebound/guarding; No hepatosplenomegaly  MUSCLOSKELETAL:  no clubbing or cyanosis of digits; no joint swelling or tenderness to palpation  PSYCH: A+O to person, place, and time; affect appropriate  NEUROLOGY: CN 2-12 are intact and symmetric; no gross sensory deficits; was observed moving all 4 ext against gravity cooperating with exam.   SKIN: redness, swelling and unroofed blister over dorsum of R foot, and large blister over L medial malleus no apparent puss;        LABS:                        11.3   11.67 )-----------( 382      ( 12 Mar 2023 05:17 )             36.5     03-12    136  |  99  |  16.6  ----------------------------<  182<H>  4.2   |  26.0  |  1.19    Ca    9.3      12 Mar 2023 05:17    TPro  7.0  /  Alb  2.7<L>  /  TBili  0.4  /  DBili  0.2  /  AST  62<H>  /  ALT  89<H>  /  AlkPhos  133<H>  03-11              CAPILLARY BLOOD GLUCOSE      POCT Blood Glucose.: 158 mg/dL (12 Mar 2023 11:24)  POCT Blood Glucose.: 175 mg/dL (12 Mar 2023 07:45)  POCT Blood Glucose.: 195 mg/dL (11 Mar 2023 22:02)  POCT Blood Glucose.: 143 mg/dL (11 Mar 2023 16:36)        RADIOLOGY & ADDITIONAL TESTS:  Results Reviewed:   Imaging Personally Reviewed:  Electrocardiogram Personally Reviewed:

## 2023-03-12 NOTE — PROGRESS NOTE ADULT - SUBJECTIVE AND OBJECTIVE BOX
[facility]  INFECTIOUS DISEASES at [place] and [place]  ===============================================================                               Joe Oviedo MD*     Merlene Ghosh MD*                         Fuentes Luke MD*       Martha Pollard MD*            Diplomates American Board of Internal Medicine & Infectious Diseases                * [place] Office - Appt - Tel  [phone] Fax [phone]                * [place] Office - Appt - Tel [phone] Fax [phone]                                  Hospital Consult line:  [phone]  ==============================================================    EVERETT GEORGE [id number]    Follow up: RLE cellulitis    FELL  - injured his right foot - peeled off some skin   Afebrile hemodynamically stable     I have personally reviewed the labs and data; pertinent labs and data are listed in this note; please see below.     _______________________________________________________________  REVIEW OF SYSTEMS  Tolerating abx w/o side effects. Persistent pain in RLE   ________________________________________________________________  Allergies:  fish (Unknown)  No Known Drug Allergies  shellfish (Vomiting; Nausea)      ________________________________________________________________  PHYSICAL EXAM  GEN: NAD, lying in bed. Obese  NECK: Supple.   LUNGS: eupneic. CTA B/L.  HEART: RRR, no m/r/gNo Crowley catheter  EXTREMITIES: RLE - markedly edematous with erythema, TTP. Area of skin loss of foot dorsum. Bullae formation above the ankle.    DEPENDENT  ERYTHEMA AND EDEMA   NEUROLOGIC: Grossly no focal deficits   SKIN: as above. Dry akin in UEs  LINES: PIV  ________________________________________________________________  Vitals:   Vital Signs Last 24 Hrs  T(C): 36.6 (12 Mar 2023 05:33), Max: 37.1 (11 Mar 2023 11:34)  T(F): 97.9 (12 Mar 2023 05:33), Max: 98.8 (11 Mar 2023 11:34)  HR: 75 (12 Mar 2023 05:33) (67 - 87)  BP: 166/72 (12 Mar 2023 05:33) (141/80 - 166/72)  BP(mean): --  RR: 18 (12 Mar 2023 05:33) (18 - 19)  SpO2: 96% (12 Mar 2023 05:33) (92% - 99%)          Current Antibiotics:  DAPTOmycin IVPB 450 milliGRAM(s) IV Intermittent every 24 hours  piperacillin/tazobactam IVPB.. 3.375 Gram(s) IV Intermittent every 8 hours    Other medications:  allopurinol 300 milliGRAM(s) Oral daily  aMIOdarone    Tablet 200 milliGRAM(s) Oral daily  amLODIPine   Tablet 10 milliGRAM(s) Oral daily  aspirin  chewable 81 milliGRAM(s) Oral daily  atorvastatin 20 milliGRAM(s) Oral at bedtime  dextrose 5%. 1000 milliLiter(s) IV Continuous <Continuous>  dextrose 50% Injectable 25 Gram(s) IV Push once  DULoxetine 60 milliGRAM(s) Oral daily  glucagon  Injectable 1 milliGRAM(s) IntraMuscular once  influenza  Vaccine (HIGH DOSE) 0.7 milliLiter(s) IntraMuscular once  insulin glargine Injectable (LANTUS) 10 Unit(s) SubCutaneous at bedtime  insulin lispro (ADMELOG) corrective regimen sliding scale   SubCutaneous Before meals and at bedtime  insulin lispro Injectable (ADMELOG) 4 Unit(s) SubCutaneous before breakfast  insulin lispro Injectable (ADMELOG) 4 Unit(s) SubCutaneous before lunch  insulin lispro Injectable (ADMELOG) 4 Unit(s) SubCutaneous before dinner  levothyroxine 50 MICROGram(s) Oral daily  lisinopril 40 milliGRAM(s) Oral daily  pantoprazole    Tablet 40 milliGRAM(s) Oral before breakfast  rivaroxaban 15 milliGRAM(s) Oral with dinner  tamsulosin 0.4 milliGRAM(s) Oral at bedtime                                                    11.3   11.67 )-----------( 382      ( 12 Mar 2023 05:17 )             36.5   03-12    136  |  99  |  16.6  ----------------------------<  182<H>  4.2   |  26.0  |  1.19    Ca    9.3      12 Mar 2023 05:17    TPro  7.0  /  Alb  2.7<L>  /  TBili  0.4  /  DBili  0.2  /  AST  62<H>  /  ALT  89<H>  /  AlkPhos  133<H>  03-11      RECENT CULTURES:  03-05 @ 22:50    RVP with SARS-CoV-2   [facility]      03-05 @ 22:00 .Blood Blood-Peripheral     No growth to date.        03-05 @ 21:50 .Blood Blood-Peripheral     No growth to date.          WBC Count: 12.32 K/uL (03-10-23 @ 05:46)  WBC Count: 13.70 K/uL (03-09-23 @ 19:10)  WBC Count: 10.94 K/uL (03-09-23 @ 05:38)  WBC Count: 11.75 K/uL (03-08-23 @ 05:01)  WBC Count: 15.22 K/uL (03-07-23 @ 06:21)  WBC Count: 18.41 K/uL (03-06-23 @ 08:34)  WBC Count: 20.88 K/uL (03-05-23 @ 22:50)    Creatinine, Serum: 1.24 mg/dL (03-10-23 @ 05:46)  Creatinine, Serum: 1.44 mg/dL (03-09-23 @ 05:38)  Creatinine, Serum: 1.44 mg/dL (03-08-23 @ 05:01)  Creatinine, Serum: 1.53 mg/dL (03-07-23 @ 06:21)  Creatinine, Serum: 1.60 mg/dL (03-06-23 @ 08:34)  Creatinine, Serum: 1.57 mg/dL (03-05-23 @ 22:50)       SARS-CoV-2: NotDetec (03-05-23 @ 22:50)    ________________________________________________________________  RADIOLOGY  < from: US Duplex Venous Lower Ext Complete, Bilateral (03.09.23 @ 23:53) >  FINDINGS:    RIGHT:  Normal compressibility of the RIGHT common femoral, femoral and popliteal   veins.  Doppler examination shows normal spontaneous and phasic flow.  Limited visualized of the RIGHT calf veins. No RIGHT calf vein thrombosis   detected.    LEFT:  Normal compressibility of the LEFT common femoral, femoral and popliteal   veins.  Doppler examination shows normal spontaneous and phasic flow.  Limited visualized of the LEFT calf veins. No LEFT calf vein thrombosis   detected.    IMPRESSION:  No evidence of deep venous thrombosis in either lower extremity.  Limited visualized of the calf veins    < end of copied text >    < from: CT Lower Extremity No Cont, Right (03.09.23 @ 10:03) >  FINDINGS:    Evaluation for underlying fluid collection is limited by the lack of   intravenous contrast. There is edema within the subcutaneous fat   laterally along the right thigh with overlying cutaneous thickening.   Circumferential confluent subcutaneous edema is seen from the level of   the proximal tibias/fibula to the level of the dorsum of the foot with   associated cutaneous thickening. Findings are nonspecific but may be   related to cellulitis. There is no definite reticulation or stranding   within the deep myofascial planes. Mildly prominent right inguinal lymph   nodes are seen which are likely reactive. There is no subcutaneous air.    There is no evidence of osseous erosion or destruction to suggest   osteomyelitis. There is mild right hip arthrosis. There is moderate   tricompartmental arthrosis of the kneewithout joint effusion. There is   severe talonavicular, naviculocuneiform, and first metatarsophalangeal   joint arthrosis. There is hallux valgus. Chronic appearing erosive   changes along the medial head of the first metatarsal may be related to   underlying gout or enthesopathic change. Tarsometatarsal arthrosis is   seen, most prominent at the second tarsometatarsal joint.    There is no disproportionate fatty atrophy of the musculature. There is   atherosclerotic disease.    There is a fat-containing left inguinal hernia.    There is partially imaged left knee arthroplasty.    IMPRESSION:    Subcutaneous edema and skin thickening throughout the right lower   extremity which is circumferential and confluent from the level of the   proximal tibias/fibula to the level of the foot. This may be related to   underlying cellulitis. Evaluation for underlying fluid collection is   limited by lack of intravenous contrast. No subcutaneous air. No CT   evidence of osteomyelitis.    < end of copied text >

## 2023-03-13 LAB
GLUCOSE BLDC GLUCOMTR-MCNC: 136 MG/DL — HIGH (ref 70–99)
GLUCOSE BLDC GLUCOMTR-MCNC: 172 MG/DL — HIGH (ref 70–99)
GLUCOSE BLDC GLUCOMTR-MCNC: 173 MG/DL — HIGH (ref 70–99)
GLUCOSE BLDC GLUCOMTR-MCNC: 192 MG/DL — HIGH (ref 70–99)

## 2023-03-13 PROCEDURE — 99232 SBSQ HOSP IP/OBS MODERATE 35: CPT

## 2023-03-13 RX ADMIN — AMLODIPINE BESYLATE 10 MILLIGRAM(S): 2.5 TABLET ORAL at 06:08

## 2023-03-13 RX ADMIN — Medication 2: at 07:59

## 2023-03-13 RX ADMIN — DULOXETINE HYDROCHLORIDE 60 MILLIGRAM(S): 30 CAPSULE, DELAYED RELEASE ORAL at 11:04

## 2023-03-13 RX ADMIN — RIVAROXABAN 15 MILLIGRAM(S): KIT at 16:56

## 2023-03-13 RX ADMIN — INSULIN GLARGINE 10 UNIT(S): 100 INJECTION, SOLUTION SUBCUTANEOUS at 21:41

## 2023-03-13 RX ADMIN — Medication 650 MILLIGRAM(S): at 22:19

## 2023-03-13 RX ADMIN — Medication 81 MILLIGRAM(S): at 11:04

## 2023-03-13 RX ADMIN — Medication 2: at 11:18

## 2023-03-13 RX ADMIN — PANTOPRAZOLE SODIUM 40 MILLIGRAM(S): 20 TABLET, DELAYED RELEASE ORAL at 06:07

## 2023-03-13 RX ADMIN — LISINOPRIL 40 MILLIGRAM(S): 2.5 TABLET ORAL at 06:07

## 2023-03-13 RX ADMIN — Medication 650 MILLIGRAM(S): at 12:05

## 2023-03-13 RX ADMIN — Medication 4 UNIT(S): at 11:18

## 2023-03-13 RX ADMIN — Medication 4 UNIT(S): at 16:56

## 2023-03-13 RX ADMIN — TAMSULOSIN HYDROCHLORIDE 0.4 MILLIGRAM(S): 0.4 CAPSULE ORAL at 21:40

## 2023-03-13 RX ADMIN — Medication 300 MILLIGRAM(S): at 11:05

## 2023-03-13 RX ADMIN — Medication 4 UNIT(S): at 08:00

## 2023-03-13 RX ADMIN — Medication 2: at 21:41

## 2023-03-13 RX ADMIN — Medication 650 MILLIGRAM(S): at 21:49

## 2023-03-13 RX ADMIN — Medication 650 MILLIGRAM(S): at 11:04

## 2023-03-13 RX ADMIN — Medication 50 MICROGRAM(S): at 06:08

## 2023-03-13 RX ADMIN — AMIODARONE HYDROCHLORIDE 200 MILLIGRAM(S): 400 TABLET ORAL at 06:07

## 2023-03-13 RX ADMIN — DAPTOMYCIN 118 MILLIGRAM(S): 500 INJECTION, POWDER, LYOPHILIZED, FOR SOLUTION INTRAVENOUS at 11:04

## 2023-03-13 RX ADMIN — ATORVASTATIN CALCIUM 20 MILLIGRAM(S): 80 TABLET, FILM COATED ORAL at 21:40

## 2023-03-13 NOTE — PROGRESS NOTE ADULT - SUBJECTIVE AND OBJECTIVE BOX
[facility]  INFECTIOUS DISEASES at [place] and [place]  ===============================================================                               Joe Oviedo MD*     Merlene Ghosh MD*                         Fuentes Luke MD*       Martha Pollard MD*            Diplomates American Board of Internal Medicine & Infectious Diseases                * [place] Office - Appt - Tel  [phone] Fax [phone]                * [place] Office - Appt - Tel [phone] Fax [phone]                                  Hospital Consult line:  [phone]  ==============================================================    EVERETT GEORGE [id number]    Follow up: RLE cellulitis    No acute events  Afebrile, hemodynamically stable     I have personally reviewed the labs and data; pertinent labs and data are listed in this note; please see below.     _______________________________________________________________  REVIEW OF SYSTEMS  No new complaints. Feels leg is slowly getting better.   ________________________________________________________________  Allergies:  fish (Unknown)  No Known Drug Allergies  shellfish (Vomiting; Nausea)        ________________________________________________________________  PHYSICAL EXAM  GEN: in NAD, lying in bed. obese  HEENT: Anicteric sclerae, using CPAP    LUNGS: eupneic. CTA B/L.  HEART: RRR, no m/r/g  ABDOMEN: Soft, NT, ND,  : No Crowley catheter  EXTREMITIES: RLE - edema slightly improved.   NEUROLOGIC: Grossly no focal deficits   PSYCHIATRIC: Appropriate affect and mood  SKIN: RLE erythema and tenderness improving   LINES: PIV  ________________________________________________________________  Vitals:  T(F): 99.6 (13 Mar 2023 11:49), Max: 99.6 (13 Mar 2023 11:49)  HR: 73 (13 Mar 2023 11:49)  BP: 157/89 (13 Mar 2023 11:49)  RR: 18 (13 Mar 2023 11:49)  SpO2: 90% (13 Mar 2023 11:49) (90% - 97%)  temp max in last 48H T(F): , Max: 99.6 (03-13-23 @ 11:49)    Current Antibiotics:  DAPTOmycin IVPB 450 milliGRAM(s) IV Intermittent every 24 hours    Other medications:  allopurinol 300 milliGRAM(s) Oral daily  aMIOdarone    Tablet 200 milliGRAM(s) Oral daily  amLODIPine   Tablet 10 milliGRAM(s) Oral daily  aspirin  chewable 81 milliGRAM(s) Oral daily  atorvastatin 20 milliGRAM(s) Oral at bedtime  dextrose 5%. 1000 milliLiter(s) IV Continuous <Continuous>  dextrose 50% Injectable 25 Gram(s) IV Push once  DULoxetine 60 milliGRAM(s) Oral daily  glucagon  Injectable 1 milliGRAM(s) IntraMuscular once  influenza  Vaccine (HIGH DOSE) 0.7 milliLiter(s) IntraMuscular once  insulin glargine Injectable (LANTUS) 10 Unit(s) SubCutaneous at bedtime  insulin lispro (ADMELOG) corrective regimen sliding scale   SubCutaneous Before meals and at bedtime  insulin lispro Injectable (ADMELOG) 4 Unit(s) SubCutaneous before breakfast  insulin lispro Injectable (ADMELOG) 4 Unit(s) SubCutaneous before lunch  insulin lispro Injectable (ADMELOG) 4 Unit(s) SubCutaneous before dinner  levothyroxine 50 MICROGram(s) Oral daily  lisinopril 40 milliGRAM(s) Oral daily  pantoprazole    Tablet 40 milliGRAM(s) Oral before breakfast  rivaroxaban 15 milliGRAM(s) Oral with dinner  tamsulosin 0.4 milliGRAM(s) Oral at bedtime                            11.3   11.67 )-----------( 382      ( 12 Mar 2023 05:17 )             36.5     03-12    136  |  99  |  16.6  ----------------------------<  182<H>  4.2   |  26.0  |  1.19    Ca    9.3      12 Mar 2023 05:17      RECENT CULTURES:  03-05 @ 22:50    R[facility] with SARS-CoV-2   [facility]      03-05 @ 22:00 .Blood Blood-Peripheral     No Growth Final        03-05 @ 21:50 .Blood Blood-Peripheral     No Growth Final        WBC Count: 11.67 K/uL (03-12-23 @ 05:17)  WBC Count: 12.95 K/uL (03-11-23 @ 05:02)  WBC Count: 12.32 K/uL (03-10-23 @ 05:46)  WBC Count: 13.70 K/uL (03-09-23 @ 19:10)  WBC Count: 10.94 K/uL (03-09-23 @ 05:38)    Creatinine, Serum: 1.19 mg/dL (03-12-23 @ 05:17)  Creatinine, Serum: 1.20 mg/dL (03-11-23 @ 05:02)  Creatinine, Serum: 1.24 mg/dL (03-10-23 @ 05:46)  Creatinine, Serum: 1.44 mg/dL (03-09-23 @ 05:38)        SARS-CoV-2: NotDetec (03-05-23 @ 22:50)    ________________________________________________________________  RADIOLOGY  < from: US Duplex Venous Lower Ext Complete, Bilateral (03.09.23 @ 23:53) >  FINDINGS:    RIGHT:  Normal compressibility of the RIGHT common femoral, femoral and popliteal   veins.  Doppler examination shows normal spontaneous and phasic flow.  Limited visualized of the RIGHT calf veins. No RIGHT calf vein thrombosis   detected.    LEFT:  Normal compressibility of the LEFT common femoral, femoral and popliteal   veins.  Doppler examination shows normal spontaneous and phasic flow.  Limited visualized of the LEFT calf veins. No LEFT calf vein thrombosis   detected.    IMPRESSION:  No evidence of deep venous thrombosis in either lower extremity.  Limited visualized of the calf veins    < end of copied text >    < from: CT Lower Extremity No Cont, Right (03.09.23 @ 10:03) >  IMPRESSION:    Subcutaneous edema and skin thickening throughout the right lower   extremity which is circumferential and confluent from the level of the   proximal tibias/fibula to the level of the foot. This may be related to   underlying cellulitis. Evaluation for underlying fluid collection is   limited by lack of intravenous contrast. No subcutaneous air. No CT   evidence of osteomyelitis.    < end of copied text >

## 2023-03-13 NOTE — DIETITIAN INITIAL EVALUATION ADULT - PERTINENT LABORATORY DATA
03-12    136  |  99  |  16.6  ----------------------------<  182<H>  4.2   |  26.0  |  1.19    Ca    9.3      12 Mar 2023 05:17    POCT Blood Glucose.: 173 mg/dL (03-13-23 @ 11:08)  A1C with Estimated Average Glucose Result: 7.4 % (03-07-23 @ 06:21)  A1C with Estimated Average Glucose Result: 6.8 % (08-17-22 @ 09:00)

## 2023-03-13 NOTE — PROGRESS NOTE ADULT - SUBJECTIVE AND OBJECTIVE BOX
[facility] Division of Hospital Medicine    Chief Complaint:  cellulitis     SUBJECTIVE: reports feeling better, encouraged to elevated RLE    OVERNIGHT EVENTS: none reported     Patient denies chest pain, SOB, abd pain, N/V, fever, chills, dysuria or any other complaints. All remainder ROS negative.     MEDICATIONS  (STANDING):  allopurinol 300 milliGRAM(s) Oral daily  aMIOdarone    Tablet 200 milliGRAM(s) Oral daily  amLODIPine   Tablet 10 milliGRAM(s) Oral daily  aspirin  chewable 81 milliGRAM(s) Oral daily  atorvastatin 20 milliGRAM(s) Oral at bedtime  DAPTOmycin IVPB 450 milliGRAM(s) IV Intermittent every 24 hours  dextrose 5%. 1000 milliLiter(s) (50 mL/Hr) IV Continuous <Continuous>  dextrose 50% Injectable 25 Gram(s) IV Push once  DULoxetine 60 milliGRAM(s) Oral daily  glucagon  Injectable 1 milliGRAM(s) IntraMuscular once  influenza  Vaccine (HIGH DOSE) 0.7 milliLiter(s) IntraMuscular once  insulin glargine Injectable (LANTUS) 10 Unit(s) SubCutaneous at bedtime  insulin lispro (ADMELOG) corrective regimen sliding scale   SubCutaneous Before meals and at bedtime  insulin lispro Injectable (ADMELOG) 4 Unit(s) SubCutaneous before breakfast  insulin lispro Injectable (ADMELOG) 4 Unit(s) SubCutaneous before lunch  insulin lispro Injectable (ADMELOG) 4 Unit(s) SubCutaneous before dinner  levothyroxine 50 MICROGram(s) Oral daily  lisinopril 40 milliGRAM(s) Oral daily  pantoprazole    Tablet 40 milliGRAM(s) Oral before breakfast  rivaroxaban 15 milliGRAM(s) Oral with dinner  tamsulosin 0.4 milliGRAM(s) Oral at bedtime    MEDICATIONS  (PRN):  acetaminophen     Tablet .. 650 milliGRAM(s) Oral every 6 hours PRN Temp greater or equal to 38C (100.4F), Mild Pain (1 - 3)  aluminum hydroxide/magnesium hydroxide/simethicone Suspension 30 milliLiter(s) Oral every 4 hours PRN Dyspepsia  dextrose Oral Gel 15 Gram(s) Oral once PRN Blood Glucose LESS THAN 70 milliGRAM(s)/deciliter  melatonin 3 milliGRAM(s) Oral at bedtime PRN Insomnia  ondansetron Injectable 4 milliGRAM(s) IV Push every 8 hours PRN Nausea and/or Vomiting  oxycodone    5 mG/acetaminophen 325 mG 1 Tablet(s) Oral every 4 hours PRN Moderate Pain (4 - 6)        I&O's Summary    12 Mar 2023 07:01  -  13 Mar 2023 07:00  --------------------------------------------------------  IN: 50 mL / OUT: 2065 mL / NET: -2015 mL    13 Mar 2023 07:01  -  13 Mar 2023 16:10  --------------------------------------------------------  IN: 0 mL / OUT: 500 mL / NET: -500 mL        PHYSICAL EXAM:  Vital Signs Last 24 Hrs  T(C): 37.6 (13 Mar 2023 11:49), Max: 37.6 (13 Mar 2023 11:49)  T(F): 99.6 (13 Mar 2023 11:49), Max: 99.6 (13 Mar 2023 11:49)  HR: 73 (13 Mar 2023 11:49) (61 - 73)  BP: 157/89 (13 Mar 2023 11:49) (145/75 - 157/89)  BP(mean): --  RR: 18 (13 Mar 2023 11:49) (18 - 18)  SpO2: 90% (13 Mar 2023 11:49) (90% - 97%)    Parameters below as of 13 Mar 2023 04:59  Patient On (Oxygen Delivery Method): BiPAP/CPAP        CONSTITUTIONAL: NAD, truncal obesity   ENMT: Moist oral mucosa, no pharyngeal injection or exudates; normal dentition; No JVD  RESPIRATORY: Normal respiratory effort; lungs are clear to auscultation bilaterally  CARDIOVASCULAR: Regular rate and rhythm, normal S1 and S2, no murmur/rub/gallop; R> L not pitting edema; Peripheral pulses are 2+ bilaterally  ABDOMEN: Nontender to palpation, normoactive bowel sounds, no rebound/guarding; No hepatosplenomegaly  MUSCLOSKELETAL:  no clubbing or cyanosis of digits; no joint swelling or tenderness to palpation  PSYCH: A+O to person, place, and time; affect appropriate  NEUROLOGY: CN 2-12 are intact and symmetric; no gross sensory deficits; was observed moving all 4 ext against gravity cooperating with exam.   SKIN: redness, swelling and unroofed blister over dorsum of R foot, and large blister over R medial malleus now is draining clear fluids as well.     LABS:                        11.3   11.67 )-----------( 382      ( 12 Mar 2023 05:17 )             36.5     03-12    136  |  99  |  16.6  ----------------------------<  182<H>  4.2   |  26.0  |  1.19    Ca    9.3      12 Mar 2023 05:17                CAPILLARY BLOOD GLUCOSE      POCT Blood Glucose.: 173 mg/dL (13 Mar 2023 11:08)  POCT Blood Glucose.: 172 mg/dL (13 Mar 2023 07:53)  POCT Blood Glucose.: 193 mg/dL (12 Mar 2023 22:18)  POCT Blood Glucose.: 120 mg/dL (12 Mar 2023 16:26)        RADIOLOGY & ADDITIONAL TESTS:  Results Reviewed:   Imaging Personally Reviewed:  Electrocardiogram Personally Reviewed:

## 2023-03-13 NOTE — PROGRESS NOTE ADULT - ASSESSMENT
68 y/o M w/ PMH of morbid obesity, pAF (on xarelto), CKD III, HTN, HLD, DMII, RCC s/p rt nephrectomy, HCC, gout, hypothyroid came in c/o RLE redness found to have RLE cellulitis.  Initial  w/u significant for wbc 20K and lactic acidosis and admitted for sepsis 2/2 RLE cellulitis.  Hospital course complicated by mechanical fall yesterday w/ subsequent head trauma but no LOC.  CT head x2 negative for acute pathology.  Severe edema still present of RLE but no signs of compartment at this time or forming abscess on repeat dry CT extremities. ID team follows and adjusting Abx. Pt now slowly improving on iv daptomycin.       Sepsis 2/2 not purulent RLE cellulitis supperimposed on chronic  venous insuffiencey   - as per pt no worsening of edema or pain  - ID consult noted  - elevation on RLE, daily dressing   - c/w daptomycin, zosyn was stopped on 03/11    Mechanical fall while in hospital   - Hospital course complicated by mechanical fall yesterday w/ subsequent head trauma but no LOC.  CT head x2 negative for acute changes/bleeding  - back on doac  - fall precautions     AFib   - On Xarelto  - INR likely elevated 2/2 doac    - c/w Amiodarone     CKD-3b / RCC s/p nephrectomy  - Cr. Stable  - Avoid nephrotoxins given ckd and s/p nephrectomy   - Renally dose meds as indicated   - F/u with Dr. Stratton and Dr. Shetty as scheduled outpt     HTN / HLD  - c/w home meds     DM-2  - A1c 7.4, c/w Basal 10, ac 4 units, mdss, will adjust base on poc    Normocytic anemia   - Likely of chronic dx in the setting of CKD  - Iron panel reviewed   - Hemodynamically stable and no active bleeding   - Monitor CBC and transfuse as needed    Transaminitis   - Asymptomatic, benign abd exam   - Has hx of HCC   - OP f/u     HCC  - f/u w/ Dr. Stratton at [facility] as scheduled     Gout  - c/w Allopurinol     Hypothyroidism  - c/w Synthroid       VTE ppx: xarelto   Dispo: guided by ID team

## 2023-03-13 NOTE — PROGRESS NOTE ADULT - ASSESSMENT
66 y/o male with hx of Afib on Xarelto, obesity, CKD-3, HTN, HLD, DM-2, Hypothyroidism, JASON, Gout, RCC s/p right nephrectomy in 4/22 with no chemo/XRT, known HCC s/p liver biopsy 6/22 currently being followed at [facility]. Admitted on 3/6 with RLE cellulitis, marked leukocytosis 21K and fever. started on empiric piperacillin-tazobactam and vancomycin     RLE cellulitis  Leukocytosis  Fever   Obesity     - Improving RLE findings - cellulitis on top of chronic skin changes  - Continue dapto adjusted to GFR  - CPK normal   - CT RLE w/o evidence of collections   - Surgery consulted - no evidence of compartment syndrome   - BCX NGTD   - MRSA nasal swab neg   - RVP neg     D/w Dr. Preciado

## 2023-03-13 NOTE — DIETITIAN INITIAL EVALUATION ADULT - OTHER INFO
66 y/o male with hx of Afib on xarelto, obesity, CKD-3, HTN, HLD, DM-2, Hypothyroidism, JASON, Gout, RCC s/p right nephrectomy in 4/22 with no chemo/XRT, known HCC s/p liver biopsy 6/22 currently being followed at [facility]. Patient presents to the ED c/o right leg pain, and generalized weakness for the past 2-3 days. In the ED patient noted to be febrile, tachypneic, not hypotensive. Right leg with diffuse erythema, warmth. Surgery following, no surgical intervention at this time.

## 2023-03-13 NOTE — DIETITIAN INITIAL EVALUATION ADULT - NS FNS DIET ORDER
Diet, DASH/TLC:   Sodium & Cholesterol Restricted  Consistent Carbohydrate {No Snacks} (CSTCHO) (03-06-23 @ 01:34)

## 2023-03-13 NOTE — DIETITIAN INITIAL EVALUATION ADULT - ORAL INTAKE PTA/DIET HISTORY
Pt reports having a good appetite in house and PTA, UBW 315lbs confirmed with bedscale. Intentional weight loss ~70lbs x 1 year reported achieved with portion control and making more healthful food choices. Pt follows low Na and sugar diet at home, declines nutrition education at this time. Previously reported constipation resolved, had a BM this morning. Encouraged adequate protein/fluid intake reinforced low Na/sugar recommendations.

## 2023-03-13 NOTE — DIETITIAN INITIAL EVALUATION ADULT - PERTINENT MEDS FT
MEDICATIONS  (STANDING):  allopurinol 300 milliGRAM(s) Oral daily  aMIOdarone    Tablet 200 milliGRAM(s) Oral daily  amLODIPine   Tablet 10 milliGRAM(s) Oral daily  aspirin  chewable 81 milliGRAM(s) Oral daily  atorvastatin 20 milliGRAM(s) Oral at bedtime  DAPTOmycin IVPB 450 milliGRAM(s) IV Intermittent every 24 hours  dextrose 5%. 1000 milliLiter(s) (50 mL/Hr) IV Continuous <Continuous>  dextrose 50% Injectable 25 Gram(s) IV Push once  DULoxetine 60 milliGRAM(s) Oral daily  glucagon  Injectable 1 milliGRAM(s) IntraMuscular once  influenza  Vaccine (HIGH DOSE) 0.7 milliLiter(s) IntraMuscular once  insulin glargine Injectable (LANTUS) 10 Unit(s) SubCutaneous at bedtime  insulin lispro (ADMELOG) corrective regimen sliding scale   SubCutaneous Before meals and at bedtime  insulin lispro Injectable (ADMELOG) 4 Unit(s) SubCutaneous before breakfast  insulin lispro Injectable (ADMELOG) 4 Unit(s) SubCutaneous before lunch  insulin lispro Injectable (ADMELOG) 4 Unit(s) SubCutaneous before dinner  levothyroxine 50 MICROGram(s) Oral daily  lisinopril 40 milliGRAM(s) Oral daily  pantoprazole    Tablet 40 milliGRAM(s) Oral before breakfast  rivaroxaban 15 milliGRAM(s) Oral with dinner  tamsulosin 0.4 milliGRAM(s) Oral at bedtime    MEDICATIONS  (PRN):  acetaminophen     Tablet .. 650 milliGRAM(s) Oral every 6 hours PRN Temp greater or equal to 38C (100.4F), Mild Pain (1 - 3)  aluminum hydroxide/magnesium hydroxide/simethicone Suspension 30 milliLiter(s) Oral every 4 hours PRN Dyspepsia  dextrose Oral Gel 15 Gram(s) Oral once PRN Blood Glucose LESS THAN 70 milliGRAM(s)/deciliter  melatonin 3 milliGRAM(s) Oral at bedtime PRN Insomnia  ondansetron Injectable 4 milliGRAM(s) IV Push every 8 hours PRN Nausea and/or Vomiting  oxycodone    5 mG/acetaminophen 325 mG 1 Tablet(s) Oral every 4 hours PRN Moderate Pain (4 - 6)

## 2023-03-14 LAB
ANION GAP SERPL CALC-SCNC: 11 MMOL/L — SIGNIFICANT CHANGE UP (ref 5–17)
BUN SERPL-MCNC: 19 MG/DL — SIGNIFICANT CHANGE UP (ref 8–20)
CALCIUM SERPL-MCNC: 9.3 MG/DL — SIGNIFICANT CHANGE UP (ref 8.4–10.5)
CHLORIDE SERPL-SCNC: 97 MMOL/L — SIGNIFICANT CHANGE UP (ref 96–108)
CO2 SERPL-SCNC: 26 MMOL/L — SIGNIFICANT CHANGE UP (ref 22–29)
CREAT SERPL-MCNC: 1.36 MG/DL — HIGH (ref 0.5–1.3)
EGFR: 58 ML/MIN/1.73M2 — LOW
GLUCOSE BLDC GLUCOMTR-MCNC: 142 MG/DL — HIGH (ref 70–99)
GLUCOSE BLDC GLUCOMTR-MCNC: 152 MG/DL — HIGH (ref 70–99)
GLUCOSE BLDC GLUCOMTR-MCNC: 153 MG/DL — HIGH (ref 70–99)
GLUCOSE BLDC GLUCOMTR-MCNC: 189 MG/DL — HIGH (ref 70–99)
GLUCOSE SERPL-MCNC: 140 MG/DL — HIGH (ref 70–99)
POTASSIUM SERPL-MCNC: 4.2 MMOL/L — SIGNIFICANT CHANGE UP (ref 3.5–5.3)
POTASSIUM SERPL-SCNC: 4.2 MMOL/L — SIGNIFICANT CHANGE UP (ref 3.5–5.3)
SODIUM SERPL-SCNC: 134 MMOL/L — LOW (ref 135–145)

## 2023-03-14 PROCEDURE — 99232 SBSQ HOSP IP/OBS MODERATE 35: CPT

## 2023-03-14 RX ADMIN — Medication 4 UNIT(S): at 16:26

## 2023-03-14 RX ADMIN — Medication 500 MILLIGRAM(S): at 18:55

## 2023-03-14 RX ADMIN — Medication 4 UNIT(S): at 07:58

## 2023-03-14 RX ADMIN — Medication 81 MILLIGRAM(S): at 11:13

## 2023-03-14 RX ADMIN — DULOXETINE HYDROCHLORIDE 60 MILLIGRAM(S): 30 CAPSULE, DELAYED RELEASE ORAL at 11:13

## 2023-03-14 RX ADMIN — Medication 2: at 11:14

## 2023-03-14 RX ADMIN — Medication 2: at 16:26

## 2023-03-14 RX ADMIN — INSULIN GLARGINE 10 UNIT(S): 100 INJECTION, SOLUTION SUBCUTANEOUS at 22:25

## 2023-03-14 RX ADMIN — AMLODIPINE BESYLATE 10 MILLIGRAM(S): 2.5 TABLET ORAL at 05:22

## 2023-03-14 RX ADMIN — Medication 300 MILLIGRAM(S): at 11:13

## 2023-03-14 RX ADMIN — Medication 2: at 07:58

## 2023-03-14 RX ADMIN — DAPTOMYCIN 118 MILLIGRAM(S): 500 INJECTION, POWDER, LYOPHILIZED, FOR SOLUTION INTRAVENOUS at 09:19

## 2023-03-14 RX ADMIN — LISINOPRIL 40 MILLIGRAM(S): 2.5 TABLET ORAL at 05:22

## 2023-03-14 RX ADMIN — Medication 4 UNIT(S): at 11:14

## 2023-03-14 RX ADMIN — PANTOPRAZOLE SODIUM 40 MILLIGRAM(S): 20 TABLET, DELAYED RELEASE ORAL at 05:25

## 2023-03-14 RX ADMIN — Medication 500 MILLIGRAM(S): at 19:25

## 2023-03-14 RX ADMIN — AMIODARONE HYDROCHLORIDE 200 MILLIGRAM(S): 400 TABLET ORAL at 05:23

## 2023-03-14 RX ADMIN — TAMSULOSIN HYDROCHLORIDE 0.4 MILLIGRAM(S): 0.4 CAPSULE ORAL at 22:25

## 2023-03-14 RX ADMIN — ATORVASTATIN CALCIUM 20 MILLIGRAM(S): 80 TABLET, FILM COATED ORAL at 22:25

## 2023-03-14 RX ADMIN — Medication 50 MICROGRAM(S): at 05:22

## 2023-03-14 RX ADMIN — RIVAROXABAN 15 MILLIGRAM(S): KIT at 16:26

## 2023-03-14 NOTE — PROGRESS NOTE ADULT - SUBJECTIVE AND OBJECTIVE BOX
[facility] Division of Hospital Medicine    Chief Complaint:  cellulitis     SUBJECTIVE: reports feeling better, no new complains    OVERNIGHT EVENTS: none reported     Patient denies chest pain, SOB, abd pain, N/V, fever, chills, dysuria or any other complaints. All remainder ROS negative.     MEDICATIONS  (STANDING):  allopurinol 300 milliGRAM(s) Oral daily  aMIOdarone    Tablet 200 milliGRAM(s) Oral daily  amLODIPine   Tablet 10 milliGRAM(s) Oral daily  aspirin  chewable 81 milliGRAM(s) Oral daily  atorvastatin 20 milliGRAM(s) Oral at bedtime  DAPTOmycin IVPB 450 milliGRAM(s) IV Intermittent every 24 hours  dextrose 5%. 1000 milliLiter(s) (50 mL/Hr) IV Continuous <Continuous>  dextrose 50% Injectable 25 Gram(s) IV Push once  DULoxetine 60 milliGRAM(s) Oral daily  glucagon  Injectable 1 milliGRAM(s) IntraMuscular once  influenza  Vaccine (HIGH DOSE) 0.7 milliLiter(s) IntraMuscular once  insulin glargine Injectable (LANTUS) 10 Unit(s) SubCutaneous at bedtime  insulin lispro (ADMELOG) corrective regimen sliding scale   SubCutaneous Before meals and at bedtime  insulin lispro Injectable (ADMELOG) 4 Unit(s) SubCutaneous before breakfast  insulin lispro Injectable (ADMELOG) 4 Unit(s) SubCutaneous before lunch  insulin lispro Injectable (ADMELOG) 4 Unit(s) SubCutaneous before dinner  levothyroxine 50 MICROGram(s) Oral daily  lisinopril 40 milliGRAM(s) Oral daily  pantoprazole    Tablet 40 milliGRAM(s) Oral before breakfast  rivaroxaban 15 milliGRAM(s) Oral with dinner  tamsulosin 0.4 milliGRAM(s) Oral at bedtime    MEDICATIONS  (PRN):  acetaminophen     Tablet .. 650 milliGRAM(s) Oral every 6 hours PRN Temp greater or equal to 38C (100.4F), Mild Pain (1 - 3)  aluminum hydroxide/magnesium hydroxide/simethicone Suspension 30 milliLiter(s) Oral every 4 hours PRN Dyspepsia  dextrose Oral Gel 15 Gram(s) Oral once PRN Blood Glucose LESS THAN 70 milliGRAM(s)/deciliter  melatonin 3 milliGRAM(s) Oral at bedtime PRN Insomnia  ondansetron Injectable 4 milliGRAM(s) IV Push every 8 hours PRN Nausea and/or Vomiting  oxycodone    5 mG/acetaminophen 325 mG 1 Tablet(s) Oral every 4 hours PRN Moderate Pain (4 - 6)        I&O's Summary    12 Mar 2023 07:01  -  13 Mar 2023 07:00  --------------------------------------------------------  IN: 50 mL / OUT: 2065 mL / NET: -2015 mL    13 Mar 2023 07:01  -  13 Mar 2023 16:10  --------------------------------------------------------  IN: 0 mL / OUT: 500 mL / NET: -500 mL        PHYSICAL EXAM:  Vital Signs Last 24 Hrs  T(C): 37.6 (13 Mar 2023 11:49), Max: 37.6 (13 Mar 2023 11:49)  T(F): 99.6 (13 Mar 2023 11:49), Max: 99.6 (13 Mar 2023 11:49)  HR: 73 (13 Mar 2023 11:49) (61 - 73)  BP: 157/89 (13 Mar 2023 11:49) (145/75 - 157/89)  BP(mean): --  RR: 18 (13 Mar 2023 11:49) (18 - 18)  SpO2: 90% (13 Mar 2023 11:49) (90% - 97%)    Parameters below as of 13 Mar 2023 04:59  Patient On (Oxygen Delivery Method): BiPAP/CPAP        CONSTITUTIONAL: NAD, truncal obesity   ENMT: Moist oral mucosa, no pharyngeal injection or exudates; normal dentition; No JVD  RESPIRATORY: Normal respiratory effort; lungs are clear to auscultation bilaterally  CARDIOVASCULAR: Regular rate and rhythm, normal S1 and S2, no murmur/rub/gallop; R> L not pitting edema; Peripheral pulses are 2+ bilaterally  ABDOMEN: Nontender to palpation, normoactive bowel sounds, no rebound/guarding; No hepatosplenomegaly  MUSCLOSKELETAL:  no clubbing or cyanosis of digits; no joint swelling or tenderness to palpation  PSYCH: A+O to person, place, and time; affect appropriate  NEUROLOGY: CN 2-12 are intact and symmetric; no gross sensory deficits; was observed moving all 4 ext against gravity cooperating with exam.   SKIN: redness, swelling and unroofed blister over dorsum of R foot, and large blister over R medial malleus now is draining clear fluids as well.     LABS:                        11.3   11.67 )-----------( 382      ( 12 Mar 2023 05:17 )             36.5     03-12    136  |  99  |  16.6  ----------------------------<  182<H>  4.2   |  26.0  |  1.19    Ca    9.3      12 Mar 2023 05:17                CAPILLARY BLOOD GLUCOSE      POCT Blood Glucose.: 173 mg/dL (13 Mar 2023 11:08)  POCT Blood Glucose.: 172 mg/dL (13 Mar 2023 07:53)  POCT Blood Glucose.: 193 mg/dL (12 Mar 2023 22:18)  POCT Blood Glucose.: 120 mg/dL (12 Mar 2023 16:26)        RADIOLOGY & ADDITIONAL TESTS:  Results Reviewed:   Imaging Personally Reviewed:  Electrocardiogram Personally Reviewed:

## 2023-03-14 NOTE — PROGRESS NOTE ADULT - ASSESSMENT
68 y/o M w/ PMH of morbid obesity, pAF (on xarelto), CKD III, HTN, HLD, DMII, RCC s/p rt nephrectomy, HCC, gout, hypothyroid came in c/o RLE redness found to have RLE cellulitis.  Initial  w/u significant for wbc 20K and lactic acidosis and admitted for sepsis 2/2 RLE cellulitis.  Hospital course complicated by mechanical fall yesterday w/ subsequent head trauma but no LOC.  CT head x2 negative for acute pathology.  Severe edema still present of RLE but no signs of compartment at this time or forming abscess on repeat dry CT extremities. ID team follows and adjusting Abx. Pt now slowly improving on iv daptomycin.       Sepsis 2/2 not purulent RLE cellulitis supperimposed on chronic  venous insuffiencey   - as per pt no worsening of edema or pain  - ID consult noted  - elevation on RLE, daily dressing   - c/w daptomycin, zosyn was stopped on 03/11    Mechanical fall while in hospital   - Hospital course complicated by mechanical fall yesterday w/ subsequent head trauma but no LOC.  CT head x2 negative for acute changes/bleeding  - back on doac  - fall precautions     AFib   - On Xarelto  - INR likely elevated 2/2 doac    - c/w Amiodarone     CKD-3b / RCC s/p nephrectomy  - Cr. Stable  - Avoid nephrotoxins given ckd and s/p nephrectomy   - Renally dose meds as indicated   - F/u with Dr. Stratton and Dr. Shetty as scheduled outpt     HTN / HLD  - c/w home meds     DM-2  - A1c 7.4, c/w Basal 10, ac 4 units, mdss, will adjust base on poc    Normocytic anemia   - Likely of chronic dx in the setting of CKD  - Iron panel reviewed   - Hemodynamically stable and no active bleeding   - Monitor CBC and transfuse as needed    Transaminitis   - Asymptomatic, benign abd exam   - Has hx of HCC   - OP f/u     HCC  - f/u w/ Dr. Stratton at [facility] as scheduled     Gout  - c/w Allopurinol     Hypothyroidism  - c/w Synthroid       VTE ppx: xarelto   Dispo: guided by ID team

## 2023-03-14 NOTE — PROGRESS NOTE ADULT - SUBJECTIVE AND OBJECTIVE BOX
[facility]  INFECTIOUS DISEASES at [place] and [place]  ===============================================================                               Joe Oviedo MD*     Merlene Ghosh MD*                         Fuentes Luke MD*       Martha Pollard MD*            Diplomates American Board of Internal Medicine & Infectious Diseases                * [place] Office - Appt - Tel  [phone] Fax [phone]                * [place] Office - Appt - Tel [phone] Fax [phone]                                  Hospital Consult line:  [phone]  ==============================================================    ARIEL EVERETT [id number]    Follow up: RLE cellulitis    Afebrile  No acute events  hemodynamically stable     I have personally reviewed the labs and data; pertinent labs and data are listed in this note; please see below.     _______________________________________________________________  REVIEW OF SYSTEMS  Feeling well. Still has burning pain in RLE. No fever, chills, N, v, D   ________________________________________________________________  Allergies:  fish (Unknown)  No Known Drug Allergies  shellfish (Vomiting; Nausea)        ________________________________________________________________  PHYSICAL EXAM  GEN: NAD, lying in bed. Obese  LUNGS: eupneic  : No Crowley catheter  EXTREMITIES: RLE edematous, improved a bit   NEUROLOGIC: Grossly no focal deficits   PSYCHIATRIC: Appropriate affect and mood  SKIN: RLE - dependant erythema, medial large bullae above ankle with serous drainage; skin loss on foot dorsum w/o purulence   LINES: PIV  ________________________________________________________________  Vitals:  T(F): 97.6 (14 Mar 2023 10:52), Max: 99.6 (13 Mar 2023 11:49)  HR: 62 (14 Mar 2023 10:52)  BP: 153/79 (14 Mar 2023 10:52)  RR: 18 (14 Mar 2023 10:52)  SpO2: 91% (14 Mar 2023 10:52) (90% - 100%)  temp max in last 48H T(F): , Max: 99.6 (03-13-23 @ 11:49)    Current Antibiotics:  DAPTOmycin IVPB 450 milliGRAM(s) IV Intermittent every 24 hours    Other medications:  allopurinol 300 milliGRAM(s) Oral daily  aMIOdarone    Tablet 200 milliGRAM(s) Oral daily  amLODIPine   Tablet 10 milliGRAM(s) Oral daily  aspirin  chewable 81 milliGRAM(s) Oral daily  atorvastatin 20 milliGRAM(s) Oral at bedtime  dextrose 5%. 1000 milliLiter(s) IV Continuous <Continuous>  dextrose 50% Injectable 25 Gram(s) IV Push once  DULoxetine 60 milliGRAM(s) Oral daily  glucagon  Injectable 1 milliGRAM(s) IntraMuscular once  influenza  Vaccine (HIGH DOSE) 0.7 milliLiter(s) IntraMuscular once  insulin glargine Injectable (LANTUS) 10 Unit(s) SubCutaneous at bedtime  insulin lispro (ADMELOG) corrective regimen sliding scale   SubCutaneous Before meals and at bedtime  insulin lispro Injectable (ADMELOG) 4 Unit(s) SubCutaneous before breakfast  insulin lispro Injectable (ADMELOG) 4 Unit(s) SubCutaneous before lunch  insulin lispro Injectable (ADMELOG) 4 Unit(s) SubCutaneous before dinner  levothyroxine 50 MICROGram(s) Oral daily  lisinopril 40 milliGRAM(s) Oral daily  pantoprazole    Tablet 40 milliGRAM(s) Oral before breakfast  rivaroxaban 15 milliGRAM(s) Oral with dinner  tamsulosin 0.4 milliGRAM(s) Oral at bedtime        03-14    134<L>  |  97  |  19.0  ----------------------------<  140<H>  4.2   |  26.0  |  1.36<H>    Ca    9.3      14 Mar 2023 04:50      RECENT CULTURES:  03-05 @ 22:50    RVP with SARS-CoV-2   NotDetec      03-05 @ 22:00 .Blood Blood-Peripheral     No Growth Final      03-05 @ 21:50 .Blood Blood-Peripheral     No Growth Final      WBC Count: 11.67 K/uL (03-12-23 @ 05:17)  WBC Count: 12.95 K/uL (03-11-23 @ 05:02)  WBC Count: 12.32 K/uL (03-10-23 @ 05:46)  WBC Count: 13.70 K/uL (03-09-23 @ 19:10)    Creatinine, Serum: 1.36 mg/dL (03-14-23 @ 04:50)  Creatinine, Serum: 1.19 mg/dL (03-12-23 @ 05:17)  Creatinine, Serum: 1.20 mg/dL (03-11-23 @ 05:02)  Creatinine, Serum: 1.24 mg/dL (03-10-23 @ 05:46)       SARS-CoV-2: NotDetec (03-05-23 @ 22:50)    ________________________________________________________________  RADIOLOGY  < from: US Duplex Venous Lower Ext Complete, Bilateral (03.09.23 @ 23:53) >  FINDINGS:    RIGHT:  Normal compressibility of the RIGHT common femoral, femoral and popliteal   veins.  Doppler examination shows normal spontaneous and phasic flow.  Limited visualized of the RIGHT calf veins. No RIGHT calf vein thrombosis   detected.    LEFT:  Normal compressibility of the LEFT common femoral, femoral and popliteal   veins.  Doppler examination shows normal spontaneous and phasic flow.  Limited visualized of the LEFT calf veins. No LEFT calf vein thrombosis   detected.    IMPRESSION:  No evidence of deep venous thrombosis in either lower extremity.  Limited visualized of the calf veins    < end of copied text >    < from: CT Lower Extremity No Cont, Right (03.09.23 @ 10:03) >  FINDINGS:    Evaluation for underlying fluid collection is limited by the lack of   intravenous contrast. There is edema within the subcutaneous fat   laterally along the right thigh with overlying cutaneous thickening.   Circumferential confluent subcutaneous edema is seen from the level of   the proximal tibias/fibula to the level of the dorsum of the foot with   associated cutaneous thickening. Findings are nonspecific but may be   related to cellulitis. There is no definite reticulation or stranding   within the deep myofascial planes. Mildly prominent right inguinal lymph   nodes are seen which are likely reactive. There is no subcutaneous air.    There is no evidence of osseous erosion or destruction to suggest   osteomyelitis. There is mild right hip arthrosis. There is moderate   tricompartmental arthrosis of the kneewithout joint effusion. There is   severe talonavicular, naviculocuneiform, and first metatarsophalangeal   joint arthrosis. There is hallux valgus. Chronic appearing erosive   changes along the medial head of the first metatarsal may be related to   underlying gout or enthesopathic change. Tarsometatarsal arthrosis is   seen, most prominent at the second tarsometatarsal joint.    There is no disproportionate fatty atrophy of the musculature. There is   atherosclerotic disease.    There is a fat-containing left inguinal hernia.    There is partially imaged left knee arthroplasty.    IMPRESSION:    Subcutaneous edema and skin thickening throughout the right lower   extremity which is circumferential and confluent from the level of the   proximal tibias/fibula to the level of the foot. This may be related to   underlying cellulitis. Evaluation for underlying fluid collection is   limited by lack of intravenous contrast. No subcutaneous air. No CT   evidence of osteomyelitis.    < end of copied text >

## 2023-03-14 NOTE — PROGRESS NOTE ADULT - ASSESSMENT
66 y/o male with hx of Afib on Xarelto, obesity, CKD-3, HTN, HLD, DM-2, Hypothyroidism, JASON, Gout, RCC s/p right nephrectomy in 4/22 with no chemo/XRT, known HCC s/p liver biopsy 6/22 currently being followed at [facility]. Admitted on 3/6 with RLE cellulitis, marked leukocytosis 21K and fever. started on empiric piperacillin-tazobactam and vancomycin     RLE cellulitis  Leukocytosis  Fever   Obesity     - Slow clinical improvement  - Continue dapto adjusted to GFR  - CPK normal   - CT RLE w/o evidence of collections   - Surgery consulted - no evidence of compartment syndrome   - BCX neg  - MRSA nasal swab neg   - RVP neg   - Anticipate discharge on oral abx later this week   - Patient will wound care services upon discharge (large RLE bullae)    D/w Dr. Preciado

## 2023-03-15 ENCOUNTER — TRANSCRIPTION ENCOUNTER (OUTPATIENT)
Age: 68
End: 2023-03-15

## 2023-03-15 LAB
ANION GAP SERPL CALC-SCNC: 11 MMOL/L — SIGNIFICANT CHANGE UP (ref 5–17)
BUN SERPL-MCNC: 20.7 MG/DL — HIGH (ref 8–20)
CALCIUM SERPL-MCNC: 9.4 MG/DL — SIGNIFICANT CHANGE UP (ref 8.4–10.5)
CHLORIDE SERPL-SCNC: 100 MMOL/L — SIGNIFICANT CHANGE UP (ref 96–108)
CO2 SERPL-SCNC: 26 MMOL/L — SIGNIFICANT CHANGE UP (ref 22–29)
CREAT SERPL-MCNC: 1.23 MG/DL — SIGNIFICANT CHANGE UP (ref 0.5–1.3)
EGFR: 65 ML/MIN/1.73M2 — SIGNIFICANT CHANGE UP
GLUCOSE BLDC GLUCOMTR-MCNC: 123 MG/DL — HIGH (ref 70–99)
GLUCOSE BLDC GLUCOMTR-MCNC: 125 MG/DL — HIGH (ref 70–99)
GLUCOSE BLDC GLUCOMTR-MCNC: 145 MG/DL — HIGH (ref 70–99)
GLUCOSE BLDC GLUCOMTR-MCNC: 159 MG/DL — HIGH (ref 70–99)
GLUCOSE SERPL-MCNC: 139 MG/DL — HIGH (ref 70–99)
POTASSIUM SERPL-MCNC: 4.2 MMOL/L — SIGNIFICANT CHANGE UP (ref 3.5–5.3)
POTASSIUM SERPL-SCNC: 4.2 MMOL/L — SIGNIFICANT CHANGE UP (ref 3.5–5.3)
SODIUM SERPL-SCNC: 137 MMOL/L — SIGNIFICANT CHANGE UP (ref 135–145)

## 2023-03-15 PROCEDURE — 99232 SBSQ HOSP IP/OBS MODERATE 35: CPT

## 2023-03-15 RX ORDER — DAPTOMYCIN 500 MG/10ML
450 INJECTION, POWDER, LYOPHILIZED, FOR SOLUTION INTRAVENOUS EVERY 24 HOURS
Refills: 0 | Status: DISCONTINUED | OUTPATIENT
Start: 2023-03-16 | End: 2023-03-16

## 2023-03-15 RX ADMIN — PANTOPRAZOLE SODIUM 40 MILLIGRAM(S): 20 TABLET, DELAYED RELEASE ORAL at 06:28

## 2023-03-15 RX ADMIN — RIVAROXABAN 15 MILLIGRAM(S): KIT at 17:43

## 2023-03-15 RX ADMIN — Medication 500 MILLIGRAM(S): at 07:26

## 2023-03-15 RX ADMIN — ATORVASTATIN CALCIUM 20 MILLIGRAM(S): 80 TABLET, FILM COATED ORAL at 21:41

## 2023-03-15 RX ADMIN — DAPTOMYCIN 118 MILLIGRAM(S): 500 INJECTION, POWDER, LYOPHILIZED, FOR SOLUTION INTRAVENOUS at 10:30

## 2023-03-15 RX ADMIN — AMLODIPINE BESYLATE 10 MILLIGRAM(S): 2.5 TABLET ORAL at 06:26

## 2023-03-15 RX ADMIN — TAMSULOSIN HYDROCHLORIDE 0.4 MILLIGRAM(S): 0.4 CAPSULE ORAL at 21:41

## 2023-03-15 RX ADMIN — AMIODARONE HYDROCHLORIDE 200 MILLIGRAM(S): 400 TABLET ORAL at 06:26

## 2023-03-15 RX ADMIN — Medication 81 MILLIGRAM(S): at 11:33

## 2023-03-15 RX ADMIN — Medication 50 MICROGRAM(S): at 06:26

## 2023-03-15 RX ADMIN — Medication 4 UNIT(S): at 11:48

## 2023-03-15 RX ADMIN — Medication 2: at 08:47

## 2023-03-15 RX ADMIN — Medication 500 MILLIGRAM(S): at 17:36

## 2023-03-15 RX ADMIN — Medication 500 MILLIGRAM(S): at 06:26

## 2023-03-15 RX ADMIN — Medication 4 UNIT(S): at 08:46

## 2023-03-15 RX ADMIN — Medication 4 UNIT(S): at 17:34

## 2023-03-15 RX ADMIN — Medication 300 MILLIGRAM(S): at 11:35

## 2023-03-15 RX ADMIN — DULOXETINE HYDROCHLORIDE 60 MILLIGRAM(S): 30 CAPSULE, DELAYED RELEASE ORAL at 11:34

## 2023-03-15 RX ADMIN — INSULIN GLARGINE 10 UNIT(S): 100 INJECTION, SOLUTION SUBCUTANEOUS at 21:40

## 2023-03-15 RX ADMIN — LISINOPRIL 40 MILLIGRAM(S): 2.5 TABLET ORAL at 06:26

## 2023-03-15 NOTE — PROGRESS NOTE ADULT - ASSESSMENT
66 y/o M w/ PMH of morbid obesity, pAF (on xarelto), CKD III, HTN, HLD, DMII, RCC s/p rt nephrectomy, HCC, gout, hypothyroid came in c/o RLE redness found to have RLE cellulitis.  Initial  w/u significant for wbc 20K and lactic acidosis and admitted for sepsis 2/2 RLE cellulitis.  Hospital course complicated by mechanical fall yesterday w/ subsequent head trauma but no LOC.  CT head x2 negative for acute pathology.  Severe edema still present of RLE but no signs of compartment at this time or forming abscess on repeat dry CT extremities. ID team follows and adjusting Abx. Pt now slowly improving on iv daptomycin and daily wound care      Sepsis 2/2 not purulent RLE cellulitis supperimposed on chronic  venous insuffiencey   - as per pt no worsening of edema or pain  - ID consult noted  - elevation on RLE, daily dressing with  xeroform covered w kerlex to blister on dorsum of R foot as well as medial malleus, then cover with ace wrapping  - c/w daptomycin    Mechanical fall while in hospital   - Hospital course complicated by mechanical fall yesterday w/ subsequent head trauma but no LOC.  CT head x2 negative for acute changes/bleeding  - back on doac  - fall precautions     AFib   - On Xarelto  - INR likely elevated 2/2 doac    - c/w Amiodarone     CKD-3b / RCC s/p nephrectomy  - Cr. Stable  - Avoid nephrotoxins given ckd and s/p nephrectomy   - Renally dose meds as indicated   - F/u with Dr. Stratton and Dr. Shetty as scheduled outpt     HTN / HLD  - c/w home meds     DM-2  - A1c 7.4, c/w Basal 10, ac 4 units, mdss, will adjust base on poc    Normocytic anemia   - Likely of chronic dx in the setting of CKD  - Iron panel reviewed   - Hemodynamically stable and no active bleeding   - Monitor CBC and transfuse as needed    Transaminitis   - Asymptomatic, benign abd exam   - Has hx of HCC   - OP f/u     HCC  - f/u w/ Dr. Stratton at [facility] as scheduled     Gout iwht R knee pain  - c/w Allopurinol, naproxen    Hypothyroidism  - c/w Synthroid       VTE ppx: xarelto   Dispo: eventually d/c home with wound care ( ccm team notified) once cleared by ID team

## 2023-03-15 NOTE — DISCHARGE NOTE PROVIDER - NSDCFUSCHEDAPPT_GEN_ALL_CORE_FT
Daren Huerta  [facility]  FAMILYMED [address]  Scheduled Appointment: 03/29/2023    Yeyo Tom  [facility]  OPHTHALM [address]  Scheduled Appointment: 04/20/2023    Willard Stratton  [facility]  [facility]  Scheduled Appointment: 04/27/2023

## 2023-03-15 NOTE — DISCHARGE NOTE PROVIDER - NSDCCPCAREPLAN_GEN_ALL_CORE_FT
PRINCIPAL DISCHARGE DIAGNOSIS  Diagnosis: Cellulitis of leg, right  Assessment and Plan of Treatment: finish coures of doxycycline as ordered  continue wound care Apply xeroform covered w kerlex to blister on dorsum of R foot as well as medial malleus, then cover with ace wrapping.   follow up with infectious disease in 1-2 weeks

## 2023-03-15 NOTE — DISCHARGE NOTE PROVIDER - HOSPITAL COURSE
68 y/o M w/ PMH of morbid obesity, pAF (on xarelto), CKD III, HTN, HLD, DMII, RCC s/p rt nephrectomy, HCC, gout, hypothyroid came in c/o RLE redness found to have RLE cellulitis.  Initial  w/u significant for wbc 20K and lactic acidosis and admitted for sepsis 2/2 RLE cellulitis.  Hospital course complicated by mechanical fall yesterday w/ subsequent head trauma but no LOC.  CT head x2 negative for acute pathology.  Severe edema still present of RLE but no signs of compartment at this time or forming abscess on repeat dry CT extremities. ID team follows and adjusting Abx. Pt eventually slowly improving on iv daptomycin. He is stable to be d/c home to complete course of oral Abx, wound care visiting RN is set up as well. 68 y/o M w/ PMH of morbid obesity, pAF (on xarelto), CKD III, HTN, HLD, DMII, RCC s/p rt nephrectomy, HCC, gout, hypothyroid came in c/o RLE redness found to have RLE cellulitis.  Initial  w/u significant for wbc 20K and lactic acidosis and admitted for sepsis 2/2 RLE cellulitis.  Hospital course complicated by mechanical fall yesterday w/ subsequent head trauma but no LOC.  CT head x2 negative for acute pathology.  Severe edema still present of RLE but no signs of compartment at this time or forming abscess on repeat dry CT extremities. ID team follows and adjusting Abx. Pt eventually slowly improving on iv daptomycin. He is stable to be d/c home to complete course of oral Abx, wound care visiting RN is set up as well.     Wound care recommendation as bellow:  Apply xeroform covered w kerlex to blister on dorsum of R foot as well as medial malleus, then cover with ace wrapping. 68 y/o M w/ PMH of morbid obesity, pAF (on xarelto), CKD III, HTN, HLD, DMII, RCC s/p rt nephrectomy, HCC, gout, hypothyroid came in c/o RLE redness found to have RLE cellulitis.  Initial  w/u significant for wbc 20K and lactic acidosis and admitted for sepsis 2/2 RLE cellulitis.  Hospital course complicated by mechanical fall yesterday w/ subsequent head trauma but no LOC.  CT head x2 negative for acute pathology.  Severe edema still present of RLE but no signs of compartment at this time or forming abscess on repeat dry CT extremities. ID team follows and adjusting Abx. Pt eventually slowly improving on iv daptomycin. He is stable to be d/c home to complete course of oral Abx, wound care visiting RN is set up as well. home o2 eval and if needed, home o2 will be setup     Wound care recommendation as bellow:  Apply xeroform covered w kerlex to blister on dorsum of R foot as well as medial malleus, then cover with ace wrapping.

## 2023-03-15 NOTE — DISCHARGE NOTE PROVIDER - CARE PROVIDER_API CALL
Martha Lees)  Infectious Disease; Internal Medicine  [address] [place] [id number]  Phone: [phone]  Fax: [phone]  Follow Up Time:

## 2023-03-15 NOTE — PROGRESS NOTE ADULT - SUBJECTIVE AND OBJECTIVE BOX
[facility] Division of Hospital Medicine    Chief Complaint:  cellulitis     SUBJECTIVE: reports  feeling better, less swelling in L leg and now more itching    OVERNIGHT EVENTS: none reported     Patient denies chest pain, SOB, abd pain, N/V, fever, chills, dysuria or any other complaints. All remainder ROS negative.     MEDICATIONS  (STANDING):  allopurinol 300 milliGRAM(s) Oral daily  aMIOdarone    Tablet 200 milliGRAM(s) Oral daily  amLODIPine   Tablet 10 milliGRAM(s) Oral daily  aspirin  chewable 81 milliGRAM(s) Oral daily  atorvastatin 20 milliGRAM(s) Oral at bedtime  dextrose 5%. 1000 milliLiter(s) (50 mL/Hr) IV Continuous <Continuous>  dextrose 50% Injectable 25 Gram(s) IV Push once  DULoxetine 60 milliGRAM(s) Oral daily  glucagon  Injectable 1 milliGRAM(s) IntraMuscular once  influenza  Vaccine (HIGH DOSE) 0.7 milliLiter(s) IntraMuscular once  insulin glargine Injectable (LANTUS) 10 Unit(s) SubCutaneous at bedtime  insulin lispro (ADMELOG) corrective regimen sliding scale   SubCutaneous Before meals and at bedtime  insulin lispro Injectable (ADMELOG) 4 Unit(s) SubCutaneous before breakfast  insulin lispro Injectable (ADMELOG) 4 Unit(s) SubCutaneous before lunch  insulin lispro Injectable (ADMELOG) 4 Unit(s) SubCutaneous before dinner  levothyroxine 50 MICROGram(s) Oral daily  lisinopril 40 milliGRAM(s) Oral daily  naproxen 500 milliGRAM(s) Oral two times a day  pantoprazole    Tablet 40 milliGRAM(s) Oral before breakfast  rivaroxaban 15 milliGRAM(s) Oral with dinner  tamsulosin 0.4 milliGRAM(s) Oral at bedtime    MEDICATIONS  (PRN):  acetaminophen     Tablet .. 650 milliGRAM(s) Oral every 6 hours PRN Temp greater or equal to 38C (100.4F), Mild Pain (1 - 3)  aluminum hydroxide/magnesium hydroxide/simethicone Suspension 30 milliLiter(s) Oral every 4 hours PRN Dyspepsia  dextrose Oral Gel 15 Gram(s) Oral once PRN Blood Glucose LESS THAN 70 milliGRAM(s)/deciliter  melatonin 3 milliGRAM(s) Oral at bedtime PRN Insomnia  ondansetron Injectable 4 milliGRAM(s) IV Push every 8 hours PRN Nausea and/or Vomiting        I&O's Summary    14 Mar 2023 07:01  -  15 Mar 2023 07:00  --------------------------------------------------------  IN: 875 mL / OUT: 750 mL / NET: 125 mL    15 Mar 2023 07:01  -  15 Mar 2023 15:10  --------------------------------------------------------  IN: 0 mL / OUT: 500 mL / NET: -500 mL        PHYSICAL EXAM:  Vital Signs Last 24 Hrs  T(C): 36.6 (15 Mar 2023 11:00), Max: 37.2 (14 Mar 2023 22:28)  T(F): 97.9 (15 Mar 2023 11:00), Max: 98.9 (14 Mar 2023 22:28)  HR: 95 (15 Mar 2023 11:00) (62 - 95)  BP: 130/79 (15 Mar 2023 11:00) (130/79 - 168/93)  BP(mean): 97 (14 Mar 2023 22:28) (97 - 97)  RR: 18 (15 Mar 2023 11:00) (18 - 18)  SpO2: 96% (15 Mar 2023 15:06) (92% - 98%)    Parameters below as of 15 Mar 2023 15:06  Patient On (Oxygen Delivery Method): room air      CONSTITUTIONAL: NAD, truncal obesity   ENMT: Moist oral mucosa, no pharyngeal injection or exudates; normal dentition; No JVD  RESPIRATORY: Normal respiratory effort; lungs are clear to auscultation bilaterally  CARDIOVASCULAR: Regular rate and rhythm, normal S1 and S2, no murmur/rub/gallop; R> L not pitting edema- much improved, now wrinkling ; Peripheral pulses are 2+ bilaterally  ABDOMEN: Nontender to palpation, normoactive bowel sounds, no rebound/guarding; No hepatosplenomegaly  MUSCLOSKELETAL:  no clubbing or cyanosis of digits; no joint swelling or tenderness to palpation  PSYCH: A+O to person, place, and time; affect appropriate  NEUROLOGY: CN 2-12 are intact and symmetric; no gross sensory deficits; was observed moving all 4 ext against gravity cooperating with exam.   SKIN: unroofed blister over dorsum of R foot- w/o apparent inflamation at this time,  and large blister over R medial malleus now is draining clear fluids as well, still with residual bolus in distal 1/3 of leg, redressed wound         LABS:    03-15    137  |  100  |  20.7<H>  ----------------------------<  139<H>  4.2   |  26.0  |  1.23    Ca    9.4      15 Mar 2023 05:05                CAPILLARY BLOOD GLUCOSE      POCT Blood Glucose.: 123 mg/dL (15 Mar 2023 11:46)  POCT Blood Glucose.: 159 mg/dL (15 Mar 2023 07:52)  POCT Blood Glucose.: 142 mg/dL (14 Mar 2023 22:24)  POCT Blood Glucose.: 189 mg/dL (14 Mar 2023 16:21)        RADIOLOGY & ADDITIONAL TESTS:  Results Reviewed:   Imaging Personally Reviewed:  Electrocardiogram Personally Reviewed:

## 2023-03-15 NOTE — DISCHARGE NOTE PROVIDER - NSDCMRMEDTOKEN_GEN_ALL_CORE_FT
allopurinol 300 mg oral tablet: 1 tab(s) orally once a day  amiodarone 200 mg oral tablet: 1 tab(s) orally once a day  amLODIPine 10 mg oral tablet: 1 tab(s) orally once a day  atorvastatin 20 mg oral tablet: 1 tab(s) orally once a day  DULoxetine 60 mg oral delayed release capsule: 1 cap(s) orally 2 times a day  Flomax 0.4 mg oral capsule: 1 cap(s) orally once a day  glimepiride 2 mg oral tablet: 1 tab(s) orally once a day  levothyroxine 50 mcg (0.05 mg) oral tablet: 1 tab(s) orally once a day  ProAir HFA 90 mcg/inh inhalation aerosol: 2 puff(s) inhaled 4 times a day, As Needed sob/wheezing/ COPD  ramipril 10 mg oral capsule: 1 cap(s) orally once a day  Xarelto 20 mg oral tablet: 1 tab(s) orally once a day (in the evening)   acetaminophen 325 mg oral tablet: 2 tab(s) orally every 6 hours, As needed, Temp greater or equal to 38C (100.4F), Mild Pain (1 - 3)  allopurinol 300 mg oral tablet: 1 tab(s) orally once a day  amiodarone 200 mg oral tablet: 1 tab(s) orally once a day  amLODIPine 10 mg oral tablet: 1 tab(s) orally once a day  atorvastatin 20 mg oral tablet: 1 tab(s) orally once a day  doxycycline hyclate 100 mg oral capsule: 1 cap(s) orally 2 times a day   DULoxetine 60 mg oral delayed release capsule: 1 cap(s) orally 2 times a day  Flomax 0.4 mg oral capsule: 1 cap(s) orally once a day  glimepiride 2 mg oral tablet: 1 tab(s) orally once a day  levothyroxine 50 mcg (0.05 mg) oral tablet: 1 tab(s) orally once a day  ProAir HFA 90 mcg/inh inhalation aerosol: 2 puff(s) inhaled 4 times a day, As Needed sob/wheezing/ COPD  ramipril 10 mg oral capsule: 1 cap(s) orally once a day  Xarelto 20 mg oral tablet: 1 tab(s) orally once a day (in the evening)

## 2023-03-16 ENCOUNTER — TRANSCRIPTION ENCOUNTER (OUTPATIENT)
Age: 68
End: 2023-03-16

## 2023-03-16 VITALS
DIASTOLIC BLOOD PRESSURE: 83 MMHG | SYSTOLIC BLOOD PRESSURE: 166 MMHG | HEART RATE: 96 BPM | TEMPERATURE: 98 F | RESPIRATION RATE: 19 BRPM | OXYGEN SATURATION: 90 %

## 2023-03-16 PROBLEM — Z00.00 ENCOUNTER FOR PREVENTIVE HEALTH EXAMINATION: Status: ACTIVE | Noted: 2023-03-16

## 2023-03-16 LAB
ANION GAP SERPL CALC-SCNC: 12 MMOL/L — SIGNIFICANT CHANGE UP (ref 5–17)
BUN SERPL-MCNC: 21.6 MG/DL — HIGH (ref 8–20)
CALCIUM SERPL-MCNC: 9.3 MG/DL — SIGNIFICANT CHANGE UP (ref 8.4–10.5)
CHLORIDE SERPL-SCNC: 101 MMOL/L — SIGNIFICANT CHANGE UP (ref 96–108)
CO2 SERPL-SCNC: 24 MMOL/L — SIGNIFICANT CHANGE UP (ref 22–29)
CREAT SERPL-MCNC: 1.28 MG/DL — SIGNIFICANT CHANGE UP (ref 0.5–1.3)
EGFR: 62 ML/MIN/1.73M2 — SIGNIFICANT CHANGE UP
GLUCOSE BLDC GLUCOMTR-MCNC: 129 MG/DL — HIGH (ref 70–99)
GLUCOSE BLDC GLUCOMTR-MCNC: 144 MG/DL — HIGH (ref 70–99)
GLUCOSE BLDC GLUCOMTR-MCNC: 163 MG/DL — HIGH (ref 70–99)
GLUCOSE SERPL-MCNC: 187 MG/DL — HIGH (ref 70–99)
HCT VFR BLD CALC: 37.1 % — LOW (ref 39–50)
HGB BLD-MCNC: 11.3 G/DL — LOW (ref 13–17)
MCHC RBC-ENTMCNC: 25.2 PG — LOW (ref 27–34)
MCHC RBC-ENTMCNC: 30.5 GM/DL — LOW (ref 32–36)
MCV RBC AUTO: 82.6 FL — SIGNIFICANT CHANGE UP (ref 80–100)
PLATELET # BLD AUTO: 440 K/UL — HIGH (ref 150–400)
POTASSIUM SERPL-MCNC: 4 MMOL/L — SIGNIFICANT CHANGE UP (ref 3.5–5.3)
POTASSIUM SERPL-SCNC: 4 MMOL/L — SIGNIFICANT CHANGE UP (ref 3.5–5.3)
RBC # BLD: 4.49 M/UL — SIGNIFICANT CHANGE UP (ref 4.2–5.8)
RBC # FLD: 16.4 % — HIGH (ref 10.3–14.5)
SODIUM SERPL-SCNC: 137 MMOL/L — SIGNIFICANT CHANGE UP (ref 135–145)
WBC # BLD: 11.17 K/UL — HIGH (ref 3.8–10.5)
WBC # FLD AUTO: 11.17 K/UL — HIGH (ref 3.8–10.5)

## 2023-03-16 PROCEDURE — 85730 THROMBOPLASTIN TIME PARTIAL: CPT

## 2023-03-16 PROCEDURE — 83605 ASSAY OF LACTIC ACID: CPT

## 2023-03-16 PROCEDURE — 82728 ASSAY OF FERRITIN: CPT

## 2023-03-16 PROCEDURE — 93005 ELECTROCARDIOGRAM TRACING: CPT

## 2023-03-16 PROCEDURE — 81001 URINALYSIS AUTO W/SCOPE: CPT

## 2023-03-16 PROCEDURE — 85610 PROTHROMBIN TIME: CPT

## 2023-03-16 PROCEDURE — 99239 HOSP IP/OBS DSCHRG MGMT >30: CPT

## 2023-03-16 PROCEDURE — 71045 X-RAY EXAM CHEST 1 VIEW: CPT | Mod: 26

## 2023-03-16 PROCEDURE — 93970 EXTREMITY STUDY: CPT

## 2023-03-16 PROCEDURE — 84100 ASSAY OF PHOSPHORUS: CPT

## 2023-03-16 PROCEDURE — 83036 HEMOGLOBIN GLYCOSYLATED A1C: CPT

## 2023-03-16 PROCEDURE — 83540 ASSAY OF IRON: CPT

## 2023-03-16 PROCEDURE — 0225U NFCT DS DNA&RNA 21 SARSCOV2: CPT

## 2023-03-16 PROCEDURE — 83880 ASSAY OF NATRIURETIC PEPTIDE: CPT

## 2023-03-16 PROCEDURE — 71045 X-RAY EXAM CHEST 1 VIEW: CPT

## 2023-03-16 PROCEDURE — 80202 ASSAY OF VANCOMYCIN: CPT

## 2023-03-16 PROCEDURE — 87640 STAPH A DNA AMP PROBE: CPT

## 2023-03-16 PROCEDURE — 99232 SBSQ HOSP IP/OBS MODERATE 35: CPT

## 2023-03-16 PROCEDURE — 93926 LOWER EXTREMITY STUDY: CPT

## 2023-03-16 PROCEDURE — 96375 TX/PRO/DX INJ NEW DRUG ADDON: CPT

## 2023-03-16 PROCEDURE — 96374 THER/PROPH/DIAG INJ IV PUSH: CPT

## 2023-03-16 PROCEDURE — 36415 COLL VENOUS BLD VENIPUNCTURE: CPT

## 2023-03-16 PROCEDURE — 97116 GAIT TRAINING THERAPY: CPT

## 2023-03-16 PROCEDURE — 73700 CT LOWER EXTREMITY W/O DYE: CPT

## 2023-03-16 PROCEDURE — 84466 ASSAY OF TRANSFERRIN: CPT

## 2023-03-16 PROCEDURE — 82550 ASSAY OF CK (CPK): CPT

## 2023-03-16 PROCEDURE — 82962 GLUCOSE BLOOD TEST: CPT

## 2023-03-16 PROCEDURE — 97530 THERAPEUTIC ACTIVITIES: CPT

## 2023-03-16 PROCEDURE — 80076 HEPATIC FUNCTION PANEL: CPT

## 2023-03-16 PROCEDURE — 94660 CPAP INITIATION&MGMT: CPT

## 2023-03-16 PROCEDURE — 70450 CT HEAD/BRAIN W/O DYE: CPT

## 2023-03-16 PROCEDURE — 83735 ASSAY OF MAGNESIUM: CPT

## 2023-03-16 PROCEDURE — 99285 EMERGENCY DEPT VISIT HI MDM: CPT | Mod: 25

## 2023-03-16 PROCEDURE — 85025 COMPLETE CBC W/AUTO DIFF WBC: CPT

## 2023-03-16 PROCEDURE — 80053 COMPREHEN METABOLIC PANEL: CPT

## 2023-03-16 PROCEDURE — 80048 BASIC METABOLIC PNL TOTAL CA: CPT

## 2023-03-16 PROCEDURE — 87641 MR-STAPH DNA AMP PROBE: CPT

## 2023-03-16 PROCEDURE — 87040 BLOOD CULTURE FOR BACTERIA: CPT

## 2023-03-16 PROCEDURE — 85027 COMPLETE CBC AUTOMATED: CPT

## 2023-03-16 PROCEDURE — 83550 IRON BINDING TEST: CPT

## 2023-03-16 PROCEDURE — 84484 ASSAY OF TROPONIN QUANT: CPT

## 2023-03-16 RX ORDER — ASPIRIN/CALCIUM CARB/MAGNESIUM 324 MG
1 TABLET ORAL
Qty: 30 | Refills: 0
Start: 2023-03-16 | End: 2023-04-14

## 2023-03-16 RX ORDER — ACETAMINOPHEN 500 MG
2 TABLET ORAL
Qty: 0 | Refills: 0 | DISCHARGE
Start: 2023-03-16

## 2023-03-16 RX ADMIN — RIVAROXABAN 15 MILLIGRAM(S): KIT at 16:57

## 2023-03-16 RX ADMIN — DAPTOMYCIN 118 MILLIGRAM(S): 500 INJECTION, POWDER, LYOPHILIZED, FOR SOLUTION INTRAVENOUS at 05:45

## 2023-03-16 RX ADMIN — Medication 81 MILLIGRAM(S): at 11:20

## 2023-03-16 RX ADMIN — Medication 4 UNIT(S): at 07:52

## 2023-03-16 RX ADMIN — Medication 2: at 07:52

## 2023-03-16 RX ADMIN — Medication 500 MILLIGRAM(S): at 05:45

## 2023-03-16 RX ADMIN — DULOXETINE HYDROCHLORIDE 60 MILLIGRAM(S): 30 CAPSULE, DELAYED RELEASE ORAL at 11:17

## 2023-03-16 RX ADMIN — Medication 500 MILLIGRAM(S): at 06:39

## 2023-03-16 RX ADMIN — Medication 4 UNIT(S): at 11:17

## 2023-03-16 RX ADMIN — LISINOPRIL 40 MILLIGRAM(S): 2.5 TABLET ORAL at 05:43

## 2023-03-16 RX ADMIN — AMLODIPINE BESYLATE 10 MILLIGRAM(S): 2.5 TABLET ORAL at 05:44

## 2023-03-16 RX ADMIN — PANTOPRAZOLE SODIUM 40 MILLIGRAM(S): 20 TABLET, DELAYED RELEASE ORAL at 05:44

## 2023-03-16 RX ADMIN — AMIODARONE HYDROCHLORIDE 200 MILLIGRAM(S): 400 TABLET ORAL at 05:44

## 2023-03-16 RX ADMIN — Medication 300 MILLIGRAM(S): at 11:17

## 2023-03-16 RX ADMIN — Medication 4 UNIT(S): at 16:57

## 2023-03-16 RX ADMIN — Medication 50 MICROGRAM(S): at 05:44

## 2023-03-16 RX ADMIN — Medication 500 MILLIGRAM(S): at 16:57

## 2023-03-16 NOTE — DISCHARGE NOTE NURSING/CASE MANAGEMENT/SOCIAL WORK - PATIENT PORTAL LINK FT
You can access the FollowMyHealth Patient Portal offered by [facility] by registering at the following website: [email or web address]. By joining [employer]’s FollowMyHealth portal, you will also be able to view your health information using other applications (apps) compatible with our system.

## 2023-03-16 NOTE — PROGRESS NOTE ADULT - PROVIDER SPECIALTY LIST ADULT
Infectious Disease
Infectious Disease
Hospitalist
Infectious Disease
Hospitalist
Infectious Disease

## 2023-03-16 NOTE — DISCHARGE NOTE NURSING/CASE MANAGEMENT/SOCIAL WORK - NSDCPEFALRISK_GEN_ALL_CORE
For information on Fall & Injury Prevention, visit: [email or web address] OR  [email or web address] OR  https://www.cdc.gov/steadi/patient.html

## 2023-03-16 NOTE — PROGRESS NOTE ADULT - REASON FOR ADMISSION
RLE cellulitis  Sepsis

## 2023-03-16 NOTE — ED PROVIDER NOTE - WR ORDER DATE AND TIME 1
Head,  normocephalic Ears,  External ears within normal limits Nose/Nasopharynx,  External nose  normal appearance  Lips,  Appearance normal 12-Jan-2022 11:29

## 2023-03-16 NOTE — PROGRESS NOTE ADULT - SUBJECTIVE AND OBJECTIVE BOX
[facility]  INFECTIOUS DISEASES at [place] and [place]  ===============================================================                               Joe Oviedo MD*     Merlene Ghosh MD*                         Fuentes Luke MD*       Martha Pollard MD*            Diplomates American Board of Internal Medicine & Infectious Diseases                * [place] Office - Appt - Tel  [phone] Fax [phone]                * [place] Office - Appt - Tel [phone] Fax [phone]                                  Hospital Consult line:  [phone]  ==============================================================    EVERETT GEORGE [id number]    Follow up:    I have personally reviewed the labs and data; pertinent labs and data are listed in this note; please see below.     _______________________________________________________________  REVIEW OF SYSTEMS    ________________________________________________________________  Allergies:  fish (Unknown)  No Known Drug Allergies  shellfish (Vomiting; Nausea)    ________________________________________________________________  PHYSICAL EXAM  GEN: NAD, lying in bed. Obese  HEENT: Anicteric sclerae   LUNGS: eupneic.   : No Crowley catheter  EXTREMITIES: RLE with improving edema and erythema   PSYCHIATRIC: Appropriate affect and mood  SKIN: RLE large bullae with serous fluid   LINES: PIV  ________________________________________________________________  Vitals:  T(F): 97.6 (16 Mar 2023 04:59), Max: 97.9 (15 Mar 2023 11:00)  HR: 66 (16 Mar 2023 04:59)  BP: 142/79 (16 Mar 2023 04:59)  RR: 18 (16 Mar 2023 04:59)  SpO2: 97% (16 Mar 2023 04:59) (94% - 98%)  temp max in last 48H T(F): , Max: 98.9 (03-14-23 @ 22:28)    Current Antibiotics:  DAPTOmycin IVPB 450 milliGRAM(s) IV Intermittent every 24 hours    Other medications:  allopurinol 300 milliGRAM(s) Oral daily  aMIOdarone    Tablet 200 milliGRAM(s) Oral daily  amLODIPine   Tablet 10 milliGRAM(s) Oral daily  aspirin  chewable 81 milliGRAM(s) Oral daily  atorvastatin 20 milliGRAM(s) Oral at bedtime  dextrose 5%. 1000 milliLiter(s) IV Continuous <Continuous>  dextrose 50% Injectable 25 Gram(s) IV Push once  DULoxetine 60 milliGRAM(s) Oral daily  glucagon  Injectable 1 milliGRAM(s) IntraMuscular once  influenza  Vaccine (HIGH DOSE) 0.7 milliLiter(s) IntraMuscular once  insulin glargine Injectable (LANTUS) 10 Unit(s) SubCutaneous at bedtime  insulin lispro (ADMELOG) corrective regimen sliding scale   SubCutaneous Before meals and at bedtime  insulin lispro Injectable (ADMELOG) 4 Unit(s) SubCutaneous before breakfast  insulin lispro Injectable (ADMELOG) 4 Unit(s) SubCutaneous before lunch  insulin lispro Injectable (ADMELOG) 4 Unit(s) SubCutaneous before dinner  levothyroxine 50 MICROGram(s) Oral daily  lisinopril 40 milliGRAM(s) Oral daily  naproxen 500 milliGRAM(s) Oral two times a day  pantoprazole    Tablet 40 milliGRAM(s) Oral before breakfast  rivaroxaban 15 milliGRAM(s) Oral with dinner  tamsulosin 0.4 milliGRAM(s) Oral at bedtime                            11.3   11.17 )-----------( 440      ( 16 Mar 2023 05:00 )             37.1     03-16    137  |  101  |  21.6<H>  ----------------------------<  187<H>  4.0   |  24.0  |  1.28    Ca    9.3      16 Mar 2023 05:00      RECENT CULTURES:  03-05 @ 22:50    RVP with SARS-CoV-2   NotDetec      03-05 @ 22:00 .Blood Blood-Peripheral     No Growth Final        03-05 @ 21:50 .Blood Blood-Peripheral     No Growth Final        WBC Count: 11.17 K/uL (03-16-23 @ 05:00)  WBC Count: 11.67 K/uL (03-12-23 @ 05:17)    Creatinine, Serum: 1.28 mg/dL (03-16-23 @ 05:00)  Creatinine, Serum: 1.23 mg/dL (03-15-23 @ 05:05)  Creatinine, Serum: 1.36 mg/dL (03-14-23 @ 04:50)  Creatinine, Serum: 1.19 mg/dL (03-12-23 @ 05:17)       SARS-CoV-2: NotDetec (03-05-23 @ 22:50)    ________________________________________________________________  RADIOLOGY  < from: US Duplex Venous Lower Ext Complete, Bilateral (03.09.23 @ 23:53) >  FINDINGS:    RIGHT:  Normal compressibility of the RIGHT common femoral, femoral and popliteal   veins.  Doppler examination shows normal spontaneous and phasic flow.  Limited visualized of the RIGHT calf veins. No RIGHT calf vein thrombosis   detected.    LEFT:  Normal compressibility of the LEFT common femoral, femoral and popliteal   veins.  Doppler examination shows normal spontaneous and phasic flow.  Limited visualized of the LEFT calf veins. No LEFT calf vein thrombosis   detected.    IMPRESSION:  No evidence of deep venous thrombosis in either lower extremity.  Limited visualized of the calf veins    < end of copied text >

## 2023-03-16 NOTE — DISCHARGE NOTE NURSING/CASE MANAGEMENT/SOCIAL WORK - NSTRANSFERBELONGINGSDISPO_GEN_A_NUR
Patient Seen in: [facility] AND [facility] Emergency Department    History   Patient presents with:  GI Bleeding (gastrointestinal)    Stated Complaint: black stools    HPI    Patient is an 27-year-old female who presents with GI bleed.   Patient was found to hav osteotomy with biofix fixation 1st metatarsal, left foot - French Garrido       Medications :   DILTIAZEM HCL  MG Oral Capsule SR 24 Hr,  TAKE ONE CAPSULE BY MOUTH ONCE DAILY   LOSARTAN POTASSIUM 50 MG Oral Tab,  TAKE ONE TABLET BY MOUTH TWICE DAILY HPI.  Constitutional and vital signs reviewed. All other systems reviewed and negative except as noted above. PSFH elements reviewed from today and agreed except as otherwise stated in HPI.     Physical Exam       ED Triage Vitals   BP 01/04/17 1246 ---------                               -----------         ------                     CBC W/ DIFFERENTIAL[[id number]]          Abnormal            Final result                 Please view results for these tests on the individual orders.    BLOOD TYPE, AB with patient

## 2023-03-16 NOTE — PROGRESS NOTE ADULT - ASSESSMENT
66 y/o male with hx of Afib on Xarelto, obesity, CKD-3, HTN, HLD, DM-2, Hypothyroidism, JASON, Gout, RCC s/p right nephrectomy in 4/22 with no chemo/XRT, known HCC s/p liver biopsy 6/22 currently being followed at [facility]. Admitted on 3/6 with RLE cellulitis, marked leukocytosis 21K and fever. started on empiric piperacillin-tazobactam and vancomycin     RLE cellulitis  Leukocytosis  Fever   Obesity     - Clinically improving, albeit slowly   - OK to discharge on oral doxycycline 100 mg PO BID for 10 more days   - Follow up with me in the office on 3/23. P: [phone]  - Needs wound care at home   - CT RLE w/o evidence of collections   - BCX neg  - MRSA nasal swab neg   - Stable for discharge     D/w Dr. Jim

## 2023-03-19 ENCOUNTER — NON-APPOINTMENT (OUTPATIENT)
Age: 68
End: 2023-03-19

## 2023-03-20 ENCOUNTER — OUTPATIENT (OUTPATIENT)
Dept: OUTPATIENT SERVICES | Facility: HOSPITAL | Age: 68
LOS: 1 days | Discharge: HOME | End: 2023-03-20

## 2023-03-20 DIAGNOSIS — Z98.890 OTHER SPECIFIED POSTPROCEDURAL STATES: Chronic | ICD-10-CM

## 2023-03-20 DIAGNOSIS — Z96.652 PRESENCE OF LEFT ARTIFICIAL KNEE JOINT: Chronic | ICD-10-CM

## 2023-03-20 DIAGNOSIS — Z98.49 CATARACT EXTRACTION STATUS, UNSPECIFIED EYE: Chronic | ICD-10-CM

## 2023-03-20 DIAGNOSIS — Z90.5 ACQUIRED ABSENCE OF KIDNEY: Chronic | ICD-10-CM

## 2023-03-20 DIAGNOSIS — C22.0 LIVER CELL CARCINOMA: ICD-10-CM

## 2023-03-20 PROBLEM — C64.9 MALIGNANT NEOPLASM OF UNSPECIFIED KIDNEY, EXCEPT RENAL PELVIS: Chronic | Status: ACTIVE | Noted: 2023-03-05

## 2023-03-21 ENCOUNTER — RESULT REVIEW (OUTPATIENT)
Age: 68
End: 2023-03-21

## 2023-03-21 ENCOUNTER — APPOINTMENT (OUTPATIENT)
Dept: HEMATOLOGY ONCOLOGY | Facility: CLINIC | Age: 68
End: 2023-03-21
Payer: MEDICARE

## 2023-03-21 VITALS
HEIGHT: 69 IN | HEART RATE: 89 BPM | WEIGHT: 315 LBS | SYSTOLIC BLOOD PRESSURE: 147 MMHG | OXYGEN SATURATION: 99 % | TEMPERATURE: 97.4 F | DIASTOLIC BLOOD PRESSURE: 85 MMHG | BODY MASS INDEX: 46.65 KG/M2

## 2023-03-21 LAB
BASOPHILS # BLD AUTO: 0.1 K/UL — SIGNIFICANT CHANGE UP (ref 0–0.2)
BASOPHILS NFR BLD AUTO: 0.5 % — SIGNIFICANT CHANGE UP (ref 0–2)
EOSINOPHIL # BLD AUTO: 0.4 K/UL — SIGNIFICANT CHANGE UP (ref 0–0.5)
EOSINOPHIL NFR BLD AUTO: 3.7 % — SIGNIFICANT CHANGE UP (ref 0–6)
HCT VFR BLD CALC: 39.6 % — SIGNIFICANT CHANGE UP (ref 39–50)
HGB BLD-MCNC: 13 G/DL — SIGNIFICANT CHANGE UP (ref 13–17)
LYMPHOCYTES # BLD AUTO: 1.9 K/UL — SIGNIFICANT CHANGE UP (ref 1–3.3)
LYMPHOCYTES # BLD AUTO: 19.3 % — SIGNIFICANT CHANGE UP (ref 13–44)
MCHC RBC-ENTMCNC: 26.1 PG — LOW (ref 27–34)
MCHC RBC-ENTMCNC: 32.8 G/DL — SIGNIFICANT CHANGE UP (ref 32–36)
MCV RBC AUTO: 79.6 FL — LOW (ref 80–100)
MONOCYTES # BLD AUTO: 0.4 K/UL — SIGNIFICANT CHANGE UP (ref 0–0.9)
MONOCYTES NFR BLD AUTO: 3.7 % — SIGNIFICANT CHANGE UP (ref 2–14)
NEUTROPHILS # BLD AUTO: 7.2 K/UL — SIGNIFICANT CHANGE UP (ref 1.8–7.4)
NEUTROPHILS NFR BLD AUTO: 72.8 % — SIGNIFICANT CHANGE UP (ref 43–77)
PLATELET # BLD AUTO: 500 K/UL — HIGH (ref 150–400)
RBC # BLD: 4.98 M/UL — SIGNIFICANT CHANGE UP (ref 4.2–5.8)
RBC # FLD: 15.6 % — HIGH (ref 10.3–14.5)
WBC # BLD: 9.9 K/UL — SIGNIFICANT CHANGE UP (ref 3.8–10.5)
WBC # FLD AUTO: 9.9 K/UL — SIGNIFICANT CHANGE UP (ref 3.8–10.5)

## 2023-03-21 PROCEDURE — 99214 OFFICE O/P EST MOD 30 MIN: CPT

## 2023-03-22 ENCOUNTER — NON-APPOINTMENT (OUTPATIENT)
Age: 68
End: 2023-03-22

## 2023-03-23 ENCOUNTER — APPOINTMENT (OUTPATIENT)
Dept: INTERNAL MEDICINE | Facility: CLINIC | Age: 68
End: 2023-03-23

## 2023-03-23 ENCOUNTER — APPOINTMENT (OUTPATIENT)
Dept: INTERNAL MEDICINE | Facility: CLINIC | Age: 68
End: 2023-03-23
Payer: MEDICARE

## 2023-03-23 VITALS
HEIGHT: 69 IN | TEMPERATURE: 97.3 F | BODY MASS INDEX: 46.65 KG/M2 | HEART RATE: 88 BPM | OXYGEN SATURATION: 98 % | DIASTOLIC BLOOD PRESSURE: 87 MMHG | SYSTOLIC BLOOD PRESSURE: 135 MMHG | WEIGHT: 315 LBS

## 2023-03-23 PROCEDURE — 99213 OFFICE O/P EST LOW 20 MIN: CPT

## 2023-03-29 ENCOUNTER — APPOINTMENT (OUTPATIENT)
Dept: CT IMAGING | Facility: CLINIC | Age: 68
End: 2023-03-29
Payer: MEDICARE

## 2023-03-29 ENCOUNTER — APPOINTMENT (OUTPATIENT)
Dept: INTERNAL MEDICINE | Facility: CLINIC | Age: 68
End: 2023-03-29
Payer: MEDICARE

## 2023-03-29 ENCOUNTER — OUTPATIENT (OUTPATIENT)
Dept: OUTPATIENT SERVICES | Facility: HOSPITAL | Age: 68
LOS: 1 days | End: 2023-03-29
Payer: MEDICARE

## 2023-03-29 VITALS
DIASTOLIC BLOOD PRESSURE: 69 MMHG | HEIGHT: 69 IN | RESPIRATION RATE: 15 BRPM | WEIGHT: 315 LBS | HEART RATE: 87 BPM | SYSTOLIC BLOOD PRESSURE: 137 MMHG | BODY MASS INDEX: 46.65 KG/M2 | TEMPERATURE: 97.4 F | OXYGEN SATURATION: 95 %

## 2023-03-29 DIAGNOSIS — Z98.890 OTHER SPECIFIED POSTPROCEDURAL STATES: Chronic | ICD-10-CM

## 2023-03-29 DIAGNOSIS — Z96.652 PRESENCE OF LEFT ARTIFICIAL KNEE JOINT: Chronic | ICD-10-CM

## 2023-03-29 DIAGNOSIS — L03.115 CELLULITIS OF RIGHT LOWER LIMB: ICD-10-CM

## 2023-03-29 DIAGNOSIS — Z98.49 CATARACT EXTRACTION STATUS, UNSPECIFIED EYE: Chronic | ICD-10-CM

## 2023-03-29 DIAGNOSIS — Z90.5 ACQUIRED ABSENCE OF KIDNEY: Chronic | ICD-10-CM

## 2023-03-29 DIAGNOSIS — C22.8 MALIGNANT NEOPLASM OF LIVER, PRIMARY, UNSPECIFIED AS TO TYPE: ICD-10-CM

## 2023-03-29 PROCEDURE — 71260 CT THORAX DX C+: CPT

## 2023-03-29 PROCEDURE — 71260 CT THORAX DX C+: CPT | Mod: 26

## 2023-03-29 PROCEDURE — 99213 OFFICE O/P EST LOW 20 MIN: CPT

## 2023-03-30 ENCOUNTER — APPOINTMENT (OUTPATIENT)
Dept: FAMILY MEDICINE | Facility: CLINIC | Age: 68
End: 2023-03-30
Payer: MEDICARE

## 2023-03-30 VITALS
BODY MASS INDEX: 46.65 KG/M2 | SYSTOLIC BLOOD PRESSURE: 130 MMHG | WEIGHT: 315 LBS | OXYGEN SATURATION: 96 % | HEART RATE: 74 BPM | HEIGHT: 69 IN | DIASTOLIC BLOOD PRESSURE: 90 MMHG | RESPIRATION RATE: 14 BRPM

## 2023-03-30 PROCEDURE — 99215 OFFICE O/P EST HI 40 MIN: CPT

## 2023-03-30 NOTE — HISTORY OF PRESENT ILLNESS
[Asthma] : asthma [Sleep Apnea] : sleep apnea [No Adverse Anesthesia Reaction] : no adverse anesthesia reaction in self or family member [(Patient denies any chest pain, claudication, dyspnea on exertion, orthopnea, palpitations or syncope)] : Patient denies any chest pain, claudication, dyspnea on exertion, orthopnea, palpitations or syncope [Chronic Kidney Disease] : chronic kidney disease [Diabetes] : diabetes [FreeTextEntry1] : kidney stone removal [FreeTextEntry2] : 04/04/2023 [FreeTextEntry3] :  [FreeTextEntry4] : 69 y/o male with PMH HTN, hypothyroidism, liver mass HCC , obesity, gout present for medical clearance for kidney stone removal on 4/4/23. Patent had presurgical testing  Patient with one Kidney 2022 for RCC [FreeTextEntry7] : separate note

## 2023-03-30 NOTE — HISTORY OF PRESENT ILLNESS
[Asthma] : asthma [Sleep Apnea] : sleep apnea [No Adverse Anesthesia Reaction] : no adverse anesthesia reaction in self or family member [(Patient denies any chest pain, claudication, dyspnea on exertion, orthopnea, palpitations or syncope)] : Patient denies any chest pain, claudication, dyspnea on exertion, orthopnea, palpitations or syncope [Chronic Kidney Disease] : chronic kidney disease [Negative] : Musculoskeletal [Diabetes] : diabetes [FreeTextEntry1] : kidney stone removal [FreeTextEntry2] : 04/04/2023 [FreeTextEntry3] :  [FreeTextEntry4] : 67 y/o male with PMH HTN, hypothyroidism, liver mass HCC , obesity, gout present for medical clearance for kidney stone removal on 4/4/23. Patent had presurgical testing  Patient with one Kidney 2022 for RCC [FreeTextEntry7] : separate note

## 2023-03-30 NOTE — ASSESSMENT
[Patient Optimized for Surgery] : Patient optimized for surgery [FreeTextEntry4] : 67 y/o male with PMH HTN, hypothyroidism, liver mass, obesity, gout present for medical clearance for kidney stone removal on 4/4/23. Patent had presurgical testing. Patient medically cleared

## 2023-03-30 NOTE — ASSESSMENT
[Patient Optimized for Surgery] : Patient optimized for surgery [FreeTextEntry4] : 69 y/o male with PMH HTN, hypothyroidism, liver mass, obesity, gout present for medical clearance for kidney stone removal on 4/4/23. Patent had presurgical testing. Patient medically cleared

## 2023-04-03 ENCOUNTER — RX RENEWAL (OUTPATIENT)
Age: 68
End: 2023-04-03

## 2023-04-03 LAB
AFP-TM SERPL-MCNC: 90.7 NG/ML
ALBUMIN SERPL ELPH-MCNC: 3.7 G/DL
ALP BLD-CCNC: 118 U/L
ALT SERPL-CCNC: 29 U/L
ANION GAP SERPL CALC-SCNC: 15 MMOL/L
APTT BLD: 39.4 SEC
AST SERPL-CCNC: 22 U/L
BILIRUB SERPL-MCNC: 0.2 MG/DL
BUN SERPL-MCNC: 25 MG/DL
CALCIUM SERPL-MCNC: 9.8 MG/DL
CHLORIDE SERPL-SCNC: 99 MMOL/L
CO2 SERPL-SCNC: 22 MMOL/L
CREAT SERPL-MCNC: 1.43 MG/DL
EGFR: 54 ML/MIN/1.73M2
GLUCOSE SERPL-MCNC: 308 MG/DL
INR PPP: 1.51 RATIO
POTASSIUM SERPL-SCNC: 4.8 MMOL/L
PROT SERPL-MCNC: 8 G/DL
PT BLD: 17.6 SEC
SODIUM SERPL-SCNC: 137 MMOL/L

## 2023-04-04 ENCOUNTER — NON-APPOINTMENT (OUTPATIENT)
Age: 68
End: 2023-04-04

## 2023-04-04 PROBLEM — L03.115 CELLULITIS OF RIGHT LOWER LEG: Status: ACTIVE | Noted: 2023-03-23

## 2023-04-04 NOTE — ASSESSMENT
[FreeTextEntry1] : 69 y/o male with multiple comorbidities was hospitalized at [facility] 3/6-3/16 with RLE cellulitis, marked leukocytosis 21K and fever. Treated with Zosyn/vancomycin >> Zosyn/daptomycin with slow resolution of symptoms. Work up included, neg BCX, CT RLE that did not show collections, and Doppler LE US negative for DVT. Discharge on doxy 100 BID which was extended for another week at his last visit. \par \par I'm pleased with the progress observed today after completing almost 3 weeks of antibiotics. I do not recommend further treatment. Wound dressings were changed in the office; he shall continue with wound care at home. \par \par Patient would like to return to work on 3/31. Return to work note provided. \par \par Follow up as needed. \par \par

## 2023-04-04 NOTE — ASSESSMENT
[FreeTextEntry1] : 69 y/o male with multiple comorbidities was hospitalized at [facility] 3/6-3/16 with RLE cellulitis, marked leukocytosis 21K and fever. Treated with Zosyn/vancomycin >> Zosyn/daptomycin with slow resolution of symptoms. Work up included, neg BCX, CT RLE that did not show collections, and Doppler LE US negative for DVT. Discharge on doxy 100 BID which was extended for another week at his last visit. \par \par I'm pleased with the progress observed today after completing almost 3 weeks of antibiotics. I do not recommend further treatment. Wound dressings were changed in the office; he shall continue with wound care at home. \par \par Patient would like to return to work on 3/31. Return to work note provided. \par \par Follow up as needed. \par \par

## 2023-04-04 NOTE — ASSESSMENT
[FreeTextEntry1] : \par 69 y/o male with multiple comorbidities was hospitalized at [facility] 3/6-3/16 with RLE cellulitis, marked leukocytosis 21K and fever. Treated with Zosyn/vancomycin >> Zosyn/daptomycin with slow resolution of symptoms. Work up included, neg BCX, CT RLE that did not show collections, and Doppler LE US negative for DVT. Discharge on doxy 100 BID which was extended for another week at his last visit. \par \par I'm pleased with the progress observed today after completing almost 3 weeks of antibiotics. I do not recommend further treatment. Wound dressings were changed in the office; he shall continue with wound care at home. \par \par Patient would like to return to work on 3/31. Return to work note provided. \par \par Follow up as needed.

## 2023-04-04 NOTE — HISTORY OF PRESENT ILLNESS
[FreeTextEntry1] : Hospitalization [facility] 3/6-3/16\par \par 69 y/o male with hx of Afib on Xarelto, obesity, CKD-3, HTN, HLD, DM-2, Hypothyroidism, JASON, Gout, RCC s/p right nephrectomy and HCC was admitted to [facility] on 3/6 with RLE cellulitis, marked leukocytosis 21K and fever. Initially treated with Zosyn/vancomycin >> Zosyn/daptomycin. Work up included CT RLE that did not show collections, and Doppler LE US negative for DVT. BCx were negative and leukocytosis had significantly improved before discharge. His symptoms improved slowly, and he was ultimately discharged after 10 days of hospitalization on doxycycline 100 mg BID. \par \par Follow up 3/23 - doxy extended for another week \par \par Follow up 3/29 - doing well. Tolerating doxycycline. No fever, chills; no worsening erythema, pain or discharge from right leg.

## 2023-04-04 NOTE — REASON FOR VISIT
[Post Hospitalization] : a post hospitalization visit [FreeTextEntry1] : RLE celullitis  [Follow-Up: _____] : a [unfilled] follow-up visit

## 2023-04-04 NOTE — PHYSICAL EXAM
[General Appearance - Alert] : alert [General Appearance - In No Acute Distress] : in no acute distress [Sclera] : the sclera and conjunctiva were normal [Oropharynx] : the oropharynx was normal with no thrush [] : no respiratory distress [No Focal Deficits] : no focal deficits [Oriented To Time, Place, And Person] : oriented to person, place, and time [Affect] : the affect was normal [FreeTextEntry1] : RLE - erythema much improved. Bullae almost resolved. Scant amount of serous oozing from bullae.

## 2023-04-04 NOTE — HISTORY OF PRESENT ILLNESS
[FreeTextEntry1] : Hospitalization [facility] 3/6-3/16\par \par 69 y/o male with hx of Afib on Xarelto, obesity, CKD-3, HTN, HLD, DM-2, Hypothyroidism, JASON, Gout, RCC s/p right nephrectomy and HCC was admitted to [facility] on 3/6 with RLE cellulitis, marked leukocytosis 21K and fever. Initially treated with Zosyn/vancomycin >> Zosyn/daptomycin. Work up included CT RLE that did not show collections, and Doppler LE US negative for DVT. BCx were negative and leukocytosis had significantly improved before discharge. His symptoms improved slowly, and he was ultimately discharged after 10 days of hospitalization on doxycycline 100 mg BID. \par \par Follow up 3/23 - doxy extended for another week \par \par Follow up 3/29 - doing well. Tolerating doxycycline. No fever, chills; no worsening erythema, pain or discharge from right leg. \par \par

## 2023-04-04 NOTE — HISTORY OF PRESENT ILLNESS
[FreeTextEntry1] : Hospitalization [facility] 3/6-3/16\par \par 69 y/o male with hx of Afib on Xarelto, obesity, CKD-3, HTN, HLD, DM-2, Hypothyroidism, JASON, Gout, RCC s/p right nephrectomy and HCC was admitted to [facility] on 3/6 with RLE cellulitis, marked leukocytosis 21K and fever. Initially treated with Zosyn/vancomycin >> Zosyn/daptomycin. Work up included CT RLE that did not show collections, and Doppler LE US negative for DVT. BCx were negative and leukocytosis had significantly improved before discharge. His symptoms improved slowly, and he was ultimately discharged after 10 days of hospitalization on doxycycline 100 mg BID. \par \par Follow up 3/23 - doxy extended for another week \par \par Follow up 3/29 - doing well. Tolerating doxycycline. No fever, chills; no worsening erythema, pain or discharge from right leg. \par \par

## 2023-04-04 NOTE — DATA REVIEWED
[FreeTextEntry1] : < from: US Duplex Venous Lower Ext Complete, Bilateral (03.09.23 @ 23:53) >\par FINDINGS:\par \par RIGHT:\par Normal compressibility of the RIGHT common femoral, femoral and popliteal \par veins.\par Doppler examination shows normal spontaneous and phasic flow.\par Limited visualized of the RIGHT calf veins. No RIGHT calf vein thrombosis \par detected.\par \par LEFT:\par Normal compressibility of the LEFT common femoral, femoral and popliteal \par veins.\par Doppler examination shows normal spontaneous and phasic flow.\par Limited visualized of the LEFT calf veins. No LEFT calf vein thrombosis \par detected.\par \par IMPRESSION:\par No evidence of deep venous thrombosis in either lower extremity.\par Limited visualized of the calf veins\par \par < end of copied text >\par \par < from: CT Lower Extremity No Cont, Right (03.09.23 @ 10:03) >\par FINDINGS:\par \par Evaluation for underlying fluid collection is limited by the lack of \par intravenous contrast. There is edema within the subcutaneous fat \par laterally along the right thigh with overlying cutaneous thickening. \par Circumferential confluent subcutaneous edema is seen from the level of \par the proximal tibias/fibula to the level of the dorsum of the foot with \par associated cutaneous thickening. Findings are nonspecific but may be \par related to cellulitis. There is no definite reticulation or stranding \par within the deep myofascial planes. Mildly prominent right inguinal lymph \par nodes are seen which are likely reactive. There is no subcutaneous air.\par \par There is no evidence of osseous erosion or destruction to suggest \par osteomyelitis. There is mild right hip arthrosis. There is moderate \par tricompartmental arthrosis of the kneewithout joint effusion. There is \par severe talonavicular, naviculocuneiform, and first metatarsophalangeal \par joint arthrosis. There is hallux valgus. Chronic appearing erosive \par changes along the medial head of the first metatarsal may be related to \par underlying gout or enthesopathic change. Tarsometatarsal arthrosis is \par seen, most prominent at the second tarsometatarsal joint.\par \par There is no disproportionate fatty atrophy of the musculature. There is \par atherosclerotic disease.\par \par There is a fat-containing left inguinal hernia.\par \par There is partially imaged left knee arthroplasty.\par \par IMPRESSION:\par \par Subcutaneous edema and skin thickening throughout the right lower \par extremity which is circumferential and confluent from the level of the \par proximal tibias/fibula to the level of the foot. This may be related to \par underlying cellulitis. Evaluation for underlying fluid collection is \par limited by lack of intravenous contrast. No subcutaneous air. No CT \par evidence of osteomyelitis.\par \par < end of copied text >\par

## 2023-04-05 NOTE — PHYSICAL EXAM
[Restricted in physically strenuous activity but ambulatory and able to carry out work of a light or sedentary nature] : Status 1- Restricted in physically strenuous activity but ambulatory and able to carry out work of a light or sedentary nature, e.g., light house work, office work [Obese] : obese [Normal] : affect appropriate [de-identified] : well healed incisions

## 2023-04-05 NOTE — ASSESSMENT
[FreeTextEntry1] : 68 year old M with h/o anemia, Afib, gout, DM, HTN, HLD, hypothyroid, obesity, JASON and renal cancer s/p right nephrectomy who was diagnosed with HCC in June 2022. \par \par # HCC\par -MRI abd showed 0.7 and 1.2 cm Lt hepatic lobe lesion and 1.1 cm right hepatic lobe lesion\par -no evidence of metastatic disease on PET\par -pt seen by hepatology >> the case was discussed at [facility]. pending bx of liver lesion as one of the lesion appear to be different from HCC. >> bx of the lesion on 8/24 showed benign bile duct proliferation in background of fibrous tissue with mixed inflammation and liver cirrhosis\par - The patient still has signfiicant insurance issues that have hindered further wor up including MRI and IR intervention. I have made my  aware so that hopefully patient can move forward with imaging and treatment. \par -Schedule CT Chest\par -will defer systemic therapy for now as pt is eligible for LDT and being evaluated for possible liver transplant. WIll discuss case with Dr. Phipps as well as Dr. Santos for LDT. \par \par \par # RCC\par -s/p right nephrectomy\par -Path showed RCC , clear cell, nuclear grade II, pT1a, Stage I. \par -No indication for adjuvant pembro. \par -will do surveillance\par -CT or MRI i ab/pn and CXR 6 months after sx then annually\par \par MR Abd 2/7/23: Hepatocellular carcinoma in segment 5 measures 2.5 cm (5:36,1001:21), which may be minimally increased from outside MR 4/7/2022 but is difficult to compare due to motion artifacts on that exam, and demonstrates mild T2 hyperintensity and arterial phase enhancement with washout and capsule formation. Rim enhancing lesions in segment 4 (1003:27,29), largest previously biopsied benign measures 1.3 cm and the other 0.7 cm. These lesions are without a definite T2 correlate or abnormality on diffusion-weighted, unchanged. A possible adjacent cyst. No new lesions.\par \par \par RTC with in 1 month

## 2023-04-05 NOTE — HISTORY OF PRESENT ILLNESS
[Disease: _____________________] : Disease: [unfilled] [de-identified] : 67 year old M with h/o anemia, Afib, gout, DM, HTN, HLD, hypothyroid, obesity, JASON and renal cancer s/p right nephrectomy who was diagnosed with HCC in June 2022. \par \par He was noted to have a suspicious kidney lesion on imaging in December 2021 and was referred for an abdominal MRI in January 2022, with incidental note of a multiple small indeterminate hepatic lesions which are indeterminate but for which metastatic renal cell carcinoma cannot be excluded. Subsequent MRI abdomen in April 2022 revealed an endophytic right lower pole renal lesion suspicious for malignancy, evidence of liver metastasis and multiple prominent subcentimeter periportal, retroperitoneal and retrocaval lymph nodes which are nonspecific.\par \par On 4/20/22 he underwent a right nephrectomy with Dr Jhonathan Shetty.  Path showed RCC , clear cell, nuclear grade II, pT1a, Stage I. No indication for adjuvant pembro. \par \par PET/CT performed on 5/13/22 showed no definitive hypermetabolic evidence of malignant disease. There are prominent retroperitoneal and iliac chain lymph nodes without corresponding abnormal activity. No discrete hepatic focus to correspond with liver lesions seen on MRI (continued MRI surveillance is recommended), right anterolateral subcutaneous activity possibly related to abdominal surgery.\par \par On 6/1/22 he underwent a CT-guided biopsy of a representative mass in the inferior right hepatic lobe. Pathology demonstrated well differentiated hepatocellular carcinoma. Immunohistochemical stains show tumor cells are positive for arginase, focal positive for HSA, CK7 and CA IX, negative for CK20, CDX2, TTF1, Villin, [place]-8, vimentin and AFP. The morphology and immuno profile support the diagnosis of hepatocellular carcinoma.\par \par Pt works as a storage . Feels well. Denies HA. CP, SOB, abd pain, constipation, diarrhea, melena, hematuria, dysuria.  [de-identified] : moderately differentiated HCC [de-identified] : \par \par 7/28/22: Angel is here for follow up for HCC. Pt's case was discussed at . Some of the lesions suggestive of metastasis rather than HCC. Pending  bx then re discussion at  to assess for transplant eligibility. Pt will also follow up with IR for LDT. Denies HA. CP, SOB, abd pain, constipation, diarrhea, melena, hematuria, dysuria. \par \par \par 10/25/22: : Angel is here for follow up for HCC.Bx of one of the liver lesions was neg or malignancy. mix bibrous tissue with mixed inflammation and liver cirrhosis. No evidence of metastatic RCC. Will obtain interval MRI. Pt to follow up with IR for LDT. Pt will also follow up with IR for LDT. Denies HA. CP, SOB, abd pain, constipation, diarrhea, melena, hematuria, dysuria. \par \par 1/12/22: Care transferred from Dr. Miller to Dr. Stratton. Patient seen by urology for nephrolithiasis requiring antibiotics. Planning on going in for a procedure. since last visit, the patient's insurance issues have not been resolved, and he has not been approved for further treatment. \par \par \par 3/21/23: Patient presented to the ER on 3/6/23-3/15/23, admitted for sepsis 2/2 RLE cellulitis given course of IV abx. With hospital course complicated by fall  CT head x2 \par negative for acute pathology.  Severe edema still present of RLE but no signs of compartment syndrome. \par Reports intermitted abd pain. Denies n/v/d\par

## 2023-04-05 NOTE — ADDENDUM
[FreeTextEntry1] : Documented by Mulu Vieyra acting as scribe for Dr. Stratton on 03/21/2023 \par \par All Medical record entries made by the Scribe were at my, Dr. Stratton's, direction and personally dictated by me on 03/21/2023 . I have reviewed the chart and agree that the record accurately reflects my personal performance of the history, physical exam, assessment and plan. I have also personally directed, reviewed, and agreed with the discharge instructions.\par \par

## 2023-04-19 ENCOUNTER — OUTPATIENT (OUTPATIENT)
Dept: OUTPATIENT SERVICES | Facility: HOSPITAL | Age: 68
LOS: 1 days | End: 2023-04-19
Payer: MEDICARE

## 2023-04-19 DIAGNOSIS — Z98.890 OTHER SPECIFIED POSTPROCEDURAL STATES: Chronic | ICD-10-CM

## 2023-04-19 DIAGNOSIS — Z96.652 PRESENCE OF LEFT ARTIFICIAL KNEE JOINT: Chronic | ICD-10-CM

## 2023-04-19 DIAGNOSIS — Z90.5 ACQUIRED ABSENCE OF KIDNEY: Chronic | ICD-10-CM

## 2023-04-19 DIAGNOSIS — C22.0 LIVER CELL CARCINOMA: ICD-10-CM

## 2023-04-19 DIAGNOSIS — Z98.49 CATARACT EXTRACTION STATUS, UNSPECIFIED EYE: Chronic | ICD-10-CM

## 2023-04-20 ENCOUNTER — APPOINTMENT (OUTPATIENT)
Dept: OPHTHALMOLOGY | Facility: CLINIC | Age: 68
End: 2023-04-20
Payer: MEDICARE

## 2023-04-20 ENCOUNTER — APPOINTMENT (OUTPATIENT)
Dept: GASTROENTEROLOGY | Facility: CLINIC | Age: 68
End: 2023-04-20
Payer: MEDICARE

## 2023-04-20 ENCOUNTER — RESULT REVIEW (OUTPATIENT)
Age: 68
End: 2023-04-20

## 2023-04-20 ENCOUNTER — NON-APPOINTMENT (OUTPATIENT)
Age: 68
End: 2023-04-20

## 2023-04-20 VITALS
OXYGEN SATURATION: 97 % | SYSTOLIC BLOOD PRESSURE: 126 MMHG | WEIGHT: 315 LBS | RESPIRATION RATE: 16 BRPM | DIASTOLIC BLOOD PRESSURE: 84 MMHG | HEART RATE: 64 BPM | BODY MASS INDEX: 46.65 KG/M2 | HEIGHT: 69 IN | TEMPERATURE: 97.7 F

## 2023-04-20 PROCEDURE — 92004 COMPRE OPH EXAM NEW PT 1/>: CPT

## 2023-04-20 PROCEDURE — 99212 OFFICE O/P EST SF 10 MIN: CPT

## 2023-04-20 PROCEDURE — 99214 OFFICE O/P EST MOD 30 MIN: CPT

## 2023-04-20 PROCEDURE — 92136 OPHTHALMIC BIOMETRY: CPT | Mod: RT

## 2023-04-27 ENCOUNTER — APPOINTMENT (OUTPATIENT)
Dept: HEMATOLOGY ONCOLOGY | Facility: CLINIC | Age: 68
End: 2023-04-27
Payer: MEDICARE

## 2023-04-27 VITALS
OXYGEN SATURATION: 99 % | BODY MASS INDEX: 46.65 KG/M2 | WEIGHT: 315 LBS | HEIGHT: 69 IN | SYSTOLIC BLOOD PRESSURE: 165 MMHG | DIASTOLIC BLOOD PRESSURE: 94 MMHG | HEART RATE: 68 BPM

## 2023-04-27 PROCEDURE — 99214 OFFICE O/P EST MOD 30 MIN: CPT

## 2023-05-03 ENCOUNTER — RX RENEWAL (OUTPATIENT)
Age: 68
End: 2023-05-03

## 2023-05-03 NOTE — HISTORY OF PRESENT ILLNESS
[Disease: _____________________] : Disease: [unfilled] [de-identified] : 67 year old M with h/o anemia, Afib, gout, DM, HTN, HLD, hypothyroid, obesity, JASON and renal cancer s/p right nephrectomy who was diagnosed with HCC in June 2022. \par \par He was noted to have a suspicious kidney lesion on imaging in December 2021 and was referred for an abdominal MRI in January 2022, with incidental note of a multiple small indeterminate hepatic lesions which are indeterminate but for which metastatic renal cell carcinoma cannot be excluded. Subsequent MRI abdomen in April 2022 revealed an endophytic right lower pole renal lesion suspicious for malignancy, evidence of liver metastasis and multiple prominent subcentimeter periportal, retroperitoneal and retrocaval lymph nodes which are nonspecific.\par \par On 4/20/22 he underwent a right nephrectomy with Dr Jhonathan Shetty.  Path showed RCC , clear cell, nuclear grade II, pT1a, Stage I. No indication for adjuvant pembro. \par \par PET/CT performed on 5/13/22 showed no definitive hypermetabolic evidence of malignant disease. There are prominent retroperitoneal and iliac chain lymph nodes without corresponding abnormal activity. No discrete hepatic focus to correspond with liver lesions seen on MRI (continued MRI surveillance is recommended), right anterolateral subcutaneous activity possibly related to abdominal surgery.\par \par On 6/1/22 he underwent a CT-guided biopsy of a representative mass in the inferior right hepatic lobe. Pathology demonstrated well differentiated hepatocellular carcinoma. Immunohistochemical stains show tumor cells are positive for arginase, focal positive for HSA, CK7 and CA IX, negative for CK20, CDX2, TTF1, Villin, [place]-8, vimentin and AFP. The morphology and immuno profile support the diagnosis of hepatocellular carcinoma.\par \par Pt works as a storage . Feels well. Denies HA. CP, SOB, abd pain, constipation, diarrhea, melena, hematuria, dysuria.  [de-identified] : moderately differentiated HCC [de-identified] : \par \par 7/28/22: Angel is here for follow up for HCC. Pt's case was discussed at . Some of the lesions suggestive of metastasis rather than HCC. Pending  bx then re discussion at TB to assess for transplant eligibility. Pt will also follow up with IR for LDT. Denies HA. CP, SOB, abd pain, constipation, diarrhea, melena, hematuria, dysuria. \par \par \par 10/25/22: : Angel is here for follow up for HCC.Bx of one of the liver lesions was neg or malignancy. mix bibrous tissue with mixed inflammation and liver cirrhosis. No evidence of metastatic RCC. Will obtain interval MRI. Pt to follow up with IR for LDT. Pt will also follow up with IR for LDT. Denies HA. CP, SOB, abd pain, constipation, diarrhea, melena, hematuria, dysuria. \par \par 1/12/22: Care transferred from Dr. Miller to Dr. Stratton. Patient seen by urology for nephrolithiasis requiring antibiotics. Planning on going in for a procedure. since last visit, the patient's insurance issues have not been resolved, and he has not been approved for further treatment. \par \par \par 3/21/23: Patient presented to the ER on 3/6/23-3/15/23, admitted for sepsis 2/2 RLE cellulitis given course of IV abx. With hospital course complicated by fall  CT head x2 \par negative for acute pathology.  Severe edema still present of RLE but no signs of compartment syndrome. \par Reports intermitted abd pain. Denies n/v/d\par \par 4/27/23:Patient feels well, has a normal appetite. Continues to work without difficulty. LE swelling has not worsened  Denies n/v, abdominal pain, weight loss, \par Patient pending cataract surgery in June 2023\par Continues on Xarelto 2/2 A-fib\par Patient had f/u with Dr. Phipps on 4/20/23, discussed liver biopsy\par Had f/u with Dr. Santos who recommended a bone scan.

## 2023-05-03 NOTE — PHYSICAL EXAM
[Restricted in physically strenuous activity but ambulatory and able to carry out work of a light or sedentary nature] : Status 1- Restricted in physically strenuous activity but ambulatory and able to carry out work of a light or sedentary nature, e.g., light house work, office work [Obese] : obese [Normal] : affect appropriate [de-identified] : well healed incisions

## 2023-05-03 NOTE — ASSESSMENT
[FreeTextEntry1] : 68 year old M with h/o anemia, Afib, gout, DM, HTN, HLD, hypothyroid, obesity, JASON and renal cancer s/p right nephrectomy who was diagnosed with HCC in June 2022. \par \par # HCC\par -MRI abd showed 0.7 and 1.2 cm Lt hepatic lobe lesion and 1.1 cm right hepatic lobe lesion\par -no evidence of metastatic disease on PET\par -pt seen by hepatology >> the case was discussed at TB. pending bx of liver lesion as one of the lesion appear to be different from HCC.\par -will defer systemic therapy for now as pt is eligible for LDT and being evaluated for possible liver transplant. Patient to follow up with Dr. Phipps and Dr. Santos. \par \par \par # RCC\par -s/p right nephrectomy\par -Path showed RCC , clear cell, nuclear grade II, pT1a, Stage I. \par -No indication for adjuvant pembro. \par -will do surveillance\par -CT or MRI i ab/pn and CXR 6 months after sx then annually\par - biopsy as above \par \par Pending cataract surgery in June 2023\par RTC with in 1-2 months with Dr. Hyde

## 2023-05-03 NOTE — RESULTS/DATA
[FreeTextEntry1] : \par \par 3/29/23:CT Chest:  Few ill-defined ground-glass nodular opacities primarily in the right middle lobe and bilateral lower lobes; some of these opacities are present on 1/18/2019, and a few others appear to be new. While indeterminate, these are favored to be infectious/inflammatory etiology. Consider CT chest follow-up in 3 months to assess stability.\par \par \par MR Abd 2/7/23: Hepatocellular carcinoma in segment 5 measures 2.5 cm (5:36,1001:21), which may be minimally increased from outside MR 4/7/2022 but is difficult to compare due to motion artifacts on that exam, and demonstrates mild T2 hyperintensity and arterial phase enhancement with washout and capsule formation. Rim enhancing lesions in segment 4 (1003:27,29), largest previously biopsied benign measures 1.3 cm and the other 0.7 cm. These lesions are without a definite T2 correlate or abnormality on diffusion-weighted, unchanged. A possible adjacent cyst. No new lesions.

## 2023-05-03 NOTE — ADDENDUM
[FreeTextEntry1] : Documented by Mulu Vieyra acting as scribe for Dr. Stratton on 04/27/2023 \par \par All Medical record entries made by the Scribe were at my, Dr. Stratton's, direction and personally dictated by me on 04/27/2023 . I have reviewed the chart and agree that the record accurately reflects my personal performance of the history, physical exam, assessment and plan. I have also personally directed, reviewed, and agreed with the discharge instructions.\par \par

## 2023-05-17 ENCOUNTER — APPOINTMENT (OUTPATIENT)
Dept: NUCLEAR MEDICINE | Facility: CLINIC | Age: 68
End: 2023-05-17
Payer: MEDICARE

## 2023-05-17 ENCOUNTER — OUTPATIENT (OUTPATIENT)
Dept: OUTPATIENT SERVICES | Facility: HOSPITAL | Age: 68
LOS: 1 days | End: 2023-05-17

## 2023-05-17 ENCOUNTER — RESULT REVIEW (OUTPATIENT)
Age: 68
End: 2023-05-17

## 2023-05-17 DIAGNOSIS — Z98.890 OTHER SPECIFIED POSTPROCEDURAL STATES: Chronic | ICD-10-CM

## 2023-05-17 DIAGNOSIS — Z98.49 CATARACT EXTRACTION STATUS, UNSPECIFIED EYE: Chronic | ICD-10-CM

## 2023-05-17 DIAGNOSIS — Z96.652 PRESENCE OF LEFT ARTIFICIAL KNEE JOINT: Chronic | ICD-10-CM

## 2023-05-17 DIAGNOSIS — C22.0 LIVER CELL CARCINOMA: ICD-10-CM

## 2023-05-17 DIAGNOSIS — Z90.5 ACQUIRED ABSENCE OF KIDNEY: Chronic | ICD-10-CM

## 2023-05-17 PROCEDURE — 78306 BONE IMAGING WHOLE BODY: CPT | Mod: 26

## 2023-05-17 PROCEDURE — 78830 RP LOCLZJ TUM SPECT W/CT 1: CPT | Mod: 26

## 2023-05-20 ENCOUNTER — NON-APPOINTMENT (OUTPATIENT)
Age: 68
End: 2023-05-20

## 2023-05-21 ENCOUNTER — OUTPATIENT (OUTPATIENT)
Dept: OUTPATIENT SERVICES | Facility: HOSPITAL | Age: 68
LOS: 1 days | Discharge: HOME | End: 2023-05-21

## 2023-05-21 DIAGNOSIS — Z90.5 ACQUIRED ABSENCE OF KIDNEY: Chronic | ICD-10-CM

## 2023-05-21 DIAGNOSIS — Z98.890 OTHER SPECIFIED POSTPROCEDURAL STATES: Chronic | ICD-10-CM

## 2023-05-21 DIAGNOSIS — Z96.652 PRESENCE OF LEFT ARTIFICIAL KNEE JOINT: Chronic | ICD-10-CM

## 2023-05-21 DIAGNOSIS — C22.0 LIVER CELL CARCINOMA: ICD-10-CM

## 2023-05-21 DIAGNOSIS — Z98.49 CATARACT EXTRACTION STATUS, UNSPECIFIED EYE: Chronic | ICD-10-CM

## 2023-05-22 NOTE — DATA REVIEWED
[FreeTextEntry1] : Pertinent Lab Values: \par Albumin: 3.7 g/dL\par  TBili: 0.2 mg/dL\par  INR: 1.51\par  Cr: 1.43 mg/dL\par  Na: 137 mEq/L\par  AFP: 90.7 ng/mL  \par \par ECOG Score: 1 \par Child Baca Score: A5\par  Mireya Grade: Grade 1\par  MELD Score: 16\par  BCLC Classification: A\par \par   Pertinent Outpatient Studies (Biopsies/ Imaging/ Other): Pertinent imaging reviewed. \par Mc[place] Status: Currently being evaluated\par  Liver Lesion 1  -2.5 cm segment V lesion, biopsy-proven HCC

## 2023-05-22 NOTE — PHYSICAL EXAM
[Alert] : alert [No Acute Distress] : no acute distress [Well Developed] : well developed [No Respiratory Distress] : no respiratory distress [Normal Bowel Sounds] : normal bowel sounds [Not Tender] : non-tender [Soft] : abdomen soft [Not Distended] : not distended [Restricted in physically strenuous activity but ambulatory and able to carry out work of a light or sedentary nature] : Restricted in physically strenuous activity but ambulatory and able to carry out work of a light or sedentary nature, e.g., light house work, office work

## 2023-05-22 NOTE — ASSESSMENT
[FreeTextEntry1] : 68-year-old male with history of RCC status post nephrectomy and with right-sided biopsy-proven HCC.    \par    -2.5 cm right HCC as well as to indeterminate lesions within segment IV. Extrahepatic lymph nodes are also identified. Patient currently being evaluated for transplant. Would recommend further evaluation with repeat MRI and PET CT scan to evaluate extrahepatic lymph nodes and evaluate liver lesion. Will reevaluate after recent imaging, no extrahepatic disease, patient candidate for liver directed therapy however if extrahepatic disease is identified patient would potentially benefit from systemic therapy.

## 2023-05-24 ENCOUNTER — APPOINTMENT (OUTPATIENT)
Dept: ENDOCRINOLOGY | Facility: CLINIC | Age: 68
End: 2023-05-24
Payer: MEDICARE

## 2023-05-24 VITALS
DIASTOLIC BLOOD PRESSURE: 72 MMHG | HEIGHT: 69 IN | SYSTOLIC BLOOD PRESSURE: 120 MMHG | BODY MASS INDEX: 46.65 KG/M2 | WEIGHT: 315 LBS | HEART RATE: 67 BPM | OXYGEN SATURATION: 95 %

## 2023-05-24 LAB — GLUCOSE BLDC GLUCOMTR-MCNC: 200

## 2023-05-24 PROCEDURE — 99214 OFFICE O/P EST MOD 30 MIN: CPT | Mod: 25

## 2023-05-24 PROCEDURE — 82962 GLUCOSE BLOOD TEST: CPT

## 2023-05-24 NOTE — REVIEW OF SYSTEMS
[Fatigue] : fatigue [Fast Heart Rate] : fast heart rate [SOB on Exertion] : shortness of breath on exertion [Recent Weight Gain (___ Lbs)] : no recent weight gain [Recent Weight Loss (___ Lbs)] : no recent weight loss [Visual Field Defect] : no visual field defect [Blurred Vision] : no blurred vision [Dysphagia] : no dysphagia [Neck Pain] : no neck pain [Dysphonia] : no dysphonia [Chest Pain] : no chest pain [Constipation] : no constipation [Diarrhea] : no diarrhea [FreeTextEntry5] : + a fib on xarelto  [FreeTextEntry9] : +hip and knee pain

## 2023-05-24 NOTE — HISTORY OF PRESENT ILLNESS
[FreeTextEntry1] : Pt lost to follow up since 2022\par S/P nephrectomy in 2022 - at that time, was having a lot of hypoglycemia- was taken off insulin but has since had hyperglycemia \par THEN LOST INSURANCE\par \par \par DM type:2\par Severity:severely uncontrolled\par Duration:several years\par Onset:found DM on labs\par \par Associated symptoms or complications:none\par \par Modifying Factors:severe obesity; uncontrolled diabetes continues despite weight loss from 375 to 315 pounds. Alfred anorexia or abdominal pain; made many diet changes in an effort to lose weight for anticipated cardioversion\par \par Current regimen:\par Glimepiride 2 mg daily \par did not tolerate metformin\par \par Current control: checks 3x a day\par Fasting high 100s-300s (highest) \par Throughout the day 100s \par FS in office 200- coffee with sweet and low\par \par **Was using viktoriya but is having too many scans for cancer treatment**\par \par PMH:\par atrial fib\par renal stones\par kidney and liver cancer\par hypothyroid-  levothyroxine 50 mcg daily. Need updated TFTs \par

## 2023-05-24 NOTE — ASSESSMENT
[FreeTextEntry1] : T2DM\par -Pt unable to wear viktoriya due to frequent scans and imaging so has been checking BS 3x a day. Blood sugars often higher then goal fasting.\par -Will try a 2 week trial of glimepride 2 mg BID (adding the second with dinner). If that does not improve sugars, will reintroduce insulin\par \par HLD\par -need updated lipid panel\par \par \par HTN\par -BP stable in office \par \par Hypothyroidism\par -Over due for TFTs, continue lT4 50 mcg daily. Patient advised to take every day in the morning, on an empty stomach, and with no other medications. \par \par Fasting labs due ASAP\par RTO 3 months with Dr. Chaudhry

## 2023-05-31 ENCOUNTER — LABORATORY RESULT (OUTPATIENT)
Age: 68
End: 2023-05-31

## 2023-06-02 ENCOUNTER — NON-APPOINTMENT (OUTPATIENT)
Age: 68
End: 2023-06-02

## 2023-06-07 ENCOUNTER — APPOINTMENT (OUTPATIENT)
Dept: HEMATOLOGY ONCOLOGY | Facility: CLINIC | Age: 68
End: 2023-06-07
Payer: MEDICARE

## 2023-06-07 ENCOUNTER — APPOINTMENT (OUTPATIENT)
Dept: NUCLEAR MEDICINE | Facility: CLINIC | Age: 68
End: 2023-06-07
Payer: MEDICARE

## 2023-06-07 ENCOUNTER — OUTPATIENT (OUTPATIENT)
Dept: OUTPATIENT SERVICES | Facility: HOSPITAL | Age: 68
LOS: 1 days | End: 2023-06-07

## 2023-06-07 ENCOUNTER — RESULT REVIEW (OUTPATIENT)
Age: 68
End: 2023-06-07

## 2023-06-07 VITALS
WEIGHT: 315 LBS | TEMPERATURE: 97.5 F | OXYGEN SATURATION: 98 % | SYSTOLIC BLOOD PRESSURE: 132 MMHG | BODY MASS INDEX: 46.65 KG/M2 | HEART RATE: 57 BPM | HEIGHT: 69 IN | DIASTOLIC BLOOD PRESSURE: 70 MMHG

## 2023-06-07 DIAGNOSIS — Z96.652 PRESENCE OF LEFT ARTIFICIAL KNEE JOINT: Chronic | ICD-10-CM

## 2023-06-07 DIAGNOSIS — C22.0 LIVER CELL CARCINOMA: ICD-10-CM

## 2023-06-07 DIAGNOSIS — Z98.49 CATARACT EXTRACTION STATUS, UNSPECIFIED EYE: Chronic | ICD-10-CM

## 2023-06-07 DIAGNOSIS — Z90.5 ACQUIRED ABSENCE OF KIDNEY: Chronic | ICD-10-CM

## 2023-06-07 DIAGNOSIS — Z98.890 OTHER SPECIFIED POSTPROCEDURAL STATES: Chronic | ICD-10-CM

## 2023-06-07 LAB
BASOPHILS # BLD AUTO: 0.1 K/UL — SIGNIFICANT CHANGE UP (ref 0–0.2)
BASOPHILS NFR BLD AUTO: 1 % — SIGNIFICANT CHANGE UP (ref 0–2)
EOSINOPHIL # BLD AUTO: 0.4 K/UL — SIGNIFICANT CHANGE UP (ref 0–0.5)
EOSINOPHIL NFR BLD AUTO: 5.1 % — SIGNIFICANT CHANGE UP (ref 0–6)
HCT VFR BLD CALC: 38.9 % — LOW (ref 39–50)
HGB BLD-MCNC: 13 G/DL — SIGNIFICANT CHANGE UP (ref 13–17)
LYMPHOCYTES # BLD AUTO: 1.8 K/UL — SIGNIFICANT CHANGE UP (ref 1–3.3)
LYMPHOCYTES # BLD AUTO: 22.1 % — SIGNIFICANT CHANGE UP (ref 13–44)
MCHC RBC-ENTMCNC: 26.3 PG — LOW (ref 27–34)
MCHC RBC-ENTMCNC: 33.3 G/DL — SIGNIFICANT CHANGE UP (ref 32–36)
MCV RBC AUTO: 78.9 FL — LOW (ref 80–100)
MONOCYTES # BLD AUTO: 0.4 K/UL — SIGNIFICANT CHANGE UP (ref 0–0.9)
MONOCYTES NFR BLD AUTO: 5.4 % — SIGNIFICANT CHANGE UP (ref 2–14)
NEUTROPHILS # BLD AUTO: 5.4 K/UL — SIGNIFICANT CHANGE UP (ref 1.8–7.4)
NEUTROPHILS NFR BLD AUTO: 66.4 % — SIGNIFICANT CHANGE UP (ref 43–77)
PLATELET # BLD AUTO: 286 K/UL — SIGNIFICANT CHANGE UP (ref 150–400)
RBC # BLD: 4.93 M/UL — SIGNIFICANT CHANGE UP (ref 4.2–5.8)
RBC # FLD: 16 % — HIGH (ref 10.3–14.5)
WBC # BLD: 8.1 K/UL — SIGNIFICANT CHANGE UP (ref 3.8–10.5)
WBC # FLD AUTO: 8.1 K/UL — SIGNIFICANT CHANGE UP (ref 3.8–10.5)

## 2023-06-07 PROCEDURE — 78815 PET IMAGE W/CT SKULL-THIGH: CPT | Mod: 26,PI

## 2023-06-07 PROCEDURE — 99215 OFFICE O/P EST HI 40 MIN: CPT

## 2023-06-07 RX ORDER — DOXYCYCLINE HYCLATE 100 MG/1
100 CAPSULE ORAL
Qty: 20 | Refills: 0 | Status: DISCONTINUED | COMMUNITY
Start: 2023-03-16 | End: 2023-06-07

## 2023-06-07 RX ORDER — DOXYCYCLINE HYCLATE 100 MG/1
100 CAPSULE ORAL DAILY
Qty: 14 | Refills: 0 | Status: DISCONTINUED | COMMUNITY
Start: 2023-03-23 | End: 2023-06-07

## 2023-06-09 NOTE — REVIEW OF SYSTEMS
[Vision Problems] : vision problems [Lower Ext Edema] : lower extremity edema [Joint Pain] : joint pain [Joint Stiffness] : joint stiffness [Muscle Pain] : muscle pain [Muscle Weakness] : muscle weakness [Difficulty Walking] : difficulty walking [FreeTextEntry2] : negative except as indicated in interval history [FreeTextEntry3] : Cataracts [de-identified] : uses a cane

## 2023-06-09 NOTE — PHYSICAL EXAM
[Restricted in physically strenuous activity but ambulatory and able to carry out work of a light or sedentary nature] : Status 1- Restricted in physically strenuous activity but ambulatory and able to carry out work of a light or sedentary nature, e.g., light house work, office work [Obese] : obese [Normal] : supple without JVD, no thyromegaly or masses appreciated [Ulcers] : no ulcers [Mucositis] : no mucositis [Thrush] : no thrush [Vesicles] : no vesicles [de-identified] : small lens opacities [de-identified] : edentulous upper; extractions lower [de-identified] : bilateral lower extremioty edema and prior cellulitis LE  [de-identified] : well healed incisions [de-identified] : numerous multi coloured tattoos on chest back and arms

## 2023-06-09 NOTE — HISTORY OF PRESENT ILLNESS
[Disease: _____________________] : Disease: [unfilled] [Date: ____________] : Patient's last distress assessment performed on [unfilled]. [0 - No Distress] : Distress Level: 0 [80: Normal activity with effort; some signs or symptoms of disease.] : 80: Normal activity with effort; some signs or symptoms of disease.  [ECOG Performance Status: 2 - Ambulatory and capable of all self care but unable to carry out any work activities] : Performance Status: 2 - Ambulatory and capable of all self care but unable to carry out any work activities. Up and about more than 50% of waking hours [de-identified] : 68 year old M with h/o anemia, Afib, gout, DM, HTN, HLD, hypothyroid, obesity, JASON and renal cancer s/p right nephrectomy who was diagnosed with HCC in June 2022.\par 06/07/2023 First visit with Dr Hyde \par \par He was noted to have a suspicious kidney lesion on imaging in December 2021 and was referred for an abdominal MRI in January 2022, with incidental note of a multiple small indeterminate hepatic lesions which are indeterminate but for which metastatic renal cell carcinoma cannot be excluded. Subsequent MRI abdomen in April 2022 revealed an endophytic right lower pole renal lesion suspicious for malignancy, evidence of liver metastasis and multiple prominent subcentimeter periportal, retroperitoneal and retrocaval lymph nodes which are nonspecific.\par \par On 4/20/22 he underwent a right nephrectomy with Dr Jhonathan Shetty.  Path showed RCC , clear cell, nuclear grade II, pT1a, Stage I. No indication for adjuvant pembro. \par \par PET/CT performed on 5/13/22 showed no definitive hypermetabolic evidence of malignant disease. There are prominent retroperitoneal and iliac chain lymph nodes without corresponding abnormal activity. No discrete hepatic focus to correspond with liver lesions seen on MRI (continued MRI surveillance is recommended), right anterolateral subcutaneous activity possibly related to abdominal surgery.\par \par On 6/1/22 he underwent a CT-guided biopsy of a representative mass in the inferior right hepatic lobe. Pathology demonstrated well differentiated hepatocellular carcinoma. Immunohistochemical stains show tumor cells are positive for arginase, focal positive for HSA, CK7 and CA IX, negative for CK20, CDX2, TTF1, Villin, [place]-8, vimentin and AFP. The morphology and immuno profile support the diagnosis of hepatocellular carcinoma.\par \par Pt works as a storage . Feels well. Denies HA. CP, SOB, abd pain, constipation, diarrhea, melena, hematuria, dysuria.  [de-identified] : moderately differentiated HCC [FreeTextEntry1] : evaluation for SRT [de-identified] : 7/28/22: Angel is here for follow up for HCC. Pt's case was discussed at . Some of the lesions suggestive of metastasis rather than HCC. Pending  bx then re discussion at TB to assess for transplant eligibility. Pt will also follow up with IR for LDT. Denies HA. CP, SOB, abd pain, constipation, diarrhea, melena, hematuria, dysuria. \par 10/25/22: : Angel is here for follow up for HCC.Bx of one of the liver lesions was neg or malignancy. mix bibrous tissue with mixed inflammation and liver cirrhosis. No evidence of metastatic RCC. Will obtain interval MRI. Pt to follow up with IR for LDT. Pt will also follow up with IR for LDT. Denies HA. CP, SOB, abd pain, constipation, diarrhea, melena, hematuria, dysuria. \par 1/12/22: Care transferred from Dr. Miller to Dr. Stratton. Patient seen by urology for nephrolithiasis requiring antibiotics. Planning on going in for a procedure. since last visit, the patient's insurance issues have not been resolved, and he has not been approved for further treatment. \par 06/07/2023 first visit with Dr Hyde; when asked how he felt and is current state of health, patient responded"...you tell me..." He has had bone scanning and he is currently awaiting CT/PET scan not requested by this office.I read report of May 17 bone scan that showed no evidence of osseous lesions; (he has only a left kidney) . Scans are being requested by pulmonary, GI hepatology and IR to assess suitability of SRT\par \par \par 3/21/23: Patient presented to the ER on 3/6/23-3/15/23, admitted for sepsis 2/2 RLE cellulitis given course of IV abx. With hospital course complicated by fall  CT head x2 \par negative for acute pathology.  Severe edema still present of RLE but no signs of compartment syndrome. \par Reports intermitted abd pain. Denies n/v/d\par \par 4/27/23:Patient feels well, has a normal appetite. Continues to work without difficulty. LE swelling has not worsened  Denies n/v, abdominal pain, weight loss, \par Patient pending cataract surgery in June 2023\par Continues on Xarelto 2/2 A-fib\par Patient had f/u with Dr. Phipps on 4/20/23, discussed liver biopsy\par Had f/u with Dr. Santos who recommended a bone scan. \par \par 6/7/23: Patient presents for a followup (transfer of care from Dr. Stratton)\par Continues on Xarelto 20 mg 2/2 A-fib\par Not currently on abx\par Ambulates with a cane, for hip pain \par Admits 70lb intentional weight loss\par Pending cataract surgery

## 2023-06-09 NOTE — ASSESSMENT
[Supportive] : Goals of care discussed with patient: Supportive [Palliative Care Plan] : not applicable at this time [FreeTextEntry1] : 68 year old M with h/o anemia, Afib, gout, type 2 DM, HTN, hyperlipidemia , hypothyroid, obesity, JASON and right renal cancer s/p right nephrectomy who was diagnosed with HCC in June 2022. \par # HCC\par -MRI abd showed 0.7 and 1.2 cm Lt hepatic lobe lesion and 1.1 cm right hepatic lobe lesion\par -no evidence of metastatic disease on PET\par -pt seen by hepatology >> the case was discussed at TB. pending bx of liver lesion as one of the lesion appear to be different from HCC.\par -will defer systemic therapy for now as pt is eligible for LDT and being evaluated for possible liver transplant. -Patient to follow up with Dr. Phipps and Dr. Santos. \par -PET/CT and CT scan scheduled on 6/7/23 and Pending MRI on 6/15/23 per Dr. Phipps\par # RCC\par -s/p right nephrectomy\par -Path showed RCC , clear cell, nuclear grade II, pT 1 a, Stage I. \par -No indication for adjuvant TKI or chemotherapy/immune therapy. \par Reviewed\par -3/29/23 CT Chest: Few ill-defined ground-glass nodular opacities primarily in the right middle lobe 0.5 cm is unchanged. Right lower lobe superior segment 0.5 cm ground-glass nodule abutting the major fissure is new.\par -5/17/23 Spect / CT bone: No radionuclide evidence of osseous metastases.\par Patietn may be a candidate for SERT rather than other therapies. If he has metastatic disease immune therapy may be an option\par F/u in 6 weeks to see [facility]

## 2023-06-09 NOTE — ADDENDUM
[FreeTextEntry1] : Documented by Mulu Vieyra acting as scribe for Dr. Stratton on 06/07/2023 \par \par All Medical record entries made by the Scribe were at my, Dr. Stratton's, direction and personally dictated by me on 06/07/2023 . I have reviewed the chart and agree that the record accurately reflects my personal performance of the history, physical exam, assessment and plan. I have also personally directed, reviewed, and agreed with the discharge instructions.\par \par

## 2023-06-14 ENCOUNTER — APPOINTMENT (OUTPATIENT)
Dept: MRI IMAGING | Facility: CLINIC | Age: 68
End: 2023-06-14
Payer: MEDICARE

## 2023-06-14 ENCOUNTER — NON-APPOINTMENT (OUTPATIENT)
Age: 68
End: 2023-06-14

## 2023-06-14 ENCOUNTER — OUTPATIENT (OUTPATIENT)
Dept: OUTPATIENT SERVICES | Facility: HOSPITAL | Age: 68
LOS: 1 days | End: 2023-06-14

## 2023-06-14 ENCOUNTER — APPOINTMENT (OUTPATIENT)
Dept: FAMILY MEDICINE | Facility: CLINIC | Age: 68
End: 2023-06-14
Payer: MEDICARE

## 2023-06-14 VITALS
DIASTOLIC BLOOD PRESSURE: 80 MMHG | OXYGEN SATURATION: 97 % | SYSTOLIC BLOOD PRESSURE: 120 MMHG | BODY MASS INDEX: 46.65 KG/M2 | TEMPERATURE: 97.3 F | HEIGHT: 69 IN | WEIGHT: 315 LBS | HEART RATE: 68 BPM | RESPIRATION RATE: 16 BRPM

## 2023-06-14 DIAGNOSIS — Z90.5 ACQUIRED ABSENCE OF KIDNEY: Chronic | ICD-10-CM

## 2023-06-14 DIAGNOSIS — C22.0 LIVER CELL CARCINOMA: ICD-10-CM

## 2023-06-14 DIAGNOSIS — Z98.49 CATARACT EXTRACTION STATUS, UNSPECIFIED EYE: Chronic | ICD-10-CM

## 2023-06-14 PROCEDURE — 74183 MRI ABD W/O CNTR FLWD CNTR: CPT | Mod: 26

## 2023-06-14 PROCEDURE — 99215 OFFICE O/P EST HI 40 MIN: CPT | Mod: 25

## 2023-06-14 PROCEDURE — 93000 ELECTROCARDIOGRAM COMPLETE: CPT

## 2023-06-14 NOTE — REVIEW OF SYSTEMS
[Patient Intake Form Reviewed] : Patient intake form was reviewed [Fatigue] : fatigue [Negative] : Heme/Lymph [Fever] : no fever [Chills] : no chills [Night Sweats] : no night sweats [Recent Change In Weight] : ~T no recent weight change [Discharge] : no discharge [Pain] : no pain [Redness] : no redness [Vision Problems] : no vision problems [Itching] : no itching [Earache] : no earache [Hearing Loss] : no hearing loss [Nosebleeds] : no nosebleeds [Postnasal Drip] : no postnasal drip [Nasal Discharge] : no nasal discharge [Sore Throat] : no sore throat [Hoarseness] : no hoarseness [Chest Pain] : no chest pain [Palpitations] : no palpitations [Claudication] : no  leg claudication [Lower Ext Edema] : no lower extremity edema [Orthopena] : no orthopnea [Abdominal Pain] : no abdominal pain [Nausea] : no nausea [Vomiting] : no vomiting [Heartburn] : no heartburn [Melena] : no melena [Dysuria] : no dysuria [Incontinence] : no incontinence [Hesitancy] : no hesitancy [Hematuria] : no hematuria [Frequency] : no frequency [Impotence] : no impotency [Poor Libido] : libido not poor [Muscle Pain] : no muscle pain [Muscle Weakness] : no muscle weakness [Itching] : no itching [Headache] : no headache [Dizziness] : no dizziness [Fainting] : no fainting [Confusion] : no confusion [Unsteady Walk] : no ataxia [Memory Loss] : no memory loss [FreeTextEntry2] : overweight

## 2023-06-14 NOTE — HISTORY OF PRESENT ILLNESS
[Atrial Fibrillation] : atrial fibrillation [Asthma] : asthma [No Adverse Anesthesia Reaction] : no adverse anesthesia reaction in self or family member [Chronic Anticoagulation] : chronic anticoagulation [Diabetes] : diabetes [(Patient denies any chest pain, claudication, dyspnea on exertion, orthopnea, palpitations or syncope)] : Patient denies any chest pain, claudication, dyspnea on exertion, orthopnea, palpitations or syncope [Anticoagulants: _____] : Anticoagulants: [unfilled] [Coronary Artery Disease] : coronary artery disease [FreeTextEntry1] : RT cataract [FreeTextEntry2] : 6/26 [FreeTextEntry3] : Dr. Yeyo Tom [FreeTextEntry4] : 69 yo M pt w pmho obesity, poorly controlled DM, HT, HLD, JASON, hypothyroidism, RCC, liver CA, gout, AFib, and anemia here for MCA for RT cataract surgery on 6/26.\par Had Cardiac clearance prior

## 2023-06-14 NOTE — CURRENT MEDS
[Yes] : Reviewed medication list for presence of high-risk medications. [Blood Thinners] : blood thinners

## 2023-06-14 NOTE — ASSESSMENT
[Patient Optimized for Surgery] : Patient optimized for surgery [No Further Testing Recommended] : no further testing recommended [FreeTextEntry4] : 67 yo M pt w pmho obesity, poorly controlled DM, HT, HLD, JASON, hypothyroidism, RCC, liver CA, gout, AFib, and anemia here for MCA for RT cataract surgery on 6/26. Follow by specialists.\par \par A1c +8 [FreeTextEntry5] : per ophthalmologist

## 2023-06-14 NOTE — COUNSELING
[Weight management counseling provided] : Weight management [Healthy eating counseling provided] : healthy eating [Activity counseling provided] : activity [Behavioral health counseling provided] : behavioral health  [Fall prevention counseling provided] : fall prevention  [Discussed Risks and Advised to Quit Smoking] : Discussed risks and advised to quit smoking [Weigh Self Once Weekly] : Weigh self once weekly [Decrease Portions] : Decrease food portions [None] : None

## 2023-06-14 NOTE — PHYSICAL EXAM
[No Acute Distress] : no acute distress [Well Nourished] : well nourished [Well Developed] : well developed [Well-Appearing] : well-appearing [Normal Sclera/Conjunctiva] : normal sclera/conjunctiva [PERRL] : pupils equal round and reactive to light [EOMI] : extraocular movements intact [Normal Outer Ear/Nose] : the outer ears and nose were normal in appearance [Normal Oropharynx] : the oropharynx was normal [No JVD] : no jugular venous distention [No Lymphadenopathy] : no lymphadenopathy [Supple] : supple [Thyroid Normal, No Nodules] : the thyroid was normal and there were no nodules present [No Respiratory Distress] : no respiratory distress  [No Accessory Muscle Use] : no accessory muscle use [Clear to Auscultation] : lungs were clear to auscultation bilaterally [Normal Rate] : normal rate  [Regular Rhythm] : with a regular rhythm [Normal S1, S2] : normal S1 and S2 [No Murmur] : no murmur heard [No Carotid Bruits] : no carotid bruits [No Abdominal Bruit] : a ~M bruit was not heard ~T in the abdomen [No Varicosities] : no varicosities [Pedal Pulses Present] : the pedal pulses are present [No Palpable Aorta] : no palpable aorta [No Extremity Clubbing/Cyanosis] : no extremity clubbing/cyanosis [Soft] : abdomen soft [Non Tender] : non-tender [Non-distended] : non-distended [No Masses] : no abdominal mass palpated [No HSM] : no HSM [Normal Bowel Sounds] : normal bowel sounds [No CVA Tenderness] : no CVA  tenderness [No Spinal Tenderness] : no spinal tenderness [No Joint Swelling] : no joint swelling [Grossly Normal Strength/Tone] : grossly normal strength/tone [No Rash] : no rash [Coordination Grossly Intact] : coordination grossly intact [No Focal Deficits] : no focal deficits [Normal Gait] : normal gait [Deep Tendon Reflexes (DTR)] : deep tendon reflexes were 2+ and symmetric [Normal Affect] : the affect was normal [Normal Insight/Judgement] : insight and judgment were intact [de-identified] : Bilateral leg edema

## 2023-06-15 ENCOUNTER — NON-APPOINTMENT (OUTPATIENT)
Age: 68
End: 2023-06-15

## 2023-06-15 NOTE — ED PROVIDER NOTE - GASTROINTESTINAL, MLM
-- DO NOT REPLY / DO NOT REPLY ALL --  -- Message is from [employer] ([facility]) --    General Patient Message: Patient states that her and doctor discussed a medication for her cramps that she was going to send over to the pharmacy, the pharmacy has not received the script yet, patient is calling in to follow up and have doctor send the script over      Caller Information       Type Contact Phone/Fax    06/15/2023 08:42 AM CDT Phone (Incoming) Alma Delia Caputo (Self) [phone] (M)        Alternative phone number: none    Can a detailed message be left? Yes    Message Turnaround: [place]:    Refer to site's KB page for routing instructions    Please give this turnaround time to the caller:   \"You can expect to receive a response 1-3 business days after your provider's clinical team reviews the message\"               Abdomen soft, non-tender, no guarding.

## 2023-06-15 NOTE — HISTORY OF PRESENT ILLNESS
[de-identified] : EVERETT GEORGE is a 67 year old male with a PMH significant for anemia, Afib, gout, DM, HTN, HLD, hypothyroid, obesity, JASON and renal cancer s/p right nephrectomy who was diagnosed with HCC in June 2022. \par \par 67 y/o male with multiple comorbidities was hospitalized at [facility] 3/6-3/16 with RLE cellulitis, marked leukocytosis 21K and fever. Treated with Zosyn/vancomycin >> Zosyn/daptomycin with slow resolution of symptoms. Work up included, neg BCX, CT RLE that did not show collections, and Doppler LE US negative for DVT. Discharge on doxy 100 BID which was extended for another week at his last visit. \par \par I'm pleased with the progress observed today after completing almost 3 weeks of antibiotics. I do not recommend further treatment. Wound dressings were changed in the office; he shall continue with wound care at home. \par \par Aril 4 : ?Lithotrypsy at [facility].\par Complication :desated,hyperglycemis high bp, went into Afiib . Card saw him on WEdnesday, d/c Friday\par \par 4/19 : Seen by IR who recommended bone scan\par \par August 2022 :\par \par He was noted to have a suspicious kidney lesion on imaging in December 2021 and was referred for an abdominal MRI in January 2022, with incidental note of a multiple small indeterminate hepatic lesions which are indeterminate but for which metastatic renal cell carcinoma cannot be excluded. Subsequent MRI abdomen in April 2022 revealed an endophytic right lower pole renal lesion suspicious for malignancy, evidence of liver metastasis and multiple prominent subcentimeter periportal, retroperitoneal and retrocaval lymph nodes which are nonspecific.\par \par On 4/20/22 he underwent a right nephrectomy with Dr Jhonathan Shetty. Path showed RCC, clear cell, nuclear grade II, pT1a, Stage I. No indication for adjuvant pembro. \par \par PET/CT performed on 5/13/22 showed no definitive hypermetabolic evidence of malignant disease. There are prominent retroperitoneal and iliac chain lymph nodes without corresponding abnormal activity. No discrete hepatic focus to correspond with liver lesions seen on MRI (continued MRI surveillance is recommended), right anterolateral subcutaneous activity possibly related to abdominal surgery.\par \par On 6/1/22 he underwent a CT-guided biopsy of a representative mass in the inferior right hepatic lobe. Pathology demonstrated well differentiated hepatocellular carcinoma. Immunohistochemical stains show tumor cells are positive for arginase, focal positive for HSA, CK7 and CA IX, negative for CK20, CDX2, TTF1, Villin, [place]-8, vimentin and AFP. The morphology and immuno profile support the diagnosis of hepatocellular carcinoma.\par \par 8/10/22: Pt's case was discussed at . Some of the lesions suggestive of metastasis rather than HCC. Pending bx then re discussion at  to assess for transplant eligibility. Pt will also follow up with IR for LDT.\par Labs 7/20/22: bilirubin 0.2, AST 18, ALT 21, AlkPhos 115, INR 1.55, AFP 8.7\par Denies fatigue, malaise, arthralgias, myalgias, pruritus, recent infection, abdominal pain or distension, jaundice, hematemesis, hematochezia, dark urine, confusion, unintentional weight loss or gain. Denies family history of liver disease, sudden death or late trimester abortions.

## 2023-06-15 NOTE — PHYSICAL EXAM
[Non-Tender] : non-tender [Smooth] : smooth [General Appearance - Alert] : alert [General Appearance - In No Acute Distress] : in no acute distress [Sclera] : the sclera and conjunctiva were normal [Outer Ear] : the ears and nose were normal in appearance [Oropharynx] : the oropharynx was normal [Neck Appearance] : the appearance of the neck was normal [Neck Cervical Mass (___cm)] : no neck mass was observed [Jugular Venous Distention Increased] : there was no jugular-venous distention [Thyroid Diffuse Enlargement] : the thyroid was not enlarged [Thyroid Nodule] : there were no palpable thyroid nodules [Auscultation Breath Sounds / Voice Sounds] : lungs were clear to auscultation bilaterally [Heart Rate And Rhythm] : heart rate was normal and rhythm regular [Nonpitting Edema] : nonpitting edema present [___ +] : bilateral [unfilled]+ pitting edema to the ankles [Bowel Sounds] : normal bowel sounds [Abdomen Soft] : soft [Abdomen Tenderness] : non-tender [Abdomen Mass (___ Cm)] : no abdominal mass palpated [Abnormal Walk] : normal gait [Nail Clubbing] : no clubbing  or cyanosis of the fingernails [Musculoskeletal - Swelling] : no joint swelling seen [Motor Tone] : muscle strength and tone were normal [Skin Color & Pigmentation] : normal skin color and pigmentation [Skin Turgor] : normal skin turgor [] : no rash [No Focal Deficits] : no focal deficits [Oriented To Time, Place, And Person] : oriented to person, place, and time [Impaired Insight] : insight and judgment were intact [Affect] : the affect was normal [Scleral Icterus] : No Scleral Icterus [Hepatojugular Reflux] : patient did not have a sustained hepatojugular reflux [Abdominal  Ascites] : no ascites [Spider Angioma] : No spider angioma(s) were observed [Splenomegaly] : no splenomegaly [Scrotal Edema] : Scrotum is not edematous [Asterixis] : no asterixis observed [Jaundice] : No jaundice [Palmar Erythema] : no Palmar Erythema [Depression] : no depression [Hallucinations] : ~T no ~M hallucinations [Delusions] : no ~T delusions [Suicidal Ideation] : no suicidal ideation [Homicidal Ideation] : no homicidal ideations [Preoccupiation With Violent Thoughts] : no preoccupation with violent thoughts

## 2023-06-26 ENCOUNTER — NON-APPOINTMENT (OUTPATIENT)
Age: 68
End: 2023-06-26

## 2023-06-26 ENCOUNTER — APPOINTMENT (OUTPATIENT)
Dept: OPHTHALMOLOGY | Facility: AMBULATORY MEDICAL SERVICES | Age: 68
End: 2023-06-26
Payer: MEDICARE

## 2023-06-26 PROCEDURE — 66984 XCAPSL CTRC RMVL W/O ECP: CPT | Mod: RT

## 2023-06-27 ENCOUNTER — NON-APPOINTMENT (OUTPATIENT)
Age: 68
End: 2023-06-27

## 2023-06-27 ENCOUNTER — APPOINTMENT (OUTPATIENT)
Dept: OPHTHALMOLOGY | Facility: CLINIC | Age: 68
End: 2023-06-27
Payer: MEDICARE

## 2023-06-27 PROCEDURE — 99024 POSTOP FOLLOW-UP VISIT: CPT

## 2023-06-29 ENCOUNTER — OUTPATIENT (OUTPATIENT)
Dept: OUTPATIENT SERVICES | Facility: HOSPITAL | Age: 68
LOS: 1 days | End: 2023-06-29
Payer: MEDICARE

## 2023-06-29 VITALS
HEIGHT: 69 IN | OXYGEN SATURATION: 97 % | HEART RATE: 69 BPM | RESPIRATION RATE: 16 BRPM | DIASTOLIC BLOOD PRESSURE: 70 MMHG | WEIGHT: 315 LBS | TEMPERATURE: 97 F | SYSTOLIC BLOOD PRESSURE: 120 MMHG

## 2023-06-29 DIAGNOSIS — Z96.652 PRESENCE OF LEFT ARTIFICIAL KNEE JOINT: Chronic | ICD-10-CM

## 2023-06-29 DIAGNOSIS — I10 ESSENTIAL (PRIMARY) HYPERTENSION: ICD-10-CM

## 2023-06-29 DIAGNOSIS — G47.33 OBSTRUCTIVE SLEEP APNEA (ADULT) (PEDIATRIC): ICD-10-CM

## 2023-06-29 DIAGNOSIS — Z01.818 ENCOUNTER FOR OTHER PREPROCEDURAL EXAMINATION: ICD-10-CM

## 2023-06-29 DIAGNOSIS — Z98.890 OTHER SPECIFIED POSTPROCEDURAL STATES: Chronic | ICD-10-CM

## 2023-06-29 DIAGNOSIS — Z29.9 ENCOUNTER FOR PROPHYLACTIC MEASURES, UNSPECIFIED: ICD-10-CM

## 2023-06-29 DIAGNOSIS — C22.0 LIVER CELL CARCINOMA: ICD-10-CM

## 2023-06-29 DIAGNOSIS — Z90.5 ACQUIRED ABSENCE OF KIDNEY: Chronic | ICD-10-CM

## 2023-06-29 DIAGNOSIS — Z98.49 CATARACT EXTRACTION STATUS, UNSPECIFIED EYE: Chronic | ICD-10-CM

## 2023-06-29 LAB
ALBUMIN SERPL ELPH-MCNC: 4.1 G/DL — SIGNIFICANT CHANGE UP (ref 3.3–5.2)
ALP SERPL-CCNC: 106 U/L — SIGNIFICANT CHANGE UP (ref 40–120)
ALT FLD-CCNC: 31 U/L — SIGNIFICANT CHANGE UP
ANION GAP SERPL CALC-SCNC: 14 MMOL/L — SIGNIFICANT CHANGE UP (ref 5–17)
APTT BLD: 36.6 SEC — HIGH (ref 27.5–35.5)
AST SERPL-CCNC: 35 U/L — SIGNIFICANT CHANGE UP
BASOPHILS # BLD AUTO: 0.07 K/UL — SIGNIFICANT CHANGE UP (ref 0–0.2)
BASOPHILS NFR BLD AUTO: 0.8 % — SIGNIFICANT CHANGE UP (ref 0–2)
BILIRUB SERPL-MCNC: 0.3 MG/DL — LOW (ref 0.4–2)
BUN SERPL-MCNC: 25.5 MG/DL — HIGH (ref 8–20)
CALCIUM SERPL-MCNC: 9.6 MG/DL — SIGNIFICANT CHANGE UP (ref 8.4–10.5)
CHLORIDE SERPL-SCNC: 101 MMOL/L — SIGNIFICANT CHANGE UP (ref 96–108)
CO2 SERPL-SCNC: 23 MMOL/L — SIGNIFICANT CHANGE UP (ref 22–29)
CREAT SERPL-MCNC: 1.6 MG/DL — HIGH (ref 0.5–1.3)
EGFR: 47 ML/MIN/1.73M2 — LOW
EOSINOPHIL # BLD AUTO: 0.29 K/UL — SIGNIFICANT CHANGE UP (ref 0–0.5)
EOSINOPHIL NFR BLD AUTO: 3.4 % — SIGNIFICANT CHANGE UP (ref 0–6)
GLUCOSE SERPL-MCNC: 106 MG/DL — HIGH (ref 70–99)
HCT VFR BLD CALC: 38.1 % — LOW (ref 39–50)
HGB BLD-MCNC: 12.3 G/DL — LOW (ref 13–17)
IMM GRANULOCYTES NFR BLD AUTO: 0.2 % — SIGNIFICANT CHANGE UP (ref 0–0.9)
INR BLD: 1.49 RATIO — HIGH (ref 0.88–1.16)
LYMPHOCYTES # BLD AUTO: 1.95 K/UL — SIGNIFICANT CHANGE UP (ref 1–3.3)
LYMPHOCYTES # BLD AUTO: 23 % — SIGNIFICANT CHANGE UP (ref 13–44)
MCHC RBC-ENTMCNC: 26.3 PG — LOW (ref 27–34)
MCHC RBC-ENTMCNC: 32.3 GM/DL — SIGNIFICANT CHANGE UP (ref 32–36)
MCV RBC AUTO: 81.4 FL — SIGNIFICANT CHANGE UP (ref 80–100)
MONOCYTES # BLD AUTO: 0.52 K/UL — SIGNIFICANT CHANGE UP (ref 0–0.9)
MONOCYTES NFR BLD AUTO: 6.1 % — SIGNIFICANT CHANGE UP (ref 2–14)
NEUTROPHILS # BLD AUTO: 5.64 K/UL — SIGNIFICANT CHANGE UP (ref 1.8–7.4)
NEUTROPHILS NFR BLD AUTO: 66.5 % — SIGNIFICANT CHANGE UP (ref 43–77)
PLATELET # BLD AUTO: 308 K/UL — SIGNIFICANT CHANGE UP (ref 150–400)
POTASSIUM SERPL-MCNC: 4.3 MMOL/L — SIGNIFICANT CHANGE UP (ref 3.5–5.3)
POTASSIUM SERPL-SCNC: 4.3 MMOL/L — SIGNIFICANT CHANGE UP (ref 3.5–5.3)
PROT SERPL-MCNC: 7.9 G/DL — SIGNIFICANT CHANGE UP (ref 6.6–8.7)
PROTHROM AB SERPL-ACNC: 17.4 SEC — HIGH (ref 10.5–13.4)
RBC # BLD: 4.68 M/UL — SIGNIFICANT CHANGE UP (ref 4.2–5.8)
RBC # FLD: 17.2 % — HIGH (ref 10.3–14.5)
SODIUM SERPL-SCNC: 138 MMOL/L — SIGNIFICANT CHANGE UP (ref 135–145)
WBC # BLD: 8.49 K/UL — SIGNIFICANT CHANGE UP (ref 3.8–10.5)
WBC # FLD AUTO: 8.49 K/UL — SIGNIFICANT CHANGE UP (ref 3.8–10.5)

## 2023-06-29 PROCEDURE — 85610 PROTHROMBIN TIME: CPT

## 2023-06-29 PROCEDURE — 85025 COMPLETE CBC W/AUTO DIFF WBC: CPT

## 2023-06-29 PROCEDURE — G0463: CPT

## 2023-06-29 PROCEDURE — 36415 COLL VENOUS BLD VENIPUNCTURE: CPT

## 2023-06-29 PROCEDURE — 85730 THROMBOPLASTIN TIME PARTIAL: CPT

## 2023-06-29 PROCEDURE — 80053 COMPREHEN METABOLIC PANEL: CPT

## 2023-06-29 RX ORDER — SODIUM CHLORIDE 9 MG/ML
3 INJECTION INTRAMUSCULAR; INTRAVENOUS; SUBCUTANEOUS ONCE
Refills: 0 | Status: DISCONTINUED | OUTPATIENT
Start: 2023-07-12 | End: 2023-07-26

## 2023-06-29 NOTE — H&P PST ADULT - ASSESSMENT
67 yo presents to PST for mapping procedure under anestsia for newly Dx of liver cancer 1 yr ago.  Pt also had kidney cancer 1 yr ago which was removed (right kidney).  Pt states he has noo wt loss or gain.  Pt is currently working and fullfilling all of his ADLs.  Pt denies any abd pain.   67 yo presents to [facility] for mapping procedure under anestsia for newly Dx of liver cancer 1 yr ago.  Pt also had kidney cancer 1 yr ago which was removed (right kidney).  Pt states he has noo wt loss or gain.  Pt is currently working and fullfilling all of his ADLs.  Pt denies any abd pain.   Pt is schedule for Y90 mapping procedure with anesthesia Dr Phipps ordering Dr Acevedo doing the procedure on 2023.     Patient was educated on preoperative preoperation with written and verbal instruction . Patient is going for medical clearance with Dr. Linette leon patient will review medications with PCP. Patient was educated on aspirin and aspirin products NSAIDS ,vitamins and herbals that increase the risk of bleeding and need to be stopped five days before procedure  . Patient was also educated on covid testing and covid prevention ,social distancing and wearing a mask.     CAPRINI SCORE [CLOT]    AGE RELATED RISK FACTORS                                                       MOBILITY RELATED FACTORS  [ ] Age 41-60 years                                            (1 Point)                  [ ] Bed rest                                                        (1 Point)  [x ] Age: 61-74 years                                           (2 Points)                 [ ] Plaster cast                                                   (2 Points)  [ ] Age= 75 years                                              (3 Points)                 [ ] Bed bound for more than 72 hours                 (2 Points)    DISEASE RELATED RISK FACTORS                                               GENDER SPECIFIC FACTORS  [ ] Edema in the lower extremities                       (1 Point)                  [ ] Pregnancy                                                     (1 Point)  [ ] Varicose veins                                               (1 Point)                  [ ] Post-partum < 6 weeks                                   (1 Point)             [x ] BMI > 25 Kg/m2                                            (1 Point)                  [ ] Hormonal therapy  or oral contraception          (1 Point)                 [ ] Sepsis (in the previous month)                        (1 Point)                  [ ] History of pregnancy complications                 (1 point)  [ ] Pneumonia or serious lung disease                                               [ ] Unexplained or recurrent                     (1 Point)           (in the previous month)                               (1 Point)  [ ] Abnormal pulmonary function test                     (1 Point)                 SURGERY RELATED RISK FACTORS  [ ] Acute myocardial infarction                              (1 Point)                 [ ]  Section                                             (1 Point)  [ ] Congestive heart failure (in the previous month)  (1 Point)               [ ] Minor surgery                                                  (1 Point)   [ ] Inflammatory bowel disease                             (1 Point)                 [ ] Arthroscopic surgery                                        (2 Points)  [ ] Central venous access                                      (2 Points)                [x ] General surgery lasting more than 45 minutes   (2 Points)       [ ] Stroke (in the previous month)                          (5 Points)               [ ] Elective arthroplasty                                         (5 Points)                                                                                                                                               HEMATOLOGY RELATED FACTORS                                                 TRAUMA RELATED RISK FACTORS  [ ] Prior episodes of VTE                                     (3 Points)                [ ] Fracture of the hip, pelvis, or leg                       (5 Points)  [ ] Positive family history for VTE                         (3 Points)                 [ ] Acute spinal cord injury (in the previous month)  (5 Points)  [ ] Prothrombin 57603 A                                     (3 Points)                 [ ] Paralysis  (less than 1 month)                             (5 Points)  [ ] Factor V Leiden                                             (3 Points)                  [ ] Multiple Trauma within 1 month                        (5 Points)  [ ] Lupus anticoagulants                                     (3 Points)                                                           [ ] Anticardiolipin antibodies                               (3 Points)                                                       [ ] High homocysteine in the blood                      (3 Points)                                             [ ] Other congenital or acquired thrombophilia      (3 Points)                                                [ ] Heparin induced thrombocytopenia                  (3 Points)                                          Total Score [     5     ]    OPIOID RISK TOOL    CAMILO EACH BOX THAT APPLIES AND ADD TOTALS AT THE END    FAMILY HISTORY OF SUBSTANCE ABUSE                 FEMALE         MALE                                                Alcohol                             [  ]1 pt          [  ]3pts                                               Illegal Durgs                     [  ]2 pts        [  ]3pts                                               Rx Drugs                           [  ]4 pts        [  ]4 pts    PERSONAL HISTORY OF SUBSTANCE ABUSE                                                                                          Alcohol                             [  ]3 pts       [  ]3 pts                                               Illegal Drugs                     [  ]4 pts        [  ]4 pts                                               Rx Drugs                           [  ]5 pts        [  ]5 pts    AGE BETWEEN 16-45 YEARS                                      [  ]1 pt         [  ]1 pt    HISTORY OF PREADOLESCENT   SEXUAL ABUSE                                                             [  ]3 pts        [  ]0pts    PSYCHOLOGICAL DISEASE                     ADD, OCD, Bipolar, Schizophrenia        [  ]2 pts         [  ]2 pts                      Depression                                               [  ]1 pt           [  ]1 pt           SCORING TOTAL   (add numbers and type here)           0                                       69 yo presents to [facility] for mapping procedure under anestsia for newly Dx of liver cancer 1 yr ago.  Pt also had kidney cancer 1 yr ago which was removed (right kidney).  Pt states he has noo wt loss or gain.  Pt is currently working and fullfilling all of his ADLs.  Pt denies any abd pain.   Pt is schedule for Y90 mapping procedure with anesthesia Dr Phipps ordering Dr Acevedo doing the procedure on 2023.     Patient was educated on preoperative preoperation with written and verbal instruction . Patient is going for medical clearance with Dr. Linette solisain patient will review medications with PCP. Patient was educated on aspirin and aspirin products NSAIDS ,vitamins and herbals that increase the risk of bleeding and need to be stopped five days before procedure  . Patient was also educated on covid testing and covid prevention ,social distancing and wearing a mask.     23 07:43 All available labs noted as documented and emailed to radiology Dr. Santos, also faxed to PCP with whom medical clearance is pending. History of renal cell ca. s/p nephrectomy per chart, afib on kira. BARBARA Lopez MS, FNP-BC     CAPRINI SCORE [CLOT]    AGE RELATED RISK FACTORS                                                       MOBILITY RELATED FACTORS  [ ] Age 41-60 years                                            (1 Point)                  [ ] Bed rest                                                        (1 Point)  [x ] Age: 61-74 years                                           (2 Points)                 [ ] Plaster cast                                                   (2 Points)  [ ] Age= 75 years                                              (3 Points)                 [ ] Bed bound for more than 72 hours                 (2 Points)    DISEASE RELATED RISK FACTORS                                               GENDER SPECIFIC FACTORS  [ ] Edema in the lower extremities                       (1 Point)                  [ ] Pregnancy                                                     (1 Point)  [ ] Varicose veins                                               (1 Point)                  [ ] Post-partum < 6 weeks                                   (1 Point)             [x ] BMI > 25 Kg/m2                                            (1 Point)                  [ ] Hormonal therapy  or oral contraception          (1 Point)                 [ ] Sepsis (in the previous month)                        (1 Point)                  [ ] History of pregnancy complications                 (1 point)  [ ] Pneumonia or serious lung disease                                               [ ] Unexplained or recurrent                     (1 Point)           (in the previous month)                               (1 Point)  [ ] Abnormal pulmonary function test                     (1 Point)                 SURGERY RELATED RISK FACTORS  [ ] Acute myocardial infarction                              (1 Point)                 [ ]  Section                                             (1 Point)  [ ] Congestive heart failure (in the previous month)  (1 Point)               [ ] Minor surgery                                                  (1 Point)   [ ] Inflammatory bowel disease                             (1 Point)                 [ ] Arthroscopic surgery                                        (2 Points)  [ ] Central venous access                                      (2 Points)                [x ] General surgery lasting more than 45 minutes   (2 Points)       [ ] Stroke (in the previous month)                          (5 Points)               [ ] Elective arthroplasty                                         (5 Points)                                                                                                                                               HEMATOLOGY RELATED FACTORS                                                 TRAUMA RELATED RISK FACTORS  [ ] Prior episodes of VTE                                     (3 Points)                [ ] Fracture of the hip, pelvis, or leg                       (5 Points)  [ ] Positive family history for VTE                         (3 Points)                 [ ] Acute spinal cord injury (in the previous month)  (5 Points)  [ ] Prothrombin 50267 A                                     (3 Points)                 [ ] Paralysis  (less than 1 month)                             (5 Points)  [ ] Factor V Leiden                                             (3 Points)                  [ ] Multiple Trauma within 1 month                        (5 Points)  [ ] Lupus anticoagulants                                     (3 Points)                                                           [ ] Anticardiolipin antibodies                               (3 Points)                                                       [ ] High homocysteine in the blood                      (3 Points)                                             [ ] Other congenital or acquired thrombophilia      (3 Points)                                                [ ] Heparin induced thrombocytopenia                  (3 Points)                                          Total Score [     5     ]    OPIOID RISK TOOL    CAMILO EACH BOX THAT APPLIES AND ADD TOTALS AT THE END    FAMILY HISTORY OF SUBSTANCE ABUSE                 FEMALE         MALE                                                Alcohol                             [  ]1 pt          [  ]3pts                                               Illegal Durgs                     [  ]2 pts        [  ]3pts                                               Rx Drugs                           [  ]4 pts        [  ]4 pts    PERSONAL HISTORY OF SUBSTANCE ABUSE                                                                                          Alcohol                             [  ]3 pts       [  ]3 pts                                               Illegal Drugs                     [  ]4 pts        [  ]4 pts                                               Rx Drugs                           [  ]5 pts        [  ]5 pts    AGE BETWEEN 16-45 YEARS                                      [  ]1 pt         [  ]1 pt    HISTORY OF PREADOLESCENT   SEXUAL ABUSE                                                             [  ]3 pts        [  ]0pts    PSYCHOLOGICAL DISEASE                     ADD, OCD, Bipolar, Schizophrenia        [  ]2 pts         [  ]2 pts                      Depression                                               [  ]1 pt           [  ]1 pt           SCORING TOTAL   (add numbers and type here)           0

## 2023-06-29 NOTE — H&P PST ADULT - PROBLEM SELECTOR PLAN 2
pt requires cardiac clearance for procedure    Pt instructed to stop Xarelto ad asa 4 days prior to procedure per booking sheet

## 2023-06-29 NOTE — H&P PST ADULT - PROBLEM SELECTOR PLAN 1
yes
Pt is schedule for Y90 mapping procedure with anesthesia Dr Phipps ordering Dr Acevedo doing the procedure on 7/11/2023.

## 2023-06-29 NOTE — H&P PST ADULT - HISTORY OF PRESENT ILLNESS
67 yo presents to PST for mapping procedure under anestsia for newly Dx of liver cancer 1 yr ago.  Pt also had kidney cancer 1 yr ago which was removed (right kidney).  Pt states he has noo wt loss or gain.  Pt is currently working and fullfilling all of his ADLs.  Pt denies any abd pain.

## 2023-07-05 ENCOUNTER — APPOINTMENT (OUTPATIENT)
Dept: RHEUMATOLOGY | Facility: CLINIC | Age: 68
End: 2023-07-05
Payer: MEDICARE

## 2023-07-05 VITALS
OXYGEN SATURATION: 93 % | HEART RATE: 83 BPM | HEIGHT: 69 IN | DIASTOLIC BLOOD PRESSURE: 76 MMHG | TEMPERATURE: 98.1 F | SYSTOLIC BLOOD PRESSURE: 126 MMHG

## 2023-07-05 DIAGNOSIS — M17.12 UNILATERAL PRIMARY OSTEOARTHRITIS, LEFT KNEE: ICD-10-CM

## 2023-07-05 DIAGNOSIS — Z74.09 OTHER REDUCED MOBILITY: ICD-10-CM

## 2023-07-05 DIAGNOSIS — M19.011 PRIMARY OSTEOARTHRITIS, RIGHT SHOULDER: ICD-10-CM

## 2023-07-05 DIAGNOSIS — M18.0 BILATERAL PRIMARY OSTEOARTHRITIS OF FIRST CARPOMETACARPAL JOINTS: ICD-10-CM

## 2023-07-05 DIAGNOSIS — M16.0 BILATERAL PRIMARY OSTEOARTHRITIS OF HIP: ICD-10-CM

## 2023-07-05 DIAGNOSIS — M19.012 PRIMARY OSTEOARTHRITIS, RIGHT SHOULDER: ICD-10-CM

## 2023-07-05 DIAGNOSIS — M17.11 UNILATERAL PRIMARY OSTEOARTHRITIS, RIGHT KNEE: ICD-10-CM

## 2023-07-05 LAB
ALBUMIN SERPL ELPH-MCNC: 4.2 G/DL
ALP BLD-CCNC: 113 U/L
ALT SERPL-CCNC: 30 U/L
ANION GAP SERPL CALC-SCNC: 13 MMOL/L
AST SERPL-CCNC: 27 U/L
BILIRUB SERPL-MCNC: 0.3 MG/DL
BUN SERPL-MCNC: 28 MG/DL
CALCIUM SERPL-MCNC: 9.8 MG/DL
CHLORIDE SERPL-SCNC: 103 MMOL/L
CO2 SERPL-SCNC: 22 MMOL/L
CREAT SERPL-MCNC: 1.48 MG/DL
EGFR: 52 ML/MIN/1.73M2
GLUCOSE SERPL-MCNC: 151 MG/DL
POTASSIUM SERPL-SCNC: 4.4 MMOL/L
PROT SERPL-MCNC: 7.8 G/DL
SODIUM SERPL-SCNC: 138 MMOL/L
URATE SERPL-MCNC: 7.9 MG/DL

## 2023-07-05 PROCEDURE — 99214 OFFICE O/P EST MOD 30 MIN: CPT | Mod: 25

## 2023-07-05 PROCEDURE — 36415 COLL VENOUS BLD VENIPUNCTURE: CPT

## 2023-07-06 ENCOUNTER — APPOINTMENT (OUTPATIENT)
Dept: OPHTHALMOLOGY | Facility: CLINIC | Age: 68
End: 2023-07-06
Payer: MEDICARE

## 2023-07-06 ENCOUNTER — NON-APPOINTMENT (OUTPATIENT)
Age: 68
End: 2023-07-06

## 2023-07-06 PROCEDURE — 99024 POSTOP FOLLOW-UP VISIT: CPT

## 2023-07-10 ENCOUNTER — APPOINTMENT (OUTPATIENT)
Dept: FAMILY MEDICINE | Facility: CLINIC | Age: 68
End: 2023-07-10
Payer: MEDICARE

## 2023-07-10 VITALS
RESPIRATION RATE: 14 BRPM | SYSTOLIC BLOOD PRESSURE: 124 MMHG | DIASTOLIC BLOOD PRESSURE: 68 MMHG | HEIGHT: 69 IN | OXYGEN SATURATION: 94 % | TEMPERATURE: 97.9 F | HEART RATE: 59 BPM

## 2023-07-10 DIAGNOSIS — C22.8 MALIGNANT NEOPLASM OF LIVER, PRIMARY, UNSPECIFIED AS TO TYPE: ICD-10-CM

## 2023-07-10 PROCEDURE — 99215 OFFICE O/P EST HI 40 MIN: CPT

## 2023-07-10 NOTE — ASSESSMENT
[Patient Optimized for Surgery] : Patient optimized for surgery [FreeTextEntry4] : Patient cleared for procedure

## 2023-07-12 ENCOUNTER — OUTPATIENT (OUTPATIENT)
Dept: OUTPATIENT SERVICES | Facility: HOSPITAL | Age: 68
LOS: 1 days | End: 2023-07-12
Payer: MEDICARE

## 2023-07-12 ENCOUNTER — TRANSCRIPTION ENCOUNTER (OUTPATIENT)
Age: 68
End: 2023-07-12

## 2023-07-12 ENCOUNTER — RESULT REVIEW (OUTPATIENT)
Age: 68
End: 2023-07-12

## 2023-07-12 VITALS
WEIGHT: 315 LBS | HEART RATE: 59 BPM | RESPIRATION RATE: 16 BRPM | SYSTOLIC BLOOD PRESSURE: 143 MMHG | TEMPERATURE: 98 F | HEIGHT: 69 IN | DIASTOLIC BLOOD PRESSURE: 75 MMHG | OXYGEN SATURATION: 95 %

## 2023-07-12 DIAGNOSIS — Z98.49 CATARACT EXTRACTION STATUS, UNSPECIFIED EYE: Chronic | ICD-10-CM

## 2023-07-12 DIAGNOSIS — Z90.5 ACQUIRED ABSENCE OF KIDNEY: Chronic | ICD-10-CM

## 2023-07-12 DIAGNOSIS — Z98.890 OTHER SPECIFIED POSTPROCEDURAL STATES: Chronic | ICD-10-CM

## 2023-07-12 DIAGNOSIS — Z96.652 PRESENCE OF LEFT ARTIFICIAL KNEE JOINT: Chronic | ICD-10-CM

## 2023-07-12 DIAGNOSIS — C22.0 LIVER CELL CARCINOMA: ICD-10-CM

## 2023-07-12 LAB — GLUCOSE BLDC GLUCOMTR-MCNC: 145 MG/DL — HIGH (ref 70–99)

## 2023-07-12 PROCEDURE — C9399: CPT

## 2023-07-12 PROCEDURE — 36247 INS CATH ABD/L-EXT ART 3RD: CPT | Mod: RT

## 2023-07-12 PROCEDURE — C1894: CPT

## 2023-07-12 PROCEDURE — 36248 INS CATH ABD/L-EXT ART ADDL: CPT

## 2023-07-12 PROCEDURE — 78201 LIVER IMAGING STATIC ONLY: CPT | Mod: 26

## 2023-07-12 PROCEDURE — 82962 GLUCOSE BLOOD TEST: CPT

## 2023-07-12 PROCEDURE — 78803 RP LOCLZJ TUM SPECT 1 AREA: CPT

## 2023-07-12 PROCEDURE — 77300 RADIATION THERAPY DOSE PLAN: CPT | Mod: 26

## 2023-07-12 PROCEDURE — 75726 ARTERY X-RAYS ABDOMEN: CPT | Mod: 26

## 2023-07-12 PROCEDURE — 75774 ARTERY X-RAY EACH VESSEL: CPT

## 2023-07-12 PROCEDURE — C1760: CPT

## 2023-07-12 PROCEDURE — 76942 ECHO GUIDE FOR BIOPSY: CPT

## 2023-07-12 PROCEDURE — 77263 THER RADIOLOGY TX PLNG CPLX: CPT

## 2023-07-12 PROCEDURE — 75774 ARTERY X-RAY EACH VESSEL: CPT | Mod: 26

## 2023-07-12 PROCEDURE — A9541: CPT

## 2023-07-12 PROCEDURE — 75726 ARTERY X-RAYS ABDOMEN: CPT

## 2023-07-12 PROCEDURE — 36245 INS CATH ABD/L-EXT ART 1ST: CPT | Mod: 59,RT

## 2023-07-12 PROCEDURE — 36247 INS CATH ABD/L-EXT ART 3RD: CPT

## 2023-07-12 PROCEDURE — 75894 X-RAYS TRANSCATH THERAPY: CPT

## 2023-07-12 PROCEDURE — C1769: CPT

## 2023-07-12 PROCEDURE — 37243 VASC EMBOLIZE/OCCLUDE ORGAN: CPT

## 2023-07-12 PROCEDURE — 77290 THER RAD SIMULAJ FIELD CPLX: CPT | Mod: 26

## 2023-07-12 PROCEDURE — 78201 LIVER IMAGING STATIC ONLY: CPT

## 2023-07-12 PROCEDURE — 76380 CAT SCAN FOLLOW-UP STUDY: CPT | Mod: 26

## 2023-07-12 PROCEDURE — A9540: CPT

## 2023-07-12 RX ORDER — ALBUTEROL 90 UG/1
2 AEROSOL, METERED ORAL
Qty: 0 | Refills: 0 | DISCHARGE

## 2023-07-12 NOTE — PROGRESS NOTE ADULT - SUBJECTIVE AND OBJECTIVE BOX
IR Post Procedure Note    Diagnosis: HCC    Procedure: Y90 Mapping    : Casa Santos MD    Contrast: 180cc    Anesthesia: 1% Lidocaine Subcutaneous, Sedation administered by Anesthesiology    Estimated Blood Loss: Less than 10cc    Complications: No Immediate Complications    Anticoagulation: Resume without Restriction    Findings & Plan: R CFA Access, SMA normal, Celiac shows perfusion of tumor from RHA, MAA administered, closure w angioseal      Please call Interventional Radiology with any questions, concerns, or issues.  IR Post Procedure Note    Diagnosis: HCC    Procedure: Y90 Mapping Angiogram    : Casa Santos MD    Contrast: 180cc    Anesthesia: 1% Lidocaine Subcutaneous, Sedation administered by Anesthesiology    Estimated Blood Loss: Less than 10cc    Complications: No Immediate Complications    Anticoagulation: Resume without Restriction    Findings & Plan: R CFA Access, SMA angiogram normal without perfusion, Celiac axis angiogram shows perfusion of tumor from RHA, Microcatheter angiogram and cone beam CT from Posterior RHA show tumoral perfusion, MAA administered in posterior RHA, closure w angioseal      Please call Interventional Radiology with any questions, concerns, or issues.

## 2023-07-12 NOTE — ASU DISCHARGE PLAN (ADULT/PEDIATRIC) - NS MD DC FALL RISK RISK
For information on Fall & Injury Prevention, visit: [email or web address] OR  [email or web address] OR  https://www.cdc.gov/steadi/patient.html

## 2023-07-12 NOTE — ASU DISCHARGE PLAN (ADULT/PEDIATRIC) - ASU DC SPECIAL INSTRUCTIONSFT
- You underwent a mapping angiogram today in preparation for planned radioembolization to treat your liver tumor(s).   - You may have mild abdominal pain, nausea, loss of appetite, fatigue, and/or low grade fever.  These are normal after your procedure and they should resolve within 3-5 days.  Please call Interventional Radiology if you have a fever > 101.0 F.  If you have persistent nausea, contact Interventional Radiology and we can prescribe anti-nausea medication.  - Monitor right groin site for symptoms of bleeding, hardness underneath the incision site, bruising, numbness, intense pain, or inability to move.  If you have any of these symptoms, contact Interventional Radiology and seek immediate medical attention  - Please report chills, temperature > 101.0F, persistent nausea or vomiting, severe abdominal or leg pain, confusion, yellowing of the skin, or abdominal swelling.  - You may shower in 24 hours. You may resume normal activity in 24 hours.  - Do not perform any heavy lifting or put tension on the area for the next 48 hours.  - You may resume your normal diet.  - You may resume your normal medications however you should wait 24 hours before restarting aspirin, plavix, or blood thinners.  - It is normal to experience some pain over the site for the next few days. You may take apply ice to the area (20 minutes on, 20 minutes off) and take Motrin for that pain. Do not take more frequently than every 6 hours.    Next Steps  - You should have received a prescription for medications to take prior to your radioembolization procedure.  You are to start your PPI (Protonix) 5 days prior to radioembolization treatment day and continue until all medication is completed.   - The Medrol dose pack will be started the day after treatment. If your are diabetic, you will not start this medication.  If you have not received a prescription for the medication, please notify the IR team at [phone].

## 2023-07-20 RX ORDER — PANTOPRAZOLE SODIUM 20 MG/1
1 TABLET, DELAYED RELEASE ORAL
Qty: 30 | Refills: 0
Start: 2023-07-20 | End: 2023-08-18

## 2023-07-23 ENCOUNTER — OUTPATIENT (OUTPATIENT)
Dept: OUTPATIENT SERVICES | Facility: HOSPITAL | Age: 68
LOS: 1 days | Discharge: HOME | End: 2023-07-23

## 2023-07-23 DIAGNOSIS — Z96.652 PRESENCE OF LEFT ARTIFICIAL KNEE JOINT: Chronic | ICD-10-CM

## 2023-07-23 DIAGNOSIS — Z90.5 ACQUIRED ABSENCE OF KIDNEY: Chronic | ICD-10-CM

## 2023-07-23 DIAGNOSIS — Z98.890 OTHER SPECIFIED POSTPROCEDURAL STATES: Chronic | ICD-10-CM

## 2023-07-23 DIAGNOSIS — Z98.49 CATARACT EXTRACTION STATUS, UNSPECIFIED EYE: Chronic | ICD-10-CM

## 2023-07-23 DIAGNOSIS — C22.0 LIVER CELL CARCINOMA: ICD-10-CM

## 2023-07-24 ENCOUNTER — RX RENEWAL (OUTPATIENT)
Age: 68
End: 2023-07-24

## 2023-07-26 ENCOUNTER — RESULT REVIEW (OUTPATIENT)
Age: 68
End: 2023-07-26

## 2023-07-26 ENCOUNTER — APPOINTMENT (OUTPATIENT)
Dept: HEMATOLOGY ONCOLOGY | Facility: CLINIC | Age: 68
End: 2023-07-26
Payer: MEDICARE

## 2023-07-26 VITALS
HEART RATE: 63 BPM | WEIGHT: 315 LBS | OXYGEN SATURATION: 95 % | TEMPERATURE: 98 F | SYSTOLIC BLOOD PRESSURE: 124 MMHG | HEIGHT: 69 IN | BODY MASS INDEX: 46.65 KG/M2 | DIASTOLIC BLOOD PRESSURE: 72 MMHG

## 2023-07-26 LAB
BASOPHILS # BLD AUTO: 0.1 K/UL — SIGNIFICANT CHANGE UP (ref 0–0.2)
BASOPHILS NFR BLD AUTO: 0.9 % — SIGNIFICANT CHANGE UP (ref 0–2)
EOSINOPHIL # BLD AUTO: 0.4 K/UL — SIGNIFICANT CHANGE UP (ref 0–0.5)
EOSINOPHIL NFR BLD AUTO: 4.7 % — SIGNIFICANT CHANGE UP (ref 0–6)
HCT VFR BLD CALC: 41.1 % — SIGNIFICANT CHANGE UP (ref 39–50)
HGB BLD-MCNC: 13.1 G/DL — SIGNIFICANT CHANGE UP (ref 13–17)
LYMPHOCYTES # BLD AUTO: 1.8 K/UL — SIGNIFICANT CHANGE UP (ref 1–3.3)
LYMPHOCYTES # BLD AUTO: 20.9 % — SIGNIFICANT CHANGE UP (ref 13–44)
MCHC RBC-ENTMCNC: 26.7 PG — LOW (ref 27–34)
MCHC RBC-ENTMCNC: 31.8 G/DL — LOW (ref 32–36)
MCV RBC AUTO: 83.9 FL — SIGNIFICANT CHANGE UP (ref 80–100)
MONOCYTES # BLD AUTO: 0.4 K/UL — SIGNIFICANT CHANGE UP (ref 0–0.9)
MONOCYTES NFR BLD AUTO: 4.9 % — SIGNIFICANT CHANGE UP (ref 2–14)
NEUTROPHILS # BLD AUTO: 6 K/UL — SIGNIFICANT CHANGE UP (ref 1.8–7.4)
NEUTROPHILS NFR BLD AUTO: 68.6 % — SIGNIFICANT CHANGE UP (ref 43–77)
PLATELET # BLD AUTO: 288 K/UL — SIGNIFICANT CHANGE UP (ref 150–400)
RBC # BLD: 4.89 M/UL — SIGNIFICANT CHANGE UP (ref 4.2–5.8)
RBC # FLD: 15.1 % — HIGH (ref 10.3–14.5)
WBC # BLD: 8.8 K/UL — SIGNIFICANT CHANGE UP (ref 3.8–10.5)
WBC # FLD AUTO: 8.8 K/UL — SIGNIFICANT CHANGE UP (ref 3.8–10.5)

## 2023-07-26 PROCEDURE — 99214 OFFICE O/P EST MOD 30 MIN: CPT

## 2023-07-26 NOTE — ASSESSMENT
[Supportive] : Goals of care discussed with patient: Supportive [Palliative Care Plan] : not applicable at this time [FreeTextEntry1] : 68 year old M with h/o anemia, Afib, gout, type 2 DM, HTN, hyperlipidemia , hypothyroid, obesity, JASON and right renal cancer s/p right nephrectomy who was diagnosed with HCC in June 2022. \par # HCC\par -MRI abd showed 0.7 and 1.2 cm Lt hepatic lobe lesion and 1.1 cm right hepatic lobe lesion\par -no evidence of metastatic disease on PET\par -pt seen by hepatology >> the case was discussed at [facility]. pending bx of liver lesion as one of the lesion appear to be different from HCC.\par -will defer systemic therapy for now as pt is eligible for LDT and being evaluated for possible liver transplant. -Patient to follow up with Dr. Phipps and Dr. Santos. \par -PET/CT and CT scan scheduled on 6/7/23 and Pending MRI on 6/15/23 per Dr. Phipps\par # RCC\par -s/p right nephrectomy\par -Path showed RCC , clear cell, nuclear grade II, pT 1 a, Stage I. \par -No indication for adjuvant TKI or chemotherapy/immune therapy. \par Reviewed\par -3/29/23 CT Chest: Few ill-defined ground-glass nodular opacities primarily in the right middle lobe 0.5 cm is unchanged. Right lower lobe superior segment 0.5 cm ground-glass nodule abutting the major fissure is new.\par -5/17/23 Spect / CT bone: No radionuclide evidence of osseous metastases.\par -6/7/23 PET/CT: Focal activity in the right hepatic lobe corresponding to one of the enhancing MRI lesions compatible with HCC.\par 6/14/23 MR abdomen: A 3.1 cm biopsy-proven hepatocellular carcinoma in the right hepatic lobe with interval growth from prior imaging in February 2023. Two rim-enhancing lesions in segment 4 are without significant change and are indeterminate.\par -Patient planning for embolization on 8/1/23. Discussion with him regarding the use of embolization. The physical examination is unchanged. Recommend holding rivaroxaban 20 mg PO : three days before embolization and arterial puncture. Re imaging in 2 months to decide effectiveness; may consider chemotherapy as alternative therapy\par -F/u in 2 months

## 2023-07-26 NOTE — PHYSICAL EXAM
[Restricted in physically strenuous activity but ambulatory and able to carry out work of a light or sedentary nature] : Status 1- Restricted in physically strenuous activity but ambulatory and able to carry out work of a light or sedentary nature, e.g., light house work, office work [Obese] : obese [Normal] : affect appropriate [Ulcers] : no ulcers [Mucositis] : no mucositis [Thrush] : no thrush [Vesicles] : no vesicles [de-identified] : small lens opacities [de-identified] : edentulous upper; extractions lower [de-identified] : numerous multi coloured tattoos on chest back and arms

## 2023-07-26 NOTE — ADDENDUM
[FreeTextEntry1] : Documented by Mulu Vieyra acting as scribe for Dr. Hyde on 07/26/2023 \par \par All Medical record entries made by the Scribe were at my, Dr. Hyde's , direction and personally dictated by me on 07/26/2023 . I have reviewed the chart and agree that the record accurately reflects my personal performance of the history, physical exam, assessment and plan. I have also personally directed, reviewed, and agreed with the discharge instructions.\par

## 2023-07-26 NOTE — REVIEW OF SYSTEMS
[Vision Problems] : vision problems [Joint Pain] : joint pain [Joint Stiffness] : joint stiffness [Muscle Pain] : muscle pain [Muscle Weakness] : muscle weakness [Difficulty Walking] : difficulty walking [Lower Ext Edema] : lower extremity edema [FreeTextEntry2] : negative except as indicated in interval history [FreeTextEntry3] : Cataracts [de-identified] : uses a cane

## 2023-07-26 NOTE — HISTORY OF PRESENT ILLNESS
[Disease: _____________________] : Disease: [unfilled] [80: Normal activity with effort; some signs or symptoms of disease.] : 80: Normal activity with effort; some signs or symptoms of disease.  [ECOG Performance Status: 2 - Ambulatory and capable of all self care but unable to carry out any work activities] : Performance Status: 2 - Ambulatory and capable of all self care but unable to carry out any work activities. Up and about more than 50% of waking hours [de-identified] : 68 year old M with h/o anemia, Afib, gout, DM, HTN, HLD, hypothyroid, obesity, JASON and renal cancer s/p right nephrectomy who was diagnosed with HCC in June 2022.\par 06/07/2023 First visit with Dr Hyde \par \par He was noted to have a suspicious kidney lesion on imaging in December 2021 and was referred for an abdominal MRI in January 2022, with incidental note of a multiple small indeterminate hepatic lesions which are indeterminate but for which metastatic renal cell carcinoma cannot be excluded. Subsequent MRI abdomen in April 2022 revealed an endophytic right lower pole renal lesion suspicious for malignancy, evidence of liver metastasis and multiple prominent subcentimeter periportal, retroperitoneal and retrocaval lymph nodes which are nonspecific.\par \par On 4/20/22 he underwent a right nephrectomy with Dr Jhonathan Shetty.  Path showed RCC , clear cell, nuclear grade II, pT1a, Stage I. No indication for adjuvant pembro. \par \par PET/CT performed on 5/13/22 showed no definitive hypermetabolic evidence of malignant disease. There are prominent retroperitoneal and iliac chain lymph nodes without corresponding abnormal activity. No discrete hepatic focus to correspond with liver lesions seen on MRI (continued MRI surveillance is recommended), right anterolateral subcutaneous activity possibly related to abdominal surgery.\par \par On 6/1/22 he underwent a CT-guided biopsy of a representative mass in the inferior right hepatic lobe. Pathology demonstrated well differentiated hepatocellular carcinoma. Immunohistochemical stains show tumor cells are positive for arginase, focal positive for HSA, CK7 and CA IX, negative for CK20, CDX2, TTF1, Villin, [place]-8, vimentin and AFP. The morphology and immuno profile support the diagnosis of hepatocellular carcinoma.\par \par Pt works as a storage . Feels well. Denies HA. CP, SOB, abd pain, constipation, diarrhea, melena, hematuria, dysuria.  [de-identified] : moderately differentiated HCC [FreeTextEntry1] : evaluation for SRT [de-identified] : 7/28/22: Angel is here for follow up for HCC. Pt's case was discussed at . Some of the lesions suggestive of metastasis rather than HCC. Pending  bx then re discussion at [facility] to assess for transplant eligibility. Pt will also follow up with IR for LDT. Denies HA. CP, SOB, abd pain, constipation, diarrhea, melena, hematuria, dysuria. \par 10/25/22: : Angel is here for follow up for HCC.Bx of one of the liver lesions was neg or malignancy. mix bibrous tissue with mixed inflammation and liver cirrhosis. No evidence of metastatic RCC. Will obtain interval MRI. Pt to follow up with IR for LDT. Pt will also follow up with IR for LDT. Denies HA. CP, SOB, abd pain, constipation, diarrhea, melena, hematuria, dysuria. \par 1/12/22: Care transferred from Dr. Miller to Dr. Stratton. Patient seen by urology for nephrolithiasis requiring antibiotics. Planning on going in for a procedure. since last visit, the patient's insurance issues have not been resolved, and he has not been approved for further treatment. \par 06/07/2023 first visit with Dr Hyde; when asked how he felt and is current state of health, patient responded"...you tell me..." He has had bone scanning and he is currently awaiting CT/PET scan not requested by this office.I read report of May 17 bone scan that showed no evidence of osseous lesions; (he has only a left kidney) . Scans are being requested by pulmonary, GI hepatology and IR to assess suitability of SRT\par \par \par 3/21/23: Patient presented to the ER on 3/6/23-3/15/23, admitted for sepsis 2/2 RLE cellulitis given course of IV abx. With hospital course complicated by fall  CT head x2 \par negative for acute pathology.  Severe edema still present of RLE but no signs of compartment syndrome. \par Reports intermitted abd pain. Denies n/v/d\par \par 4/27/23:Patient feels well, has a normal appetite. Continues to work without difficulty. LE swelling has not worsened  Denies n/v, abdominal pain, weight loss, \par Patient pending cataract surgery in June 2023\par Continues on Xarelto 2/2 A-fib\par Patient had f/u with Dr. Phipps on 4/20/23, discussed liver biopsy\par Had f/u with Dr. Santos who recommended a bone scan. \par \par 6/7/23: Patient presents for a followup (transfer of care from Dr. Stratton)\par Continues on Xarelto 20 mg 2/2 A-fib\par Not currently on abx\par Ambulates with a cane, for hip pain \par Admits 70lb intentional weight loss\par Pending cataract surgery\par \par 7/26/23: Patient presents for a followup\par Patient planning for embolization on 7/31/23\par Gout issues resolved. \par Reports left degenerative hip, planning to f/u with Dr. Schafer\par He has stopped eliquis and asa\par

## 2023-07-27 LAB
AFP-TM SERPL-MCNC: 670 NG/ML
ALBUMIN SERPL ELPH-MCNC: 4.1 G/DL
ALP BLD-CCNC: 104 U/L
ALT SERPL-CCNC: 34 U/L
ANION GAP SERPL CALC-SCNC: 15 MMOL/L
AST SERPL-CCNC: 33 U/L
BILIRUB SERPL-MCNC: 0.2 MG/DL
BUN SERPL-MCNC: 21 MG/DL
CALCIUM SERPL-MCNC: 9.7 MG/DL
CHLORIDE SERPL-SCNC: 103 MMOL/L
CO2 SERPL-SCNC: 21 MMOL/L
CREAT SERPL-MCNC: 1.5 MG/DL
EGFR: 51 ML/MIN/1.73M2
GLUCOSE SERPL-MCNC: 214 MG/DL
MAGNESIUM SERPL-MCNC: 2.2 MG/DL
POTASSIUM SERPL-SCNC: 4.3 MMOL/L
PROT SERPL-MCNC: 7.5 G/DL
SODIUM SERPL-SCNC: 139 MMOL/L

## 2023-07-27 NOTE — END OF VISIT
Please review patient response below via portal message   [Time Spent: ___ minutes] : I have spent [unfilled] minutes of time on the encounter.

## 2023-08-01 ENCOUNTER — RX RENEWAL (OUTPATIENT)
Age: 68
End: 2023-08-01

## 2023-08-01 ENCOUNTER — OUTPATIENT (OUTPATIENT)
Dept: OUTPATIENT SERVICES | Facility: HOSPITAL | Age: 68
LOS: 1 days | End: 2023-08-01
Payer: MEDICARE

## 2023-08-01 ENCOUNTER — RESULT REVIEW (OUTPATIENT)
Age: 68
End: 2023-08-01

## 2023-08-01 VITALS
SYSTOLIC BLOOD PRESSURE: 126 MMHG | DIASTOLIC BLOOD PRESSURE: 61 MMHG | OXYGEN SATURATION: 93 % | TEMPERATURE: 99 F | RESPIRATION RATE: 16 BRPM | WEIGHT: 315 LBS | HEART RATE: 59 BPM | HEIGHT: 69 IN

## 2023-08-01 DIAGNOSIS — Z90.5 ACQUIRED ABSENCE OF KIDNEY: Chronic | ICD-10-CM

## 2023-08-01 DIAGNOSIS — Z98.890 OTHER SPECIFIED POSTPROCEDURAL STATES: Chronic | ICD-10-CM

## 2023-08-01 DIAGNOSIS — C22.0 LIVER CELL CARCINOMA: ICD-10-CM

## 2023-08-01 DIAGNOSIS — Z98.49 CATARACT EXTRACTION STATUS, UNSPECIFIED EYE: Chronic | ICD-10-CM

## 2023-08-01 DIAGNOSIS — Z96.652 PRESENCE OF LEFT ARTIFICIAL KNEE JOINT: Chronic | ICD-10-CM

## 2023-08-01 PROCEDURE — 75774 ARTERY X-RAY EACH VESSEL: CPT

## 2023-08-01 PROCEDURE — 36247 INS CATH ABD/L-EXT ART 3RD: CPT | Mod: RT

## 2023-08-01 PROCEDURE — A9541: CPT

## 2023-08-01 PROCEDURE — C1760: CPT

## 2023-08-01 PROCEDURE — 79445 NUCLEAR RX INTRA-ARTERIAL: CPT | Mod: 26

## 2023-08-01 PROCEDURE — 37243 VASC EMBOLIZE/OCCLUDE ORGAN: CPT

## 2023-08-01 PROCEDURE — 78803 RP LOCLZJ TUM SPECT 1 AREA: CPT | Mod: 26

## 2023-08-01 PROCEDURE — C2616: CPT

## 2023-08-01 PROCEDURE — C1769: CPT

## 2023-08-01 PROCEDURE — 76942 ECHO GUIDE FOR BIOPSY: CPT

## 2023-08-01 PROCEDURE — 78803 RP LOCLZJ TUM SPECT 1 AREA: CPT

## 2023-08-01 PROCEDURE — 75894 X-RAYS TRANSCATH THERAPY: CPT

## 2023-08-01 PROCEDURE — 75726 ARTERY X-RAYS ABDOMEN: CPT

## 2023-08-01 PROCEDURE — 76380 CAT SCAN FOLLOW-UP STUDY: CPT | Mod: 26,XU

## 2023-08-01 RX ORDER — ATORVASTATIN CALCIUM 20 MG/1
20 TABLET, FILM COATED ORAL
Qty: 90 | Refills: 2 | Status: ACTIVE | COMMUNITY
Start: 2020-12-02 | End: 1900-01-01

## 2023-08-01 NOTE — ASU DISCHARGE PLAN (ADULT/PEDIATRIC) - ASU DC SPECIAL INSTRUCTIONSFT
Post Y90 Selective Internal Radiation Therapy Instructions  Initial Discharge Instructions  - You underwent a Y90 radioembolization to treat your liver tumor by Dr. Santos  - You may have mild abdominal pain, nausea, loss of appetite, fatigue, and/or low grade fever.  These are normal after your procedure and they should resolve within 3-5 days.  Please call Interventional Radiology if you have a fever > 101.0 F.  If you have persistent nausea, contact Interventional Radiology and we can prescribe anti-nausea medication.  - Monitor right groin site for symptoms of bleeding, hardness underneath the incision site, bruising, numbness, intense pain, or inability to move.  If you have any of these symptoms, contact Interventional Radiology and seek immediate medical attention  - Please report chills, temperature > 101.0F, persistent nausea or vomiting, severe abdominal or leg pain, confusion, yellowing of the skin, or abdominal swelling.  - Please refer to the "Radiation Safety Instructions" that were provided.  Please adhere to them for the 72 hours after your procedure.   - Continue your PPI (Protonix) for 30 days post procedure.  - You may shower in 24 hours. You may resume normal activity in 24 hours.  - Do not perform any heavy lifting or put tension on the area for the next 48 hours.  - You may resume your normal diet.  - You may resume your normal medications however you should wait 24 hours before restarting aspirin, plavix, or blood thinners.  - It is normal to experience some pain over the site for the next few days. You may take apply ice to the area (20 minutes on, 20 minutes off) and take Motrin for that pain. Do not take more frequently than every 6 hours.  - You were given conscious sedation which may make you drowsy, therefore you need someone to stay with you until the morning following the procedure.  - Do not drive, engage in heavy lifting or strenuous activity, or drink any alcoholic beverages for the next 24 hours.     Next Steps  - A follow up phone call will be performed the day after the procedure.   - Blood work is to be performed 2 weeks after your procedure (you will be given a prescription).  You can locate the closest [facility] lab by calling [phone]. If you have labwork performed at a NON-[facility] lab, please request that the results be faxed to [phone]  - Please call the Radiology Department at [phone] to schedule a follow up visit with Dr. Santos in 1 month.   - Follow up with your hepatologist or oncologist in 2 weeks.   - Post procedure imaging study,CT or MRI, is to be performed 3 months post procedure.  You will be given a prescription for this at your initial 1 month follow up visit. If needed, our booking office will obtain authorization from your insurance company.    Notify your primary physician and/or Interventional Radiology IMMEDIATELY if you experience any of the following       - Fever of 101.0F       - Chills or Rigors/ Shakes       - Swelling, Hardness, Redness, or Bleeding at the Groin Site       - Worsening Abdominal or Leg Pain       - Worsening Abdominal Swelling       - Skin Yellowing       - Lightheadedness and/or Dizziness Upon Standing       - Chest Pain/ Tightness       - Shortness of Breath       - Difficulty Walking    If you have a problem that you believe requires IMMEDIATE attention, please go to your NEAREST Emergency Room. If you believe your problem can safely wait until you speak to a physician, please call Interventional Radiology for any concerns.    During Normal Weekday Business Hours- You can contact the Interventional Radiology department during normal business hours via telephone, [phone].  During Evenings and Weekends- If you need to contact Interventional Radiology during off hours, do so by calling the hospital and requesting to be connected to the Interventional Radiologist on call.        Radiation Safety Discharge Information After Y90 Selective Internal Radiation Therapy Treatment    - Patients can resume normal contact with adult family members.  - For the next 72 hours, avoid prolonged close contact with small children or pregnant individuals (Maintain distance of more than 3 feet).  - If you have to see a physician or go to the ER, inform them of your Y90 treatment to your liver. Treatment should not be delayed based on recent Y90 treatment.

## 2023-08-01 NOTE — ASU DISCHARGE PLAN (ADULT/PEDIATRIC) - NS MD DC FALL RISK RISK
For information on Fall & Injury Prevention, visit: [email or web address] OR  [email or web address] OR  https://www.cdc.gov/steadi/patient.html

## 2023-08-01 NOTE — PROGRESS NOTE ADULT - SUBJECTIVE AND OBJECTIVE BOX
IR Post Procedure Note    Diagnosis: HCC    Procedure: Y90 Admin    : Casa Santos MD    Contrast: 80cc    Anesthesia: 1% Lidocaine Subcutaneous, GA    Estimated Blood Loss: Less than 10cc    Complications: No Immediate Complications    Findings & Plan: R CFA Access, Catheters to celiac axis, Y90 admin to posterior RHA, 8.0GbQ vial administered without complication, closure w angioseal      Please call Interventional Radiology with any questions, concerns, or issues.

## 2023-08-02 ENCOUNTER — NON-APPOINTMENT (OUTPATIENT)
Age: 68
End: 2023-08-02

## 2023-08-02 ENCOUNTER — APPOINTMENT (OUTPATIENT)
Dept: FAMILY MEDICINE | Facility: CLINIC | Age: 68
End: 2023-08-02
Payer: MEDICARE

## 2023-08-02 ENCOUNTER — APPOINTMENT (OUTPATIENT)
Dept: GASTROENTEROLOGY | Facility: CLINIC | Age: 68
End: 2023-08-02
Payer: MEDICARE

## 2023-08-02 VITALS
RESPIRATION RATE: 16 BRPM | HEART RATE: 81 BPM | BODY MASS INDEX: 46.65 KG/M2 | OXYGEN SATURATION: 95 % | WEIGHT: 315 LBS | DIASTOLIC BLOOD PRESSURE: 70 MMHG | HEIGHT: 69 IN | TEMPERATURE: 98.6 F | SYSTOLIC BLOOD PRESSURE: 124 MMHG

## 2023-08-02 VITALS
TEMPERATURE: 98.6 F | SYSTOLIC BLOOD PRESSURE: 114 MMHG | WEIGHT: 315 LBS | HEART RATE: 56 BPM | RESPIRATION RATE: 16 BRPM | OXYGEN SATURATION: 95 % | BODY MASS INDEX: 46.65 KG/M2 | HEIGHT: 69 IN | DIASTOLIC BLOOD PRESSURE: 56 MMHG

## 2023-08-02 DIAGNOSIS — M15.9 POLYOSTEOARTHRITIS, UNSPECIFIED: ICD-10-CM

## 2023-08-02 DIAGNOSIS — R16.0 HEPATOMEGALY, NOT ELSEWHERE CLASSIFIED: ICD-10-CM

## 2023-08-02 DIAGNOSIS — M25.50 PAIN IN UNSPECIFIED JOINT: ICD-10-CM

## 2023-08-02 DIAGNOSIS — R06.02 SHORTNESS OF BREATH: ICD-10-CM

## 2023-08-02 DIAGNOSIS — G47.33 OBSTRUCTIVE SLEEP APNEA (ADULT) (PEDIATRIC): ICD-10-CM

## 2023-08-02 DIAGNOSIS — Z87.891 PERSONAL HISTORY OF NICOTINE DEPENDENCE: ICD-10-CM

## 2023-08-02 PROCEDURE — G0439: CPT

## 2023-08-02 PROCEDURE — 99204 OFFICE O/P NEW MOD 45 MIN: CPT

## 2023-08-02 PROCEDURE — 36415 COLL VENOUS BLD VENIPUNCTURE: CPT

## 2023-08-02 PROCEDURE — 99214 OFFICE O/P EST MOD 30 MIN: CPT

## 2023-08-02 RX ORDER — HYDROCHLOROTHIAZIDE 25 MG/1
25 TABLET ORAL
Qty: 90 | Refills: 0 | Status: ACTIVE | COMMUNITY
Start: 2019-04-01 | End: 1900-01-01

## 2023-08-02 NOTE — HEALTH RISK ASSESSMENT
[1 or 2 (0 pts)] : 1 or 2 (0 points) [Never (0 pts)] : Never (0 points) [No] : In the past 12 months have you used drugs other than those required for medical reasons? No [No falls in past year] : Patient reported no falls in the past year [0] : 2) Feeling down, depressed, or hopeless: Not at all (0) [PHQ-2 Negative - No further assessment needed] : PHQ-2 Negative - No further assessment needed [Patient/Caregiver not ready to engage] : , patient/caregiver not ready to engage [I will adhere to the patient's wishes.] : I will adhere to the patient's wishes. [Time Spent: ___ minutes] : Time Spent: [unfilled] minutes [Former] : Former [< 15 Years] : < 15 Years [Good] : ~his/her~  mood as  good [Patient reported bone density results were normal] : Patient reported bone density results were normal [Patient reported colonoscopy was normal] : Patient reported colonoscopy was normal [None] : None [Employed] : employed [Feels Safe at Home] : Feels safe at home [Reports normal functional visual acuity (ie: able to read med bottle)] : Reports normal functional visual acuity [Smoke Detector] : smoke detector [Seat Belt] :  uses seat belt [Change in mental status noted] : No change in mental status noted [Language] : denies difficulty with language [Behavior] : denies difficulty with behavior [Learning/Retaining New Information] : denies difficulty learning/retaining new information [Handling Complex Tasks] : denies difficulty handling complex tasks [Reasoning] : denies difficulty with reasoning [Spatial Ability and Orientation] : denies difficulty with spatial ability and orientation [Sexually Active] : not sexually active [Reports changes in hearing] : Reports no changes in hearing [Reports changes in vision] : Reports no changes in vision [de-identified] : with friends [de-identified] :   [de-identified] : cataract surgery helped [de-identified] : pack and a half [Audit-CScore] : 0 [de-identified] : average [de-identified] : susan [facility]] : 8 [[id number]] : 0 [AdvancecareDate] : 06/22

## 2023-08-02 NOTE — COUNSELING
[Fall prevention counseling provided] : Fall prevention counseling provided [Adequate lighting] : Adequate lighting [No throw rugs] : No throw rugs [Use proper foot wear] : Use proper foot wear [Behavioral health counseling provided] : Behavioral health counseling provided [Sleep ___ hours/day] : Sleep [unfilled] hours/day [Engage in a relaxing activity] : Engage in a relaxing activity [Plan in advance] : Plan in advance [AUDIT-C Screening administered and reviewed] : AUDIT-C Screening administered and reviewed [Potential consequences of obesity discussed] : Potential consequences of obesity discussed [Benefits of weight loss discussed] : Benefits of weight loss discussed [Encouraged to increase physical activity] : Encouraged to increase physical activity [Encouraged to use exercise tracking device] : Encouraged to use exercise tracking device [Weigh Self Weekly] : weigh self weekly [Decrease Portions] : decrease portions [None] : None [Good understanding] : Patient has a good understanding of lifestyle changes and steps needed to achieve self management goal [FreeTextEntry2] : former smoker

## 2023-08-02 NOTE — CURRENT MEDS
[Takes medication as prescribed] : takes [None] : Patient does not have any barriers to medication adherence [FreeTextEntry1] : is off xarelto, aspirin for liver was told by prior provider. yesterday received radiation for liver.

## 2023-08-02 NOTE — HISTORY OF PRESENT ILLNESS
[FreeTextEntry1] : 69 y/o M presents for a CPE, no complaints or concerns today. Patient needs a medication refill.  [de-identified] : . 69 y/o M with PMH of HTN, HLD, arthritis, asthma, hypothyroidism, DM2 and other pertinent PMH noted in chart presents for a CPE. Patient needs a medication refill. Patient stated he follows up with an oncologist, cardiologist, rheumatologist, and urologist, follows up with a provider regarding his DM2. Currently does note hip pain on the left side, and he had a left knee joint replacement. Recent kidney removal and radiation for his liver yesterday. He admits to dyspnea on exertion which he notes is chronic. He states his mood is good today and he lost 70 pounds, but recently gained 7 due to his hip pain. He is trying to continue his weight management. He currently utilizes a cane daily.

## 2023-08-02 NOTE — PHYSICAL EXAM
[No Acute Distress] : no acute distress [Well Nourished] : well nourished [Well Developed] : well developed [Well-Appearing] : well-appearing [Normal Sclera/Conjunctiva] : normal sclera/conjunctiva [PERRL] : pupils equal round and reactive to light [EOMI] : extraocular movements intact [Normal Outer Ear/Nose] : the outer ears and nose were normal in appearance [Normal Oropharynx] : the oropharynx was normal [No JVD] : no jugular venous distention [No Lymphadenopathy] : no lymphadenopathy [Supple] : supple [Thyroid Normal, No Nodules] : the thyroid was normal and there were no nodules present [No Respiratory Distress] : no respiratory distress  [No Accessory Muscle Use] : no accessory muscle use [Clear to Auscultation] : lungs were clear to auscultation bilaterally [Normal Rate] : normal rate  [Regular Rhythm] : with a regular rhythm [Normal S1, S2] : normal S1 and S2 [No Murmur] : no murmur heard [No Carotid Bruits] : no carotid bruits [No Abdominal Bruit] : a ~M bruit was not heard ~T in the abdomen [No Varicosities] : no varicosities [Pedal Pulses Present] : the pedal pulses are present [No Palpable Aorta] : no palpable aorta [No Extremity Clubbing/Cyanosis] : no extremity clubbing/cyanosis [Soft] : abdomen soft [Non Tender] : non-tender [Non-distended] : non-distended [No Masses] : no abdominal mass palpated [No HSM] : no HSM [Normal Bowel Sounds] : normal bowel sounds [No CVA Tenderness] : no CVA  tenderness [No Spinal Tenderness] : no spinal tenderness [No Joint Swelling] : no joint swelling [Grossly Normal Strength/Tone] : grossly normal strength/tone [No Rash] : no rash [Coordination Grossly Intact] : coordination grossly intact [No Focal Deficits] : no focal deficits [Normal Gait] : normal gait [Deep Tendon Reflexes (DTR)] : deep tendon reflexes were 2+ and symmetric [Normal Affect] : the affect was normal [Normal Insight/Judgement] : insight and judgment were intact [No Edema] : there was no peripheral edema [Normal Posterior Cervical Nodes] : no posterior cervical lymphadenopathy [Normal Anterior Cervical Nodes] : no anterior cervical lymphadenopathy [de-identified] : He walks with an antalgic gait, some instability. Utilizes cane.  [de-identified] : no wheezing, states some dysnpea on exertion  [de-identified] : Bilateral leg edema [de-identified] : surgical scars noted [de-identified] : surgical scars noted

## 2023-08-02 NOTE — ASSESSMENT
[FreeTextEntry1] : 67 y/o M with PMH of HTN, HLD, arthritis, asthma, hypothyroidism, DM2 and other pertinent PMH noted in chart presents for a CPE. Patient needs a medication refill. Patient stated he follows up with an oncologist, cardiologist, rheumatologist, and urologist, follows up with a provider regarding his DM2. Currently does note hip pain on the left side, and he had a left knee joint replacement. Recent kidney removal and radiation for his liver yesterday. He admits to dyspnea on exertion which he notes is chronic. He states his mood is good today and he lost 70 pounds, but recently gained 7 due to his hip pain. He is trying to continue his weight management. He currently utilizes a cane daily.   Care plan reviewed ortho referral given labs ordered and reviewed patient follows up with providers noted above weight management was discussed medication reconciliation and refill  RTC in 2 months

## 2023-08-02 NOTE — REVIEW OF SYSTEMS
[Fatigue] : fatigue [Hearing Loss] : hearing loss [Joint Pain] : joint pain [Joint Stiffness] : joint stiffness [Muscle Weakness] : muscle weakness [Back Pain] : back pain [Joint Swelling] : joint swelling [Negative] : Musculoskeletal [Recent Change In Weight] : ~T recent weight change [Fever] : no fever [Chills] : no chills [Night Sweats] : no night sweats [Discharge] : no discharge [Pain] : no pain [Redness] : no redness [Vision Problems] : no vision problems [Itching] : no itching [Earache] : no earache [Nosebleeds] : no nosebleeds [Postnasal Drip] : no postnasal drip [Nasal Discharge] : no nasal discharge [Sore Throat] : no sore throat [Hoarseness] : no hoarseness [Chest Pain] : no chest pain [Palpitations] : no palpitations [Claudication] : no  leg claudication [Lower Ext Edema] : no lower extremity edema [Orthopena] : no orthopnea [Abdominal Pain] : no abdominal pain [Nausea] : no nausea [Constipation] : no constipation [Diarrhea] : no diarrhea [Vomiting] : no vomiting [Heartburn] : no heartburn [Melena] : no melena [Dysuria] : no dysuria [Incontinence] : no incontinence [Hesitancy] : no hesitancy [Nocturia] : no nocturia [Hematuria] : no hematuria [Frequency] : no frequency [Impotence] : no impotency [Poor Libido] : libido not poor [Muscle Pain] : no muscle pain [Headache] : no headache [Dizziness] : no dizziness [Fainting] : no fainting [Confusion] : no confusion [Unsteady Walk] : no ataxia [Memory Loss] : no memory loss [FreeTextEntry2] : some weight gain 5-7 pounds  [FreeTextEntry3] : cataract surgery  [FreeTextEntry4] : impacted cerumen  [FreeTextEntry9] : hip pain currently, left knee joint replacement

## 2023-08-07 ENCOUNTER — RX RENEWAL (OUTPATIENT)
Age: 68
End: 2023-08-07

## 2023-08-07 LAB
24R-OH-CALCIDIOL SERPL-MCNC: 24.9 PG/ML
ALBUMIN SERPL ELPH-MCNC: 4.3 G/DL
ALP BLD-CCNC: 122 U/L
ALT SERPL-CCNC: 37 U/L
ANION GAP SERPL CALC-SCNC: 15 MMOL/L
AST SERPL-CCNC: 33 U/L
BILIRUB SERPL-MCNC: 0.4 MG/DL
BUN SERPL-MCNC: 22 MG/DL
CALCIUM SERPL-MCNC: 9.9 MG/DL
CHLORIDE SERPL-SCNC: 102 MMOL/L
CHOLEST SERPL-MCNC: 144 MG/DL
CO2 SERPL-SCNC: 22 MMOL/L
CREAT SERPL-MCNC: 1.61 MG/DL
EGFR: 47 ML/MIN/1.73M2
ESTIMATED AVERAGE GLUCOSE: 169 MG/DL
GLUCOSE SERPL-MCNC: 131 MG/DL
HBA1C MFR BLD HPLC: 7.5 %
HDLC SERPL-MCNC: 36 MG/DL
LDLC SERPL CALC-MCNC: 83 MG/DL
NONHDLC SERPL-MCNC: 108 MG/DL
POTASSIUM SERPL-SCNC: 4.7 MMOL/L
PROT SERPL-MCNC: 8.1 G/DL
PSA SERPL-MCNC: 1.19 NG/ML
SODIUM SERPL-SCNC: 138 MMOL/L
TRIGL SERPL-MCNC: 143 MG/DL
TSH SERPL-ACNC: 5.16 UIU/ML

## 2023-08-10 ENCOUNTER — NON-APPOINTMENT (OUTPATIENT)
Age: 68
End: 2023-08-10

## 2023-08-10 ENCOUNTER — APPOINTMENT (OUTPATIENT)
Dept: ORTHOPEDIC SURGERY | Facility: CLINIC | Age: 68
End: 2023-08-10
Payer: MEDICARE

## 2023-08-10 VITALS
SYSTOLIC BLOOD PRESSURE: 125 MMHG | BODY MASS INDEX: 46.65 KG/M2 | HEIGHT: 69 IN | WEIGHT: 315 LBS | HEART RATE: 80 BPM | DIASTOLIC BLOOD PRESSURE: 79 MMHG

## 2023-08-10 PROCEDURE — 73502 X-RAY EXAM HIP UNI 2-3 VIEWS: CPT

## 2023-08-10 PROCEDURE — 99214 OFFICE O/P EST MOD 30 MIN: CPT

## 2023-08-10 NOTE — HISTORY OF PRESENT ILLNESS
[de-identified] : EVERETT GEORGE is a 67 year old male with a PMH significant for anemia, Afib, gout, DM, HTN, HLD, hypothyroid, obesity, JASON and renal cancer s/p right nephrectomy who was diagnosed with HCC in June 2022.  8/2/23: Pt presents for f/u visit. Pt had y90 yesterday. Doing well. Reports labs were drawn today at PCPs office. Denies abdominal pain. Has ongoing hip pain for which he is following w/ Dr. Schafer.  4/20/23: 69 y/o male with multiple comorbidities was hospitalized at [facility] 3/6-3/16 with RLE cellulitis, marked leukocytosis 21K and fever. Treated with Zosyn/vancomycin >> Zosyn/daptomycin with slow resolution of symptoms. Work up included, neg BCX, CT RLE that did not show collections, and Doppler LE US negative for DVT. Discharge on doxy 100 BID which was extended for another week at his last visit.  I'm pleased with the progress observed today after completing almost 3 weeks of antibiotics. I do not recommend further treatment. Wound dressings were changed in the office; he shall continue with wound care at home.  Aril 4 : ?Lithotrypsy at [facility].  Complication :desated,hyperglycemis high bp, went into Afiib. Card saw him on WEdnesday, d/c Friday 4/19 : Seen by IR who recommended bone scan  August 2022 : He was noted to have a suspicious kidney lesion on imaging in December 2021 and was referred for an abdominal MRI in January 2022, with incidental note of a multiple small indeterminate hepatic lesions which are indeterminate but for which metastatic renal cell carcinoma cannot be excluded. Subsequent MRI abdomen in April 2022 revealed an endophytic right lower pole renal lesion suspicious for malignancy, evidence of liver metastasis and multiple prominent subcentimeter periportal, retroperitoneal and retrocaval lymph nodes which are nonspecific.  On 4/20/22 he underwent a right nephrectomy with Dr Jhonathan Shetty. Path showed RCC, clear cell, nuclear grade II, pT1a, Stage I. No indication for adjuvant pembro.  PET/CT performed on 5/13/22 showed no definitive hypermetabolic evidence of malignant disease. There are prominent retroperitoneal and iliac chain lymph nodes without corresponding abnormal activity. No discrete hepatic focus to correspond with liver lesions seen on MRI (continued MRI surveillance is recommended), right anterolateral subcutaneous activity possibly related to abdominal surgery.  On 6/1/22 he underwent a CT-guided biopsy of a representative mass in the inferior right hepatic lobe. Pathology demonstrated well differentiated hepatocellular carcinoma. Immunohistochemical stains show tumor cells are positive for arginase, focal positive for HSA, CK7 and CA IX, negative for CK20, CDX2, TTF1, Villin, [place]-8, vimentin and AFP. The morphology and immuno profile support the diagnosis of hepatocellular carcinoma.  8/10/22: Pt's case was discussed at . Some of the lesions suggestive of metastasis rather than HCC. Pending bx then re discussion at  to assess for transplant eligibility. Pt will also follow up with IR for LDT.  Labs 7/20/22: bilirubin 0.2, AST 18, ALT 21, AlkPhos 115, INR 1.55, AFP 8.7  Denies fatigue, malaise, arthralgias, myalgias, pruritus, recent infection, abdominal pain or distension, jaundice, hematemesis, hematochezia, dark urine, confusion, unintentional weight loss or gain. Denies family history of liver disease, sudden death or late trimester abortions.

## 2023-08-10 NOTE — ASSESSMENT
[FreeTextEntry1] : EVERETT GEORGE is a 67 year old male with a PMH significant for anemia, Afib, gout, DM, HTN, HLD, hypothyroid, obesity, JASON and renal cancer s/p right nephrectomy who was diagnosed with HCC in June 2022.  HCC -MRI abd showed 0.7 and 1.2 cm Lt hepatic lobe lesion and 1.1 cm right hepatic lobe lesion -no evidence of metastatic disease on PET -case was discussed at [facility]. Y90 done on 7/31/23. Repeat labs done today. -Repeat labs and MRI ordered in 3 months Follow up in 3 months

## 2023-08-10 NOTE — HISTORY OF PRESENT ILLNESS
[Pain Location] : pain [] : left hip [Worsening] : worsening [___ mths] : [unfilled] month(s) ago [Standing] : standing [Constant] : ~He/She~ states the symptoms seem to be constant [Bending] : worsened by bending [Hip Movement] : worsened by hip movement [Sitting] : worsened by sitting [Walking] : worsened by walking [NSAIDs] : relieved by nonsteroidal anti-inflammatory drugs [de-identified] : 68 year old male patient presents today for an initial consultation for left hip pain. The patient states the pain has been ongoing for over a month. He has severe groin pain. He has been avoiding stairs. Last fall was when he was in the [facility] getting a leg sonogram. The patient is working on losing weight. Uses a cane for the pain. He states he has a walker at home. He only has 1 kidney. Was diagnosed with kidney and liver cancer. Had the beads put in the liver for radiation treatments last Tuesday (08/01/2023). The patient had a left total knee arthroplasty done by Dr. Yun. The patient takes Meloxicam, Advil, or Aspirin for the pain. he has severe limitations with range of motion.  Has difficulty going up and down stairs rising reseated position.  States that his leg does not bend.  After this pop he has been severely disabled.  Is here today to discuss further treatment options [de-identified] : going up and down the stairs, rising from a seated position, putting on socks and shoes

## 2023-08-10 NOTE — PHYSICAL EXAM
[Non-Tender] : non-tender [Smooth] : smooth [General Appearance - Alert] : alert [General Appearance - In No Acute Distress] : in no acute distress [Sclera] : the sclera and conjunctiva were normal [Outer Ear] : the ears and nose were normal in appearance [Oropharynx] : the oropharynx was normal [Neck Appearance] : the appearance of the neck was normal [Bowel Sounds] : normal bowel sounds [Abdomen Soft] : soft [Abdomen Tenderness] : non-tender [Abdomen Mass (___ Cm)] : no abdominal mass palpated [Abnormal Walk] : normal gait [Skin Color & Pigmentation] : normal skin color and pigmentation [Skin Turgor] : normal skin turgor [] : no rash [No Focal Deficits] : no focal deficits [Oriented To Time, Place, And Person] : oriented to person, place, and time [Impaired Insight] : insight and judgment were intact [Affect] : the affect was normal [Scleral Icterus] : No Scleral Icterus [Hepatojugular Reflux] : patient did not have a sustained hepatojugular reflux [Spider Angioma] : No spider angioma(s) were observed [Abdominal  Ascites] : no ascites [Splenomegaly] : no splenomegaly [Scrotal Edema] : Scrotum is not edematous [Asterixis] : no asterixis observed [Jaundice] : No jaundice [Palmar Erythema] : no Palmar Erythema [Depression] : no depression [Hallucinations] : ~T no ~M hallucinations [Delusions] : no ~T delusions [Suicidal Ideation] : no suicidal ideation [Homicidal Ideation] : no homicidal ideations [Preoccupiation With Violent Thoughts] : no preoccupation with violent thoughts

## 2023-08-10 NOTE — ADDENDUM
[FreeTextEntry1] : This note was written by Liz Craven, acting as the  for Dr. Ojeda. This note accurately reflects the work and decisions made by Dr. Ojeda.

## 2023-08-10 NOTE — PHYSICAL EXAM
[Cane] : ambulates with cane [de-identified] : GENERAL APPEARANCE: Well nourished and hydrated, pleasant, alert, and oriented x 3. Appears their stated age.  HEENT: Normocephalic, extraocular eye motion intact. Nasal septum midline. Oral cavity clear. External auditory canal clear.  RESPIRATORY: Breath sounds clear and audible in all lobes. No wheezing, No accessory muscle use. CARDIOVASCULAR: No apparent abnormalities. No lower leg edema. No varicosities. Pedal pulses are palpable. NEUROLOGIC: Sensation is normal, no muscle weakness in the upper or lower extremities. DERMATOLOGIC: No apparent skin lesions, moist, warm, no rash. SPINE: Cervical spine appears normal and moves freely; thoracic spine appears normal and moves freely; lumbosacral spine appears normal and moves freely, normal, nontender. MUSCULOSKELETAL: Hands, wrists, and elbows are normal and move freely, shoulders are normal and move freely.  PSYCHIATRIC: Oriented to person, place, and time, insight and judgement were intact and the affect was normal. [de-identified] : Ambulates with a cane. Left hip exam showed severe groin pain with SLR, ROM is 90 flexion with 20 degrees external and 0 degrees internal with pain, LIO positive, FADIR positive. 5/5 motor strength in bilateral lower extremities. Sensory: Intact in bilateral lower extremities. DTRs: Biceps, brachioradialis, triceps, patellar, ankle and plantar 2+ and symmetric bilaterally. Pulses: dorsalis pedis, posterior tibial, femoral, popliteal, and radial 2+ and symmetric bilaterally. [de-identified] : AP pelvis and 2 views of the left hip obtained the office today show no acute fracture or dislocation.  There is severe joint space narrowing bone-on-bone osteoarthritis consistent with severe left hip osteoarthritis and moderate right hip osteoarthritis

## 2023-08-10 NOTE — ADDENDUM
[FreeTextEntry1] : I, Maggie Pierce NP, acted as scribe for ABIODUN Gerard for this patient encounter.  Seen and evaluated and discussed with ALESHA.  Agree with assessment and plan.

## 2023-08-10 NOTE — DISCUSSION/SUMMARY
[Medication Risks Reviewed] : Medication risks reviewed [Surgical risks reviewed] : Surgical risks reviewed [Other: ____] : in [unfilled] [de-identified] : Patient is a 68-year-old male with severe left hip osteoarthritis presenting today for initial evaluation.  He has significant bone-on-bone osteoarthritis of his left hip severe limitations with going up and down stairs rising to seated position and is now barely able to go but has activities of daily living due to severe pain and is needing to use a cane to ambulate.  We had a thorough discussion about conservative and surgical treatment options.  He is not interested in pursuing any type of injections in his hip as he does not feel like these would be beneficial.  We discussed low impact activity and exercise and he is going to begin physical therapy in order to work on range of motion and strengthening.  However due to the fact that he has been doing at home directed exercises as well as activity modification the fact that he is failed that relief of the pain in his hip he wishes to proceed with a total hip arthroplasty and I do think that is indicated due to the severe limitations that he has as well as severe osteoarthritic changes and mechanical blocks to motion.  We discussed risk benefits alternatives common to blood loss infection damage neurovascular structures risk need for revision surgery risk of periprosthetic fracture risk of dislocation.  We discussed increased risk of complications in patients with morbid obesity.  He can continue to work on weight loss.  We will obtain a CT scan for Lan planning.  I will see him back preoperatively for repeat evaluation.  All questions were asked and answered.  He is going to begin physical therapy prior to surgical intervention  The patient is a 68 year old male who presents with a severe left hip osteoarthritis. Based upon the patients continued symptoms and failure to respond to conservative treatment I have recommended a left total hip replacement for the patient. A discussion was had with the patient regarding a left total hip replacement. Anterior and posterior exposures were discussed. The patient would like to proceed with an posterior approach. A long discussion was had with the patient as what the total joint replacement would entail. A model was used to demonstrate the operation and to discuss bearing surfaces of the implants. The hospitalization and rehabilitation were discussed. The use of perioperative antibiotics and DVT prophylaxis were discussed. The risks, benefits and alternatives to surgical intervention were discussed at length with the patient. Specific risks discussed included: infection, wound breakdown, numbness and damage to nerves, tendon, muscle, arteries or other blood vessels, and the possibility of leg length discrepancy. The possibility of recurrent pain, no improvement in pain and actual worsening of the pain were also mentioned in conversation with the patient. Medical complications related to the patient's general medical health including deep vein thrombosis, pulmonary embolism, heart attack, stroke, death and other complications from anesthesia were discussed as well. The patient was told that we will take steps to minimize these risks by using sterile technique, antibiotics and DVT prophylaxis when appropriate and following the patient postoperatively in the clinic setting to monitor progress. The benefits of surgery were discussed with the patient including the potential to improve the current clinical condition through operative intervention. Alternatives to surgical intervention include continued conservative management which may yield less than optimal results in this particular patient. All questions were answered to the satisfaction of the patient. Models were used as an educational tool. We did discuss implant choices and fixation, with shared decision making with the patient.

## 2023-08-14 PROBLEM — M18.0 PRIMARY OSTEOARTHRITIS OF BOTH FIRST CARPOMETACARPAL JOINTS: Status: ACTIVE | Noted: 2020-12-08

## 2023-08-14 PROBLEM — M19.011 PRIMARY OSTEOARTHRITIS OF BOTH SHOULDERS: Status: ACTIVE | Noted: 2021-02-24

## 2023-08-14 PROBLEM — M16.0 PRIMARY OSTEOARTHRITIS OF BOTH HIPS: Status: ACTIVE | Noted: 2020-12-08

## 2023-08-14 PROBLEM — M17.12 PRIMARY OSTEOARTHRITIS OF LEFT KNEE: Status: ACTIVE | Noted: 2019-07-29

## 2023-08-14 PROBLEM — M17.11 PRIMARY OSTEOARTHRITIS OF RIGHT KNEE: Status: ACTIVE | Noted: 2020-12-08

## 2023-08-16 ENCOUNTER — APPOINTMENT (OUTPATIENT)
Dept: ENDOCRINOLOGY | Facility: CLINIC | Age: 68
End: 2023-08-16
Payer: MEDICARE

## 2023-08-16 VITALS
WEIGHT: 315 LBS | SYSTOLIC BLOOD PRESSURE: 124 MMHG | DIASTOLIC BLOOD PRESSURE: 80 MMHG | HEIGHT: 69 IN | HEART RATE: 66 BPM | OXYGEN SATURATION: 98 % | BODY MASS INDEX: 46.65 KG/M2

## 2023-08-16 LAB — GLUCOSE BLDC GLUCOMTR-MCNC: 158

## 2023-08-16 PROCEDURE — 82962 GLUCOSE BLOOD TEST: CPT

## 2023-08-16 PROCEDURE — 99214 OFFICE O/P EST MOD 30 MIN: CPT | Mod: 25

## 2023-08-16 RX ORDER — AMLODIPINE BESYLATE 10 MG/1
10 TABLET ORAL
Refills: 0 | Status: ACTIVE | COMMUNITY

## 2023-08-16 RX ORDER — CEFPODOXIME PROXETIL 200 MG/1
200 TABLET, FILM COATED ORAL
Refills: 0 | Status: ACTIVE | COMMUNITY

## 2023-08-16 NOTE — REVIEW OF SYSTEMS
[SOB on Exertion] : shortness of breath on exertion [Fatigue] : no fatigue [Recent Weight Gain (___ Lbs)] : no recent weight gain [Recent Weight Loss (___ Lbs)] : no recent weight loss [Visual Field Defect] : no visual field defect [Blurred Vision] : no blurred vision [Dysphagia] : no dysphagia [Neck Pain] : no neck pain [Dysphonia] : no dysphonia [Chest Pain] : no chest pain [Constipation] : no constipation [Diarrhea] : no diarrhea [Polyuria] : no polyuria [Polydipsia] : no polydipsia [FreeTextEntry9] : walks with cane, needs hip replacement

## 2023-08-16 NOTE — HISTORY OF PRESENT ILLNESS
[FreeTextEntry1] : Pt now had radiation beads installed in liver Plans for cardioversion Sept 2023 Plans for left hip replacement Oct 2023  DM type:2 Severity:severely uncontrolled Duration:several years Onset:found DM on labs  Associated symptoms or complications:none  Modifying Factors:severe obesity; uncontrolled diabetes continues despite weight loss from 375 to 315 pounds. Alfred anorexia or abdominal pain; made many diet changes in an effort to lose weight for anticipated cardioversion  Current regimen: Glimepiride 2 mg BID did not tolerate metformin  HGA1C 7.5- 8/2023   Current control: checks 4x a day Fasting high 100s-300s (highest)  Throughout the day 100s  Denies hypoglycemia Rare 200s  FS in office 158  **Was using viktoriya but is having too many scans for cancer treatment**  PMH: atrial fib renal stones kidney and liver cancer hypothyroid-  levothyroxine 75 mcg daily. TSH not at goal but that is because he was taking all meds together.

## 2023-08-16 NOTE — ASSESSMENT
[FreeTextEntry1] : T2DM -Pt unable to wear viktoriya due to frequent scans and imaging so has been checking BS 4x a day. Blood sugars often at goal. -Continue glimperide 2 mg BID -RTO prior to hip replacement surgery to discuss plan, will likely need formal clearance at that time-goal hga1c less than 8 for clearance (Currently 7.5)  HLD -cholesterol at goal, continue statin  HTN -BP stable in office   Hypothyroidism -Continue lt4 75 mcg daily. Patient advised to take every day in the morning, on an empty stomach, and with no other medications.   RTO 2 months NP, 4 months MD

## 2023-08-31 ENCOUNTER — NON-APPOINTMENT (OUTPATIENT)
Age: 68
End: 2023-08-31

## 2023-08-31 ENCOUNTER — APPOINTMENT (OUTPATIENT)
Dept: OPHTHALMOLOGY | Facility: CLINIC | Age: 68
End: 2023-08-31
Payer: MEDICARE

## 2023-08-31 PROCEDURE — 99024 POSTOP FOLLOW-UP VISIT: CPT

## 2023-09-06 ENCOUNTER — NON-APPOINTMENT (OUTPATIENT)
Age: 68
End: 2023-09-06

## 2023-09-06 ENCOUNTER — APPOINTMENT (OUTPATIENT)
Dept: HEMATOLOGY ONCOLOGY | Facility: CLINIC | Age: 68
End: 2023-09-06

## 2023-09-13 ENCOUNTER — LABORATORY RESULT (OUTPATIENT)
Age: 68
End: 2023-09-13

## 2023-09-13 ENCOUNTER — APPOINTMENT (OUTPATIENT)
Dept: FAMILY MEDICINE | Facility: CLINIC | Age: 68
End: 2023-09-13
Payer: MEDICARE

## 2023-09-13 ENCOUNTER — RESULT REVIEW (OUTPATIENT)
Age: 68
End: 2023-09-13

## 2023-09-13 ENCOUNTER — APPOINTMENT (OUTPATIENT)
Dept: HEMATOLOGY ONCOLOGY | Facility: CLINIC | Age: 68
End: 2023-09-13
Payer: MEDICARE

## 2023-09-13 VITALS
WEIGHT: 315 LBS | BODY MASS INDEX: 46.65 KG/M2 | HEIGHT: 69 IN | HEART RATE: 71 BPM | SYSTOLIC BLOOD PRESSURE: 179 MMHG | OXYGEN SATURATION: 95 % | DIASTOLIC BLOOD PRESSURE: 103 MMHG | TEMPERATURE: 97.5 F

## 2023-09-13 VITALS
HEIGHT: 69 IN | OXYGEN SATURATION: 96 % | BODY MASS INDEX: 46.65 KG/M2 | RESPIRATION RATE: 16 BRPM | HEART RATE: 71 BPM | DIASTOLIC BLOOD PRESSURE: 78 MMHG | SYSTOLIC BLOOD PRESSURE: 120 MMHG | TEMPERATURE: 97.3 F | WEIGHT: 315 LBS

## 2023-09-13 DIAGNOSIS — I25.2 OLD MYOCARDIAL INFARCTION: ICD-10-CM

## 2023-09-13 DIAGNOSIS — J45.909 UNSPECIFIED ASTHMA, UNCOMPLICATED: ICD-10-CM

## 2023-09-13 DIAGNOSIS — D64.9 ANEMIA, UNSPECIFIED: ICD-10-CM

## 2023-09-13 PROCEDURE — 36415 COLL VENOUS BLD VENIPUNCTURE: CPT

## 2023-09-13 PROCEDURE — 99215 OFFICE O/P EST HI 40 MIN: CPT

## 2023-09-13 PROCEDURE — 83036 HEMOGLOBIN GLYCOSYLATED A1C: CPT | Mod: QW

## 2023-09-13 PROCEDURE — 99214 OFFICE O/P EST MOD 30 MIN: CPT | Mod: 25

## 2023-09-14 LAB
24R-OH-CALCIDIOL SERPL-MCNC: 41.2 PG/ML
AFP-TM SERPL-MCNC: 7.5 NG/ML
ALBUMIN SERPL ELPH-MCNC: 3.8 G/DL
ALBUMIN SERPL ELPH-MCNC: 4.2 G/DL
ALP BLD-CCNC: 132 U/L
ALP BLD-CCNC: 132 U/L
ALT SERPL-CCNC: 23 U/L
ALT SERPL-CCNC: 26 U/L
ANION GAP SERPL CALC-SCNC: 12 MMOL/L
ANION GAP SERPL CALC-SCNC: 15 MMOL/L
APPEARANCE: CLEAR
AST SERPL-CCNC: 24 U/L
AST SERPL-CCNC: 26 U/L
BASOPHILS # BLD AUTO: 0.04 K/UL
BASOPHILS NFR BLD AUTO: 0.6 %
BILIRUB SERPL-MCNC: 0.2 MG/DL
BILIRUB SERPL-MCNC: 0.2 MG/DL
BILIRUBIN URINE: NEGATIVE
BLOOD URINE: NEGATIVE
BUN SERPL-MCNC: 19 MG/DL
BUN SERPL-MCNC: 20 MG/DL
CALCIUM SERPL-MCNC: 9.5 MG/DL
CALCIUM SERPL-MCNC: 9.7 MG/DL
CHLORIDE SERPL-SCNC: 105 MMOL/L
CHLORIDE SERPL-SCNC: 105 MMOL/L
CHOLEST SERPL-MCNC: 145 MG/DL
CO2 SERPL-SCNC: 22 MMOL/L
CO2 SERPL-SCNC: 22 MMOL/L
COLOR: YELLOW
CREAT SERPL-MCNC: 1.23 MG/DL
CREAT SERPL-MCNC: 1.35 MG/DL
EGFR: 58 ML/MIN/1.73M2
EGFR: 65 ML/MIN/1.73M2
EOSINOPHIL # BLD AUTO: 0.3 K/UL
EOSINOPHIL NFR BLD AUTO: 4.3 %
GLUCOSE QUALITATIVE U: NEGATIVE MG/DL
GLUCOSE SERPL-MCNC: 202 MG/DL
GLUCOSE SERPL-MCNC: 233 MG/DL
HCT VFR BLD CALC: 43.4 %
HDLC SERPL-MCNC: 38 MG/DL
HGB BLD-MCNC: 13.4 G/DL
IMM GRANULOCYTES NFR BLD AUTO: 0.3 %
KETONES URINE: NEGATIVE MG/DL
LDLC SERPL CALC-MCNC: 84 MG/DL
LEUKOCYTE ESTERASE URINE: NEGATIVE
LYMPHOCYTES # BLD AUTO: 0.64 K/UL
LYMPHOCYTES NFR BLD AUTO: 9.2 %
MAGNESIUM SERPL-MCNC: 2 MG/DL
MAN DIFF?: NORMAL
MCHC RBC-ENTMCNC: 27.2 PG
MCHC RBC-ENTMCNC: 30.9 GM/DL
MCV RBC AUTO: 88.2 FL
MONOCYTES # BLD AUTO: 0.35 K/UL
MONOCYTES NFR BLD AUTO: 5.1 %
NEUTROPHILS # BLD AUTO: 5.57 K/UL
NEUTROPHILS NFR BLD AUTO: 80.5 %
NITRITE URINE: NEGATIVE
NONHDLC SERPL-MCNC: 107 MG/DL
PH URINE: 6
PLATELET # BLD AUTO: 261 K/UL
POTASSIUM SERPL-SCNC: 4.3 MMOL/L
POTASSIUM SERPL-SCNC: 4.5 MMOL/L
PROT SERPL-MCNC: 7.5 G/DL
PROT SERPL-MCNC: 7.5 G/DL
PROTEIN URINE: NORMAL MG/DL
RBC # BLD: 4.92 M/UL
RBC # FLD: 18.7 %
SODIUM SERPL-SCNC: 139 MMOL/L
SODIUM SERPL-SCNC: 142 MMOL/L
SPECIFIC GRAVITY URINE: 1.02
TRIGL SERPL-MCNC: 129 MG/DL
TSH SERPL-ACNC: 4.43 UIU/ML
UROBILINOGEN URINE: 0.2 MG/DL
WBC # FLD AUTO: 6.92 K/UL

## 2023-09-20 ENCOUNTER — APPOINTMENT (OUTPATIENT)
Dept: CT IMAGING | Facility: CLINIC | Age: 68
End: 2023-09-20
Payer: MEDICARE

## 2023-09-20 ENCOUNTER — OUTPATIENT (OUTPATIENT)
Dept: OUTPATIENT SERVICES | Facility: HOSPITAL | Age: 68
LOS: 1 days | End: 2023-09-20

## 2023-09-20 DIAGNOSIS — Z90.5 ACQUIRED ABSENCE OF KIDNEY: Chronic | ICD-10-CM

## 2023-09-20 DIAGNOSIS — Z96.652 PRESENCE OF LEFT ARTIFICIAL KNEE JOINT: Chronic | ICD-10-CM

## 2023-09-20 DIAGNOSIS — Z98.49 CATARACT EXTRACTION STATUS, UNSPECIFIED EYE: Chronic | ICD-10-CM

## 2023-09-20 DIAGNOSIS — M16.12 UNILATERAL PRIMARY OSTEOARTHRITIS, LEFT HIP: ICD-10-CM

## 2023-09-20 DIAGNOSIS — Z98.890 OTHER SPECIFIED POSTPROCEDURAL STATES: Chronic | ICD-10-CM

## 2023-09-20 PROCEDURE — 73700 CT LOWER EXTREMITY W/O DYE: CPT | Mod: 26,LT

## 2023-10-04 ENCOUNTER — OUTPATIENT (OUTPATIENT)
Dept: OUTPATIENT SERVICES | Facility: HOSPITAL | Age: 68
LOS: 1 days | End: 2023-10-04
Payer: MEDICARE

## 2023-10-04 VITALS
HEART RATE: 75 BPM | TEMPERATURE: 97 F | WEIGHT: 315 LBS | SYSTOLIC BLOOD PRESSURE: 152 MMHG | RESPIRATION RATE: 18 BRPM | OXYGEN SATURATION: 96 % | HEIGHT: 69 IN | DIASTOLIC BLOOD PRESSURE: 90 MMHG

## 2023-10-04 DIAGNOSIS — M16.12 UNILATERAL PRIMARY OSTEOARTHRITIS, LEFT HIP: ICD-10-CM

## 2023-10-04 DIAGNOSIS — Z01.818 ENCOUNTER FOR OTHER PREPROCEDURAL EXAMINATION: ICD-10-CM

## 2023-10-04 DIAGNOSIS — G47.33 OBSTRUCTIVE SLEEP APNEA (ADULT) (PEDIATRIC): ICD-10-CM

## 2023-10-04 DIAGNOSIS — E11.9 TYPE 2 DIABETES MELLITUS WITHOUT COMPLICATIONS: ICD-10-CM

## 2023-10-04 DIAGNOSIS — Z98.890 OTHER SPECIFIED POSTPROCEDURAL STATES: Chronic | ICD-10-CM

## 2023-10-04 DIAGNOSIS — E03.9 HYPOTHYROIDISM, UNSPECIFIED: ICD-10-CM

## 2023-10-04 DIAGNOSIS — Z91.89 OTHER SPECIFIED PERSONAL RISK FACTORS, NOT ELSEWHERE CLASSIFIED: ICD-10-CM

## 2023-10-04 DIAGNOSIS — Z90.5 ACQUIRED ABSENCE OF KIDNEY: Chronic | ICD-10-CM

## 2023-10-04 DIAGNOSIS — I48.91 UNSPECIFIED ATRIAL FIBRILLATION: ICD-10-CM

## 2023-10-04 DIAGNOSIS — Z98.49 CATARACT EXTRACTION STATUS, UNSPECIFIED EYE: Chronic | ICD-10-CM

## 2023-10-04 DIAGNOSIS — I10 ESSENTIAL (PRIMARY) HYPERTENSION: ICD-10-CM

## 2023-10-04 DIAGNOSIS — Z96.652 PRESENCE OF LEFT ARTIFICIAL KNEE JOINT: Chronic | ICD-10-CM

## 2023-10-04 DIAGNOSIS — Z98.82 BREAST IMPLANT STATUS: Chronic | ICD-10-CM

## 2023-10-04 LAB
A1C WITH ESTIMATED AVERAGE GLUCOSE RESULT: 7.2 % — HIGH (ref 4–5.6)
ANION GAP SERPL CALC-SCNC: 11 MMOL/L — SIGNIFICANT CHANGE UP (ref 5–17)
APTT BLD: 34.5 SEC — SIGNIFICANT CHANGE UP (ref 24.5–35.6)
BASOPHILS # BLD AUTO: 0.07 K/UL — SIGNIFICANT CHANGE UP (ref 0–0.2)
BASOPHILS NFR BLD AUTO: 0.9 % — SIGNIFICANT CHANGE UP (ref 0–2)
BLD GP AB SCN SERPL QL: SIGNIFICANT CHANGE UP
BUN SERPL-MCNC: 17.2 MG/DL — SIGNIFICANT CHANGE UP (ref 8–20)
CALCIUM SERPL-MCNC: 9.5 MG/DL — SIGNIFICANT CHANGE UP (ref 8.4–10.5)
CHLORIDE SERPL-SCNC: 100 MMOL/L — SIGNIFICANT CHANGE UP (ref 96–108)
CO2 SERPL-SCNC: 25 MMOL/L — SIGNIFICANT CHANGE UP (ref 22–29)
CREAT SERPL-MCNC: 1.23 MG/DL — SIGNIFICANT CHANGE UP (ref 0.5–1.3)
EGFR: 65 ML/MIN/1.73M2 — SIGNIFICANT CHANGE UP
EOSINOPHIL # BLD AUTO: 0.25 K/UL — SIGNIFICANT CHANGE UP (ref 0–0.5)
EOSINOPHIL NFR BLD AUTO: 3.1 % — SIGNIFICANT CHANGE UP (ref 0–6)
ESTIMATED AVERAGE GLUCOSE: 160 MG/DL — HIGH (ref 68–114)
GLUCOSE SERPL-MCNC: 105 MG/DL — HIGH (ref 70–99)
HCT VFR BLD CALC: 43.9 % — SIGNIFICANT CHANGE UP (ref 39–50)
HGB BLD-MCNC: 13.8 G/DL — SIGNIFICANT CHANGE UP (ref 13–17)
IMM GRANULOCYTES NFR BLD AUTO: 0.5 % — SIGNIFICANT CHANGE UP (ref 0–0.9)
INR BLD: 1.17 RATIO — SIGNIFICANT CHANGE UP (ref 0.85–1.18)
LYMPHOCYTES # BLD AUTO: 0.83 K/UL — LOW (ref 1–3.3)
LYMPHOCYTES # BLD AUTO: 10.2 % — LOW (ref 13–44)
MCHC RBC-ENTMCNC: 26.6 PG — LOW (ref 27–34)
MCHC RBC-ENTMCNC: 31.4 GM/DL — LOW (ref 32–36)
MCV RBC AUTO: 84.7 FL — SIGNIFICANT CHANGE UP (ref 80–100)
MONOCYTES # BLD AUTO: 0.45 K/UL — SIGNIFICANT CHANGE UP (ref 0–0.9)
MONOCYTES NFR BLD AUTO: 5.6 % — SIGNIFICANT CHANGE UP (ref 2–14)
NEUTROPHILS # BLD AUTO: 6.46 K/UL — SIGNIFICANT CHANGE UP (ref 1.8–7.4)
NEUTROPHILS NFR BLD AUTO: 79.7 % — HIGH (ref 43–77)
PLATELET # BLD AUTO: 254 K/UL — SIGNIFICANT CHANGE UP (ref 150–400)
POTASSIUM SERPL-MCNC: 4.3 MMOL/L — SIGNIFICANT CHANGE UP (ref 3.5–5.3)
POTASSIUM SERPL-SCNC: 4.3 MMOL/L — SIGNIFICANT CHANGE UP (ref 3.5–5.3)
PROTHROM AB SERPL-ACNC: 12.9 SEC — SIGNIFICANT CHANGE UP (ref 9.5–13)
RBC # BLD: 5.18 M/UL — SIGNIFICANT CHANGE UP (ref 4.2–5.8)
RBC # FLD: 16.8 % — HIGH (ref 10.3–14.5)
SODIUM SERPL-SCNC: 136 MMOL/L — SIGNIFICANT CHANGE UP (ref 135–145)
T3 SERPL-MCNC: 103 NG/DL — SIGNIFICANT CHANGE UP (ref 80–200)
T4 AB SER-ACNC: 7.2 UG/DL — SIGNIFICANT CHANGE UP (ref 4.5–12)
TSH SERPL-MCNC: 4.24 UIU/ML — HIGH (ref 0.27–4.2)
WBC # BLD: 8.1 K/UL — SIGNIFICANT CHANGE UP (ref 3.8–10.5)
WBC # FLD AUTO: 8.1 K/UL — SIGNIFICANT CHANGE UP (ref 3.8–10.5)

## 2023-10-04 PROCEDURE — 93005 ELECTROCARDIOGRAM TRACING: CPT

## 2023-10-04 PROCEDURE — 93010 ELECTROCARDIOGRAM REPORT: CPT

## 2023-10-04 PROCEDURE — G0463: CPT

## 2023-10-04 RX ORDER — LEVOTHYROXINE SODIUM 125 MCG
1 TABLET ORAL
Qty: 0 | Refills: 0 | DISCHARGE

## 2023-10-04 NOTE — H&P PST ADULT - RESPIRATORY
normal/clear to auscultation bilaterally/no wheezes/no rales/no rhonchi normal/clear to auscultation bilaterally/no wheezes/no rales/no rhonchi/breath sounds equal

## 2023-10-04 NOTE — H&P PST ADULT - PROBLEM SELECTOR PLAN 1
medical clearance, cardiology clearance pending  Patient is scheduled for left posterior hip replacement on 10/24/23 with Dr. Ojeda.

## 2023-10-04 NOTE — H&P PST ADULT - PROBLEM SELECTOR PLAN 5
Advised to take amiodarone the morning of the procedure  Patient instructed to review anticoagulant medication with cardiologist for instructions when to stop prior to surgery

## 2023-10-04 NOTE — H&P PST ADULT - ASSESSMENT
Patient educated on surgical scrub, preadmission instructions, ERP protocol, joint book, medical clearance and day of procedure medications, verbalizes understanding. Pt instructed to stop vitamins/supplements/herbal medications/ASA/NSAIDS for one week prior to surgery and discuss with PMD.    CAPRINI SCORE    AGE RELATED RISK FACTORS                                                             [ ] Age 41-60 years                                            (1 Point)  [ ] Age: 61-74 years                                           (2 Points)                 [ ] Age= 75 years                                                (3 Points)             DISEASE RELATED RISK FACTORS                                                       [ ] Edema in the lower extremities                 (1 Point)                     [ ] Varicose veins                                               (1 Point)                                 [ ] BMI > 25 Kg/m2                                            (1 Point)                                  [ ] Serious infection (ie PNA)                            (1 Point)                     [ ] Lung disease ( COPD, Emphysema)            (1 Point)                                                                          [ ] Acute myocardial infarction                         (1 Point)                  [ ] Congestive heart failure (in the previous month)  (1 Point)         [ ] Inflammatory bowel disease                            (1 Point)                  [ ] Central venous access, PICC or Port               (2 points)       (within the last month)                                                                [ ] Stroke (in the previous month)                        (5 Points)    [ ] Previous or present malignancy                       (2 points)                                                                                                                                                         HEMATOLOGY RELATED FACTORS                                                         [ ] Prior episodes of VTE                                     (3 Points)                     [ ] Positive family history for VTE                      (3 Points)                  [ ] Prothrombin 01567 A                                     (3 Points)                     [ ] Factor V Leiden                                                (3 Points)                        [ ] Lupus anticoagulants                                      (3 Points)                                                           [ ] Anticardiolipin antibodies                              (3 Points)                                                       [ ] High homocysteine in the blood                   (3 Points)                                             [ ] Other congenital or acquired thrombophilia      (3 Points)                                                [ ] Heparin induced thrombocytopenia                  (3 Points)                                        MOBILITY RELATED FACTORS  [ ] Bed rest                                                         (1 Point)  [ ] Plaster cast                                                    (2 points)  [ ] Bed bound for more than 72 hours           (2 Points)    GENDER SPECIFIC FACTORS  [ ] Pregnancy or had a baby within the last month   (1 Point)  [ ] Post-partum < 6 weeks                                   (1 Point)  [ ] Hormonal therapy  or oral contraception   (1 Point)  [ ] History of pregnancy complications              (1 point)  [ ] Unexplained or recurrent              (1 Point)    OTHER RISK FACTORS                                           (1 Point)  [ ] BMI >40, smoking, diabetes requiring insulin, chemotherapy  blood transfusions and length of surgery over 2 hours    SURGERY RELATED RISK FACTORS  [ ]  Section within the last month     (1 Point)  [ ] Minor surgery                                                  (1 Point)  [ ] Arthroscopic surgery                                       (2 Points)  [ ] Planned major surgery lasting more            (2 Points)      than 45 minutes     [ ] Elective hip or knee joint replacement       (5 points)       surgery                                                TRAUMA RELATED RISK FACTORS  [ ] Fracture of the hip, pelvis, or leg                       (5 Points)  [ ] Spinal cord injury resulting in paralysis             (5 points)       (in the previous month)    [ ] Paralysis  (less than 1 month)                             (5 Points)  [ ] Multiple Trauma within 1 month                        (5 Points)    Total Score [        ]    Caprini Score 0-2: Low Risk, NO VTE prophylaxis required for most patients, encourage ambulation  Caprini Score 3-6: Moderate Risk , pharmacologic VTE prophylaxis is indicated for most patients (in the absence of contraindications)  Caprini Score Greater than or =7: High risk, pharmocologic VTE prophylaxis indicated for most patients (in the absence of contraindications)    OPIOID RISK TOOL    CAMILO EACH BOX THAT APPLIES AND ADD TOTALS AT THE END    FAMILY HISTORY OF SUBSTANCE ABUSE                 FEMALE         MALE                                                Alcohol                             [  ]1 pt          [  ]3pts                                               Illegal Durgs                     [  ]2 pts        [  ]3pts                                               Rx Drugs                           [  ]4 pts        [  ]4 pts    PERSONAL HISTORY OF SUBSTANCE ABUSE                                                                                          Alcohol                             [  ]3 pts       [  ]3 pts                                               Illegal Drugs                     [  ]4 pts        [  ]4 pts                                               Rx Drugs                           [  ]5 pts        [  ]5 pts    AGE BETWEEN 16-45 YEARS                                      [  ]1 pt         [  ]1 pt    HISTORY OF PREADOLESCENT   SEXUAL ABUSE                                                             [  ]3 pts        [  ]0pts    PSYCHOLOGICAL DISEASE                     ADD, OCD, Bipolar, Schizophrenia        [  ]2 pts         [  ]2 pts                      Depression                                               [  ]1 pt           [  ]1 pt           SCORING TOTAL   (add numbers and type here)              (***)                                     A score of 3 or lower indicated LOW risk for future opioid abuse  A score of 4 to 7 indicated moderate risk for future opioid abuse  A score of 8 or higher indicates a high risk for opioid abuse   69 y/o male with PMH of asthma, JASON on CPAP, HTN, HLD, atrial fibrillation (on Xarelto), diabetes, liver CA s/p radiation, hypothyroidism, kidney CA s/p right nephrectomy  and osteoarthritis presents to PST with complaints of left hip pain. States he started to have left hip pain a couple of years ago that has gotten progressively worse. He is using a cane for ambulation. Reports left hip pain to be constant, described as sharp and throbbing, 10/10 in severity, worse with walking, relieved NSAIDs. He was told he has bone on bone arthritis. Denies fevers, chills, nausea or vomiting. Patient is scheduled for left posterior hip replacement on 10/24/23 with Dr. Ojeda. Patient educated on surgical scrub, preadmission instructions, ERP protocol, joint book, medical clearance, cardiology clearance and day of procedure medications, verbalizes understanding. Pt instructed to stop vitamins/supplements/herbal medications/NSAIDS for one week prior to surgery and discuss with PMD. Asked the patient to consult with PCP/cardiologist about holding ASA and the pt agreed. Patient instructed to review anticoagulant medication with PMD/cardiologist for instructions when to stop prior to surgery     CAPRINI SCORE    AGE RELATED RISK FACTORS                                                             [ ] Age 41-60 years                                            (1 Point)  [x ] Age: 61-74 years                                           (2 Points)                 [ ] Age= 75 years                                                (3 Points)             DISEASE RELATED RISK FACTORS                                                       [ x] Edema in the lower extremities                 (1 Point)                     [ ] Varicose veins                                               (1 Point)                                 [x ] BMI > 25 Kg/m2                                            (1 Point)                                  [ ] Serious infection (ie PNA)                            (1 Point)                     [ ] Lung disease ( COPD, Emphysema)            (1 Point)                                                                          [ ] Acute myocardial infarction                         (1 Point)                  [ ] Congestive heart failure (in the previous month)  (1 Point)         [ ] Inflammatory bowel disease                            (1 Point)                  [ ] Central venous access, PICC or Port               (2 points)       (within the last month)                                                                [ ] Stroke (in the previous month)                        (5 Points)    [x] Previous or present malignancy                       (2 points)                                                                                                                                                         HEMATOLOGY RELATED FACTORS                                                         [ ] Prior episodes of VTE                                     (3 Points)                     [ ] Positive family history for VTE                      (3 Points)                  [ ] Prothrombin 11102 A                                     (3 Points)                     [ ] Factor V Leiden                                                (3 Points)                        [ ] Lupus anticoagulants                                      (3 Points)                                                           [ ] Anticardiolipin antibodies                              (3 Points)                                                       [ ] High homocysteine in the blood                   (3 Points)                                             [ ] Other congenital or acquired thrombophilia      (3 Points)                                                [ ] Heparin induced thrombocytopenia                  (3 Points)                                        MOBILITY RELATED FACTORS  [ ] Bed rest                                                         (1 Point)  [ ] Plaster cast                                                    (2 points)  [ ] Bed bound for more than 72 hours           (2 Points)    GENDER SPECIFIC FACTORS  [ ] Pregnancy or had a baby within the last month   (1 Point)  [ ] Post-partum < 6 weeks                                   (1 Point)  [ ] Hormonal therapy  or oral contraception   (1 Point)  [ ] History of pregnancy complications              (1 point)  [ ] Unexplained or recurrent              (1 Point)    OTHER RISK FACTORS                                           (1 Point)  [ x] BMI >40, smoking, diabetes requiring insulin, chemotherapy  blood transfusions and length of surgery over 2 hours    SURGERY RELATED RISK FACTORS  [ ]  Section within the last month     (1 Point)  [ ] Minor surgery                                                  (1 Point)  [ ] Arthroscopic surgery                                       (2 Points)  [ ] Planned major surgery lasting more            (2 Points)      than 45 minutes     [ x] Elective hip or knee joint replacement       (5 points)       surgery                                                TRAUMA RELATED RISK FACTORS  [ ] Fracture of the hip, pelvis, or leg                       (5 Points)  [ ] Spinal cord injury resulting in paralysis             (5 points)       (in the previous month)    [ ] Paralysis  (less than 1 month)                             (5 Points)  [ ] Multiple Trauma within 1 month                        (5 Points)    Total Score [     12   ]    Caprini Score 0-2: Low Risk, NO VTE prophylaxis required for most patients, encourage ambulation  Caprini Score 3-6: Moderate Risk , pharmacologic VTE prophylaxis is indicated for most patients (in the absence of contraindications)  Caprini Score Greater than or =7: High risk, pharmocologic VTE prophylaxis indicated for most patients (in the absence of contraindications)    OPIOID RISK TOOL    CAMILO EACH BOX THAT APPLIES AND ADD TOTALS AT THE END    FAMILY HISTORY OF SUBSTANCE ABUSE                 FEMALE         MALE                                                Alcohol                             [  ]1 pt          [  ]3pts                                               Illegal Durgs                     [  ]2 pts        [  ]3pts                                               Rx Drugs                           [  ]4 pts        [  ]4 pts    PERSONAL HISTORY OF SUBSTANCE ABUSE                                                                                          Alcohol                             [  ]3 pts       [  ]3 pts                                               Illegal Drugs                     [  ]4 pts        [  ]4 pts                                               Rx Drugs                           [  ]5 pts        [  ]5 pts    AGE BETWEEN 16-45 YEARS                                      [  ]1 pt         [  ]1 pt    HISTORY OF PREADOLESCENT   SEXUAL ABUSE                                                             [  ]3 pts        [  ]0pts    PSYCHOLOGICAL DISEASE                     ADD, OCD, Bipolar, Schizophrenia        [  ]2 pts         [  ]2 pts                      Depression                                               [  ]1 pt           [  ]1 pt           SCORING TOTAL   (add numbers and type here)              (*0**)                                     A score of 3 or lower indicated LOW risk for future opioid abuse  A score of 4 to 7 indicated moderate risk for future opioid abuse  A score of 8 or higher indicates a high risk for opioid abuse

## 2023-10-04 NOTE — H&P PST ADULT - OTHER CARE PROVIDERS
Dr Grimaldo  [phone] / Dr. Hernandez cardiology Dr Grimaldo  [phone] / Dr. Domínguez cardiology

## 2023-10-04 NOTE — H&P PST ADULT - NS MD HP INPLANTS MED DEV
left knee replacement,  B/L prosthetic breast implants/Artificial joint/Breast implant/Lens implant/Prosthetic device(s)

## 2023-10-04 NOTE — H&P PST ADULT - PROBLEM SELECTOR PLAN 3
Advised to take amlodipine the morning of the procedure  Advised to hold ramipril the morning of the procedure

## 2023-10-04 NOTE — H&P PST ADULT - MUSCULOSKELETAL
decreased ROM/decreased ROM due to pain details… no calf tenderness/decreased ROM/decreased ROM due to pain/strength 5/5 bilateral upper extremities

## 2023-10-04 NOTE — H&P PST ADULT - NSICDXPASTMEDICALHX_GEN_ALL_CORE_FT
PAST MEDICAL HISTORY:  Afib on Xarelto    Asthma     Degenerative joint disease     Diabetes mellitus     Hepatocellular carcinoma     HLD (hyperlipidemia)     HTN (hypertension)     Hypothyroidism     Obstructive sleep apnea use BIPAP    Renal cell carcinoma

## 2023-10-04 NOTE — H&P PST ADULT - NSICDXPASTSURGICALHX_GEN_ALL_CORE_FT
PAST SURGICAL HISTORY:  H/O breast implant     H/O cataract extraction left 2020    H/O elbow surgery right 1987    H/O right nephrectomy 2022    History of left knee replacement 2015

## 2023-10-04 NOTE — H&P PST ADULT - HISTORY OF PRESENT ILLNESS
67 y/o male with PMH of asthma, JASON on CPAP, HTN, HLD, atrial fibrillation (on Xarelto), diabetes, liver CA s/p radiation, hypothyroidism, kidney CA s/p right nephrectomy 2022 and osteoarthritis presents to PST with complaints of left hip pain. States he started to have left hip pain a couple of years ago that has gotten progressively worse. He is using a cane for ambulation. Reports left hip pain to be constant, described as sharp and throbbing, 10/10 in severity, worse with walking, relieved NSAIDs. He was told he has bone on bone arthritis. Denies fevers, chills, nausea or vomiting. Patient is scheduled for left posterior hip replacement on 10/24/23 with Dr. Ojeda.

## 2023-10-04 NOTE — H&P PST ADULT - EKG AND INTERPRETATION
atrial fibrillation with slow ventricular response, RBBB, left anterior fasicular block, bifasicular block VR 55 atrial fibrillation with slow ventricular response, RBBB, left anterior fascicular block, bifascicular block VR 55

## 2023-10-05 LAB
MRSA PCR RESULT.: SIGNIFICANT CHANGE UP
S AUREUS DNA NOSE QL NAA+PROBE: SIGNIFICANT CHANGE UP

## 2023-10-06 PROBLEM — E78.5 HYPERLIPIDEMIA, UNSPECIFIED: Chronic | Status: ACTIVE | Noted: 2023-10-04

## 2023-10-06 PROBLEM — E03.9 HYPOTHYROIDISM, UNSPECIFIED: Chronic | Status: ACTIVE | Noted: 2023-10-04

## 2023-10-11 ENCOUNTER — APPOINTMENT (OUTPATIENT)
Dept: FAMILY MEDICINE | Facility: CLINIC | Age: 68
End: 2023-10-11
Payer: MEDICARE

## 2023-10-11 VITALS
TEMPERATURE: 97.9 F | BODY MASS INDEX: 46.65 KG/M2 | OXYGEN SATURATION: 97 % | RESPIRATION RATE: 16 BRPM | SYSTOLIC BLOOD PRESSURE: 150 MMHG | WEIGHT: 315 LBS | DIASTOLIC BLOOD PRESSURE: 86 MMHG | HEART RATE: 69 BPM | HEIGHT: 69 IN

## 2023-10-11 DIAGNOSIS — M16.12 UNILATERAL PRIMARY OSTEOARTHRITIS, LEFT HIP: ICD-10-CM

## 2023-10-11 DIAGNOSIS — Z01.818 ENCOUNTER FOR OTHER PREPROCEDURAL EXAMINATION: ICD-10-CM

## 2023-10-11 DIAGNOSIS — Z00.00 ENCOUNTER FOR GENERAL ADULT MEDICAL EXAMINATION W/OUT ABNORMAL FINDINGS: ICD-10-CM

## 2023-10-11 DIAGNOSIS — I48.91 UNSPECIFIED ATRIAL FIBRILLATION: ICD-10-CM

## 2023-10-11 PROCEDURE — 99215 OFFICE O/P EST HI 40 MIN: CPT

## 2023-10-12 ENCOUNTER — RX RENEWAL (OUTPATIENT)
Age: 68
End: 2023-10-12

## 2023-10-21 ENCOUNTER — RX RENEWAL (OUTPATIENT)
Age: 68
End: 2023-10-21

## 2023-10-23 ENCOUNTER — TRANSCRIPTION ENCOUNTER (OUTPATIENT)
Age: 68
End: 2023-10-23

## 2023-10-24 ENCOUNTER — TRANSCRIPTION ENCOUNTER (OUTPATIENT)
Age: 68
End: 2023-10-24

## 2023-10-24 ENCOUNTER — INPATIENT (INPATIENT)
Facility: HOSPITAL | Age: 68
LOS: 1 days | Discharge: HOME HEALTH | DRG: 470 | End: 2023-10-26
Attending: STUDENT IN AN ORGANIZED HEALTH CARE EDUCATION/TRAINING PROGRAM | Admitting: STUDENT IN AN ORGANIZED HEALTH CARE EDUCATION/TRAINING PROGRAM
Payer: MEDICARE

## 2023-10-24 ENCOUNTER — APPOINTMENT (OUTPATIENT)
Dept: ORTHOPEDIC SURGERY | Facility: HOSPITAL | Age: 68
End: 2023-10-24

## 2023-10-24 VITALS
OXYGEN SATURATION: 94 % | TEMPERATURE: 98 F | DIASTOLIC BLOOD PRESSURE: 88 MMHG | HEART RATE: 68 BPM | RESPIRATION RATE: 16 BRPM | SYSTOLIC BLOOD PRESSURE: 170 MMHG

## 2023-10-24 DIAGNOSIS — M16.12 UNILATERAL PRIMARY OSTEOARTHRITIS, LEFT HIP: ICD-10-CM

## 2023-10-24 DIAGNOSIS — Z98.890 OTHER SPECIFIED POSTPROCEDURAL STATES: Chronic | ICD-10-CM

## 2023-10-24 DIAGNOSIS — Z98.82 BREAST IMPLANT STATUS: Chronic | ICD-10-CM

## 2023-10-24 DIAGNOSIS — Z90.5 ACQUIRED ABSENCE OF KIDNEY: Chronic | ICD-10-CM

## 2023-10-24 DIAGNOSIS — Z96.652 PRESENCE OF LEFT ARTIFICIAL KNEE JOINT: Chronic | ICD-10-CM

## 2023-10-24 DIAGNOSIS — Z98.49 CATARACT EXTRACTION STATUS, UNSPECIFIED EYE: Chronic | ICD-10-CM

## 2023-10-24 LAB
APTT BLD: 40.9 SEC — HIGH (ref 24.5–35.6)
APTT BLD: 40.9 SEC — HIGH (ref 24.5–35.6)
BLD GP AB SCN SERPL QL: SIGNIFICANT CHANGE UP
BLD GP AB SCN SERPL QL: SIGNIFICANT CHANGE UP
GLUCOSE BLDC GLUCOMTR-MCNC: 182 MG/DL — HIGH (ref 70–99)
GLUCOSE BLDC GLUCOMTR-MCNC: 182 MG/DL — HIGH (ref 70–99)
GLUCOSE BLDC GLUCOMTR-MCNC: 187 MG/DL — HIGH (ref 70–99)
GLUCOSE BLDC GLUCOMTR-MCNC: 187 MG/DL — HIGH (ref 70–99)
GLUCOSE BLDC GLUCOMTR-MCNC: 203 MG/DL — HIGH (ref 70–99)
GLUCOSE BLDC GLUCOMTR-MCNC: 203 MG/DL — HIGH (ref 70–99)
GLUCOSE BLDC GLUCOMTR-MCNC: 219 MG/DL — HIGH (ref 70–99)
GLUCOSE BLDC GLUCOMTR-MCNC: 219 MG/DL — HIGH (ref 70–99)
GLUCOSE BLDC GLUCOMTR-MCNC: 256 MG/DL — HIGH (ref 70–99)
GLUCOSE BLDC GLUCOMTR-MCNC: 256 MG/DL — HIGH (ref 70–99)
GLUCOSE BLDC GLUCOMTR-MCNC: 304 MG/DL — HIGH (ref 70–99)
GLUCOSE BLDC GLUCOMTR-MCNC: 304 MG/DL — HIGH (ref 70–99)
INR BLD: 1.09 RATIO — SIGNIFICANT CHANGE UP (ref 0.85–1.18)
INR BLD: 1.09 RATIO — SIGNIFICANT CHANGE UP (ref 0.85–1.18)
PROTHROM AB SERPL-ACNC: 12.1 SEC — SIGNIFICANT CHANGE UP (ref 9.5–13)
PROTHROM AB SERPL-ACNC: 12.1 SEC — SIGNIFICANT CHANGE UP (ref 9.5–13)

## 2023-10-24 PROCEDURE — 27130 TOTAL HIP ARTHROPLASTY: CPT | Mod: 22,LT

## 2023-10-24 PROCEDURE — 20985 CPTR-ASST DIR MS PX: CPT

## 2023-10-24 PROCEDURE — 99222 1ST HOSP IP/OBS MODERATE 55: CPT

## 2023-10-24 PROCEDURE — 27130 TOTAL HIP ARTHROPLASTY: CPT | Mod: AS,22,LT

## 2023-10-24 PROCEDURE — 73502 X-RAY EXAM HIP UNI 2-3 VIEWS: CPT | Mod: 26,LT

## 2023-10-24 DEVICE — SHELL ACET TRIDENT II E 54MM: Type: IMPLANTABLE DEVICE | Site: LEFT | Status: FUNCTIONAL

## 2023-10-24 DEVICE — HEAD FEM BIOLOX TS REV PLUS 85X40MM: Type: IMPLANTABLE DEVICE | Site: LEFT | Status: FUNCTIONAL

## 2023-10-24 DEVICE — BONE WAX 2.5GM: Type: IMPLANTABLE DEVICE | Site: LEFT | Status: FUNCTIONAL

## 2023-10-24 DEVICE — MAKO KNEE TIBIAL CHECKPOINT: Type: IMPLANTABLE DEVICE | Site: LEFT | Status: FUNCTIONAL

## 2023-10-24 DEVICE — STEM FEM ACTIS HIGH COLLAR SZ 5: Type: IMPLANTABLE DEVICE | Site: LEFT | Status: FUNCTIONAL

## 2023-10-24 DEVICE — MAKO BONE PIN 4MM X 170MM: Type: IMPLANTABLE DEVICE | Site: LEFT | Status: FUNCTIONAL

## 2023-10-24 DEVICE — MAKO CHECKPOINT 3.5MM HEX IMPACTION: Type: IMPLANTABLE DEVICE | Site: LEFT | Status: FUNCTIONAL

## 2023-10-24 DEVICE — LINER ACET TRIDENT X3 0 DEG 40MM E: Type: IMPLANTABLE DEVICE | Site: LEFT | Status: FUNCTIONAL

## 2023-10-24 RX ORDER — OXYCODONE HYDROCHLORIDE 5 MG/1
10 TABLET ORAL
Refills: 0 | Status: DISCONTINUED | OUTPATIENT
Start: 2023-10-24 | End: 2023-10-26

## 2023-10-24 RX ORDER — CEFAZOLIN SODIUM 1 G
2000 VIAL (EA) INJECTION ONCE
Refills: 0 | Status: DISCONTINUED | OUTPATIENT
Start: 2023-10-24 | End: 2023-10-24

## 2023-10-24 RX ORDER — ONDANSETRON 8 MG/1
4 TABLET, FILM COATED ORAL ONCE
Refills: 0 | Status: DISCONTINUED | OUTPATIENT
Start: 2023-10-24 | End: 2023-10-24

## 2023-10-24 RX ORDER — AMLODIPINE BESYLATE 2.5 MG/1
1 TABLET ORAL
Qty: 0 | Refills: 0 | DISCHARGE

## 2023-10-24 RX ORDER — SODIUM CHLORIDE 9 MG/ML
500 INJECTION INTRAMUSCULAR; INTRAVENOUS; SUBCUTANEOUS ONCE
Refills: 0 | Status: COMPLETED | OUTPATIENT
Start: 2023-10-24 | End: 2023-10-24

## 2023-10-24 RX ORDER — INSULIN LISPRO 100/ML
VIAL (ML) SUBCUTANEOUS
Refills: 0 | Status: DISCONTINUED | OUTPATIENT
Start: 2023-10-24 | End: 2023-10-26

## 2023-10-24 RX ORDER — SODIUM CHLORIDE 9 MG/ML
3 INJECTION INTRAMUSCULAR; INTRAVENOUS; SUBCUTANEOUS EVERY 8 HOURS
Refills: 0 | Status: DISCONTINUED | OUTPATIENT
Start: 2023-10-24 | End: 2023-10-24

## 2023-10-24 RX ORDER — ATORVASTATIN CALCIUM 80 MG/1
20 TABLET, FILM COATED ORAL AT BEDTIME
Refills: 0 | Status: DISCONTINUED | OUTPATIENT
Start: 2023-10-24 | End: 2023-10-26

## 2023-10-24 RX ORDER — DULOXETINE HYDROCHLORIDE 30 MG/1
60 CAPSULE, DELAYED RELEASE ORAL DAILY
Refills: 0 | Status: DISCONTINUED | OUTPATIENT
Start: 2023-10-24 | End: 2023-10-26

## 2023-10-24 RX ORDER — SENNA PLUS 8.6 MG/1
2 TABLET ORAL AT BEDTIME
Refills: 0 | Status: DISCONTINUED | OUTPATIENT
Start: 2023-10-24 | End: 2023-10-26

## 2023-10-24 RX ORDER — TAMSULOSIN HYDROCHLORIDE 0.4 MG/1
0.4 CAPSULE ORAL AT BEDTIME
Refills: 0 | Status: DISCONTINUED | OUTPATIENT
Start: 2023-10-24 | End: 2023-10-26

## 2023-10-24 RX ORDER — DULOXETINE HYDROCHLORIDE 30 MG/1
1 CAPSULE, DELAYED RELEASE ORAL
Qty: 0 | Refills: 0 | DISCHARGE

## 2023-10-24 RX ORDER — DEXTROSE 50 % IN WATER 50 %
12.5 SYRINGE (ML) INTRAVENOUS ONCE
Refills: 0 | Status: DISCONTINUED | OUTPATIENT
Start: 2023-10-24 | End: 2023-10-26

## 2023-10-24 RX ORDER — HYDROMORPHONE HYDROCHLORIDE 2 MG/ML
0.5 INJECTION INTRAMUSCULAR; INTRAVENOUS; SUBCUTANEOUS
Refills: 0 | Status: DISCONTINUED | OUTPATIENT
Start: 2023-10-24 | End: 2023-10-24

## 2023-10-24 RX ORDER — ASPIRIN/CALCIUM CARB/MAGNESIUM 324 MG
81 TABLET ORAL
Refills: 0 | Status: DISCONTINUED | OUTPATIENT
Start: 2023-10-24 | End: 2023-10-26

## 2023-10-24 RX ORDER — SODIUM CHLORIDE 9 MG/ML
1000 INJECTION, SOLUTION INTRAVENOUS
Refills: 0 | Status: DISCONTINUED | OUTPATIENT
Start: 2023-10-24 | End: 2023-10-26

## 2023-10-24 RX ORDER — ALLOPURINOL 300 MG
1 TABLET ORAL
Qty: 0 | Refills: 0 | DISCHARGE

## 2023-10-24 RX ORDER — DEXTROSE 50 % IN WATER 50 %
15 SYRINGE (ML) INTRAVENOUS ONCE
Refills: 0 | Status: DISCONTINUED | OUTPATIENT
Start: 2023-10-24 | End: 2023-10-26

## 2023-10-24 RX ORDER — POLYETHYLENE GLYCOL 3350 17 G/17G
17 POWDER, FOR SOLUTION ORAL AT BEDTIME
Refills: 0 | Status: DISCONTINUED | OUTPATIENT
Start: 2023-10-24 | End: 2023-10-26

## 2023-10-24 RX ORDER — DEXTROSE 50 % IN WATER 50 %
25 SYRINGE (ML) INTRAVENOUS ONCE
Refills: 0 | Status: DISCONTINUED | OUTPATIENT
Start: 2023-10-24 | End: 2023-10-26

## 2023-10-24 RX ORDER — CELECOXIB 200 MG/1
200 CAPSULE ORAL EVERY 12 HOURS
Refills: 0 | Status: DISCONTINUED | OUTPATIENT
Start: 2023-10-26 | End: 2023-10-26

## 2023-10-24 RX ORDER — TAMSULOSIN HYDROCHLORIDE 0.4 MG/1
1 CAPSULE ORAL
Qty: 0 | Refills: 0 | DISCHARGE

## 2023-10-24 RX ORDER — CEFAZOLIN SODIUM 1 G
2000 VIAL (EA) INJECTION
Refills: 0 | Status: COMPLETED | OUTPATIENT
Start: 2023-10-24 | End: 2023-10-24

## 2023-10-24 RX ORDER — INSULIN LISPRO 100/ML
4 VIAL (ML) SUBCUTANEOUS ONCE
Refills: 0 | Status: COMPLETED | OUTPATIENT
Start: 2023-10-24 | End: 2023-10-24

## 2023-10-24 RX ORDER — APREPITANT 80 MG/1
40 CAPSULE ORAL ONCE
Refills: 0 | Status: COMPLETED | OUTPATIENT
Start: 2023-10-24 | End: 2023-10-24

## 2023-10-24 RX ORDER — LEVOTHYROXINE SODIUM 125 MCG
1 TABLET ORAL
Refills: 0 | DISCHARGE

## 2023-10-24 RX ORDER — CELECOXIB 200 MG/1
400 CAPSULE ORAL ONCE
Refills: 0 | Status: COMPLETED | OUTPATIENT
Start: 2023-10-24 | End: 2023-10-24

## 2023-10-24 RX ORDER — MAGNESIUM HYDROXIDE 400 MG/1
30 TABLET, CHEWABLE ORAL DAILY
Refills: 0 | Status: DISCONTINUED | OUTPATIENT
Start: 2023-10-24 | End: 2023-10-26

## 2023-10-24 RX ORDER — ALLOPURINOL 300 MG
300 TABLET ORAL DAILY
Refills: 0 | Status: DISCONTINUED | OUTPATIENT
Start: 2023-10-24 | End: 2023-10-26

## 2023-10-24 RX ORDER — ACETAMINOPHEN 500 MG
975 TABLET ORAL ONCE
Refills: 0 | Status: COMPLETED | OUTPATIENT
Start: 2023-10-24 | End: 2023-10-24

## 2023-10-24 RX ORDER — HYDROMORPHONE HYDROCHLORIDE 2 MG/ML
4 INJECTION INTRAMUSCULAR; INTRAVENOUS; SUBCUTANEOUS EVERY 4 HOURS
Refills: 0 | Status: DISCONTINUED | OUTPATIENT
Start: 2023-10-24 | End: 2023-10-26

## 2023-10-24 RX ORDER — RAMIPRIL 5 MG
1 CAPSULE ORAL
Qty: 0 | Refills: 0 | DISCHARGE

## 2023-10-24 RX ORDER — SODIUM CHLORIDE 9 MG/ML
1000 INJECTION INTRAMUSCULAR; INTRAVENOUS; SUBCUTANEOUS
Refills: 0 | Status: DISCONTINUED | OUTPATIENT
Start: 2023-10-24 | End: 2023-10-26

## 2023-10-24 RX ORDER — AMIODARONE HYDROCHLORIDE 400 MG/1
200 TABLET ORAL DAILY
Refills: 0 | Status: DISCONTINUED | OUTPATIENT
Start: 2023-10-24 | End: 2023-10-26

## 2023-10-24 RX ORDER — GLIMEPIRIDE 1 MG
1 TABLET ORAL
Qty: 0 | Refills: 0 | DISCHARGE

## 2023-10-24 RX ORDER — ACETAMINOPHEN 500 MG
975 TABLET ORAL EVERY 8 HOURS
Refills: 0 | Status: DISCONTINUED | OUTPATIENT
Start: 2023-10-24 | End: 2023-10-26

## 2023-10-24 RX ORDER — FENTANYL CITRATE 50 UG/ML
25 INJECTION INTRAVENOUS
Refills: 0 | Status: DISCONTINUED | OUTPATIENT
Start: 2023-10-24 | End: 2023-10-24

## 2023-10-24 RX ORDER — INFLUENZA VIRUS VACCINE 15; 15; 15; 15 UG/.5ML; UG/.5ML; UG/.5ML; UG/.5ML
0.7 SUSPENSION INTRAMUSCULAR ONCE
Refills: 0 | Status: DISCONTINUED | OUTPATIENT
Start: 2023-10-24 | End: 2023-10-26

## 2023-10-24 RX ORDER — LISINOPRIL 2.5 MG/1
40 TABLET ORAL DAILY
Refills: 0 | Status: DISCONTINUED | OUTPATIENT
Start: 2023-10-26 | End: 2023-10-26

## 2023-10-24 RX ORDER — INSULIN LISPRO 100/ML
VIAL (ML) SUBCUTANEOUS AT BEDTIME
Refills: 0 | Status: DISCONTINUED | OUTPATIENT
Start: 2023-10-24 | End: 2023-10-26

## 2023-10-24 RX ORDER — TRANEXAMIC ACID 100 MG/ML
1000 INJECTION, SOLUTION INTRAVENOUS ONCE
Refills: 0 | Status: DISCONTINUED | OUTPATIENT
Start: 2023-10-24 | End: 2023-10-24

## 2023-10-24 RX ORDER — GLUCAGON INJECTION, SOLUTION 0.5 MG/.1ML
1 INJECTION, SOLUTION SUBCUTANEOUS ONCE
Refills: 0 | Status: DISCONTINUED | OUTPATIENT
Start: 2023-10-24 | End: 2023-10-26

## 2023-10-24 RX ORDER — AMLODIPINE BESYLATE 2.5 MG/1
10 TABLET ORAL DAILY
Refills: 0 | Status: DISCONTINUED | OUTPATIENT
Start: 2023-10-24 | End: 2023-10-26

## 2023-10-24 RX ORDER — KETOROLAC TROMETHAMINE 30 MG/ML
15 SYRINGE (ML) INJECTION EVERY 6 HOURS
Refills: 0 | Status: DISCONTINUED | OUTPATIENT
Start: 2023-10-24 | End: 2023-10-25

## 2023-10-24 RX ORDER — ATORVASTATIN CALCIUM 80 MG/1
1 TABLET, FILM COATED ORAL
Qty: 0 | Refills: 0 | DISCHARGE

## 2023-10-24 RX ORDER — ONDANSETRON 8 MG/1
4 TABLET, FILM COATED ORAL EVERY 6 HOURS
Refills: 0 | Status: DISCONTINUED | OUTPATIENT
Start: 2023-10-24 | End: 2023-10-26

## 2023-10-24 RX ORDER — AMIODARONE HYDROCHLORIDE 400 MG/1
1 TABLET ORAL
Qty: 0 | Refills: 0 | DISCHARGE

## 2023-10-24 RX ORDER — RIVAROXABAN 15 MG-20MG
10 KIT ORAL DAILY
Refills: 0 | Status: DISCONTINUED | OUTPATIENT
Start: 2023-10-25 | End: 2023-10-26

## 2023-10-24 RX ORDER — LEVOTHYROXINE SODIUM 125 MCG
75 TABLET ORAL DAILY
Refills: 0 | Status: DISCONTINUED | OUTPATIENT
Start: 2023-10-24 | End: 2023-10-26

## 2023-10-24 RX ORDER — CEFAZOLIN SODIUM 1 G
3000 VIAL (EA) INJECTION ONCE
Refills: 0 | Status: DISCONTINUED | OUTPATIENT
Start: 2023-10-24 | End: 2023-10-24

## 2023-10-24 RX ORDER — ACETAMINOPHEN 500 MG
1000 TABLET ORAL ONCE
Refills: 0 | Status: COMPLETED | OUTPATIENT
Start: 2023-10-24 | End: 2023-10-25

## 2023-10-24 RX ORDER — OXYCODONE HYDROCHLORIDE 5 MG/1
5 TABLET ORAL
Refills: 0 | Status: DISCONTINUED | OUTPATIENT
Start: 2023-10-24 | End: 2023-10-26

## 2023-10-24 RX ADMIN — Medication 2000 MILLIGRAM(S): at 22:20

## 2023-10-24 RX ADMIN — Medication 4 UNIT(S): at 07:18

## 2023-10-24 RX ADMIN — Medication 81 MILLIGRAM(S): at 18:58

## 2023-10-24 RX ADMIN — Medication 975 MILLIGRAM(S): at 22:20

## 2023-10-24 RX ADMIN — SODIUM CHLORIDE 125 MILLILITER(S): 9 INJECTION INTRAMUSCULAR; INTRAVENOUS; SUBCUTANEOUS at 14:42

## 2023-10-24 RX ADMIN — POLYETHYLENE GLYCOL 3350 17 GRAM(S): 17 POWDER, FOR SOLUTION ORAL at 22:20

## 2023-10-24 RX ADMIN — Medication 975 MILLIGRAM(S): at 23:20

## 2023-10-24 RX ADMIN — OXYCODONE HYDROCHLORIDE 5 MILLIGRAM(S): 5 TABLET ORAL at 18:58

## 2023-10-24 RX ADMIN — OXYCODONE HYDROCHLORIDE 5 MILLIGRAM(S): 5 TABLET ORAL at 14:40

## 2023-10-24 RX ADMIN — Medication 2000 MILLIGRAM(S): at 15:38

## 2023-10-24 RX ADMIN — ATORVASTATIN CALCIUM 20 MILLIGRAM(S): 80 TABLET, FILM COATED ORAL at 22:21

## 2023-10-24 RX ADMIN — OXYCODONE HYDROCHLORIDE 5 MILLIGRAM(S): 5 TABLET ORAL at 15:30

## 2023-10-24 RX ADMIN — Medication 4 UNIT(S): at 08:21

## 2023-10-24 RX ADMIN — TAMSULOSIN HYDROCHLORIDE 0.4 MILLIGRAM(S): 0.4 CAPSULE ORAL at 22:21

## 2023-10-24 RX ADMIN — Medication 975 MILLIGRAM(S): at 07:21

## 2023-10-24 RX ADMIN — CELECOXIB 400 MILLIGRAM(S): 200 CAPSULE ORAL at 07:21

## 2023-10-24 RX ADMIN — APREPITANT 40 MILLIGRAM(S): 80 CAPSULE ORAL at 07:20

## 2023-10-24 RX ADMIN — SENNA PLUS 2 TABLET(S): 8.6 TABLET ORAL at 22:20

## 2023-10-24 RX ADMIN — SODIUM CHLORIDE 125 MILLILITER(S): 9 INJECTION INTRAMUSCULAR; INTRAVENOUS; SUBCUTANEOUS at 22:21

## 2023-10-24 RX ADMIN — SODIUM CHLORIDE 500 MILLILITER(S): 9 INJECTION INTRAMUSCULAR; INTRAVENOUS; SUBCUTANEOUS at 12:23

## 2023-10-24 RX ADMIN — HYDROMORPHONE HYDROCHLORIDE 0.5 MILLIGRAM(S): 2 INJECTION INTRAMUSCULAR; INTRAVENOUS; SUBCUTANEOUS at 12:35

## 2023-10-24 RX ADMIN — Medication 15 MILLIGRAM(S): at 18:58

## 2023-10-24 RX ADMIN — HYDROMORPHONE HYDROCHLORIDE 0.5 MILLIGRAM(S): 2 INJECTION INTRAMUSCULAR; INTRAVENOUS; SUBCUTANEOUS at 12:23

## 2023-10-24 RX ADMIN — Medication 975 MILLIGRAM(S): at 15:30

## 2023-10-24 RX ADMIN — OXYCODONE HYDROCHLORIDE 5 MILLIGRAM(S): 5 TABLET ORAL at 19:30

## 2023-10-24 RX ADMIN — Medication 975 MILLIGRAM(S): at 14:40

## 2023-10-24 RX ADMIN — Medication 15 MILLIGRAM(S): at 19:30

## 2023-10-24 NOTE — PHYSICAL THERAPY INITIAL EVALUATION ADULT - ADDITIONAL COMMENTS
Pt reports living with friends in a house with 2 ALLEY c no rail, and resides on the main level. Pt amb with SAC and is independent with functional mobility, ADLs, and IADLs. Pt drives and is currently working. Pt has support of friends. Pt owns RW and SAC.

## 2023-10-24 NOTE — PATIENT PROFILE ADULT - FALL HARM RISK - RISK INTERVENTIONS
Assistance OOB with selected safe patient handling equipment/Assistance with ambulation/Communicate Fall Risk and Risk Factors to all staff, patient, and family/Discuss with provider need for PT consult/Monitor gait and stability/Provide patient with walking aids - walker, cane, crutches/Reinforce activity limits and safety measures with patient and family/Sit up slowly, dangle for a short time, stand at bedside before walking/Use of alarms - bed, chair and/or voice tab/Visual Cue: Yellow wristband/Bed in lowest position, wheels locked, appropriate side rails in place/Call bell, personal items and telephone in reach/Instruct patient to call for assistance before getting out of bed or chair/Non-slip footwear when patient is out of bed/[place] to call system/Physically safe environment - no spills, clutter or unnecessary equipment/Purposeful Proactive Rounding/Room/bathroom lighting operational, light cord in reach

## 2023-10-24 NOTE — PROGRESS NOTE ADULT - SUBJECTIVE AND OBJECTIVE BOX
EVERETT ARIEL    [id number]    History: Patient is status post left posterior total hip arthroplasty on 10/24/23, POD 0. Patient is doing well. The patient's pain is controlled using the prescribed pain medications. The patient is participating in physical therapy. Denies nausea, vomiting, chest pain, shortness of breath, abdominal pain or fever. No new complaints.                      MEDICATIONS  (STANDING):  acetaminophen     Tablet .. 975 milliGRAM(s) Oral every 8 hours  acetaminophen   IVPB .. 1000 milliGRAM(s) IV Intermittent once  allopurinol 300 milliGRAM(s) Oral daily  aMIOdarone    Tablet 200 milliGRAM(s) Oral daily  amLODIPine   Tablet 10 milliGRAM(s) Oral daily  aspirin enteric coated 81 milliGRAM(s) Oral two times a day  atorvastatin 20 milliGRAM(s) Oral at bedtime  ceFAZolin  Injectable. 2000 milliGRAM(s) IV Push <User Schedule>  dextrose 5%. 1000 milliLiter(s) (100 mL/Hr) IV Continuous <Continuous>  dextrose 5%. 1000 milliLiter(s) (50 mL/Hr) IV Continuous <Continuous>  dextrose 50% Injectable 12.5 Gram(s) IV Push once  dextrose 50% Injectable 25 Gram(s) IV Push once  dextrose 50% Injectable 25 Gram(s) IV Push once  DULoxetine 60 milliGRAM(s) Oral daily  glucagon  Injectable 1 milliGRAM(s) IntraMuscular once  influenza  Vaccine (HIGH DOSE) 0.7 milliLiter(s) IntraMuscular once  insulin lispro (ADMELOG) corrective regimen sliding scale   SubCutaneous three times a day before meals  insulin lispro (ADMELOG) corrective regimen sliding scale   SubCutaneous at bedtime  ketorolac   Injectable 15 milliGRAM(s) IV Push every 6 hours  levothyroxine 75 MICROGram(s) Oral daily  polyethylene glycol 3350 17 Gram(s) Oral at bedtime  senna 2 Tablet(s) Oral at bedtime  sodium chloride 0.9%. 1000 milliLiter(s) (125 mL/Hr) IV Continuous <Continuous>  tamsulosin 0.4 milliGRAM(s) Oral at bedtime    MEDICATIONS  (PRN):  aluminum hydroxide/magnesium hydroxide/simethicone Suspension 30 milliLiter(s) Oral four times a day PRN Indigestion  dextrose Oral Gel 15 Gram(s) Oral once PRN Blood Glucose LESS THAN 70 milliGRAM(s)/deciliter  HYDROmorphone   Tablet 4 milliGRAM(s) Oral every 4 hours PRN Severe Pain (7 - 10)  magnesium hydroxide Suspension 30 milliLiter(s) Oral daily PRN Constipation  ondansetron Injectable 4 milliGRAM(s) IV Push every 6 hours PRN Nausea and/or Vomiting  oxyCODONE    IR 10 milliGRAM(s) Oral every 3 hours PRN Moderate Pain (4 - 6)  oxyCODONE    IR 5 milliGRAM(s) Oral every 3 hours PRN Mild Pain (1 - 3)      Physical exam: The left hip dressing is clean, dry and intact. No drainage or discharge. No erythema is noted. No blistering. No ecchymosis. The calf is supple nontender. Passive range of motion is acceptable to due postoperative pain. No calf tenderness. Sensation to light touch is grossly intact distally. Motor function distally is 5/5. No foot drop. 2+ dorsalis pedis pulse. Capillary refill is less than 2 seconds. No cyanosis.    Primary Orthopedic Assessment:  • s/p LEFT POSTERIOR total hip replacement    Plan:   • DVT prophylaxis as prescribed, including use of compression devices and ankle pumps  • Continue physical therapy  • Weightbearing as tolerated of the left lower extremity with assistance of a walker  • Incentive spirometry encouraged  • Pain control as clinically indicated  • Posterior hip precautions reviewed with patient  • Discharge planning – anticipated discharge is Home / subacute rehabilitation / acute rehabilitation

## 2023-10-24 NOTE — DISCHARGE NOTE PROVIDER - NSDCCPTREATMENT_GEN_ALL_CORE_FT
PRINCIPAL PROCEDURE  Procedure: Total replacement of left hip using CHAVO  Findings and Treatment:

## 2023-10-24 NOTE — DISCHARGE NOTE PROVIDER - HOSPITAL COURSE
The patient underwent a left POSTERIOR TOTAL HIP REPLACEMENT on 10/24/23. The patient received antibiotics consistent with SCIP guidelines. The patient underwent the procedure and had no intra-operative complications. Post-operatively, the patient was seen by medicine and PT. The patient received Xarelto for DVTP. The patient received pain medications per orthopedic pain management protocol and the pain was appropriately controlled. Patient was instructed on posterior total hip precautions by PT. The patient did not have any post-operative medical complications. The patient was discharged in stable condition. The patient underwent a left POSTERIOR TOTAL HIP REPLACEMENT on 10/24/23. The patient received antibiotics consistent with SCIP guidelines. The patient underwent the procedure and had no intra-operative complications. Post-operatively, the patient was seen by medicine and PT. The patient received Xarelto/ASA for DVTP. The patient received pain medications per orthopedic pain management protocol and the pain was appropriately controlled. Patient was instructed on posterior total hip precautions by PT. The patient did not have any post-operative medical complications. The patient was discharged in stable condition.

## 2023-10-24 NOTE — DISCHARGE NOTE PROVIDER - NSDCHOSPICE_GEN_A_CORE
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, chronic right carotid occlusion and symptomatic high-grade left carotid stenosis, s/p L CEA by Dr Ester Shelby Doctor on 6/28/2019  Noninvasive imaging demonstrates widely patent left endarterectomy site  Patient asymptomatic without new neurologic deficits  -doing well  -continue surveillance with carotid duplex in 6 months  -continue ASA and statin therapy  Patient may discontinue Plavix  Anti-platelet monotherapy recommended per Neurology   -return to office in 6 months with carotid duplex for VQI LTFU  -instructed to contact the office with new concerns or symptoms    Educated in signs/symptoms of TIA/CVA and instructed to call 911 immediately No

## 2023-10-24 NOTE — CONSULT NOTE ADULT - SUBJECTIVE AND OBJECTIVE BOX
69 y/o male with H of asthma, JASON on CPAP, HTN, HLD, atrial fibrillation (on Xarelto), diabetes, liver CA s/p radiation, hypothyroidism, kidney CA s/p right nephrectomy 2022 and osteoarthritis, left hip pain. States he started to have left hip pain a couple of years ago that has gotten progressively worse, came in here for elective  left posterior hip replacement on 10/24/23 with Dr. Ojeda s/p procedure.     Allergies:  	No Known Drug Allergies:   	shellfish: Food, Vomiting, Nausea  	fish: Food, Unknown      PAST MEDICAL HISTORY:  Afib on Xarelto    Asthma     Degenerative joint disease     Diabetes mellitus     Hepatocellular carcinoma     HLD (hyperlipidemia)     HTN (hypertension)     Hypothyroidism     Obstructive sleep apnea use BIPAP    Renal cell carcinoma.     PAST SURGICAL HISTORY:  H/O breast implant     H/O cataract extraction left 2020    H/O elbow surgery right 1987    H/O right nephrectomy 2022    History of left knee replacement 2015.     FAMILY HISTORY:  Mother  Still living? Unknown  Family history of cerebrovascular accident (CVA), Age at diagnosis: Age Unknown    Sibling  Still living? Unknown  Family history of diabetes mellitus, Age at diagnosis: Age Unknown  Family history of essential hypertension, Age at diagnosis: Age Unknown.      Social History:   Not a smoker, drinker or using any drugs       Home Medications:   * Incomplete Medication History as of 04-Oct-2023 17:27 documented in Structured Notes  · 	Aspirin Enteric Coated 500 mg oral delayed release tablet: Last Dose Taken:  , 500 milligram(s) orally once a day  · 	amiodarone 200 mg oral tablet: Last Dose Taken:  , 1 tab(s) orally once a day  · 	allopurinol 300 mg oral tablet: Last Dose Taken:  , 1 tab(s) orally once a day  · 	levothyroxine 75 mcg (0.075 mg) oral tablet: Last Dose Taken:  , 1 tab(s) orally once a day  · 	amLODIPine 10 mg oral tablet: Last Dose Taken:  , 1 tab(s) orally once a day  · 	ramipril 10 mg oral capsule: Last Dose Taken:  , 1 cap(s) orally once a day  · 	glimepiride 2 mg oral tablet: Last Dose Taken:  , 1 tab(s) orally once a day  · 	atorvastatin 20 mg oral tablet: Last Dose Taken:  , 1 tab(s) orally once a day  · 	Flomax 0.4 mg oral capsule: Last Dose Taken:  , 1 cap(s) orally once a day  · 	DULoxetine 60 mg oral delayed release capsule: Last Dose Taken:  , 1 cap(s) orally 2 times a day  · 	Xarelto 20 mg oral tablet: Last Dose Taken:  , 1 tab(s) orally once a day (in the evening)    Vital Signs Last 24 Hrs  T(C): 36.4 (24 Oct 2023 13:36), Max: 36.6 (24 Oct 2023 12:30)  T(F): 97.5 (24 Oct 2023 13:36), Max: 97.8 (24 Oct 2023 12:30)  HR: 63 (24 Oct 2023 13:36) (58 - 68)  BP: 163/97 (24 Oct 2023 13:36) (135/77 - 170/88)  BP(mean): 110 (24 Oct 2023 13:15) (91 - 110)  RR: 17 (24 Oct 2023 13:36) (16 - 19)  SpO2: 92% (24 Oct 2023 13:36) (92% - 100%)    Parameters below as of 24 Oct 2023 13:36  Patient On (Oxygen Delivery Method): room air        PHYSICAL EXAM:    GENERAL: Middle age male looking comfortable   HEENT: PERRL, +EOMI  NECK: soft, Supple, No JVD  CHEST/LUNG: Clear to auscultate bilaterally; No wheezing  HEART: S1S2+, Regular rate and rhythm; No murmurs  ABDOMEN: Soft, Nontender, Nondistended; Bowel sounds present  EXTREMITIES:  1+ Peripheral Pulses, No edema, left hip Joint area cover with dressing, no bleeding or soaking   SKIN: No rashes or lesions  NEURO: AAOX3  PSYCH: normal mood

## 2023-10-24 NOTE — CONSULT NOTE ADULT - ASSESSMENT
67 y/o male with H of asthma, JASON on CPAP, HTN, HLD, atrial fibrillation (on Xarelto), diabetes, liver CA s/p radiation, hypothyroidism, kidney CA s/p right nephrectomy 2022 and osteoarthritis, left hip pain. States he started to have left hip pain a couple of years ago that has gotten progressively worse, came in here for elective  left posterior hip replacement on 10/24/23 with Dr. Ojeda s/p procedure.     Plan:     OA of the Left hip s/p THR post op day 0:     - post op no complications  - VS stable  - abx per ortho   - c/w anti hypertensive to be restarted post op day 02 except if blood pressure goes >150 systolic   - c/w IVF x 24 hrs then reassess per ortho  - opiate induced constipation regimen   - encouraging incentive spirometry   -c/w local wound care per ortho   -DVT prophylaxis and Pain meds as per Ortho team   -PT/OT and weight bearing per ortho     Hx of Afib: Will continue with amiodarone 200 mg once a day, Once ok from Ortho will resume Xarelto 20 mg once a day (in the evening)    Gout: Will continue allopurinol 300 mg once a day    Hypothyroidism: Will continue with Levothyroxine 75 mcg once a day    HTN : Will continue with amLODIPine 10 mg once a day and Ramipril 10 mg once a day    DM: Will do FS monitoring and coverage insulin, will resume glimepiride upon discharge     HLD: Will continue with atorvastatin 20 mg once a day    BPH: will continue with Flomax 0.4 mg once a day, will monitor for retention.     Anxiety: will continue with DULoxetine 60 mg 2 times a day    JASON: Home CPAP machine.

## 2023-10-24 NOTE — DISCHARGE NOTE PROVIDER - NSDCFUSCHEDAPPT_GEN_ALL_CORE_FT
Robin Ojeda  [facility] Physician [facility]  ORTHOSURG [address]  Scheduled Appointment: 10/24/2023    Daren Huerta  [facility] Physician [facility]  FAMILYMED [address]  Scheduled Appointment: 11/01/2023    Patty Lunsford  [facility] Physician [facility]  ENDOCRIN [address]  Scheduled Appointment: 11/01/2023    [facility]  MRI  [address]  Scheduled Appointment: 11/08/2023    Angel Spencer  [facility] Physician [facility]  RHEUM [address]  Scheduled Appointment: 11/08/2023    Vandana Phipps  [facility] Physician [facility]  GASTRO [address]  Scheduled Appointment: 11/15/2023    Herbert Chaudhry  [facility] Physician [facility]  ENDOCRIN [address]  Scheduled Appointment: 12/13/2023    Lela Bonilla  [facility] Physician [facility]  [facility]  Scheduled Appointment: 01/10/2024     Daren Huerta  [facility] Physician [facility]  FAMILYMED [address]  Scheduled Appointment: 11/01/2023    Patty Lunsford  [facility] Physician [facility]  ENDOCRIN [address]  Scheduled Appointment: 11/01/2023    [facility]  MRI  [address]  Scheduled Appointment: 11/08/2023    Angel Spencer  [facility] Physician [facility]  RHEUM [address]  Scheduled Appointment: 11/08/2023    Vandana Phipps  [facility] Physician [facility]  GASTRO [address]  Scheduled Appointment: 11/15/2023    Herbert Chaudhry  [facility] Physician [facility]  ENDOCRIN [address]  Scheduled Appointment: 12/13/2023    Lela Bonilla  [facility]  [facility]  Scheduled Appointment: 01/10/2024

## 2023-10-24 NOTE — DISCHARGE NOTE PROVIDER - NSDCFUADDINST_GEN_ALL_CORE_FT
The patient will be seen in the office between 2-3 weeks for wound check.   **Your first post-operative visit has been scheduled prior to your admission. PLEASE CONTACT OFFICE TO CONFIRM THE APPOINTMENT DATE. Sutures/Staples/Tape will be removed at that time.  **  The silver based dressing is to be removed 7 days from the date of surgery.   ** CONTACT THE OFFICE IF THE FOLLOWING DEVELOP:  - the dressing becomes soiled or saturated  - you develop a fever greater that 101F  - the wound becomes red or you develop blistering around the wound  * Patient may shower after post-op day #3.   * The patient will continue home PT consistent with posterior total hip replacement protocol and will continue to practice posterior total hip precautions for a minimum of 6 week. Transition to outpatient PT will occur at the time of the first office visit.   * The patient is FULL weight bearing.  * The patient will continue Xarelto 10mg by mouth daily for 5 days postop then return to home dose and continue.     * The patient will take OXYCODONE AND TYLENOL for pain control and adjust according to prescription and patient needs. Contact the office if pain increases while taking prescribed pain medications or related concerns develop.  * Celebrex will be taken twice daily for 3 weeks for pain control and prevention of excessive bone growth. Additional prescription may be requested at your office follow-up visit.  * The patient will take Senna S while taking oxycodone to prevent narcotic associated constipation.  Additionally, increase water intake (drink at least 8 glasses of water daily) and try adding fiber to the diet by eating fruits, vegetables and foods that are rich in grains. If constipation is experienced, contact the medical/primary care provider to discuss further treatment options.  * To avoid injury at home:  - continue use of rolling walker until cleared by physical therapist  - have family or friend remove all throw rug or objects in hallways that may present a trip hazard.  - if you experience any dizziness or medical concerns, call your medical doctor or  911.  * The implant may activate metal detection devices.   The patient will be seen in the office between 2-3 weeks for wound check.   **Your first post-operative visit has been scheduled prior to your admission. PLEASE CONTACT OFFICE TO CONFIRM THE APPOINTMENT DATE. Sutures/Staples/Tape will be removed at that time.  **  The silver based dressing is to be removed 7 days from the date of surgery.   ** CONTACT THE OFFICE IF THE FOLLOWING DEVELOP:  - the dressing becomes soiled or saturated  - you develop a fever greater that 101F  - the wound becomes red or you develop blistering around the wound  * Patient may shower after post-op day #3.   * The patient will continue home PT consistent with posterior total hip replacement protocol and will continue to practice posterior total hip precautions for a minimum of 6 week. Transition to outpatient PT will occur at the time of the first office visit.   * The patient is FULL weight bearing.  * The patient will continue Xarelto 10mg by mouth daily for 5 days postop (last dose 10/29/23) then return to home dose of 20mg daily (on 10/30/23).  * Patient will take ASA 81 mg two times per day for 3 weeks (last dose on 11/14/23) and then resume home ASA 500mg bid (on 11/15/23)  * The patient will take OXYCODONE AND TYLENOL for pain control and adjust according to prescription and patient needs. Contact the office if pain increases while taking prescribed pain medications or related concerns develop.  * Celebrex will be taken twice daily for 3 weeks for pain control and prevention of excessive bone growth. Additional prescription may be requested at your office follow-up visit.  * The patient will take Senna S while taking oxycodone to prevent narcotic associated constipation.  Additionally, increase water intake (drink at least 8 glasses of water daily) and try adding fiber to the diet by eating fruits, vegetables and foods that are rich in grains. If constipation is experienced, contact the medical/primary care provider to discuss further treatment options.  * To avoid injury at home:  - continue use of rolling walker until cleared by physical therapist  - have family or friend remove all throw rug or objects in hallways that may present a trip hazard.  - if you experience any dizziness or medical concerns, call your medical doctor or  911.  * The implant may activate metal detection devices.   The patient will be seen in the office between 2-3 weeks for wound check.   **Your first post-operative visit has been scheduled prior to your admission. PLEASE CONTACT OFFICE TO CONFIRM THE APPOINTMENT DATE. Sutures/Staples/Tape will be removed at that time.  **  The silver based dressing is to be removed 7 days from the date of surgery.   ** CONTACT THE OFFICE IF THE FOLLOWING DEVELOP:  - the dressing becomes soiled or saturated  - you develop a fever greater that 101F  - the wound becomes red or you develop blistering around the wound  * Patient may shower after post-op day #3.   * The patient will continue home PT consistent with posterior total hip replacement protocol and will continue to practice posterior total hip precautions for a minimum of 6 week. Transition to outpatient PT will occur at the time of the first office visit.   * The patient is FULL weight bearing.  * The patient will continue Xarelto 10mg by mouth daily for 5 days postop (last dose 10/29/23) then return to home dose of 20mg daily (on 10/30/23).  * Patient will take ASA 81 mg two times per day for 3 weeks (last dose on 11/14/23) and then resume home ASA 500mg bid (on 11/15/23)  * The patient will take OXYCODONE AND TYLENOL for pain control and adjust according to prescription and patient needs. Contact the office if pain increases while taking prescribed pain medications or related concerns develop.  * Celebrex will be taken twice daily for 3 weeks for pain control and prevention of excessive bone growth. Additional prescription may be requested at your office follow-up visit.  * The patient will take Senna S while taking oxycodone to prevent narcotic associated constipation.  Additionally, increase water intake (drink at least 8 glasses of water daily) and try adding fiber to the diet by eating fruits, vegetables and foods that are rich in grains. If constipation is experienced, contact the medical/primary care provider to discuss further treatment options.  * To avoid injury at home:  - continue use of rolling walker until cleared by physical therapist  - have family or friend remove all throw rug or objects in hallways that may present a trip hazard.  - if you experience any dizziness or medical concerns, call your medical doctor or  911.  * The implant may activate metal detection devices.  Follow up with Primary care doctor regarding wound on Left lower extremity.

## 2023-10-24 NOTE — DISCHARGE NOTE PROVIDER - CARE PROVIDER_API CALL
Robin Ojeda  Orthopaedic Surgery  [address] [place] [id number]  Phone: [phone]  Fax: [phone]  Follow Up Time:

## 2023-10-24 NOTE — DISCHARGE NOTE PROVIDER - NSDCMRMEDTOKEN_GEN_ALL_CORE_FT
allopurinol 300 mg oral tablet: 1 tab(s) orally once a day  amiodarone 200 mg oral tablet: 1 tab(s) orally once a day  amLODIPine 10 mg oral tablet: 1 tab(s) orally once a day  Aspirin Enteric Coated 500 mg oral delayed release tablet: 500 milligram(s) orally once a day  atorvastatin 20 mg oral tablet: 1 tab(s) orally once a day  DULoxetine 60 mg oral delayed release capsule: 1 cap(s) orally 2 times a day  Flomax 0.4 mg oral capsule: 1 cap(s) orally once a day  glimepiride 2 mg oral tablet: 1 tab(s) orally once a day  levothyroxine 75 mcg (0.075 mg) oral tablet: 1 tab(s) orally once a day  ramipril 10 mg oral capsule: 1 cap(s) orally once a day  Xarelto 20 mg oral tablet: 1 tab(s) orally once a day (in the evening)   acetaminophen 325 mg oral tablet: 3 tab(s) orally every 8 hours  allopurinol 300 mg oral tablet: 1 tab(s) orally once a day  amiodarone 200 mg oral tablet: 1 tab(s) orally once a day  amLODIPine 10 mg oral tablet: 1 tab(s) orally once a day  Aspirin Enteric Coated 500 mg oral delayed release tablet: 500 milligram(s) orally 2 times a day  Aspirin Enteric Coated 81 mg oral delayed release tablet: 1 tab(s) orally 2 times a day MDD: 2  atorvastatin 20 mg oral tablet: 1 tab(s) orally once a day  CeleBREX 200 mg oral capsule: 1 cap(s) orally 2 times a day MDD: 2  DULoxetine 60 mg oral delayed release capsule: 1 cap(s) orally 2 times a day  Flomax 0.4 mg oral capsule: 1 cap(s) orally once a day  glimepiride 2 mg oral tablet: 1 tab(s) orally once a day  levothyroxine 75 mcg (0.075 mg) oral tablet: 1 tab(s) orally once a day  omeprazole 20 mg oral delayed release tablet: 1 tab(s) orally once a day MDD: 1  oxyCODONE 5 mg oral tablet: 1 tab(s) orally every 6 hours as needed for pain MDD: 4  ramipril 10 mg oral capsule: 1 cap(s) orally once a day  Senna S 50 mg-8.6 mg oral tablet: 2 tab(s) orally once a day (at bedtime) MDD: 2  Xarelto 10 mg oral tablet: 1 tab(s) orally once a day  Xarelto 20 mg oral tablet: 1 tab(s) orally once a day (in the evening)

## 2023-10-24 NOTE — PHYSICAL THERAPY INITIAL EVALUATION ADULT - RANGE OF MOTION EXAMINATION, REHAB EVAL
except left LE limited by posterior THPs/bilateral upper extremity ROM was WFL (within functional limits)/bilateral lower extremity ROM was WFL (within functional limits)

## 2023-10-24 NOTE — DISCHARGE NOTE NURSING/CASE MANAGEMENT/SOCIAL WORK - PATIENT PORTAL LINK FT
You can access the FollowMyHealth Patient Portal offered by [facility] by registering at the following website: [email or web address]. By joining [facility]’s FollowMyHealth portal, you will also be able to view your health information using other applications (apps) compatible with our system.

## 2023-10-25 LAB
ANION GAP SERPL CALC-SCNC: 10 MMOL/L — SIGNIFICANT CHANGE UP (ref 5–17)
ANION GAP SERPL CALC-SCNC: 10 MMOL/L — SIGNIFICANT CHANGE UP (ref 5–17)
ANION GAP SERPL CALC-SCNC: 9 MMOL/L — SIGNIFICANT CHANGE UP (ref 5–17)
ANION GAP SERPL CALC-SCNC: 9 MMOL/L — SIGNIFICANT CHANGE UP (ref 5–17)
BUN SERPL-MCNC: 22.4 MG/DL — HIGH (ref 8–20)
BUN SERPL-MCNC: 22.4 MG/DL — HIGH (ref 8–20)
BUN SERPL-MCNC: 23.4 MG/DL — HIGH (ref 8–20)
BUN SERPL-MCNC: 23.4 MG/DL — HIGH (ref 8–20)
CALCIUM SERPL-MCNC: 8.2 MG/DL — LOW (ref 8.4–10.5)
CALCIUM SERPL-MCNC: 8.2 MG/DL — LOW (ref 8.4–10.5)
CALCIUM SERPL-MCNC: 8.4 MG/DL — SIGNIFICANT CHANGE UP (ref 8.4–10.5)
CALCIUM SERPL-MCNC: 8.4 MG/DL — SIGNIFICANT CHANGE UP (ref 8.4–10.5)
CHLORIDE SERPL-SCNC: 101 MMOL/L — SIGNIFICANT CHANGE UP (ref 96–108)
CHLORIDE SERPL-SCNC: 101 MMOL/L — SIGNIFICANT CHANGE UP (ref 96–108)
CHLORIDE SERPL-SCNC: 102 MMOL/L — SIGNIFICANT CHANGE UP (ref 96–108)
CHLORIDE SERPL-SCNC: 102 MMOL/L — SIGNIFICANT CHANGE UP (ref 96–108)
CO2 SERPL-SCNC: 24 MMOL/L — SIGNIFICANT CHANGE UP (ref 22–29)
CO2 SERPL-SCNC: 24 MMOL/L — SIGNIFICANT CHANGE UP (ref 22–29)
CO2 SERPL-SCNC: 26 MMOL/L — SIGNIFICANT CHANGE UP (ref 22–29)
CO2 SERPL-SCNC: 26 MMOL/L — SIGNIFICANT CHANGE UP (ref 22–29)
CREAT SERPL-MCNC: 1.61 MG/DL — HIGH (ref 0.5–1.3)
CREAT SERPL-MCNC: 1.61 MG/DL — HIGH (ref 0.5–1.3)
CREAT SERPL-MCNC: 1.7 MG/DL — HIGH (ref 0.5–1.3)
CREAT SERPL-MCNC: 1.7 MG/DL — HIGH (ref 0.5–1.3)
EGFR: 44 ML/MIN/1.73M2 — LOW
EGFR: 44 ML/MIN/1.73M2 — LOW
EGFR: 47 ML/MIN/1.73M2 — LOW
EGFR: 47 ML/MIN/1.73M2 — LOW
GLUCOSE BLDC GLUCOMTR-MCNC: 199 MG/DL — HIGH (ref 70–99)
GLUCOSE BLDC GLUCOMTR-MCNC: 199 MG/DL — HIGH (ref 70–99)
GLUCOSE BLDC GLUCOMTR-MCNC: 201 MG/DL — HIGH (ref 70–99)
GLUCOSE BLDC GLUCOMTR-MCNC: 201 MG/DL — HIGH (ref 70–99)
GLUCOSE BLDC GLUCOMTR-MCNC: 253 MG/DL — HIGH (ref 70–99)
GLUCOSE BLDC GLUCOMTR-MCNC: 253 MG/DL — HIGH (ref 70–99)
GLUCOSE BLDC GLUCOMTR-MCNC: 266 MG/DL — HIGH (ref 70–99)
GLUCOSE BLDC GLUCOMTR-MCNC: 266 MG/DL — HIGH (ref 70–99)
GLUCOSE SERPL-MCNC: 182 MG/DL — HIGH (ref 70–99)
GLUCOSE SERPL-MCNC: 182 MG/DL — HIGH (ref 70–99)
GLUCOSE SERPL-MCNC: 228 MG/DL — HIGH (ref 70–99)
GLUCOSE SERPL-MCNC: 228 MG/DL — HIGH (ref 70–99)
HCT VFR BLD CALC: 35.8 % — LOW (ref 39–50)
HCT VFR BLD CALC: 35.8 % — LOW (ref 39–50)
HGB BLD-MCNC: 11.3 G/DL — LOW (ref 13–17)
HGB BLD-MCNC: 11.3 G/DL — LOW (ref 13–17)
MCHC RBC-ENTMCNC: 27.7 PG — SIGNIFICANT CHANGE UP (ref 27–34)
MCHC RBC-ENTMCNC: 27.7 PG — SIGNIFICANT CHANGE UP (ref 27–34)
MCHC RBC-ENTMCNC: 31.6 GM/DL — LOW (ref 32–36)
MCHC RBC-ENTMCNC: 31.6 GM/DL — LOW (ref 32–36)
MCV RBC AUTO: 87.7 FL — SIGNIFICANT CHANGE UP (ref 80–100)
MCV RBC AUTO: 87.7 FL — SIGNIFICANT CHANGE UP (ref 80–100)
PLATELET # BLD AUTO: 188 K/UL — SIGNIFICANT CHANGE UP (ref 150–400)
PLATELET # BLD AUTO: 188 K/UL — SIGNIFICANT CHANGE UP (ref 150–400)
POTASSIUM SERPL-MCNC: 4.4 MMOL/L — SIGNIFICANT CHANGE UP (ref 3.5–5.3)
POTASSIUM SERPL-MCNC: 4.4 MMOL/L — SIGNIFICANT CHANGE UP (ref 3.5–5.3)
POTASSIUM SERPL-MCNC: 4.5 MMOL/L — SIGNIFICANT CHANGE UP (ref 3.5–5.3)
POTASSIUM SERPL-MCNC: 4.5 MMOL/L — SIGNIFICANT CHANGE UP (ref 3.5–5.3)
POTASSIUM SERPL-SCNC: 4.4 MMOL/L — SIGNIFICANT CHANGE UP (ref 3.5–5.3)
POTASSIUM SERPL-SCNC: 4.4 MMOL/L — SIGNIFICANT CHANGE UP (ref 3.5–5.3)
POTASSIUM SERPL-SCNC: 4.5 MMOL/L — SIGNIFICANT CHANGE UP (ref 3.5–5.3)
POTASSIUM SERPL-SCNC: 4.5 MMOL/L — SIGNIFICANT CHANGE UP (ref 3.5–5.3)
RBC # BLD: 4.08 M/UL — LOW (ref 4.2–5.8)
RBC # BLD: 4.08 M/UL — LOW (ref 4.2–5.8)
RBC # FLD: 17.3 % — HIGH (ref 10.3–14.5)
RBC # FLD: 17.3 % — HIGH (ref 10.3–14.5)
SODIUM SERPL-SCNC: 134 MMOL/L — LOW (ref 135–145)
SODIUM SERPL-SCNC: 134 MMOL/L — LOW (ref 135–145)
SODIUM SERPL-SCNC: 137 MMOL/L — SIGNIFICANT CHANGE UP (ref 135–145)
SODIUM SERPL-SCNC: 137 MMOL/L — SIGNIFICANT CHANGE UP (ref 135–145)
WBC # BLD: 10.6 K/UL — HIGH (ref 3.8–10.5)
WBC # BLD: 10.6 K/UL — HIGH (ref 3.8–10.5)
WBC # FLD AUTO: 10.6 K/UL — HIGH (ref 3.8–10.5)
WBC # FLD AUTO: 10.6 K/UL — HIGH (ref 3.8–10.5)

## 2023-10-25 PROCEDURE — 99232 SBSQ HOSP IP/OBS MODERATE 35: CPT

## 2023-10-25 RX ORDER — RIVAROXABAN 15 MG-20MG
1 KIT ORAL
Qty: 5 | Refills: 0
Start: 2023-10-25 | End: 2023-10-29

## 2023-10-25 RX ORDER — SENNOSIDES/DOCUSATE SODIUM 8.6MG-50MG
2 TABLET ORAL
Qty: 14 | Refills: 0
Start: 2023-10-25 | End: 2023-10-31

## 2023-10-25 RX ORDER — ASPIRIN/CALCIUM CARB/MAGNESIUM 324 MG
1 TABLET ORAL
Qty: 42 | Refills: 0
Start: 2023-10-25 | End: 2023-11-14

## 2023-10-25 RX ORDER — ACETAMINOPHEN 500 MG
3 TABLET ORAL
Qty: 0 | Refills: 0 | DISCHARGE
Start: 2023-10-25

## 2023-10-25 RX ORDER — OXYCODONE HYDROCHLORIDE 5 MG/1
1 TABLET ORAL
Qty: 28 | Refills: 0
Start: 2023-10-25 | End: 2023-10-31

## 2023-10-25 RX ORDER — OMEPRAZOLE 10 MG/1
1 CAPSULE, DELAYED RELEASE ORAL
Qty: 42 | Refills: 0
Start: 2023-10-25 | End: 2023-12-05

## 2023-10-25 RX ORDER — CELECOXIB 200 MG/1
1 CAPSULE ORAL
Qty: 42 | Refills: 0
Start: 2023-10-25 | End: 2023-11-14

## 2023-10-25 RX ADMIN — DULOXETINE HYDROCHLORIDE 60 MILLIGRAM(S): 30 CAPSULE, DELAYED RELEASE ORAL at 12:12

## 2023-10-25 RX ADMIN — Medication 1000 MILLIGRAM(S): at 06:31

## 2023-10-25 RX ADMIN — Medication 15 MILLIGRAM(S): at 13:10

## 2023-10-25 RX ADMIN — OXYCODONE HYDROCHLORIDE 5 MILLIGRAM(S): 5 TABLET ORAL at 10:55

## 2023-10-25 RX ADMIN — HYDROMORPHONE HYDROCHLORIDE 4 MILLIGRAM(S): 2 INJECTION INTRAMUSCULAR; INTRAVENOUS; SUBCUTANEOUS at 20:20

## 2023-10-25 RX ADMIN — Medication 975 MILLIGRAM(S): at 16:20

## 2023-10-25 RX ADMIN — Medication 300 MILLIGRAM(S): at 12:12

## 2023-10-25 RX ADMIN — OXYCODONE HYDROCHLORIDE 5 MILLIGRAM(S): 5 TABLET ORAL at 11:50

## 2023-10-25 RX ADMIN — Medication 3: at 08:18

## 2023-10-25 RX ADMIN — SENNA PLUS 2 TABLET(S): 8.6 TABLET ORAL at 20:57

## 2023-10-25 RX ADMIN — Medication 15 MILLIGRAM(S): at 06:31

## 2023-10-25 RX ADMIN — AMIODARONE HYDROCHLORIDE 200 MILLIGRAM(S): 400 TABLET ORAL at 06:16

## 2023-10-25 RX ADMIN — AMLODIPINE BESYLATE 10 MILLIGRAM(S): 2.5 TABLET ORAL at 12:11

## 2023-10-25 RX ADMIN — Medication 81 MILLIGRAM(S): at 06:16

## 2023-10-25 RX ADMIN — RIVAROXABAN 10 MILLIGRAM(S): KIT at 12:11

## 2023-10-25 RX ADMIN — Medication 2: at 17:17

## 2023-10-25 RX ADMIN — Medication 75 MICROGRAM(S): at 05:59

## 2023-10-25 RX ADMIN — TAMSULOSIN HYDROCHLORIDE 0.4 MILLIGRAM(S): 0.4 CAPSULE ORAL at 20:56

## 2023-10-25 RX ADMIN — Medication 400 MILLIGRAM(S): at 05:58

## 2023-10-25 RX ADMIN — Medication 1: at 21:04

## 2023-10-25 RX ADMIN — Medication 15 MILLIGRAM(S): at 01:28

## 2023-10-25 RX ADMIN — POLYETHYLENE GLYCOL 3350 17 GRAM(S): 17 POWDER, FOR SOLUTION ORAL at 20:57

## 2023-10-25 RX ADMIN — Medication 15 MILLIGRAM(S): at 12:12

## 2023-10-25 RX ADMIN — Medication 15 MILLIGRAM(S): at 06:17

## 2023-10-25 RX ADMIN — Medication 15 MILLIGRAM(S): at 00:28

## 2023-10-25 RX ADMIN — OXYCODONE HYDROCHLORIDE 5 MILLIGRAM(S): 5 TABLET ORAL at 18:15

## 2023-10-25 RX ADMIN — Medication 1: at 12:12

## 2023-10-25 RX ADMIN — Medication 81 MILLIGRAM(S): at 17:17

## 2023-10-25 RX ADMIN — OXYCODONE HYDROCHLORIDE 5 MILLIGRAM(S): 5 TABLET ORAL at 17:17

## 2023-10-25 RX ADMIN — Medication 975 MILLIGRAM(S): at 15:23

## 2023-10-25 RX ADMIN — ATORVASTATIN CALCIUM 20 MILLIGRAM(S): 80 TABLET, FILM COATED ORAL at 20:57

## 2023-10-25 NOTE — PROGRESS NOTE ADULT - SUBJECTIVE AND OBJECTIVE BOX
EVERETT GEORGE    [id number]    History: Patient is status post left posterior total hip arthroplasty on POD#1. Patient is doing well. The patient's pain is controlled using the prescribed pain medications. The patient is participating in physical therapy. Denies nausea, vomiting, chest pain, shortness of breath, abdominal pain or fever. No new complaints.    Vital Signs Last 24 Hrs  T(C): 37.2 (25 Oct 2023 06:00), Max: 37.2 (25 Oct 2023 06:00)  T(F): 98.9 (25 Oct 2023 06:00), Max: 98.9 (25 Oct 2023 06:00)  HR: 71 (25 Oct 2023 06:00) (58 - 71)  BP: 134/84 (25 Oct 2023 06:00) (123/75 - 163/97)  BP(mean): 110 (24 Oct 2023 13:15) (91 - 110)  RR: 18 (25 Oct 2023 06:00) (16 - 19)  SpO2: 96% (25 Oct 2023 06:00) (92% - 100%)    Parameters below as of 25 Oct 2023 06:00  Patient On (Oxygen Delivery Method): room air                              11.3   10.60 )-----------( 188      ( 25 Oct 2023 05:43 )             35.8     10-25    134<L>  |  101  |  22.4<H>  ----------------------------<  228<H>  4.4   |  24.0  |  1.61<H>    Ca    8.2<L>      25 Oct 2023 05:43        MEDICATIONS  (STANDING):  acetaminophen     Tablet .. 975 milliGRAM(s) Oral every 8 hours  allopurinol 300 milliGRAM(s) Oral daily  aMIOdarone    Tablet 200 milliGRAM(s) Oral daily  amLODIPine   Tablet 10 milliGRAM(s) Oral daily  aspirin enteric coated 81 milliGRAM(s) Oral two times a day  atorvastatin 20 milliGRAM(s) Oral at bedtime  dextrose 5%. 1000 milliLiter(s) (100 mL/Hr) IV Continuous <Continuous>  dextrose 5%. 1000 milliLiter(s) (50 mL/Hr) IV Continuous <Continuous>  dextrose 50% Injectable 12.5 Gram(s) IV Push once  dextrose 50% Injectable 25 Gram(s) IV Push once  dextrose 50% Injectable 25 Gram(s) IV Push once  DULoxetine 60 milliGRAM(s) Oral daily  glucagon  Injectable 1 milliGRAM(s) IntraMuscular once  influenza  Vaccine (HIGH DOSE) 0.7 milliLiter(s) IntraMuscular once  insulin lispro (ADMELOG) corrective regimen sliding scale   SubCutaneous three times a day before meals  insulin lispro (ADMELOG) corrective regimen sliding scale   SubCutaneous at bedtime  ketorolac   Injectable 15 milliGRAM(s) IV Push every 6 hours  levothyroxine 75 MICROGram(s) Oral daily  polyethylene glycol 3350 17 Gram(s) Oral at bedtime  rivaroxaban 10 milliGRAM(s) Oral daily  senna 2 Tablet(s) Oral at bedtime  sodium chloride 0.9%. 1000 milliLiter(s) (125 mL/Hr) IV Continuous <Continuous>  tamsulosin 0.4 milliGRAM(s) Oral at bedtime    MEDICATIONS  (PRN):  aluminum hydroxide/magnesium hydroxide/simethicone Suspension 30 milliLiter(s) Oral four times a day PRN Indigestion  dextrose Oral Gel 15 Gram(s) Oral once PRN Blood Glucose LESS THAN 70 milliGRAM(s)/deciliter  HYDROmorphone   Tablet 4 milliGRAM(s) Oral every 4 hours PRN Severe Pain (7 - 10)  magnesium hydroxide Suspension 30 milliLiter(s) Oral daily PRN Constipation  ondansetron Injectable 4 milliGRAM(s) IV Push every 6 hours PRN Nausea and/or Vomiting  oxyCODONE    IR 10 milliGRAM(s) Oral every 3 hours PRN Moderate Pain (4 - 6)  oxyCODONE    IR 5 milliGRAM(s) Oral every 3 hours PRN Mild Pain (1 - 3)      Physical exam: The left hip dressing is clean, dry and intact. No drainage or discharge. No erythema is noted. No blistering. No ecchymosis. The calf is supple nontender. Passive range of motion is acceptable to due postoperative pain. No calf tenderness. Sensation to light touch is grossly intact distally. Motor function distally is 5/5. No foot drop. 2+ dorsalis pedis pulse. Capillary refill is less than 2 seconds. No cyanosis.    Primary Orthopedic Assessment:  • s/p LEFT POSTERIOR total hip replacement    Secondary  Orthopedic Assessment(s):   •     Secondary  Medical Assessment(s):   •     Plan:   • DVT prophylaxis as prescribed, including use of compression devices and ankle pumps  • Continue physical therapy  • Weightbearing as tolerated of the left lower extremity with assistance of a walker  • Incentive spirometry encouraged  • Pain control as clinically indicated  • Posterior hip precautions reviewed with patient  • Discharge planning – anticipated discharge is Home

## 2023-10-25 NOTE — PROGRESS NOTE ADULT - SUBJECTIVE AND OBJECTIVE BOX
EVERETT GEORGE    [id number]    68y      Male    Patient is a 68y old  Male who presents with a chief complaint of left RABIA (24 Oct 2023 08:25)      INTERVAL HPI/OVERNIGHT EVENTS:    Patient is doing ok, pain is well managed with pain medications, has no fever, chills, chest pain, SOB, nausea, vomiting, constipation.       Vital Signs Last 24 Hrs  T(C): 36.3 (25 Oct 2023 10:01), Max: 37.2 (25 Oct 2023 06:00)  T(F): 97.4 (25 Oct 2023 10:01), Max: 98.9 (25 Oct 2023 06:00)  HR: 69 (25 Oct 2023 10:01) (58 - 71)  BP: 127/79 (25 Oct 2023 10:01) (123/75 - 163/97)  BP(mean): 110 (24 Oct 2023 13:15) (91 - 110)  RR: 18 (25 Oct 2023 10:01) (16 - 19)  SpO2: 94% (25 Oct 2023 10:01) (92% - 100%)    Parameters below as of 25 Oct 2023 10:01  Patient On (Oxygen Delivery Method): room air        PHYSICAL EXAM:  GENERAL: Middle age male looking comfortable   HEENT: PERRL, +EOMI  NECK: soft, Supple, No JVD  CHEST/LUNG: Clear to auscultate bilaterally; No wheezing  HEART: S1S2+, Regular rate and rhythm; No murmurs  ABDOMEN: Soft, Nontender, Nondistended; Bowel sounds present  EXTREMITIES:  1+ Peripheral Pulses, No edema, left hip Joint area cover with dressing, no bleeding or soaking   SKIN: No rashes or lesions  NEURO: AAOX3  PSYCH: normal mood    LABS:                        11.3   10.60 )-----------( 188      ( 25 Oct 2023 05:43 )             35.8     10-25    134<L>  |  101  |  22.4<H>  ----------------------------<  228<H>  4.4   |  24.0  |  1.61<H>    Ca    8.2<L>      25 Oct 2023 05:43      PT/INR - ( 24 Oct 2023 06:55 )   PT: 12.1 sec;   INR: 1.09 ratio         PTT - ( 24 Oct 2023 06:55 )  PTT:40.9 sec        I&O's Summary    24 Oct 2023 07:01  -  25 Oct 2023 07:00  --------------------------------------------------------  IN: 1550 mL / OUT: 1000 mL / NET: 550 mL        MEDICATIONS  (STANDING):  acetaminophen     Tablet .. 975 milliGRAM(s) Oral every 8 hours  allopurinol 300 milliGRAM(s) Oral daily  aMIOdarone    Tablet 200 milliGRAM(s) Oral daily  amLODIPine   Tablet 10 milliGRAM(s) Oral daily  aspirin enteric coated 81 milliGRAM(s) Oral two times a day  atorvastatin 20 milliGRAM(s) Oral at bedtime  dextrose 5%. 1000 milliLiter(s) (100 mL/Hr) IV Continuous <Continuous>  dextrose 5%. 1000 milliLiter(s) (50 mL/Hr) IV Continuous <Continuous>  dextrose 50% Injectable 12.5 Gram(s) IV Push once  dextrose 50% Injectable 25 Gram(s) IV Push once  dextrose 50% Injectable 25 Gram(s) IV Push once  DULoxetine 60 milliGRAM(s) Oral daily  glucagon  Injectable 1 milliGRAM(s) IntraMuscular once  influenza  Vaccine (HIGH DOSE) 0.7 milliLiter(s) IntraMuscular once  insulin lispro (ADMELOG) corrective regimen sliding scale   SubCutaneous at bedtime  insulin lispro (ADMELOG) corrective regimen sliding scale   SubCutaneous three times a day before meals  ketorolac   Injectable 15 milliGRAM(s) IV Push every 6 hours  levothyroxine 75 MICROGram(s) Oral daily  polyethylene glycol 3350 17 Gram(s) Oral at bedtime  rivaroxaban 10 milliGRAM(s) Oral daily  senna 2 Tablet(s) Oral at bedtime  sodium chloride 0.9%. 1000 milliLiter(s) (125 mL/Hr) IV Continuous <Continuous>  tamsulosin 0.4 milliGRAM(s) Oral at bedtime    MEDICATIONS  (PRN):  aluminum hydroxide/magnesium hydroxide/simethicone Suspension 30 milliLiter(s) Oral four times a day PRN Indigestion  dextrose Oral Gel 15 Gram(s) Oral once PRN Blood Glucose LESS THAN 70 milliGRAM(s)/deciliter  HYDROmorphone   Tablet 4 milliGRAM(s) Oral every 4 hours PRN Severe Pain (7 - 10)  magnesium hydroxide Suspension 30 milliLiter(s) Oral daily PRN Constipation  ondansetron Injectable 4 milliGRAM(s) IV Push every 6 hours PRN Nausea and/or Vomiting  oxyCODONE    IR 10 milliGRAM(s) Oral every 3 hours PRN Moderate Pain (4 - 6)  oxyCODONE    IR 5 milliGRAM(s) Oral every 3 hours PRN Mild Pain (1 - 3)

## 2023-10-25 NOTE — PROGRESS NOTE ADULT - ASSESSMENT
67 y/o male with H of asthma, JASON on CPAP, HTN, HLD, atrial fibrillation (on Xarelto), diabetes, liver CA s/p radiation, hypothyroidism, kidney CA s/p right nephrectomy 2022 and osteoarthritis, left hip pain. States he started to have left hip pain a couple of years ago that has gotten progressively worse, came in here for elective  left posterior hip replacement on 10/24/23 with Dr. Ojeda s/p procedure.     Plan:     OA of the Left hip s/p THR post op day 01:     - post op no complications  - VS stable  - abx per ortho   - opiate induced constipation regimen   - encouraging incentive spirometry   -c/w local wound care per ortho   -DVT prophylaxis and Pain meds as per Ortho team   -PT/OT and weight bearing per ortho     Hx of Afib: Will continue with amiodarone 200 mg once a day, Once ok from Ortho will resume Xarelto 20 mg once a day (in the evening)    Gout: Will continue allopurinol 300 mg once a day    Hypothyroidism: Will continue with Levothyroxine 75 mcg once a day    HTN : Will continue with amlodipine 10 mg once a day and Ramipril 10 mg once a day    DM: Will do FS monitoring and coverage insulin, will resume glimepiride upon discharge     HLD: Will continue with atorvastatin 20 mg once a day    BPH: will continue with Flomax 0.4 mg once a day, will monitor for retention.     LEE on CKD stage 2 : creatinine went up from 1.23 to 1.6, getting IV fluids, will BMP repeat this afternoon    Anxiety: will continue with DULoxetine 60 mg 2 times a day    JASON: Home CPAP machine.     If repeat creatinine is improving from this am value then would be ok from medicine point of view for discharge pending PT and Ortho eval.  She need to have BMP checked by her Nephrologist or PMD in 1 week.

## 2023-10-26 VITALS
RESPIRATION RATE: 18 BRPM | DIASTOLIC BLOOD PRESSURE: 69 MMHG | SYSTOLIC BLOOD PRESSURE: 109 MMHG | TEMPERATURE: 98 F | HEART RATE: 71 BPM | OXYGEN SATURATION: 92 %

## 2023-10-26 LAB
ANION GAP SERPL CALC-SCNC: 10 MMOL/L — SIGNIFICANT CHANGE UP (ref 5–17)
ANION GAP SERPL CALC-SCNC: 10 MMOL/L — SIGNIFICANT CHANGE UP (ref 5–17)
BUN SERPL-MCNC: 20.6 MG/DL — HIGH (ref 8–20)
BUN SERPL-MCNC: 20.6 MG/DL — HIGH (ref 8–20)
CALCIUM SERPL-MCNC: 8.6 MG/DL — SIGNIFICANT CHANGE UP (ref 8.4–10.5)
CALCIUM SERPL-MCNC: 8.6 MG/DL — SIGNIFICANT CHANGE UP (ref 8.4–10.5)
CHLORIDE SERPL-SCNC: 99 MMOL/L — SIGNIFICANT CHANGE UP (ref 96–108)
CHLORIDE SERPL-SCNC: 99 MMOL/L — SIGNIFICANT CHANGE UP (ref 96–108)
CO2 SERPL-SCNC: 25 MMOL/L — SIGNIFICANT CHANGE UP (ref 22–29)
CO2 SERPL-SCNC: 25 MMOL/L — SIGNIFICANT CHANGE UP (ref 22–29)
CREAT SERPL-MCNC: 1.38 MG/DL — HIGH (ref 0.5–1.3)
CREAT SERPL-MCNC: 1.38 MG/DL — HIGH (ref 0.5–1.3)
EGFR: 56 ML/MIN/1.73M2 — LOW
EGFR: 56 ML/MIN/1.73M2 — LOW
GLUCOSE BLDC GLUCOMTR-MCNC: 284 MG/DL — HIGH (ref 70–99)
GLUCOSE BLDC GLUCOMTR-MCNC: 284 MG/DL — HIGH (ref 70–99)
GLUCOSE BLDC GLUCOMTR-MCNC: 359 MG/DL — HIGH (ref 70–99)
GLUCOSE BLDC GLUCOMTR-MCNC: 359 MG/DL — HIGH (ref 70–99)
GLUCOSE SERPL-MCNC: 261 MG/DL — HIGH (ref 70–99)
GLUCOSE SERPL-MCNC: 261 MG/DL — HIGH (ref 70–99)
POTASSIUM SERPL-MCNC: 4.8 MMOL/L — SIGNIFICANT CHANGE UP (ref 3.5–5.3)
POTASSIUM SERPL-MCNC: 4.8 MMOL/L — SIGNIFICANT CHANGE UP (ref 3.5–5.3)
POTASSIUM SERPL-SCNC: 4.8 MMOL/L — SIGNIFICANT CHANGE UP (ref 3.5–5.3)
POTASSIUM SERPL-SCNC: 4.8 MMOL/L — SIGNIFICANT CHANGE UP (ref 3.5–5.3)
SODIUM SERPL-SCNC: 134 MMOL/L — LOW (ref 135–145)
SODIUM SERPL-SCNC: 134 MMOL/L — LOW (ref 135–145)

## 2023-10-26 PROCEDURE — 99232 SBSQ HOSP IP/OBS MODERATE 35: CPT

## 2023-10-26 RX ORDER — SODIUM CHLORIDE 9 MG/ML
1000 INJECTION INTRAMUSCULAR; INTRAVENOUS; SUBCUTANEOUS
Refills: 0 | Status: DISCONTINUED | OUTPATIENT
Start: 2023-10-26 | End: 2023-10-26

## 2023-10-26 RX ADMIN — Medication 5: at 07:51

## 2023-10-26 RX ADMIN — Medication 975 MILLIGRAM(S): at 06:29

## 2023-10-26 RX ADMIN — LISINOPRIL 40 MILLIGRAM(S): 2.5 TABLET ORAL at 06:29

## 2023-10-26 RX ADMIN — Medication 81 MILLIGRAM(S): at 06:29

## 2023-10-26 RX ADMIN — AMLODIPINE BESYLATE 10 MILLIGRAM(S): 2.5 TABLET ORAL at 06:28

## 2023-10-26 RX ADMIN — DULOXETINE HYDROCHLORIDE 60 MILLIGRAM(S): 30 CAPSULE, DELAYED RELEASE ORAL at 11:18

## 2023-10-26 RX ADMIN — CELECOXIB 200 MILLIGRAM(S): 200 CAPSULE ORAL at 06:29

## 2023-10-26 RX ADMIN — AMIODARONE HYDROCHLORIDE 200 MILLIGRAM(S): 400 TABLET ORAL at 06:29

## 2023-10-26 RX ADMIN — HYDROMORPHONE HYDROCHLORIDE 4 MILLIGRAM(S): 2 INJECTION INTRAMUSCULAR; INTRAVENOUS; SUBCUTANEOUS at 12:52

## 2023-10-26 RX ADMIN — Medication 3: at 11:25

## 2023-10-26 RX ADMIN — Medication 75 MICROGRAM(S): at 06:29

## 2023-10-26 RX ADMIN — Medication 300 MILLIGRAM(S): at 11:19

## 2023-10-26 RX ADMIN — HYDROMORPHONE HYDROCHLORIDE 4 MILLIGRAM(S): 2 INJECTION INTRAMUSCULAR; INTRAVENOUS; SUBCUTANEOUS at 11:52

## 2023-10-26 RX ADMIN — RIVAROXABAN 10 MILLIGRAM(S): KIT at 11:18

## 2023-10-26 RX ADMIN — OXYCODONE HYDROCHLORIDE 10 MILLIGRAM(S): 5 TABLET ORAL at 06:28

## 2023-10-26 NOTE — PROGRESS NOTE ADULT - SUBJECTIVE AND OBJECTIVE BOX
EVERETT GEORGE    [id number]    68y      Male    Patient is a 68y old  Male who presents with a chief complaint of left RABIA (24 Oct 2023 08:25)      INTERVAL HPI/OVERNIGHT EVENTS:    Patient is doing ok, pain is well managed with pain medications, has no fever, chills, chest pain, SOB, nausea, vomiting, constipation.       Vital Signs Last 24 Hrs  T(C): 36.6 (26 Oct 2023 09:23), Max: 37.1 (25 Oct 2023 20:00)  T(F): 97.9 (26 Oct 2023 09:23), Max: 98.8 (25 Oct 2023 20:00)  HR: 71 (26 Oct 2023 09:23) (69 - 71)  BP: 109/69 (26 Oct 2023 09:23) (109/69 - 153/84)  RR: 18 (26 Oct 2023 09:23) (16 - 18)  SpO2: 92% (26 Oct 2023 09:23) (92% - 96%)    Parameters below as of 26 Oct 2023 09:23  Patient On (Oxygen Delivery Method): room air        PHYSICAL EXAM:    GENERAL: Middle age male looking comfortable   HEENT: PERRL, +EOMI  NECK: soft, Supple, No JVD  CHEST/LUNG: Clear to auscultate bilaterally; No wheezing  HEART: S1S2+, Regular rate and rhythm; No murmurs  ABDOMEN: Soft, Nontender, Nondistended; Bowel sounds present  EXTREMITIES:  1+ Peripheral Pulses, No edema, left hip Joint area cover with dressing, no bleeding or soaking   SKIN: No rashes or lesions  NEURO: AAOX3  PSYCH: normal mood    LABS:                        11.3   10.60 )-----------( 188      ( 25 Oct 2023 05:43 )             35.8     10-25    137  |  102  |  23.4<H>  ----------------------------<  182<H>  4.5   |  26.0  |  1.70<H>    Ca    8.4      25 Oct 2023 11:25          I&O's Summary    25 Oct 2023 07:01  -  26 Oct 2023 07:00  --------------------------------------------------------  IN: 0 mL / OUT: 400 mL / NET: -400 mL        MEDICATIONS  (STANDING):  acetaminophen     Tablet .. 975 milliGRAM(s) Oral every 8 hours  allopurinol 300 milliGRAM(s) Oral daily  aMIOdarone    Tablet 200 milliGRAM(s) Oral daily  amLODIPine   Tablet 10 milliGRAM(s) Oral daily  aspirin enteric coated 81 milliGRAM(s) Oral two times a day  atorvastatin 20 milliGRAM(s) Oral at bedtime  celecoxib 200 milliGRAM(s) Oral every 12 hours  dextrose 5%. 1000 milliLiter(s) (50 mL/Hr) IV Continuous <Continuous>  dextrose 5%. 1000 milliLiter(s) (100 mL/Hr) IV Continuous <Continuous>  dextrose 50% Injectable 25 Gram(s) IV Push once  dextrose 50% Injectable 12.5 Gram(s) IV Push once  dextrose 50% Injectable 25 Gram(s) IV Push once  DULoxetine 60 milliGRAM(s) Oral daily  glucagon  Injectable 1 milliGRAM(s) IntraMuscular once  influenza  Vaccine (HIGH DOSE) 0.7 milliLiter(s) IntraMuscular once  insulin lispro (ADMELOG) corrective regimen sliding scale   SubCutaneous at bedtime  insulin lispro (ADMELOG) corrective regimen sliding scale   SubCutaneous three times a day before meals  levothyroxine 75 MICROGram(s) Oral daily  lisinopril 40 milliGRAM(s) Oral daily  polyethylene glycol 3350 17 Gram(s) Oral at bedtime  rivaroxaban 10 milliGRAM(s) Oral daily  senna 2 Tablet(s) Oral at bedtime  sodium chloride 0.9%. 1000 milliLiter(s) (125 mL/Hr) IV Continuous <Continuous>  tamsulosin 0.4 milliGRAM(s) Oral at bedtime    MEDICATIONS  (PRN):  aluminum hydroxide/magnesium hydroxide/simethicone Suspension 30 milliLiter(s) Oral four times a day PRN Indigestion  bisacodyl Suppository 10 milliGRAM(s) Rectal once PRN Constipation  dextrose Oral Gel 15 Gram(s) Oral once PRN Blood Glucose LESS THAN 70 milliGRAM(s)/deciliter  HYDROmorphone   Tablet 4 milliGRAM(s) Oral every 4 hours PRN Severe Pain (7 - 10)  magnesium hydroxide Suspension 30 milliLiter(s) Oral daily PRN Constipation  ondansetron Injectable 4 milliGRAM(s) IV Push every 6 hours PRN Nausea and/or Vomiting  oxyCODONE    IR 10 milliGRAM(s) Oral every 3 hours PRN Moderate Pain (4 - 6)  oxyCODONE    IR 5 milliGRAM(s) Oral every 3 hours PRN Mild Pain (1 - 3)

## 2023-10-26 NOTE — PROGRESS NOTE ADULT - ASSESSMENT
67 y/o male with H of asthma, JASON on CPAP, HTN, HLD, atrial fibrillation (on Xarelto), diabetes, liver CA s/p radiation, hypothyroidism, kidney CA s/p right nephrectomy 2022 and osteoarthritis, left hip pain. States he started to have left hip pain a couple of years ago that has gotten progressively worse, came in here for elective  left posterior hip replacement on 10/24/23 with Dr. Ojeda s/p procedure.     Plan:     OA of the Left hip s/p THR post op day 02:     - post op no complications  - VS stable  - abx per ortho   - opiate induced constipation regimen   - encouraging incentive spirometry   -c/w local wound care per ortho   -DVT prophylaxis and Pain meds as per Ortho team   -PT/OT and weight bearing per ortho     Hx of Afib: Will continue with amiodarone 200 mg once a day, Once ok from Ortho will resume Xarelto 20 mg once a day (in the evening)    Gout: Will continue allopurinol 300 mg once a day    Hypothyroidism: Will continue with Levothyroxine 75 mcg once a day    HTN : Will continue with amlodipine 10 mg once a day and Ramipril 10 mg once a day    DM: Will do FS monitoring and coverage insulin, will resume glimepiride upon discharge     HLD: Will continue with atorvastatin 20 mg once a day    BPH: will continue with Flomax 0.4 mg once a day, will monitor for retention.     LEE on CKD stage 2 : creatinine went up from 1.23 to 1.6, getting IV fluids, will BMP repeat this afternoon    Anxiety: will continue with DULoxetine 60 mg 2 times a day    JASON: Home CPAP machine.     If repeat creatinine is improving then would be ok from medicine point of view for discharge pending PT and Ortho eval.  She need to have BMP checked by her Nephrologist or PMD in 1 week.     	 67 y/o male with H of asthma, JASON on CPAP, HTN, HLD, atrial fibrillation (on Xarelto), diabetes, liver CA s/p radiation, hypothyroidism, kidney CA s/p right nephrectomy 2022 and osteoarthritis, left hip pain. States he started to have left hip pain a couple of years ago that has gotten progressively worse, came in here for elective  left posterior hip replacement on 10/24/23 with Dr. Ojeda s/p procedure.     Plan:     OA of the Left hip s/p THR post op day 02:     - post op no complications  - VS stable  - abx per ortho   - opiate induced constipation regimen   - encouraging incentive spirometry   -c/w local wound care per ortho   -DVT prophylaxis and Pain meds as per Ortho team   -PT/OT and weight bearing per ortho     Hx of Afib: Will continue with amiodarone 200 mg once a day, Once ok from Ortho will resume Xarelto 20 mg once a day (in the evening)    Gout: Will continue allopurinol 300 mg once a day    Hypothyroidism: Will continue with Levothyroxine 75 mcg once a day    HTN : Will continue with amlodipine 10 mg once a day and Ramipril 10 mg once a day    DM: Will do FS monitoring and coverage insulin, will resume glimepiride upon discharge     HLD: Will continue with atorvastatin 20 mg once a day    BPH: will continue with Flomax 0.4 mg once a day, will monitor for retention.     LEE on CKD stage 2 : creatinine went up from 1.23 to 1.7, IV fluids, will BMP repeat this afternoon    Anxiety: will continue with DULoxetine 60 mg 2 times a day    JASON: Home CPAP machine.     If repeat creatinine is improving then would be ok from medicine point of view for discharge pending PT and Ortho eval.  She need to have BMP checked by her Nephrologist or PMD in 1 week.

## 2023-10-26 NOTE — ADVANCED PRACTICE NURSE CONSULT - ASSESSMENT
went to see pt in am pt is a+ox3 co pain. rn was notified about pt pain. he has diabetes fr a few yrs and was on insulin but dr abraham put him on glimipride. he test his bg about 3xdaily and get numbers in the 100s. he was using the viktoriya sensor. pt was educated about the importance of bg control post surgery. and the effect of pain on bg. pt verbalized understanding. pt was invited to [facility] diabetes club on november 5 2023 yearly calendar provided

## 2023-10-26 NOTE — PROGRESS NOTE ADULT - SUBJECTIVE AND OBJECTIVE BOX
EVERETT GEORGE    [id number]    History: Patient is status post left posterior total hip arthroplasty POD # 1. Patient is doing well and is comfortable. The patient's pain is controlled using the prescribed pain medications. Patient reports dizziness with PT yesterday, reports symptoms are fully resolved. The patient is participating in physical therapy. Denies CP, SOB, dizziness, HA, fever/chills, numbness or tingling. No new complaints.    Vital Signs Last 24 Hrs  T(C): 36.7 (26 Oct 2023 05:30), Max: 37.1 (25 Oct 2023 20:00)  T(F): 98 (26 Oct 2023 05:30), Max: 98.8 (25 Oct 2023 20:00)  HR: 70 (26 Oct 2023 05:30) (69 - 70)  BP: 153/84 (26 Oct 2023 05:30) (124/73 - 153/84)  BP(mean): --  RR: 18 (26 Oct 2023 05:30) (16 - 18)  SpO2: 93% (26 Oct 2023 05:30) (93% - 96%)    Parameters below as of 26 Oct 2023 05:30  Patient On (Oxygen Delivery Method): room air                              11.3   10.60 )-----------( 188      ( 25 Oct 2023 05:43 )             35.8     10-25    137  |  102  |  23.4<H>  ----------------------------<  182<H>  4.5   |  26.0  |  1.70<H>    Ca    8.4      25 Oct 2023 11:25        General: Alert, awake, NAD, abduction pillow in place  Physical exam: The left hip dressing is clean, dry and intact. No drainage, discharge, erythema, blistering or ecchymosis.   The calf is supple nontender b/l. SILT. +EHL/FHL/AT/GC. No foot drop. DP pulse intact. BCR      Plan:   - DVT prophylaxis as prescribed, including use of compression devices and ankle pumps  -  Continue physical therapy  - Weight bearing status of surgical extremity: WBAT  - Incentive spirometry encouraged  - Pain control as clinically indicated  - Posterior hip precautions reviewed with patient  - Discharge planning – anticipated discharge is Home     EVERETT GEORGE    [id number]    History: Patient is status post left posterior total hip arthroplasty POD # 2. Patient is doing well and is comfortable. The patient's pain is controlled using the prescribed pain medications. Patient reports dizziness with PT yesterday, reports symptoms are fully resolved. The patient is participating in physical therapy. Denies CP, SOB, dizziness, HA, fever/chills, numbness or tingling. No new complaints.    Vital Signs Last 24 Hrs  T(C): 36.7 (26 Oct 2023 05:30), Max: 37.1 (25 Oct 2023 20:00)  T(F): 98 (26 Oct 2023 05:30), Max: 98.8 (25 Oct 2023 20:00)  HR: 70 (26 Oct 2023 05:30) (69 - 70)  BP: 153/84 (26 Oct 2023 05:30) (124/73 - 153/84)  BP(mean): --  RR: 18 (26 Oct 2023 05:30) (16 - 18)  SpO2: 93% (26 Oct 2023 05:30) (93% - 96%)    Parameters below as of 26 Oct 2023 05:30  Patient On (Oxygen Delivery Method): room air                              11.3   10.60 )-----------( 188      ( 25 Oct 2023 05:43 )             35.8     10-25    137  |  102  |  23.4<H>  ----------------------------<  182<H>  4.5   |  26.0  |  1.70<H>    Ca    8.4      25 Oct 2023 11:25        General: Alert, awake, NAD, abduction pillow in place  Physical exam: The left hip dressing is clean, dry and intact. No drainage, discharge, erythema, blistering or ecchymosis.   The calf is supple nontender b/l. SILT. +EHL/FHL/AT/GC. No foot drop. DP pulse intact. BCR      Plan:   - DVT prophylaxis as prescribed, including use of compression devices and ankle pumps  -  Continue physical therapy  - Weight bearing status of surgical extremity: WBAT  - Incentive spirometry encouraged  - Pain control as clinically indicated  - Posterior hip precautions reviewed with patient  - Discharge planning – anticipated discharge is Home

## 2023-10-27 ENCOUNTER — INPATIENT (INPATIENT)
Facility: HOSPITAL | Age: 68
LOS: 3 days | Discharge: NURSING, REHAB OR LONG-TERM CARE | DRG: 554 | End: 2023-10-31
Attending: HOSPITALIST | Admitting: INTERNAL MEDICINE
Payer: MEDICARE

## 2023-10-27 ENCOUNTER — NON-APPOINTMENT (OUTPATIENT)
Age: 68
End: 2023-10-27

## 2023-10-27 ENCOUNTER — RX RENEWAL (OUTPATIENT)
Age: 68
End: 2023-10-27

## 2023-10-27 VITALS
WEIGHT: 315 LBS | HEIGHT: 69 IN | HEART RATE: 63 BPM | TEMPERATURE: 98 F | DIASTOLIC BLOOD PRESSURE: 82 MMHG | RESPIRATION RATE: 18 BRPM | OXYGEN SATURATION: 95 % | SYSTOLIC BLOOD PRESSURE: 124 MMHG

## 2023-10-27 DIAGNOSIS — Z96.652 PRESENCE OF LEFT ARTIFICIAL KNEE JOINT: Chronic | ICD-10-CM

## 2023-10-27 DIAGNOSIS — Z90.5 ACQUIRED ABSENCE OF KIDNEY: Chronic | ICD-10-CM

## 2023-10-27 DIAGNOSIS — Z98.890 OTHER SPECIFIED POSTPROCEDURAL STATES: Chronic | ICD-10-CM

## 2023-10-27 DIAGNOSIS — Z98.82 BREAST IMPLANT STATUS: Chronic | ICD-10-CM

## 2023-10-27 DIAGNOSIS — Z98.49 CATARACT EXTRACTION STATUS, UNSPECIFIED EYE: Chronic | ICD-10-CM

## 2023-10-27 LAB
ALBUMIN SERPL ELPH-MCNC: 3.3 G/DL — SIGNIFICANT CHANGE UP (ref 3.3–5.2)
ALBUMIN SERPL ELPH-MCNC: 3.3 G/DL — SIGNIFICANT CHANGE UP (ref 3.3–5.2)
ALP SERPL-CCNC: 99 U/L — SIGNIFICANT CHANGE UP (ref 40–120)
ALP SERPL-CCNC: 99 U/L — SIGNIFICANT CHANGE UP (ref 40–120)
ALT FLD-CCNC: 18 U/L — SIGNIFICANT CHANGE UP
ALT FLD-CCNC: 18 U/L — SIGNIFICANT CHANGE UP
ANION GAP SERPL CALC-SCNC: 10 MMOL/L — SIGNIFICANT CHANGE UP (ref 5–17)
ANION GAP SERPL CALC-SCNC: 10 MMOL/L — SIGNIFICANT CHANGE UP (ref 5–17)
AST SERPL-CCNC: 37 U/L — SIGNIFICANT CHANGE UP
AST SERPL-CCNC: 37 U/L — SIGNIFICANT CHANGE UP
BASOPHILS # BLD AUTO: 0.05 K/UL — SIGNIFICANT CHANGE UP (ref 0–0.2)
BASOPHILS # BLD AUTO: 0.05 K/UL — SIGNIFICANT CHANGE UP (ref 0–0.2)
BASOPHILS NFR BLD AUTO: 0.5 % — SIGNIFICANT CHANGE UP (ref 0–2)
BASOPHILS NFR BLD AUTO: 0.5 % — SIGNIFICANT CHANGE UP (ref 0–2)
BILIRUB SERPL-MCNC: 0.4 MG/DL — SIGNIFICANT CHANGE UP (ref 0.4–2)
BILIRUB SERPL-MCNC: 0.4 MG/DL — SIGNIFICANT CHANGE UP (ref 0.4–2)
BUN SERPL-MCNC: 16.6 MG/DL — SIGNIFICANT CHANGE UP (ref 8–20)
BUN SERPL-MCNC: 16.6 MG/DL — SIGNIFICANT CHANGE UP (ref 8–20)
CALCIUM SERPL-MCNC: 9.2 MG/DL — SIGNIFICANT CHANGE UP (ref 8.4–10.5)
CALCIUM SERPL-MCNC: 9.2 MG/DL — SIGNIFICANT CHANGE UP (ref 8.4–10.5)
CHLORIDE SERPL-SCNC: 97 MMOL/L — SIGNIFICANT CHANGE UP (ref 96–108)
CHLORIDE SERPL-SCNC: 97 MMOL/L — SIGNIFICANT CHANGE UP (ref 96–108)
CO2 SERPL-SCNC: 26 MMOL/L — SIGNIFICANT CHANGE UP (ref 22–29)
CO2 SERPL-SCNC: 26 MMOL/L — SIGNIFICANT CHANGE UP (ref 22–29)
CREAT SERPL-MCNC: 1.17 MG/DL — SIGNIFICANT CHANGE UP (ref 0.5–1.3)
CREAT SERPL-MCNC: 1.17 MG/DL — SIGNIFICANT CHANGE UP (ref 0.5–1.3)
EGFR: 69 ML/MIN/1.73M2 — SIGNIFICANT CHANGE UP
EGFR: 69 ML/MIN/1.73M2 — SIGNIFICANT CHANGE UP
EOSINOPHIL # BLD AUTO: 0.38 K/UL — SIGNIFICANT CHANGE UP (ref 0–0.5)
EOSINOPHIL # BLD AUTO: 0.38 K/UL — SIGNIFICANT CHANGE UP (ref 0–0.5)
EOSINOPHIL NFR BLD AUTO: 3.8 % — SIGNIFICANT CHANGE UP (ref 0–6)
EOSINOPHIL NFR BLD AUTO: 3.8 % — SIGNIFICANT CHANGE UP (ref 0–6)
GLUCOSE BLDC GLUCOMTR-MCNC: 174 MG/DL — HIGH (ref 70–99)
GLUCOSE BLDC GLUCOMTR-MCNC: 174 MG/DL — HIGH (ref 70–99)
GLUCOSE BLDC GLUCOMTR-MCNC: 175 MG/DL — HIGH (ref 70–99)
GLUCOSE BLDC GLUCOMTR-MCNC: 175 MG/DL — HIGH (ref 70–99)
GLUCOSE SERPL-MCNC: 244 MG/DL — HIGH (ref 70–99)
GLUCOSE SERPL-MCNC: 244 MG/DL — HIGH (ref 70–99)
HCT VFR BLD CALC: 35 % — LOW (ref 39–50)
HCT VFR BLD CALC: 35 % — LOW (ref 39–50)
HGB BLD-MCNC: 11.3 G/DL — LOW (ref 13–17)
HGB BLD-MCNC: 11.3 G/DL — LOW (ref 13–17)
IMM GRANULOCYTES NFR BLD AUTO: 0.7 % — SIGNIFICANT CHANGE UP (ref 0–0.9)
IMM GRANULOCYTES NFR BLD AUTO: 0.7 % — SIGNIFICANT CHANGE UP (ref 0–0.9)
LYMPHOCYTES # BLD AUTO: 0.68 K/UL — LOW (ref 1–3.3)
LYMPHOCYTES # BLD AUTO: 0.68 K/UL — LOW (ref 1–3.3)
LYMPHOCYTES # BLD AUTO: 6.8 % — LOW (ref 13–44)
LYMPHOCYTES # BLD AUTO: 6.8 % — LOW (ref 13–44)
MCHC RBC-ENTMCNC: 28 PG — SIGNIFICANT CHANGE UP (ref 27–34)
MCHC RBC-ENTMCNC: 28 PG — SIGNIFICANT CHANGE UP (ref 27–34)
MCHC RBC-ENTMCNC: 32.3 GM/DL — SIGNIFICANT CHANGE UP (ref 32–36)
MCHC RBC-ENTMCNC: 32.3 GM/DL — SIGNIFICANT CHANGE UP (ref 32–36)
MCV RBC AUTO: 86.6 FL — SIGNIFICANT CHANGE UP (ref 80–100)
MCV RBC AUTO: 86.6 FL — SIGNIFICANT CHANGE UP (ref 80–100)
MONOCYTES # BLD AUTO: 0.56 K/UL — SIGNIFICANT CHANGE UP (ref 0–0.9)
MONOCYTES # BLD AUTO: 0.56 K/UL — SIGNIFICANT CHANGE UP (ref 0–0.9)
MONOCYTES NFR BLD AUTO: 5.6 % — SIGNIFICANT CHANGE UP (ref 2–14)
MONOCYTES NFR BLD AUTO: 5.6 % — SIGNIFICANT CHANGE UP (ref 2–14)
NEUTROPHILS # BLD AUTO: 8.32 K/UL — HIGH (ref 1.8–7.4)
NEUTROPHILS # BLD AUTO: 8.32 K/UL — HIGH (ref 1.8–7.4)
NEUTROPHILS NFR BLD AUTO: 82.6 % — HIGH (ref 43–77)
NEUTROPHILS NFR BLD AUTO: 82.6 % — HIGH (ref 43–77)
PLATELET # BLD AUTO: 226 K/UL — SIGNIFICANT CHANGE UP (ref 150–400)
PLATELET # BLD AUTO: 226 K/UL — SIGNIFICANT CHANGE UP (ref 150–400)
POTASSIUM SERPL-MCNC: 4.4 MMOL/L — SIGNIFICANT CHANGE UP (ref 3.5–5.3)
POTASSIUM SERPL-MCNC: 4.4 MMOL/L — SIGNIFICANT CHANGE UP (ref 3.5–5.3)
POTASSIUM SERPL-SCNC: 4.4 MMOL/L — SIGNIFICANT CHANGE UP (ref 3.5–5.3)
POTASSIUM SERPL-SCNC: 4.4 MMOL/L — SIGNIFICANT CHANGE UP (ref 3.5–5.3)
PROT SERPL-MCNC: 7.4 G/DL — SIGNIFICANT CHANGE UP (ref 6.6–8.7)
PROT SERPL-MCNC: 7.4 G/DL — SIGNIFICANT CHANGE UP (ref 6.6–8.7)
RBC # BLD: 4.04 M/UL — LOW (ref 4.2–5.8)
RBC # BLD: 4.04 M/UL — LOW (ref 4.2–5.8)
RBC # FLD: 16.9 % — HIGH (ref 10.3–14.5)
RBC # FLD: 16.9 % — HIGH (ref 10.3–14.5)
SODIUM SERPL-SCNC: 132 MMOL/L — LOW (ref 135–145)
SODIUM SERPL-SCNC: 132 MMOL/L — LOW (ref 135–145)
WBC # BLD: 10.06 K/UL — SIGNIFICANT CHANGE UP (ref 3.8–10.5)
WBC # BLD: 10.06 K/UL — SIGNIFICANT CHANGE UP (ref 3.8–10.5)
WBC # FLD AUTO: 10.06 K/UL — SIGNIFICANT CHANGE UP (ref 3.8–10.5)
WBC # FLD AUTO: 10.06 K/UL — SIGNIFICANT CHANGE UP (ref 3.8–10.5)

## 2023-10-27 PROCEDURE — 99223 1ST HOSP IP/OBS HIGH 75: CPT

## 2023-10-27 RX ORDER — SODIUM CHLORIDE 9 MG/ML
1000 INJECTION, SOLUTION INTRAVENOUS
Refills: 0 | Status: DISCONTINUED | OUTPATIENT
Start: 2023-10-27 | End: 2023-10-31

## 2023-10-27 RX ORDER — LEVOTHYROXINE SODIUM 125 MCG
75 TABLET ORAL DAILY
Refills: 0 | Status: DISCONTINUED | OUTPATIENT
Start: 2023-10-27 | End: 2023-10-31

## 2023-10-27 RX ORDER — GLIMEPIRIDE 1 MG
2 TABLET ORAL AT BEDTIME
Refills: 0 | Status: DISCONTINUED | OUTPATIENT
Start: 2023-10-27 | End: 2023-10-31

## 2023-10-27 RX ORDER — DEXTROSE 50 % IN WATER 50 %
15 SYRINGE (ML) INTRAVENOUS ONCE
Refills: 0 | Status: DISCONTINUED | OUTPATIENT
Start: 2023-10-27 | End: 2023-10-31

## 2023-10-27 RX ORDER — RIVAROXABAN 15 MG-20MG
20 KIT ORAL
Refills: 0 | Status: DISCONTINUED | OUTPATIENT
Start: 2023-10-27 | End: 2023-10-27

## 2023-10-27 RX ORDER — PANTOPRAZOLE SODIUM 20 MG/1
40 TABLET, DELAYED RELEASE ORAL
Refills: 0 | Status: DISCONTINUED | OUTPATIENT
Start: 2023-10-27 | End: 2023-10-31

## 2023-10-27 RX ORDER — DEXTROSE 50 % IN WATER 50 %
25 SYRINGE (ML) INTRAVENOUS ONCE
Refills: 0 | Status: DISCONTINUED | OUTPATIENT
Start: 2023-10-27 | End: 2023-10-31

## 2023-10-27 RX ORDER — ACETAMINOPHEN 500 MG
650 TABLET ORAL EVERY 6 HOURS
Refills: 0 | Status: DISCONTINUED | OUTPATIENT
Start: 2023-10-27 | End: 2023-10-31

## 2023-10-27 RX ORDER — OXYCODONE HYDROCHLORIDE 5 MG/1
5 TABLET ORAL EVERY 6 HOURS
Refills: 0 | Status: DISCONTINUED | OUTPATIENT
Start: 2023-10-27 | End: 2023-10-31

## 2023-10-27 RX ORDER — SENNA PLUS 8.6 MG/1
2 TABLET ORAL AT BEDTIME
Refills: 0 | Status: DISCONTINUED | OUTPATIENT
Start: 2023-10-27 | End: 2023-10-31

## 2023-10-27 RX ORDER — GLUCAGON INJECTION, SOLUTION 0.5 MG/.1ML
1 INJECTION, SOLUTION SUBCUTANEOUS ONCE
Refills: 0 | Status: DISCONTINUED | OUTPATIENT
Start: 2023-10-27 | End: 2023-10-31

## 2023-10-27 RX ORDER — AMIODARONE HYDROCHLORIDE 400 MG/1
200 TABLET ORAL DAILY
Refills: 0 | Status: DISCONTINUED | OUTPATIENT
Start: 2023-10-27 | End: 2023-10-31

## 2023-10-27 RX ORDER — INSULIN LISPRO 100/ML
VIAL (ML) SUBCUTANEOUS
Refills: 0 | Status: DISCONTINUED | OUTPATIENT
Start: 2023-10-27 | End: 2023-10-31

## 2023-10-27 RX ORDER — ATORVASTATIN CALCIUM 80 MG/1
20 TABLET, FILM COATED ORAL AT BEDTIME
Refills: 0 | Status: DISCONTINUED | OUTPATIENT
Start: 2023-10-27 | End: 2023-10-31

## 2023-10-27 RX ORDER — LISINOPRIL 2.5 MG/1
40 TABLET ORAL DAILY
Refills: 0 | Status: DISCONTINUED | OUTPATIENT
Start: 2023-10-28 | End: 2023-10-31

## 2023-10-27 RX ORDER — INSULIN LISPRO 100/ML
VIAL (ML) SUBCUTANEOUS AT BEDTIME
Refills: 0 | Status: DISCONTINUED | OUTPATIENT
Start: 2023-10-27 | End: 2023-10-31

## 2023-10-27 RX ORDER — DULOXETINE HYDROCHLORIDE 30 MG/1
60 CAPSULE, DELAYED RELEASE ORAL
Refills: 0 | Status: DISCONTINUED | OUTPATIENT
Start: 2023-10-27 | End: 2023-10-31

## 2023-10-27 RX ORDER — ASPIRIN/CALCIUM CARB/MAGNESIUM 324 MG
81 TABLET ORAL
Refills: 0 | Status: DISCONTINUED | OUTPATIENT
Start: 2023-10-27 | End: 2023-10-31

## 2023-10-27 RX ORDER — ALLOPURINOL 300 MG
300 TABLET ORAL AT BEDTIME
Refills: 0 | Status: DISCONTINUED | OUTPATIENT
Start: 2023-10-27 | End: 2023-10-31

## 2023-10-27 RX ORDER — DEXTROSE 50 % IN WATER 50 %
12.5 SYRINGE (ML) INTRAVENOUS ONCE
Refills: 0 | Status: DISCONTINUED | OUTPATIENT
Start: 2023-10-27 | End: 2023-10-31

## 2023-10-27 RX ORDER — AMLODIPINE BESYLATE 2.5 MG/1
10 TABLET ORAL DAILY
Refills: 0 | Status: DISCONTINUED | OUTPATIENT
Start: 2023-10-27 | End: 2023-10-31

## 2023-10-27 RX ORDER — RIVAROXABAN 15 MG-20MG
10 KIT ORAL
Refills: 0 | Status: DISCONTINUED | OUTPATIENT
Start: 2023-10-27 | End: 2023-10-28

## 2023-10-27 RX ORDER — TAMSULOSIN HYDROCHLORIDE 0.4 MG/1
0.4 CAPSULE ORAL AT BEDTIME
Refills: 0 | Status: DISCONTINUED | OUTPATIENT
Start: 2023-10-27 | End: 2023-10-31

## 2023-10-27 RX ADMIN — SENNA PLUS 2 TABLET(S): 8.6 TABLET ORAL at 22:05

## 2023-10-27 RX ADMIN — TAMSULOSIN HYDROCHLORIDE 0.4 MILLIGRAM(S): 0.4 CAPSULE ORAL at 23:09

## 2023-10-27 RX ADMIN — ATORVASTATIN CALCIUM 20 MILLIGRAM(S): 80 TABLET, FILM COATED ORAL at 23:12

## 2023-10-27 RX ADMIN — Medication 2 MILLIGRAM(S): at 23:11

## 2023-10-27 RX ADMIN — Medication 300 MILLIGRAM(S): at 23:12

## 2023-10-27 NOTE — ED CDU PROVIDER INITIAL DAY NOTE - OBJECTIVE STATEMENT
67 y/o male with PMHx of HLD, renal cell carcinoma, DM  a-fib on Xarelto, morbid obesity, presents for rehab. Pt was discharged from [facility] yesterday s/p total hip replacement and reports that his pain in controlled on pain meds. Today pt was visited by [facility] PT who felt that his home to be too cluttered to safely ambulate with a walker and advised he present for rehab placement. Pt without any other complaints, no chest pain, no SOB, no fevers, no chills.

## 2023-10-27 NOTE — ED PROVIDER NOTE - OBJECTIVE STATEMENT
69 y/o male with PMHx of HLD, renal cell carcinoma, DM  a-fib on Xarelto, morbid obesity, presents for rehab. Pt was discharged from [facility] yesterday s/p total hip replacement and reports that his pain in controlled on pain meds. Today pt was visited by [facility] PT who felt that his home to be too cluttered to safely ambulate with a walker and advised he present for rehab placement. Pt without any other complaints, no chest pain, no SOB, no fevers, no chills.

## 2023-10-27 NOTE — ED ADULT NURSE NOTE - NSFALLRISKINTERV_ED_ALL_ED

## 2023-10-27 NOTE — ED ADULT NURSE NOTE - OBJECTIVE STATEMENT
Pt sent in for placement in Rehab s/p L hip replacement. Pt states Pt sent in for placement in Rehab s/p L hip replacement. Pt states PT came to his and said his home is unfit for recovery.

## 2023-10-27 NOTE — ED CDU PROVIDER INITIAL DAY NOTE - CLINICAL SUMMARY MEDICAL DECISION MAKING FREE TEXT BOX
67 y/o male with PMHx of HLD, renal cell carcinoma, DM  a-fib on Xarelto, morbid obesity. Was recently admitted for left hip replacement.  was sent to ED by his home PT for inability to ambulate at home due to clutter. Placed in obs for PT eval and likely TUCKER.

## 2023-10-27 NOTE — ED ADULT NURSE NOTE - CHIEF COMPLAINT QUOTE
Pt BIBA from home, PT visited today and said the living conditions are not safe for him, hip surgery 2 days ago at [facility], pt c/o pain to left hip, ambulates with cane and walker

## 2023-10-27 NOTE — ED PROVIDER NOTE - INPATIENT RESIDENT/ACP NOTIFIED DATE
05/31/22 0700   Sleep/Rest   Sleep/Rest/Relaxation no problem identified   Night Time # Hours 8 hours     Patient appeared asleep throughout the shift. No safety concerns noted.    27-Oct-2023 18:00

## 2023-10-27 NOTE — ED ADULT TRIAGE NOTE - CHIEF COMPLAINT QUOTE
Pt BIBA from home, PT visited today and said the living conditions are not safe for him, hip surgery 2 days ago at [facility], pt c/o pain to left hip, ambulates with cane and walker

## 2023-10-27 NOTE — ED ADULT TRIAGE NOTE - BP NONINVASIVE SYSTOLIC (MM HG)
124 Xolair Pregnancy And Lactation Text: This medication is Pregnancy Category B and is considered safe during pregnancy. This medication is excreted in breast milk.

## 2023-10-27 NOTE — ED PROVIDER NOTE - CLINICAL SUMMARY MEDICAL DECISION MAKING FREE TEXT BOX
Pt w/ recent hip replacement sent in by PT because his home was not suitable for his recovery. Pt placed in observation for placement in [facility].

## 2023-10-27 NOTE — ED CDU PROVIDER INITIAL DAY NOTE - ATTENDING APP SHARED VISIT CONTRIBUTION OF CARE
I agree with the PA's note and was available for any issues/concerns. I was directly involved in patient care. My brief overall assessment is as follows:     patient previously DC with plan for home PT, home found to be unsafe for living and returned to ED for TUCKER/[facility] placement

## 2023-10-28 DIAGNOSIS — Z00.8 ENCOUNTER FOR OTHER GENERAL EXAMINATION: ICD-10-CM

## 2023-10-28 LAB
GLUCOSE BLDC GLUCOMTR-MCNC: 148 MG/DL — HIGH (ref 70–99)
GLUCOSE BLDC GLUCOMTR-MCNC: 148 MG/DL — HIGH (ref 70–99)
GLUCOSE BLDC GLUCOMTR-MCNC: 150 MG/DL — HIGH (ref 70–99)
GLUCOSE BLDC GLUCOMTR-MCNC: 150 MG/DL — HIGH (ref 70–99)
GLUCOSE BLDC GLUCOMTR-MCNC: 196 MG/DL — HIGH (ref 70–99)
GLUCOSE BLDC GLUCOMTR-MCNC: 196 MG/DL — HIGH (ref 70–99)
GLUCOSE BLDC GLUCOMTR-MCNC: 218 MG/DL — HIGH (ref 70–99)
GLUCOSE BLDC GLUCOMTR-MCNC: 218 MG/DL — HIGH (ref 70–99)

## 2023-10-28 PROCEDURE — 99233 SBSQ HOSP IP/OBS HIGH 50: CPT

## 2023-10-28 PROCEDURE — 99223 1ST HOSP IP/OBS HIGH 75: CPT

## 2023-10-28 PROCEDURE — 99497 ADVNCD CARE PLAN 30 MIN: CPT | Mod: 25

## 2023-10-28 RX ORDER — RIVAROXABAN 15 MG-20MG
20 KIT ORAL
Refills: 0 | Status: DISCONTINUED | OUTPATIENT
Start: 2023-10-30 | End: 2023-10-31

## 2023-10-28 RX ORDER — RIVAROXABAN 15 MG-20MG
10 KIT ORAL
Refills: 0 | Status: DISCONTINUED | OUTPATIENT
Start: 2023-10-29 | End: 2023-10-30

## 2023-10-28 RX ADMIN — Medication 650 MILLIGRAM(S): at 17:40

## 2023-10-28 RX ADMIN — OXYCODONE HYDROCHLORIDE 5 MILLIGRAM(S): 5 TABLET ORAL at 09:22

## 2023-10-28 RX ADMIN — PANTOPRAZOLE SODIUM 40 MILLIGRAM(S): 20 TABLET, DELAYED RELEASE ORAL at 09:22

## 2023-10-28 RX ADMIN — OXYCODONE HYDROCHLORIDE 5 MILLIGRAM(S): 5 TABLET ORAL at 23:10

## 2023-10-28 RX ADMIN — TAMSULOSIN HYDROCHLORIDE 0.4 MILLIGRAM(S): 0.4 CAPSULE ORAL at 22:10

## 2023-10-28 RX ADMIN — DULOXETINE HYDROCHLORIDE 60 MILLIGRAM(S): 30 CAPSULE, DELAYED RELEASE ORAL at 07:04

## 2023-10-28 RX ADMIN — Medication 650 MILLIGRAM(S): at 01:57

## 2023-10-28 RX ADMIN — RIVAROXABAN 10 MILLIGRAM(S): KIT at 22:18

## 2023-10-28 RX ADMIN — Medication 650 MILLIGRAM(S): at 00:57

## 2023-10-28 RX ADMIN — LISINOPRIL 40 MILLIGRAM(S): 2.5 TABLET ORAL at 09:21

## 2023-10-28 RX ADMIN — Medication 650 MILLIGRAM(S): at 22:11

## 2023-10-28 RX ADMIN — Medication 650 MILLIGRAM(S): at 06:56

## 2023-10-28 RX ADMIN — SENNA PLUS 2 TABLET(S): 8.6 TABLET ORAL at 22:10

## 2023-10-28 RX ADMIN — Medication 2 MILLIGRAM(S): at 22:10

## 2023-10-28 RX ADMIN — Medication 650 MILLIGRAM(S): at 12:15

## 2023-10-28 RX ADMIN — ATORVASTATIN CALCIUM 20 MILLIGRAM(S): 80 TABLET, FILM COATED ORAL at 22:11

## 2023-10-28 RX ADMIN — Medication 75 MICROGRAM(S): at 06:58

## 2023-10-28 RX ADMIN — AMIODARONE HYDROCHLORIDE 200 MILLIGRAM(S): 400 TABLET ORAL at 06:57

## 2023-10-28 RX ADMIN — Medication 81 MILLIGRAM(S): at 06:58

## 2023-10-28 RX ADMIN — Medication 300 MILLIGRAM(S): at 22:10

## 2023-10-28 RX ADMIN — OXYCODONE HYDROCHLORIDE 5 MILLIGRAM(S): 5 TABLET ORAL at 10:00

## 2023-10-28 RX ADMIN — Medication 81 MILLIGRAM(S): at 17:40

## 2023-10-28 RX ADMIN — Medication 650 MILLIGRAM(S): at 18:00

## 2023-10-28 RX ADMIN — Medication 650 MILLIGRAM(S): at 23:11

## 2023-10-28 RX ADMIN — Medication 650 MILLIGRAM(S): at 13:00

## 2023-10-28 RX ADMIN — DULOXETINE HYDROCHLORIDE 60 MILLIGRAM(S): 30 CAPSULE, DELAYED RELEASE ORAL at 17:40

## 2023-10-28 RX ADMIN — OXYCODONE HYDROCHLORIDE 5 MILLIGRAM(S): 5 TABLET ORAL at 22:10

## 2023-10-28 RX ADMIN — AMLODIPINE BESYLATE 10 MILLIGRAM(S): 2.5 TABLET ORAL at 06:57

## 2023-10-28 RX ADMIN — Medication 2: at 13:26

## 2023-10-28 NOTE — PROVIDER CONTACT NOTE (OTHER) - ACTION/TREATMENT ORDERED:
Goals: Bed mobility: Independent, Transfers: independent with RW, Ambulation: independent with RW 150ft, stairs TBA

## 2023-10-28 NOTE — ED ADULT NURSE REASSESSMENT NOTE - NSFALLHARMRISKINTERV_ED_ALL_ED
Assistance OOB with selected safe patient handling equipment if applicable/Communicate risk of Fall with Harm to all staff, patient, and family/Provide visual cue: red socks, yellow wristband, yellow gown, etc/Reinforce activity limits and safety measures with patient and family/Bed in lowest position, wheels locked, appropriate side rails in place/Call bell, personal items and telephone in reach/Instruct patient to call for assistance before getting out of bed/chair/stretcher/Non-slip footwear applied when patient is off stretcher/[place] to call system/Physically safe environment - no spills, clutter or unnecessary equipment/Purposeful Proactive Rounding/Room/bathroom lighting operational, light cord in reach
Communicate risk of Fall with Harm to all staff, patient, and family/Provide visual cue: red socks, yellow wristband, yellow gown, etc/Reinforce activity limits and safety measures with patient and family/Bed in lowest position, wheels locked, appropriate side rails in place/Call bell, personal items and telephone in reach/Instruct patient to call for assistance before getting out of bed/chair/stretcher/Non-slip footwear applied when patient is off stretcher/[place] to call system/Physically safe environment - no spills, clutter or unnecessary equipment/Purposeful Proactive Rounding/Room/bathroom lighting operational, light cord in reach

## 2023-10-28 NOTE — ED ADULT NURSE REASSESSMENT NOTE - COMFORT CARE
darkened lights/meal provided/plan of care explained/wait time explained/warm blanket provided
assisted to bathroom

## 2023-10-28 NOTE — ED CDU PROVIDER SUBSEQUENT DAY NOTE - HISTORY
Pt resting comfortably at time of re-assessment. No events overnight. Pending TUCKER placement. Will continue to monitor.

## 2023-10-28 NOTE — PHYSICAL THERAPY INITIAL EVALUATION ADULT - RANGE OF MOTION EXAMINATION, REHAB EVAL
left hip ROM assessed/limited within precautions otherwise WNL/Right LE ROM was WNL (within normal limits)

## 2023-10-28 NOTE — CONSULT NOTE ADULT - SUBJECTIVE AND OBJECTIVE BOX
Pt Name: EVERETT GEORGE    MRN: [id number]      Patient is a 68y Male presenting to the emergency department with a chief complaint of needing TUKCER placement.  Patient is s/p Left total hip arthroplasty on 10/24/23 by Dr. Ojeda.  Patient was discharged on 10/26/23, returned to ER yesterday stating patient was told by PT that their home was unsafe and needed to go back to ER.  Patient was admitted for TUCKER placement, orthopedics notified tonight by nursing staff.  Patient denies any complaints.        REVIEW OF SYSTEMS    General: Alert, responsive, in NAD    Skin/Breast: No rashes, no pruritis   	  Ophthalmologic: No visual changes. No redness.   	  ENMT:	No discharge. No swelling.    Respiratory and Thorax: No difficulty breathing. No cough.  	   Cardiovascular:	No chest pain. No palpitations.    Gastrointestinal:	 No abdominal pain. No diarrhea.     Genitourinary: No dysuria. No bleeding.    Musculoskeletal: SEE HPI.    Neurological: No sensory or motor changes.     Psychiatric: No anxiety or depression.    Hematology/Lymphatics: No swelling.    Endocrine: No Hx of diabetes.    ROS is otherwise negative.    PAST MEDICAL & SURGICAL HISTORY:  PAST MEDICAL & SURGICAL HISTORY:  Afib  on Xarelto      HTN (hypertension)      Degenerative joint disease      Asthma      Obstructive sleep apnea  use BIPAP      Diabetes mellitus      Renal cell carcinoma      Hepatocellular carcinoma      HLD (hyperlipidemia)      Hypothyroidism      History of left knee replacement  2015      H/O elbow surgery  right 1987      H/O cataract extraction  left 2020      H/O right nephrectomy  2022      H/O breast implant          Allergies: No Known Drug Allergies  fish (Unknown)  shellfish (Vomiting; Nausea)      Medications: acetaminophen     Tablet .. 650 milliGRAM(s) Oral every 6 hours  allopurinol 300 milliGRAM(s) Oral at bedtime  aMIOdarone    Tablet 200 milliGRAM(s) Oral daily  amLODIPine   Tablet 10 milliGRAM(s) Oral daily  aspirin  chewable 81 milliGRAM(s) Oral two times a day  atorvastatin 20 milliGRAM(s) Oral at bedtime  dextrose 5%. 1000 milliLiter(s) IV Continuous <Continuous>  dextrose 5%. 1000 milliLiter(s) IV Continuous <Continuous>  dextrose 50% Injectable 12.5 Gram(s) IV Push once  dextrose 50% Injectable 25 Gram(s) IV Push once  dextrose 50% Injectable 25 Gram(s) IV Push once  dextrose Oral Gel 15 Gram(s) Oral once PRN  DULoxetine 60 milliGRAM(s) Oral two times a day  glimepiride. 2 milliGRAM(s) Oral at bedtime  glucagon  Injectable 1 milliGRAM(s) IntraMuscular once  insulin lispro (ADMELOG) corrective regimen sliding scale   SubCutaneous at bedtime  insulin lispro (ADMELOG) corrective regimen sliding scale   SubCutaneous three times a day before meals  levothyroxine 75 MICROGram(s) Oral daily  lisinopril 40 milliGRAM(s) Oral daily  oxyCODONE    IR 5 milliGRAM(s) Oral every 6 hours PRN  pantoprazole    Tablet 40 milliGRAM(s) Oral before breakfast  rivaroxaban 10 milliGRAM(s) Oral with dinner  senna 2 Tablet(s) Oral at bedtime  tamsulosin 0.4 milliGRAM(s) Oral at bedtime      FAMILY HISTORY:  Family history of cerebrovascular accident (CVA) (Mother)    Family history of essential hypertension (Sibling)    Family history of diabetes mellitus (Sibling)    : non-contributory      Ambulation: Walking independently  With Walker                          11.3   10.06 )-----------( 226      ( 27 Oct 2023 16:50 )             35.0       10-27    132<L>  |  97  |  16.6  ----------------------------<  244<H>  4.4   |  26.0  |  1.17    Ca    9.2      27 Oct 2023 16:50    TPro  7.4  /  Alb  3.3  /  TBili  0.4  /  DBili  x   /  AST  37  /  ALT  18  /  AlkPhos  99  10-27      Vital Signs Last 24 Hrs  T(C): 36.6 (28 Oct 2023 20:51), Max: 37.2 (28 Oct 2023 18:42)  T(F): 97.8 (28 Oct 2023 20:51), Max: 98.9 (28 Oct 2023 18:42)  HR: 66 (28 Oct 2023 20:51) (63 - 83)  BP: 155/78 (28 Oct 2023 20:51) (119/68 - 158/74)  BP(mean): --  RR: 18 (28 Oct 2023 20:51) (18 - 20)  SpO2: 92% (28 Oct 2023 20:51) (92% - 100%)    Parameters below as of 28 Oct 2023 20:51  Patient On (Oxygen Delivery Method): room air        Daily Height in cm: 175.3 (28 Oct 2023 20:51)    Daily       PHYSICAL EXAM:      Appearance: Alert, responsive, in no acute distress.    Neurological: Sensation is grossly intact to light touch. 5/5 motor function of all extremities. No focal deficits or weaknesses found.    Skin: no rash on visible skin. Skin is clean, dry and intact. No bleeding. No abrasions. No ulcerations.    Vascular: 2+ distal pulses. Cap refill < 2 sec. No signs of venous insufficiency or stasis. No extremity ulcerations. No cyanosis.    Musculoskeletal:         Left Lower Extremity: dressing clean and dry.  Positive vascular changes to LE (Chronic) calf soft NT,  DF/PF intact      A/P:  Pt is a 68y Male POD 4 s/p left total hip arthroplasty, admitted for TUCKER    PLAN:   WBAT  PT - posterior precautions  DVTP - Xarelto 10 mg for today and tomorrow, than return to 20 mg   pain control     Case discussed with Dr. Ojeda

## 2023-10-28 NOTE — ED ADULT NURSE REASSESSMENT NOTE - NSFALLRISKFACTORS_ED_ALL_ED
Morbit obese/Other
Bone Condition: Including osteoporosis, prolonged steroid use or metastatic bone disease/cancer

## 2023-10-28 NOTE — ED CDU PROVIDER DISPOSITION NOTE - CLINICAL COURSE
68M with PMHx of HLD, renal cell carcinoma, DM  a-fib on Xarelto, morbid obesity presenting for rehab. Pt recently had left total hip replacement and was sent home with home PT. Pt was visited by home PT who felt his home was too cluttered and unsafe to ambulate with a walker. Pt evaluated by PT in ED and recommended for TUCKER. Admit to medicine for further management.

## 2023-10-28 NOTE — PATIENT PROFILE ADULT - FUNCTIONAL ASSESSMENT - BASIC MOBILITY 6.
4-calculated by average/Not able to assess (calculate score using [facility] averaging method)

## 2023-10-28 NOTE — H&P ADULT - ASSESSMENT
68 year old  morbidly obese male with asthma, JASON on CPAP, HTN, HLD, atrial fibrillation (on Xarelto), diabetes, liver CA s/p radiation, hypothyroidism, kidney CA s/p right nephrectomy 2022 and osteoarthritis with recent RABIA (elective) for sever OA now presenting from home after being sent in by PT due to risk of further injuries/falls in pt current dwelling due to clutter      OA s/p RABIA (left)  PT eval noted, Will need admission for TUCKER placement   PRN analgesia   AC per Ortho recs ( Xeralto 10 mg daily through tomorrow 10/29 followed by 20 mg dailt from 10/30. ASA 81 mg bid x 21 days through 11/14 followed by  mg daily from 11/15)    HTN / CAD/ Afib   ASA and Eliquis as above   Rate controlled   Continue Amiodarone 200 mg daily   Statin   Amlodipine   Lisinopril     T2DM   A1C 7.2%  Continue Glimepiride 2 mg daily and lispro s/s     Hypothyroidism   Synthroid 75 mcg daily     Morbid obesity with OHS   Nocturnal CPAP  Weight loss counseling done    68 year old  morbidly obese male with asthma, JASON on CPAP, HTN, HLD, atrial fibrillation (on Xarelto), diabetes, liver CA s/p radiation, hypothyroidism, kidney CA s/p right nephrectomy 2022 and osteoarthritis with recent RABIA (elective) for sever OA now presenting from home after being sent in by PT due to risk of further injuries/falls in pt current dwelling due to clutter    Admit to Medicine to any bed   OA s/p RABIA (left)  PT eval noted, Will need admission for TUCKER placement   PRN analgesia   AC per Ortho recs ( Xeralto 10 mg daily through tomorrow 10/29 followed by 20 mg dailt from 10/30. ASA 81 mg bid x 21 days through 11/14 followed by  mg daily from 11/15)    HTN / CAD/ Afib   ASA and Eliquis as above   Rate controlled   Continue Amiodarone 200 mg daily   Statin   Amlodipine   Lisinopril     T2DM   A1C 7.2%  Continue Glimepiride 2 mg daily and lispro s/s     Hypothyroidism   Synthroid 75 mcg daily     Morbid obesity with OHS   Nocturnal CPAP  Weight loss counseling done    68 year old  morbidly obese male with asthma, JASON on CPAP, HTN, HLD, atrial fibrillation (on Xarelto), diabetes, liver CA s/p radiation, hypothyroidism, kidney CA s/p right nephrectomy 2022 and osteoarthritis with recent RABIA (elective) for sever OA now presenting from home after being sent in by PT due to risk of further injuries/falls in pt current dwelling due to clutter    Admit to Medicine to any bed   OA s/p RABAI (left)  PT eval noted, Will need admission for TUCKER placement   PRN analgesia   AC per Ortho recs ( Xeralto 10 mg daily through tomorrow 10/29 followed by 20 mg dailt from 10/30. ASA 81 mg bid x 21 days through 11/14 followed by  mg daily from 11/15)    HTN / CAD/ Afib   ASA and Eliquis as above   Rate controlled   Continue Amiodarone 200 mg daily   Statin   Amlodipine   Lisinopril     T2DM   A1C 7.2%  Continue Glimepiride 2 mg daily and lispro s/s     Hypothyroidism   Synthroid 75 mcg daily     Morbid obesity with OHS   Nocturnal CPAP  Weight loss counseling done       DVT ppx :" DOAC  Full code, HCP : Friend Krys roca

## 2023-10-28 NOTE — H&P ADULT - NSHPPHYSICALEXAM_GEN_ALL_CORE
Gen : Non toxic appearing, morbidly obese male sitting up in bed comfortable, NAD  HEENT : NCAT, MMM, No cervical lymphadenopathy, no JVD  CVS : Irregularly irregular, no murmurs  Resp : CTA b/l, (exam limited by body habitus)   Abd : Soft, non distended, non tender, BS +ve   MSK : No joint swelling or tenderness, no digital clubbing, no distal cyanosis, limited exam of Left hi[  Neuro : CN II-XII intact, no focal motor or sensory deficits   Psych : Pleasant, no anxiety, normal affect

## 2023-10-28 NOTE — CONSULT NOTE ADULT - CONSULT REQUESTED DATE/TIME
Eating Heart-Healthy Foods  Eating has a big impact on your heart health. In fact, eating healthier can improve several of your heart risks at once. For instance, it helps you manage weight, cholesterol, and blood pressure. Here are ideas to help you make heart-healthy changes without giving up all the foods and flavors you love.  Getting started  Talk with your healthcare provider about eating plans, such as the DASH or Mediterranean diet. You may also be referred to a dietitian.  Change a few things at a time. Give yourself time to get used to a few eating changes before adding more.  Work to create a tasty, healthy eating plan that you can stick to for the rest of your life.    Goals for healthy eating  Below are some tips to improve your eating habits:  Limit saturated fats and trans fats. Saturated fats raise your levels of cholesterol, so keep these fats to a minimum. They are found in foods such as fatty meats, whole milk, cheese, and palm and coconut oils. Avoid trans fats because they lower good cholesterol as well as raise bad cholesterol. Trans fats are most often found in processed foods.  Reduce sodium (salt) intake. Eating too much salt may increase your blood pressure. Limit your sodium intake to 2,300 milligrams (mg) per day (the amount in 1 teaspoon of salt), or less if your healthcare provider recommends it. Dining out less often and eating fewer processed foods are two great ways to decrease the amount of salt you consume.  Managing calories. A calorie is a unit of energy. Your body burns calories for fuel, but if you eat more calories than your body burns, the extras are stored as fat. Your healthcare provider can help you create a diet plan to manage your calories. This will likely include eating healthier foods as well as exercising regularly. To help you track your progress, keep a diary to record what you eat and how often you exercise.  Choose the right foods  Aim to make these foods  staples of your diet. If you have diabetes, you may have different recommendations than what is listed here:  Fruits and vegetables provide plenty of nutrients without a lot of calories. At meals, fill half your plate with these foods. Split the other half of your plate between whole grains and lean protein.  Whole grains are high in fiber and rich in vitamins and nutrients. Good choices include whole-wheat bread, pasta, and brown rice.  Lean proteins give you nutrition with less fat. Good choices include fish, skinless chicken, and beans.  Low-fat or nonfat dairy provides nutrients without a lot of fat. Try low-fat or nonfat milk, cheese, or yogurt.  Healthy fats can be good for you in small amounts. These are unsaturated fats, such as olive oil, nuts, and fish. Try to have at least 2 servings per week of fatty fish, such as salmon, sardines, mackerel, rainbow trout, and albacore tuna. These contain omega-3 fatty acids, which are good for your heart. Flaxseed is another source of a heart-healthy fat.  More on heart-healthy eating  Read food labels  Healthy eating starts at the grocery store. Be sure to pay attention to food labels on packaged foods. Look for products that are high in fiber and protein, and low in saturated fat, cholesterol, and sodium. Avoid products that contain trans fat. And pay close attention to serving size. For instance, if you plan to eat two servings, double all the numbers on the label.  Prepare food right  A key part of healthy cooking is cutting down on added fat and salt. Look on the internet for lower-fat, lower-sodium recipes. Also, try these tips:  Remove fat from meat and skin from poultry before cooking.  Skim fat from the surface of soups and sauces.  Broil, boil, bake, steam, grill, and microwave food without added fats.  Choose ingredients that spice up your food without adding calories, fat, or sodium. Try these items: horseradish, hot sauce, lemon, mustard, nonfat salad  28-Oct-2023 20:59 dressings, and vinegar. For salt-free herbs and spices, try basil, cilantro, cinnamon, pepper, and rosemary.  Date Last Reviewed: 10/1/2017  © 2534-5716 [employer]. [address] [place] [address]. All rights reserved. This information is not intended as a substitute for professional medical care. Always follow your healthcare professional's instructions.

## 2023-10-28 NOTE — PHYSICAL THERAPY INITIAL EVALUATION ADULT - GENERAL OBSERVATIONS, REHAB EVAL
Pt received in bed, + IV Loc, breathing on RA in NAD, in 8/10 pain in left hip, agreeable to PT eval

## 2023-10-28 NOTE — PHYSICAL THERAPY INITIAL EVALUATION ADULT - MARITAL STATUS
Impression:  DDx includes infection, pericarditis, PE. H&P concerning for PNA vs viral infection.  Suspicion for PE low, given Hx of greeen sputum, fever, hypoxia.  WIll empirically treat with Abx, blood Cx, RVP, reassess.
Single

## 2023-10-28 NOTE — H&P ADULT - CONVERSATION DETAILS
d/w pt rearding ACP. He understand what vent support and CPR would mean and wishes for all measures at this time IF they were indicated. I assured him they would not be considered unless absolutely indicated.     Wishes for his friend Krys Guy to be his HCP    ACP time : 20 mins

## 2023-10-28 NOTE — PROVIDER CONTACT NOTE (OTHER) - ASSESSMENT
Pt requires minAx1 with bed mobility for left LE, CGA/Supervision for transfers & ambulate 40ft with RW. Pt with poor activity tolerance

## 2023-10-28 NOTE — ED ADULT NURSE REASSESSMENT NOTE - NS ED NURSE REASSESS COMMENT FT1
patient a/ox3 resting in stretcher, breathing is unlabored and bilaterally equal. patient makes no complaints at this time. vitals recorded and documented in EMR. report given to OBS RN. patient updated on plan of care and understands via verbal feedback. safety maintained.
Received patient from [facility] RN.  Pt AxO4, VSS.  Pt denies chest pain/SOB at this time.  Cardio NSR on cardiac monitor. ON Cpap machine throughout the night Morbid  obese ,assisted as needed For PT consult in AM .Denies chest pain, SOB ,chills or fever .Safety maintained support provided will continue to monitor closely Safety measures taken, bed in low position, call bell within reach, side rails up x2.  Plan of care explained.  Pt verbalized understanding.  Will continue to monitor.
Patient is alert and oriented xs 4   able to make needs known   continues on oral medications for pain   walks with a walker s/p left hip replacement  left hip dressing clean dry intact and covered   pending pt eval plan for rehab patient verbalized understanding to plan of care

## 2023-10-28 NOTE — PROVIDER CONTACT NOTE (OTHER) - SITUATION
chart reviewed, contents noted, orders received, PT eval completed & documented. Pt with 8/10 pain pre/post eval in left hip. Received/returned to stretcher, post hip precautions maintained

## 2023-10-28 NOTE — PATIENT PROFILE ADULT - FALL HARM RISK - RISK INTERVENTIONS
Assistance OOB with selected safe patient handling equipment/Assistance with ambulation/Communicate Fall Risk and Risk Factors to all staff, patient, and family/Discuss with provider need for PT consult/Monitor gait and stability/Provide patient with walking aids - walker, cane, crutches/Reinforce activity limits and safety measures with patient and family/Sit up slowly, dangle for a short time, stand at bedside before walking/Use of alarms - bed, chair and/or voice tab/Visual Cue: Yellow wristband/Bed in lowest position, wheels locked, appropriate side rails in place/Call bell, personal items and telephone in reach/Instruct patient to call for assistance before getting out of bed or chair/Non-slip footwear when patient is out of bed/[place] to call system/Physically safe environment - no spills, clutter or unnecessary equipment/Purposeful Proactive Rounding/Room/bathroom lighting operational, light cord in reach

## 2023-10-28 NOTE — ED CDU PROVIDER SUBSEQUENT DAY NOTE - CLINICAL SUMMARY MEDICAL DECISION MAKING FREE TEXT BOX
67 y/o male with PMHx of HLD, renal cell carcinoma, DM  a-fib on Xarelto, morbid obesity. Was recently admitted for left hip replacement.  was sent to ED by his home PT for inability to ambulate at home due to clutter. Placed in obs for PT eval. pending TUCKER placement

## 2023-10-29 LAB
GLUCOSE BLDC GLUCOMTR-MCNC: 170 MG/DL — HIGH (ref 70–99)
GLUCOSE BLDC GLUCOMTR-MCNC: 170 MG/DL — HIGH (ref 70–99)
GLUCOSE BLDC GLUCOMTR-MCNC: 173 MG/DL — HIGH (ref 70–99)
GLUCOSE BLDC GLUCOMTR-MCNC: 173 MG/DL — HIGH (ref 70–99)
GLUCOSE BLDC GLUCOMTR-MCNC: 199 MG/DL — HIGH (ref 70–99)
GLUCOSE BLDC GLUCOMTR-MCNC: 199 MG/DL — HIGH (ref 70–99)
GLUCOSE BLDC GLUCOMTR-MCNC: 223 MG/DL — HIGH (ref 70–99)
GLUCOSE BLDC GLUCOMTR-MCNC: 223 MG/DL — HIGH (ref 70–99)

## 2023-10-29 PROCEDURE — 99232 SBSQ HOSP IP/OBS MODERATE 35: CPT

## 2023-10-29 RX ORDER — INFLUENZA VIRUS VACCINE 15; 15; 15; 15 UG/.5ML; UG/.5ML; UG/.5ML; UG/.5ML
0.7 SUSPENSION INTRAMUSCULAR ONCE
Refills: 0 | Status: DISCONTINUED | OUTPATIENT
Start: 2023-10-29 | End: 2023-10-31

## 2023-10-29 RX ADMIN — DULOXETINE HYDROCHLORIDE 60 MILLIGRAM(S): 30 CAPSULE, DELAYED RELEASE ORAL at 17:51

## 2023-10-29 RX ADMIN — Medication 81 MILLIGRAM(S): at 17:51

## 2023-10-29 RX ADMIN — Medication 1: at 09:27

## 2023-10-29 RX ADMIN — TAMSULOSIN HYDROCHLORIDE 0.4 MILLIGRAM(S): 0.4 CAPSULE ORAL at 22:08

## 2023-10-29 RX ADMIN — OXYCODONE HYDROCHLORIDE 5 MILLIGRAM(S): 5 TABLET ORAL at 23:08

## 2023-10-29 RX ADMIN — SENNA PLUS 2 TABLET(S): 8.6 TABLET ORAL at 22:08

## 2023-10-29 RX ADMIN — DULOXETINE HYDROCHLORIDE 60 MILLIGRAM(S): 30 CAPSULE, DELAYED RELEASE ORAL at 06:31

## 2023-10-29 RX ADMIN — RIVAROXABAN 10 MILLIGRAM(S): KIT at 17:50

## 2023-10-29 RX ADMIN — Medication 75 MICROGRAM(S): at 06:31

## 2023-10-29 RX ADMIN — Medication 650 MILLIGRAM(S): at 06:32

## 2023-10-29 RX ADMIN — AMIODARONE HYDROCHLORIDE 200 MILLIGRAM(S): 400 TABLET ORAL at 06:32

## 2023-10-29 RX ADMIN — Medication 650 MILLIGRAM(S): at 07:32

## 2023-10-29 RX ADMIN — Medication 650 MILLIGRAM(S): at 13:43

## 2023-10-29 RX ADMIN — OXYCODONE HYDROCHLORIDE 5 MILLIGRAM(S): 5 TABLET ORAL at 07:32

## 2023-10-29 RX ADMIN — OXYCODONE HYDROCHLORIDE 5 MILLIGRAM(S): 5 TABLET ORAL at 06:32

## 2023-10-29 RX ADMIN — LISINOPRIL 40 MILLIGRAM(S): 2.5 TABLET ORAL at 06:31

## 2023-10-29 RX ADMIN — Medication 300 MILLIGRAM(S): at 22:08

## 2023-10-29 RX ADMIN — Medication 650 MILLIGRAM(S): at 17:50

## 2023-10-29 RX ADMIN — Medication 650 MILLIGRAM(S): at 12:43

## 2023-10-29 RX ADMIN — Medication 650 MILLIGRAM(S): at 23:08

## 2023-10-29 RX ADMIN — Medication 81 MILLIGRAM(S): at 06:31

## 2023-10-29 RX ADMIN — Medication 2: at 12:45

## 2023-10-29 RX ADMIN — PANTOPRAZOLE SODIUM 40 MILLIGRAM(S): 20 TABLET, DELAYED RELEASE ORAL at 06:31

## 2023-10-29 RX ADMIN — Medication 1: at 17:51

## 2023-10-29 RX ADMIN — ATORVASTATIN CALCIUM 20 MILLIGRAM(S): 80 TABLET, FILM COATED ORAL at 22:08

## 2023-10-29 RX ADMIN — Medication 2 MILLIGRAM(S): at 22:08

## 2023-10-29 RX ADMIN — OXYCODONE HYDROCHLORIDE 5 MILLIGRAM(S): 5 TABLET ORAL at 22:08

## 2023-10-29 RX ADMIN — Medication 650 MILLIGRAM(S): at 18:50

## 2023-10-29 RX ADMIN — AMLODIPINE BESYLATE 10 MILLIGRAM(S): 2.5 TABLET ORAL at 06:32

## 2023-10-29 RX ADMIN — Medication 650 MILLIGRAM(S): at 22:08

## 2023-10-29 NOTE — PROGRESS NOTE ADULT - ASSESSMENT
68 year old  morbidly obese male with asthma, JASON on CPAP, HTN, HLD, atrial fibrillation (on Xarelto), diabetes, liver CA s/p radiation, hypothyroidism, kidney CA s/p right nephrectomy 2022 and osteoarthritis with recent RABIA (elective) for sever OA now presenting from home after being sent in by PT due to risk of further injuries/falls in pt current dwelling due to clutter      OA s/p RABIA (left)  PT eval noted, Will need admission for TUCKER placement   PRN analgesia   AC per Ortho recs ( Xeralto 10 mg daily through 10/29 followed by 20 mg daily from 10/30. ASA 81 mg bid x 21 days through 11/14 followed by  mg daily from 11/15)    HTN / CAD/ Afib   ASA and Eliquis as above   Rate controlled   Continue Amiodarone 200 mg daily   Statin   Amlodipine   Lisinopril     T2DM   A1C 7.2%  Continue Glimepiride 2 mg daily and lispro s/s   changed diet to carbohydrate consistent    Hypothyroidism   Synthroid 75 mcg daily     Morbid obesity with OHS   Nocturnal CPAP  Weight loss counseling done       DVT ppx :" DOAC  Full code, HCP : Friend Krsy roca  68 year old  morbidly obese male with asthma, JASON on CPAP, HTN, HLD, atrial fibrillation (on Xarelto), diabetes, liver CA s/p radiation, hypothyroidism, kidney CA s/p right nephrectomy 2022 and osteoarthritis with recent RABIA (elective) for sever OA now presenting from home after being sent in by PT due to risk of further injuries/falls in pt current dwelling due to clutter      OA s/p RBAIA (left)  PT eval noted, Will need admission for TUCKER placement   PRN analgesia   AC per Ortho recs ( Xeralto 10 mg daily through 10/29 followed by 20 mg daily from 10/30. ASA 81 mg bid x 21 days through 11/14 followed by  mg daily from 11/15)    HTN / CAD/ Afib   ASA and Eliquis as above   Rate controlled   Continue Amiodarone 200 mg daily   Statin   Amlodipine   Lisinopril     T2DM   A1C 7.2%  Continue Glimepiride 2 mg daily and lispro s/s   changed diet to carbohydrate consistent    Hypothyroidism   Synthroid 75 mcg daily     Morbid obesity with OHS   Nocturnal CPAP  Weight loss counseling done       DVT ppx : DOAC  Full code, HCP : Friend Krys roca     Disposition - Medically stable; pending TUCKER

## 2023-10-29 NOTE — PROGRESS NOTE ADULT - SUBJECTIVE AND OBJECTIVE BOX
ORTHO-TOTAL JOINT SERVICE      EVERETT GEORGE    [id number]    History: Patient is a 68yMale status post left posterior total hip arthroplasty on 10/24/2023. Patient is doing well. The patient's pain is controlled using the prescribed pain medications. The patient is participating in physical therapy. Denies nausea, vomiting, chest pain, shortness of breath, abdominal pain or fever. No new complaints.      T(C): 36.7 (10-29-23 @ 08:32), Max: 37.2 (10-28-23 @ 18:42)  HR: 63 (10-29-23 @ 08:32) (62 - 73)  BP: 144/85 (10-29-23 @ 08:32) (119/68 - 156/79)  RR: 18 (10-29-23 @ 08:32) (18 - 18)  SpO2: 92% (10-29-23 @ 08:32) (92% - 98%)      MEDICATIONS  (STANDING):  acetaminophen     Tablet .. 650 milliGRAM(s) Oral every 6 hours  allopurinol 300 milliGRAM(s) Oral at bedtime  aMIOdarone    Tablet 200 milliGRAM(s) Oral daily  amLODIPine   Tablet 10 milliGRAM(s) Oral daily  aspirin  chewable 81 milliGRAM(s) Oral two times a day  atorvastatin 20 milliGRAM(s) Oral at bedtime  dextrose 5%. 1000 milliLiter(s) (50 mL/Hr) IV Continuous <Continuous>  dextrose 5%. 1000 milliLiter(s) (100 mL/Hr) IV Continuous <Continuous>  dextrose 50% Injectable 12.5 Gram(s) IV Push once  dextrose 50% Injectable 25 Gram(s) IV Push once  dextrose 50% Injectable 25 Gram(s) IV Push once  DULoxetine 60 milliGRAM(s) Oral two times a day  glimepiride. 2 milliGRAM(s) Oral at bedtime  glucagon  Injectable 1 milliGRAM(s) IntraMuscular once  influenza  Vaccine (HIGH DOSE) 0.7 milliLiter(s) IntraMuscular once  insulin lispro (ADMELOG) corrective regimen sliding scale   SubCutaneous three times a day before meals  insulin lispro (ADMELOG) corrective regimen sliding scale   SubCutaneous at bedtime  levothyroxine 75 MICROGram(s) Oral daily  lisinopril 40 milliGRAM(s) Oral daily  pantoprazole    Tablet 40 milliGRAM(s) Oral before breakfast  rivaroxaban 10 milliGRAM(s) Oral with dinner  senna 2 Tablet(s) Oral at bedtime  tamsulosin 0.4 milliGRAM(s) Oral at bedtime    MEDICATIONS  (PRN):  dextrose Oral Gel 15 Gram(s) Oral once PRN Blood Glucose LESS THAN 70 milliGRAM(s)/deciliter  oxyCODONE    IR 5 milliGRAM(s) Oral every 6 hours PRN Severe Pain (7 - 10)      Physical exam: The left hip dressing is clean, dry and intact. No drainage or discharge. No erythema is noted. No blistering. No ecchymosis. The calf is supple nontender. No calf tenderness. Sensation to light touch is grossly intact distally. Motor function distally is 5/5. No foot drop. 2+ dorsalis pedis pulse. Capillary refill is less than 2 seconds. No cyanosis.    Primary Orthopedic Assessment:  • s/p LEFT POSTERIOR total hip replacement    Secondary  Orthopedic Assessment(s):   •     Secondary  Medical Assessment(s):   •     Plan:   • DVT prophylaxis as prescribed, including use of compression devices and ankle pumps  • Continue physical therapy  • Weightbearing as tolerated of the right lower extremity with assistance of a walker  • Incentive spirometry encouraged  • Pain control as clinically indicated  • Posterior hip precautions reviewed with patient  • Discharge planning – anticipated discharge is subacute rehabilitation

## 2023-10-29 NOTE — PROGRESS NOTE ADULT - SUBJECTIVE AND OBJECTIVE BOX
HOSPITALIST PROGRESS NOTE    EVERETT GEORGE  [id number]  68yMale    Patient is a 68y old  Male who presents with a chief complaint of     SUBJECTIVE:   Chart reviewed since last visit.  Patient seen and examined at bedside for hip fracture  Pain controlled; ambulating to bathroom      OBJECTIVE:  Vital Signs Last 24 Hrs  T(C): 36.7 (29 Oct 2023 08:32), Max: 37.2 (28 Oct 2023 18:42)  T(F): 98 (29 Oct 2023 08:32), Max: 98.9 (28 Oct 2023 18:42)  HR: 63 (29 Oct 2023 08:32) (62 - 73)  BP: 144/85 (29 Oct 2023 08:32) (119/68 - 156/79)  BP(mean): --  RR: 18 (29 Oct 2023 08:32) (18 - 18)  SpO2: 92% (29 Oct 2023 08:32) (92% - 98%)    Parameters below as of 29 Oct 2023 08:32  Patient On (Oxygen Delivery Method): room air        PHYSICAL EXAMINATION  General: Morbidly obese male lying in bed, comfortable  HEENT:  CPAP  NECK:  supple  CVS: regular rate and rhythm S1 S2  RESP:  CTAB  GI:  Soft nondistended nontender BS+  : No suprapubic or CVA tendenrness  MSK:  Left hip dressing C/D/I. FROM  CNS:  Aox3. No gross focal or global deficit appreciated  INTEG:  warm dry skin  PSYCH:  Fair mood    MONITOR:  CAPILLARY BLOOD GLUCOSE      POCT Blood Glucose.: 223 mg/dL (29 Oct 2023 12:43)  POCT Blood Glucose.: 173 mg/dL (29 Oct 2023 09:25)  POCT Blood Glucose.: 196 mg/dL (28 Oct 2023 22:08)  POCT Blood Glucose.: 148 mg/dL (28 Oct 2023 17:39)        I&O's Summary    28 Oct 2023 07:01  -  29 Oct 2023 07:00  --------------------------------------------------------  IN: 240 mL / OUT: 300 mL / NET: -60 mL    29 Oct 2023 07:01  -  29 Oct 2023 14:37  --------------------------------------------------------  IN: 0 mL / OUT: 500 mL / NET: -500 mL                            11.3   10.06 )-----------( 226      ( 27 Oct 2023 16:50 )             35.0       10-27    132<L>  |  97  |  16.6  ----------------------------<  244<H>  4.4   |  26.0  |  1.17    Ca    9.2      27 Oct 2023 16:50    TPro  7.4  /  Alb  3.3  /  TBili  0.4  /  DBili  x   /  AST  37  /  ALT  18  /  AlkPhos  99  10-27        Urinalysis Basic - ( 27 Oct 2023 16:50 )    Color: x / Appearance: x / SG: x / pH: x  Gluc: 244 mg/dL / Ketone: x  / Bili: x / Urobili: x   Blood: x / Protein: x / Nitrite: x   Leuk Esterase: x / RBC: x / WBC x   Sq Epi: x / Non Sq Epi: x / Bacteria: x        Culture:    TTE:    RADIOLOGY        MEDICATIONS  (STANDING):  acetaminophen     Tablet .. 650 milliGRAM(s) Oral every 6 hours  allopurinol 300 milliGRAM(s) Oral at bedtime  aMIOdarone    Tablet 200 milliGRAM(s) Oral daily  amLODIPine   Tablet 10 milliGRAM(s) Oral daily  aspirin  chewable 81 milliGRAM(s) Oral two times a day  atorvastatin 20 milliGRAM(s) Oral at bedtime  dextrose 5%. 1000 milliLiter(s) (50 mL/Hr) IV Continuous <Continuous>  dextrose 5%. 1000 milliLiter(s) (100 mL/Hr) IV Continuous <Continuous>  dextrose 50% Injectable 12.5 Gram(s) IV Push once  dextrose 50% Injectable 25 Gram(s) IV Push once  dextrose 50% Injectable 25 Gram(s) IV Push once  DULoxetine 60 milliGRAM(s) Oral two times a day  glimepiride. 2 milliGRAM(s) Oral at bedtime  glucagon  Injectable 1 milliGRAM(s) IntraMuscular once  influenza  Vaccine (HIGH DOSE) 0.7 milliLiter(s) IntraMuscular once  insulin lispro (ADMELOG) corrective regimen sliding scale   SubCutaneous at bedtime  insulin lispro (ADMELOG) corrective regimen sliding scale   SubCutaneous three times a day before meals  levothyroxine 75 MICROGram(s) Oral daily  lisinopril 40 milliGRAM(s) Oral daily  pantoprazole    Tablet 40 milliGRAM(s) Oral before breakfast  rivaroxaban 10 milliGRAM(s) Oral with dinner  senna 2 Tablet(s) Oral at bedtime  tamsulosin 0.4 milliGRAM(s) Oral at bedtime      MEDICATIONS  (PRN):  dextrose Oral Gel 15 Gram(s) Oral once PRN Blood Glucose LESS THAN 70 milliGRAM(s)/deciliter  oxyCODONE    IR 5 milliGRAM(s) Oral every 6 hours PRN Severe Pain (7 - 10)     HOSPITALIST PROGRESS NOTE    EVERETT GEORGE  [id number]  68yMale    Patient is a 68y old  Male who presents with a chief complaint of     SUBJECTIVE:   Chart reviewed since last visit.  Patient seen and examined at bedside for hip fracture  Pain controlled; ambulating to bathroom. had bowel movement earlier today      OBJECTIVE:  Vital Signs Last 24 Hrs  T(C): 36.7 (29 Oct 2023 08:32), Max: 37.2 (28 Oct 2023 18:42)  T(F): 98 (29 Oct 2023 08:32), Max: 98.9 (28 Oct 2023 18:42)  HR: 63 (29 Oct 2023 08:32) (62 - 73)  BP: 144/85 (29 Oct 2023 08:32) (119/68 - 156/79)   RR: 18 (29 Oct 2023 08:32) (18 - 18)  SpO2: 92% (29 Oct 2023 08:32) (92% - 98%)    Parameters below as of 29 Oct 2023 08:32  Patient On (Oxygen Delivery Method): room air        PHYSICAL EXAMINATION  General: Morbidly obese male lying in bed, comfortable  HEENT:  CPAP  NECK:  supple  CVS: regular rate and rhythm S1 S2  RESP:  CTAB  GI:  Soft nondistended nontender BS+  : No suprapubic or CVA tenderness  MSK:  Left hip dressing C/D/I. FROM  CNS:  Aox3. No gross focal or global deficit appreciated  INTEG:  warm dry skin  PSYCH:  Fair mood    MONITOR:  CAPILLARY BLOOD GLUCOSE      POCT Blood Glucose.: 223 mg/dL (29 Oct 2023 12:43)  POCT Blood Glucose.: 173 mg/dL (29 Oct 2023 09:25)  POCT Blood Glucose.: 196 mg/dL (28 Oct 2023 22:08)  POCT Blood Glucose.: 148 mg/dL (28 Oct 2023 17:39)        I&O's Summary    28 Oct 2023 07:01  -  29 Oct 2023 07:00  --------------------------------------------------------  IN: 240 mL / OUT: 300 mL / NET: -60 mL    29 Oct 2023 07:01  -  29 Oct 2023 14:37  --------------------------------------------------------  IN: 0 mL / OUT: 500 mL / NET: -500 mL                            11.3   10.06 )-----------( 226      ( 27 Oct 2023 16:50 )             35.0       10-27    132<L>  |  97  |  16.6  ----------------------------<  244<H>  4.4   |  26.0  |  1.17    Ca    9.2      27 Oct 2023 16:50    TPro  7.4  /  Alb  3.3  /  TBili  0.4  /  DBili  x   /  AST  37  /  ALT  18  /  AlkPhos  99  10-27        Urinalysis Basic - ( 27 Oct 2023 16:50 )    Color: x / Appearance: x / SG: x / pH: x  Gluc: 244 mg/dL / Ketone: x  / Bili: x / Urobili: x   Blood: x / Protein: x / Nitrite: x   Leuk Esterase: x / RBC: x / WBC x   Sq Epi: x / Non Sq Epi: x / Bacteria: x        Culture:    TTE:    RADIOLOGY        MEDICATIONS  (STANDING):  acetaminophen     Tablet .. 650 milliGRAM(s) Oral every 6 hours  allopurinol 300 milliGRAM(s) Oral at bedtime  aMIOdarone    Tablet 200 milliGRAM(s) Oral daily  amLODIPine   Tablet 10 milliGRAM(s) Oral daily  aspirin  chewable 81 milliGRAM(s) Oral two times a day  atorvastatin 20 milliGRAM(s) Oral at bedtime  dextrose 5%. 1000 milliLiter(s) (50 mL/Hr) IV Continuous <Continuous>  dextrose 5%. 1000 milliLiter(s) (100 mL/Hr) IV Continuous <Continuous>  dextrose 50% Injectable 12.5 Gram(s) IV Push once  dextrose 50% Injectable 25 Gram(s) IV Push once  dextrose 50% Injectable 25 Gram(s) IV Push once  DULoxetine 60 milliGRAM(s) Oral two times a day  glimepiride. 2 milliGRAM(s) Oral at bedtime  glucagon  Injectable 1 milliGRAM(s) IntraMuscular once  influenza  Vaccine (HIGH DOSE) 0.7 milliLiter(s) IntraMuscular once  insulin lispro (ADMELOG) corrective regimen sliding scale   SubCutaneous at bedtime  insulin lispro (ADMELOG) corrective regimen sliding scale   SubCutaneous three times a day before meals  levothyroxine 75 MICROGram(s) Oral daily  lisinopril 40 milliGRAM(s) Oral daily  pantoprazole    Tablet 40 milliGRAM(s) Oral before breakfast  rivaroxaban 10 milliGRAM(s) Oral with dinner  senna 2 Tablet(s) Oral at bedtime  tamsulosin 0.4 milliGRAM(s) Oral at bedtime      MEDICATIONS  (PRN):  dextrose Oral Gel 15 Gram(s) Oral once PRN Blood Glucose LESS THAN 70 milliGRAM(s)/deciliter  oxyCODONE    IR 5 milliGRAM(s) Oral every 6 hours PRN Severe Pain (7 - 10)

## 2023-10-30 ENCOUNTER — TRANSCRIPTION ENCOUNTER (OUTPATIENT)
Age: 68
End: 2023-10-30

## 2023-10-30 ENCOUNTER — RX RENEWAL (OUTPATIENT)
Age: 68
End: 2023-10-30

## 2023-10-30 DIAGNOSIS — I48.91 UNSPECIFIED ATRIAL FIBRILLATION: ICD-10-CM

## 2023-10-30 DIAGNOSIS — E03.9 HYPOTHYROIDISM, UNSPECIFIED: ICD-10-CM

## 2023-10-30 DIAGNOSIS — E11.9 TYPE 2 DIABETES MELLITUS WITHOUT COMPLICATIONS: ICD-10-CM

## 2023-10-30 DIAGNOSIS — I10 ESSENTIAL (PRIMARY) HYPERTENSION: ICD-10-CM

## 2023-10-30 DIAGNOSIS — G47.33 OBSTRUCTIVE SLEEP APNEA (ADULT) (PEDIATRIC): ICD-10-CM

## 2023-10-30 DIAGNOSIS — M19.90 UNSPECIFIED OSTEOARTHRITIS, UNSPECIFIED SITE: ICD-10-CM

## 2023-10-30 LAB
GLUCOSE BLDC GLUCOMTR-MCNC: 150 MG/DL — HIGH (ref 70–99)
GLUCOSE BLDC GLUCOMTR-MCNC: 150 MG/DL — HIGH (ref 70–99)
GLUCOSE BLDC GLUCOMTR-MCNC: 153 MG/DL — HIGH (ref 70–99)
GLUCOSE BLDC GLUCOMTR-MCNC: 153 MG/DL — HIGH (ref 70–99)
GLUCOSE BLDC GLUCOMTR-MCNC: 164 MG/DL — HIGH (ref 70–99)
GLUCOSE BLDC GLUCOMTR-MCNC: 164 MG/DL — HIGH (ref 70–99)
GLUCOSE BLDC GLUCOMTR-MCNC: 170 MG/DL — HIGH (ref 70–99)
GLUCOSE BLDC GLUCOMTR-MCNC: 170 MG/DL — HIGH (ref 70–99)

## 2023-10-30 PROCEDURE — 99232 SBSQ HOSP IP/OBS MODERATE 35: CPT

## 2023-10-30 RX ORDER — TAMSULOSIN HYDROCHLORIDE 0.4 MG/1
0.4 CAPSULE ORAL
Qty: 90 | Refills: 1 | Status: ACTIVE | COMMUNITY
Start: 2022-04-03 | End: 1900-01-01

## 2023-10-30 RX ORDER — OXYCODONE HYDROCHLORIDE 5 MG/1
1 TABLET ORAL
Qty: 0 | Refills: 0 | DISCHARGE
Start: 2023-10-30

## 2023-10-30 RX ORDER — RIVAROXABAN 15 MG-20MG
1 KIT ORAL
Qty: 0 | Refills: 0 | DISCHARGE
Start: 2023-10-30

## 2023-10-30 RX ORDER — ASPIRIN/CALCIUM CARB/MAGNESIUM 324 MG
1 TABLET ORAL
Qty: 0 | Refills: 0 | DISCHARGE
Start: 2023-10-30

## 2023-10-30 RX ADMIN — Medication 2 MILLIGRAM(S): at 22:02

## 2023-10-30 RX ADMIN — RIVAROXABAN 20 MILLIGRAM(S): KIT at 17:13

## 2023-10-30 RX ADMIN — AMLODIPINE BESYLATE 10 MILLIGRAM(S): 2.5 TABLET ORAL at 05:53

## 2023-10-30 RX ADMIN — Medication 650 MILLIGRAM(S): at 17:13

## 2023-10-30 RX ADMIN — OXYCODONE HYDROCHLORIDE 5 MILLIGRAM(S): 5 TABLET ORAL at 06:53

## 2023-10-30 RX ADMIN — Medication 650 MILLIGRAM(S): at 23:07

## 2023-10-30 RX ADMIN — Medication 1: at 17:13

## 2023-10-30 RX ADMIN — Medication 81 MILLIGRAM(S): at 05:54

## 2023-10-30 RX ADMIN — Medication 81 MILLIGRAM(S): at 17:13

## 2023-10-30 RX ADMIN — OXYCODONE HYDROCHLORIDE 5 MILLIGRAM(S): 5 TABLET ORAL at 05:53

## 2023-10-30 RX ADMIN — Medication 75 MICROGRAM(S): at 05:54

## 2023-10-30 RX ADMIN — Medication 300 MILLIGRAM(S): at 22:02

## 2023-10-30 RX ADMIN — DULOXETINE HYDROCHLORIDE 60 MILLIGRAM(S): 30 CAPSULE, DELAYED RELEASE ORAL at 17:14

## 2023-10-30 RX ADMIN — AMIODARONE HYDROCHLORIDE 200 MILLIGRAM(S): 400 TABLET ORAL at 05:53

## 2023-10-30 RX ADMIN — Medication 650 MILLIGRAM(S): at 11:50

## 2023-10-30 RX ADMIN — Medication 650 MILLIGRAM(S): at 11:58

## 2023-10-30 RX ADMIN — PANTOPRAZOLE SODIUM 40 MILLIGRAM(S): 20 TABLET, DELAYED RELEASE ORAL at 05:53

## 2023-10-30 RX ADMIN — LISINOPRIL 40 MILLIGRAM(S): 2.5 TABLET ORAL at 05:53

## 2023-10-30 RX ADMIN — DULOXETINE HYDROCHLORIDE 60 MILLIGRAM(S): 30 CAPSULE, DELAYED RELEASE ORAL at 05:53

## 2023-10-30 RX ADMIN — ATORVASTATIN CALCIUM 20 MILLIGRAM(S): 80 TABLET, FILM COATED ORAL at 22:02

## 2023-10-30 RX ADMIN — Medication 650 MILLIGRAM(S): at 06:53

## 2023-10-30 RX ADMIN — TAMSULOSIN HYDROCHLORIDE 0.4 MILLIGRAM(S): 0.4 CAPSULE ORAL at 22:02

## 2023-10-30 RX ADMIN — Medication 650 MILLIGRAM(S): at 05:53

## 2023-10-30 RX ADMIN — Medication 1: at 11:51

## 2023-10-30 RX ADMIN — Medication 650 MILLIGRAM(S): at 17:39

## 2023-10-30 NOTE — DISCHARGE NOTE PROVIDER - HOSPITAL COURSE
68 year old  morbidly obese male with asthma, JASON on CPAP, HTN, HLD, atrial fibrillation (on Xarelto), diabetes, liver CA s/p radiation, hypothyroidism, kidney CA s/p right nephrectomy 2022 and osteoarthritis with recent RABIA (elective) for severe OA now presenting from home after being sent in by PT due to risk of further injuries/falls in pt current dwelling due to clutter.  Patient was admitted to medical service and continued on his discharge medications and nocturnal CPAP. Evaluated by PT with recommendations for TUCKER.

## 2023-10-30 NOTE — DISCHARGE NOTE PROVIDER - CARE PROVIDER_API CALL
Robin Ojeda  Orthopaedic Surgery  [address] [place] [id number]  Phone: [phone]  Fax: [phone]  Follow Up Time:

## 2023-10-30 NOTE — PROGRESS NOTE ADULT - SUBJECTIVE AND OBJECTIVE BOX
HOSPITALIST PROGRESS NOTE    EVERETT GEORGE  [id number]  68yMale    Patient is a 68y old  Male who presents with a chief complaint of fracture; PT unable to perform at home (29 Oct 2023 14:37)      SUBJECTIVE:   Chart reviewed since last visit.  Patient seen and examined at bedside for DJD s/p RABIA.  Pain controlled. Voiding freely, moved bowels  Denies any dyspnea, chest pain, palpitations, dizziness      Vital Signs Last 24 Hrs  T(C): 36.7 (30 Oct 2023 05:51), Max: 36.8 (29 Oct 2023 17:18)  T(F): 98 (30 Oct 2023 05:51), Max: 98.3 (29 Oct 2023 17:18)  HR: 64 (30 Oct 2023 05:51) (58 - 68)  BP: 122/70 (30 Oct 2023 05:51) (122/70 - 147/82)   RR: 18 (30 Oct 2023 05:51) (18 - 20)  SpO2: 95% (30 Oct 2023 05:51) (90% - 97%)    Parameters below as of 30 Oct 2023 05:51  Patient On (Oxygen Delivery Method): room air      PHYSICAL EXAMINATION  General: Morbidly obese male lying in bed, comfortable  HEENT:  CPAP  NECK:  supple  CVS: regular rate and rhythm S1 S2  RESP:  CTAB  GI:  Soft nondistended nontender BS+  : No suprapubic or CVA tenderness  MSK:  Left hip dressing C/D/I. FROM  CNS:  Aox3. No gross focal or global deficit appreciated  INTEG:  warm dry skin  PSYCH:  Fair mood    MONITOR:  CAPILLARY BLOOD GLUCOSE      POCT Blood Glucose.: 170 mg/dL (30 Oct 2023 11:45)  POCT Blood Glucose.: 150 mg/dL (30 Oct 2023 08:18)  POCT Blood Glucose.: 170 mg/dL (29 Oct 2023 22:11)  POCT Blood Glucose.: 199 mg/dL (29 Oct 2023 17:50)  POCT Blood Glucose.: 223 mg/dL (29 Oct 2023 12:43)        I&O's Summary    29 Oct 2023 07:01  -  30 Oct 2023 07:00  --------------------------------------------------------  IN: 520 mL / OUT: 1150 mL / NET: -630 mL                        Culture:    TTE:    RADIOLOGY        MEDICATIONS  (STANDING):  acetaminophen     Tablet .. 650 milliGRAM(s) Oral every 6 hours  allopurinol 300 milliGRAM(s) Oral at bedtime  aMIOdarone    Tablet 200 milliGRAM(s) Oral daily  amLODIPine   Tablet 10 milliGRAM(s) Oral daily  aspirin  chewable 81 milliGRAM(s) Oral two times a day  atorvastatin 20 milliGRAM(s) Oral at bedtime  dextrose 5%. 1000 milliLiter(s) (100 mL/Hr) IV Continuous <Continuous>  dextrose 5%. 1000 milliLiter(s) (50 mL/Hr) IV Continuous <Continuous>  dextrose 50% Injectable 25 Gram(s) IV Push once  dextrose 50% Injectable 25 Gram(s) IV Push once  dextrose 50% Injectable 12.5 Gram(s) IV Push once  DULoxetine 60 milliGRAM(s) Oral two times a day  glimepiride. 2 milliGRAM(s) Oral at bedtime  glucagon  Injectable 1 milliGRAM(s) IntraMuscular once  influenza  Vaccine (HIGH DOSE) 0.7 milliLiter(s) IntraMuscular once  insulin lispro (ADMELOG) corrective regimen sliding scale   SubCutaneous three times a day before meals  insulin lispro (ADMELOG) corrective regimen sliding scale   SubCutaneous at bedtime  levothyroxine 75 MICROGram(s) Oral daily  lisinopril 40 milliGRAM(s) Oral daily  pantoprazole    Tablet 40 milliGRAM(s) Oral before breakfast  rivaroxaban 20 milliGRAM(s) Oral with dinner  senna 2 Tablet(s) Oral at bedtime  tamsulosin 0.4 milliGRAM(s) Oral at bedtime      MEDICATIONS  (PRN):  dextrose Oral Gel 15 Gram(s) Oral once PRN Blood Glucose LESS THAN 70 milliGRAM(s)/deciliter  oxyCODONE    IR 5 milliGRAM(s) Oral every 6 hours PRN Severe Pain (7 - 10)

## 2023-10-30 NOTE — DISCHARGE NOTE PROVIDER - ATTENDING DISCHARGE PHYSICAL EXAMINATION:
OBJECTIVE:  Vital Signs Last 24 Hrs  T(C): 36.7 (30 Oct 2023 05:51), Max: 36.8 (29 Oct 2023 17:18)  T(F): 98 (30 Oct 2023 05:51), Max: 98.3 (29 Oct 2023 17:18)  HR: 64 (30 Oct 2023 05:51) (58 - 68)  BP: 122/70 (30 Oct 2023 05:51) (122/70 - 147/82)  BP(mean): --  RR: 18 (30 Oct 2023 05:51) (18 - 20)  SpO2: 95% (30 Oct 2023 05:51) (90% - 97%)    Parameters below as of 30 Oct 2023 05:51  Patient On (Oxygen Delivery Method): room air      PHYSICAL EXAMINATION  General: Morbidly obese male lying in bed, comfortable  HEENT:  CPAP  NECK:  supple  CVS: regular rate and rhythm S1 S2  RESP:  CTAB  GI:  Soft nondistended nontender BS+  : No suprapubic or CVA tenderness  MSK:  Left hip dressing C/D/I. FROM  CNS:  Aox3. No gross focal or global deficit appreciated  INTEG:  warm dry skin  PSYCH:  Fair mood

## 2023-10-30 NOTE — PROGRESS NOTE ADULT - ASSESSMENT
68 year old  morbidly obese male with asthma, JASON on CPAP, HTN, HLD, atrial fibrillation (on Xarelto), diabetes, liver CA s/p radiation, hypothyroidism, kidney CA s/p right nephrectomy 2022 and osteoarthritis with recent RABIA (elective) for sever OA now presenting from home after being sent in by PT due to risk of further injuries/falls in pt current dwelling due to clutter      OA s/p RABIA (left)  PT eval noted, Will need admission for TUCKER placement   PRN analgesia   AC per Ortho recs ( Xeralto 10 mg daily through 10/29 followed by 20 mg daily from 10/30. ASA 81 mg bid x 21 days through 11/14 followed by  mg daily from 11/15)    HTN / CAD/ Afib   ASA and Eliquis as above   Rate controlled   Continue Amiodarone 200 mg daily   Statin   Amlodipine   Lisinopril     T2DM   A1C 7.2%  Continue Glimepiride 2 mg daily and lispro s/s   changed diet to carbohydrate consistent    Hypothyroidism   Synthroid 75 mcg daily     Morbid obesity with OHS   Nocturnal CPAP  Weight loss counseling done       DVT ppx : DOAC  Full code, HCP : Friend Krys roca     Disposition - Medically stable; pending TUCKER    Discussed with SHENA Wood

## 2023-10-30 NOTE — DISCHARGE NOTE PROVIDER - NSDCFUSCHEDAPPT_GEN_ALL_CORE_FT
Daren Huerta  [facility] Physician [facility]  FAMILYMED [address]  Scheduled Appointment: 11/01/2023    Patty Lunsford  [facility] Physician [facility]  ENDOCRIN [address]  Scheduled Appointment: 11/01/2023    [facility]  MRI  [address]  Scheduled Appointment: 11/08/2023    Angel Spencer  [facility] Physician [facility]  RHEUM [address]  Scheduled Appointment: 11/08/2023    Vandana Phipps  [facility] Physician [facility]  GASTRO [address]  Scheduled Appointment: 11/15/2023    Herbert Chaudhry  [facility] Physician [facility]  ENDOCRIN [address]  Scheduled Appointment: 12/13/2023    Lela Bonilla  [facility] Physician [facility]  [facility]  Scheduled Appointment: 01/10/2024

## 2023-10-30 NOTE — DISCHARGE NOTE PROVIDER - NSDCCPCAREPLAN_GEN_ALL_CORE_FT
PRINCIPAL DISCHARGE DIAGNOSIS  Diagnosis: Degenerative joint disease  Assessment and Plan of Treatment: Pain medications as prescribed      SECONDARY DISCHARGE DIAGNOSES  Diagnosis: HTN (hypertension)  Assessment and Plan of Treatment: Diet and medications as prescribed    Diagnosis: Afib  Assessment and Plan of Treatment: Medications as prescribed    Diagnosis: Diabetes mellitus  Assessment and Plan of Treatment: Diet and medications as prescribed    Diagnosis: Obstructive sleep apnea  Assessment and Plan of Treatment: Nocturnal CPAP    Diagnosis: Hypothyroidism  Assessment and Plan of Treatment: resume home medications    Diagnosis: Status post hip surgery  Assessment and Plan of Treatment:

## 2023-10-30 NOTE — DISCHARGE NOTE PROVIDER - NSDCMRMEDTOKEN_GEN_ALL_CORE_FT
acetaminophen 325 mg oral tablet: 3 tab(s) orally every 8 hours  allopurinol 300 mg oral tablet: 1 tab(s) orally once a day  amiodarone 200 mg oral tablet: 1 tab(s) orally once a day  amLODIPine 10 mg oral tablet: 1 tab(s) orally once a day  aspirin 81 mg oral tablet, chewable: 1 tab(s) orally 2 times a day  atorvastatin 20 mg oral tablet: 1 tab(s) orally once a day  DULoxetine 60 mg oral delayed release capsule: 1 cap(s) orally 2 times a day  Flomax 0.4 mg oral capsule: 1 cap(s) orally once a day  glimepiride 2 mg oral tablet: 1 tab(s) orally once a day  levothyroxine 75 mcg (0.075 mg) oral tablet: 1 tab(s) orally once a day  omeprazole 20 mg oral delayed release tablet: 1 tab(s) orally once a day MDD: 1  oxyCODONE 5 mg oral tablet: 1 tab(s) orally every 6 hours As needed Severe Pain (7 - 10)  ramipril 10 mg oral capsule: 1 cap(s) orally once a day  rivaroxaban 20 mg oral tablet: 1 tab(s) orally once a day (before a meal)  Senna S 50 mg-8.6 mg oral tablet: 2 tab(s) orally once a day (at bedtime) MDD: 2

## 2023-10-31 ENCOUNTER — TRANSCRIPTION ENCOUNTER (OUTPATIENT)
Age: 68
End: 2023-10-31

## 2023-10-31 VITALS
SYSTOLIC BLOOD PRESSURE: 122 MMHG | TEMPERATURE: 97 F | OXYGEN SATURATION: 96 % | DIASTOLIC BLOOD PRESSURE: 73 MMHG | RESPIRATION RATE: 16 BRPM | HEART RATE: 63 BPM

## 2023-10-31 LAB
GLUCOSE BLDC GLUCOMTR-MCNC: 198 MG/DL — HIGH (ref 70–99)
GLUCOSE BLDC GLUCOMTR-MCNC: 198 MG/DL — HIGH (ref 70–99)
GLUCOSE BLDC GLUCOMTR-MCNC: 243 MG/DL — HIGH (ref 70–99)
GLUCOSE BLDC GLUCOMTR-MCNC: 243 MG/DL — HIGH (ref 70–99)

## 2023-10-31 PROCEDURE — 99239 HOSP IP/OBS DSCHRG MGMT >30: CPT

## 2023-10-31 RX ADMIN — Medication 75 MICROGRAM(S): at 07:53

## 2023-10-31 RX ADMIN — Medication 650 MILLIGRAM(S): at 12:30

## 2023-10-31 RX ADMIN — Medication 650 MILLIGRAM(S): at 05:52

## 2023-10-31 RX ADMIN — Medication 1: at 11:30

## 2023-10-31 RX ADMIN — PANTOPRAZOLE SODIUM 40 MILLIGRAM(S): 20 TABLET, DELAYED RELEASE ORAL at 05:53

## 2023-10-31 RX ADMIN — Medication 2: at 07:52

## 2023-10-31 RX ADMIN — Medication 650 MILLIGRAM(S): at 11:30

## 2023-10-31 RX ADMIN — AMLODIPINE BESYLATE 10 MILLIGRAM(S): 2.5 TABLET ORAL at 05:53

## 2023-10-31 RX ADMIN — DULOXETINE HYDROCHLORIDE 60 MILLIGRAM(S): 30 CAPSULE, DELAYED RELEASE ORAL at 05:53

## 2023-10-31 RX ADMIN — Medication 81 MILLIGRAM(S): at 05:53

## 2023-10-31 RX ADMIN — Medication 650 MILLIGRAM(S): at 00:05

## 2023-10-31 RX ADMIN — Medication 650 MILLIGRAM(S): at 06:49

## 2023-10-31 RX ADMIN — AMIODARONE HYDROCHLORIDE 200 MILLIGRAM(S): 400 TABLET ORAL at 05:53

## 2023-10-31 RX ADMIN — LISINOPRIL 40 MILLIGRAM(S): 2.5 TABLET ORAL at 05:54

## 2023-10-31 NOTE — PROGRESS NOTE ADULT - ASSESSMENT
68 year old  morbidly obese male with asthma, JASON on CPAP, HTN, HLD, atrial fibrillation (on Xarelto), diabetes, liver CA s/p radiation, hypothyroidism, kidney CA s/p right nephrectomy 2022 and osteoarthritis with recent RABIA (elective) for sever OA now presenting from home after being sent in by PT due to risk of further injuries/falls in pt current dwelling due to clutter      OA s/p RABIA (left)  PT eval noted, Will need admission for TUCKER placement   PRN analgesia   AC per Ortho recs ( Xeralto 10 mg daily through 10/29 followed by 20 mg daily from 10/30. ASA 81 mg bid x 21 days through 11/14 followed by  mg daily from 11/15)    HTN / CAD/ Afib   ASA and Eliquis as above   Rate controlled   Continue Amiodarone 200 mg daily   Statin   Amlodipine   Lisinopril     T2DM   A1C 7.2%  Continue Glimepiride 2 mg daily and lispro s/s   changed diet to carbohydrate consistent    Hypothyroidism   Synthroid 75 mcg daily     Morbid obesity with OHS   Nocturnal CPAP  Weight loss counseling done       DVT ppx : DOAC  Full code, HCP : Friend Krys roca     Disposition - Medically stable; pending TUCKER    Discussed with SHENA

## 2023-10-31 NOTE — DISCHARGE NOTE NURSING/CASE MANAGEMENT/SOCIAL WORK - NSDCFUADDAPPT_GEN_ALL_CORE_FT
Patient will be attending subacute rehabilitation at the below facility:    [facility]   [address] [place] [address]

## 2023-10-31 NOTE — PROGRESS NOTE ADULT - SUBJECTIVE AND OBJECTIVE BOX
EVERETT ARIEL  [id number]    History: Patient is status post left anterior total hip arthroplasty on 10/24/2023, POD #7 . Patient is seen sitting comfortably in bed eating breakfast. The patient's pain is controlled using the prescribed pain medications. The patient is participating in physical therapy. Denies nausea, vomiting, chest pain, shortness of breath, abdominal pain or fever. No new complaints. Is here awaiting TUCKER placement after home PT deemed the patients home as unsafe.     Vital Signs Last 24 Hrs  T(C): 36.3 (31 Oct 2023 08:35), Max: 36.8 (30 Oct 2023 19:36)  T(F): 97.3 (31 Oct 2023 08:35), Max: 98.2 (30 Oct 2023 19:36)  HR: 62 (31 Oct 2023 08:35) (62 - 93)  BP: 158/92 (31 Oct 2023 08:35) (120/70 - 158/92)  BP(mean): --  RR: 18 (31 Oct 2023 08:35) (16 - 18)  SpO2: 95% (31 Oct 2023 08:35) (95% - 97%)    Parameters below as of 31 Oct 2023 08:35  Patient On (Oxygen Delivery Method): room air    MEDICATIONS  (STANDING):  acetaminophen     Tablet .. 650 milliGRAM(s) Oral every 6 hours  allopurinol 300 milliGRAM(s) Oral at bedtime  aMIOdarone    Tablet 200 milliGRAM(s) Oral daily  amLODIPine   Tablet 10 milliGRAM(s) Oral daily  aspirin  chewable 81 milliGRAM(s) Oral two times a day  atorvastatin 20 milliGRAM(s) Oral at bedtime  dextrose 5%. 1000 milliLiter(s) (100 mL/Hr) IV Continuous <Continuous>  dextrose 5%. 1000 milliLiter(s) (50 mL/Hr) IV Continuous <Continuous>  dextrose 50% Injectable 25 Gram(s) IV Push once  dextrose 50% Injectable 25 Gram(s) IV Push once  dextrose 50% Injectable 12.5 Gram(s) IV Push once  DULoxetine 60 milliGRAM(s) Oral two times a day  glimepiride. 2 milliGRAM(s) Oral at bedtime  glucagon  Injectable 1 milliGRAM(s) IntraMuscular once  influenza  Vaccine (HIGH DOSE) 0.7 milliLiter(s) IntraMuscular once  insulin lispro (ADMELOG) corrective regimen sliding scale   SubCutaneous three times a day before meals  insulin lispro (ADMELOG) corrective regimen sliding scale   SubCutaneous at bedtime  levothyroxine 75 MICROGram(s) Oral daily  lisinopril 40 milliGRAM(s) Oral daily  pantoprazole    Tablet 40 milliGRAM(s) Oral before breakfast  rivaroxaban 20 milliGRAM(s) Oral with dinner  senna 2 Tablet(s) Oral at bedtime  tamsulosin 0.4 milliGRAM(s) Oral at bedtime    MEDICATIONS  (PRN):  dextrose Oral Gel 15 Gram(s) Oral once PRN Blood Glucose LESS THAN 70 milliGRAM(s)/deciliter  oxyCODONE    IR 5 milliGRAM(s) Oral every 6 hours PRN Severe Pain (7 - 10)    Physical exam: The left hip dressing was changed due to it being POD #7. Dressing is clean, dry and intact. No drainage or discharge. No erythema is noted. No blistering. No ecchymosis. The calf is supple nontender. Passive range of motion is acceptable to due postoperative pain. No calf tenderness. Sensation to light touch is grossly intact distally. No foot drop. Capillary refill is less than 2 seconds. No cyanosis.     Primary Orthopedic Assessment:  • s/p LEFT total hip replacement    Plan:   • New mepilex applied   • DVT prophylaxis as Xarelto, including use of compression devices and ankle pumps  • Continue physical therapy  • Weightbearing as tolerated of the left lower extremity with assistance of a walker  • Incentive spirometry encouraged  • Pain control as clinically indicated  • Discharge planning – anticipated discharge is subacute rehabilitation

## 2023-10-31 NOTE — PROGRESS NOTE ADULT - SUBJECTIVE AND OBJECTIVE BOX
HOSPITALIST PROGRESS NOTE    EVERETT GEORGE  [id number]  68yMale    Patient is a 68y old  Male who presents with a chief complaint of s/p left RABIA (31 Oct 2023 08:45)      SUBJECTIVE:   Chart reviewed since last visit.  Patient seen and examined at bedside for DJD s/p RABIA  Denies any pain. Had bowel movement, voiding.      OBJECTIVE:  Vital Signs Last 24 Hrs  T(C): 36.3 (31 Oct 2023 13:43), Max: 36.8 (30 Oct 2023 19:36)  T(F): 97.4 (31 Oct 2023 13:43), Max: 98.2 (30 Oct 2023 19:36)  HR: 63 (31 Oct 2023 13:43) (62 - 93)  BP: 122/73 (31 Oct 2023 13:43) (122/73 - 158/92)   RR: 16 (31 Oct 2023 13:43) (16 - 18)  SpO2: 96% (31 Oct 2023 13:43) (95% - 97%)    Parameters below as of 31 Oct 2023 13:43  Patient On (Oxygen Delivery Method): room air      PHYSICAL EXAMINATION  General: Morbidly obese male sitting up in bed, comfortable  HEENT:  extraocular movements intact  NECK:  supple  CVS: regular rate and rhythm S1 S2  RESP:  CTAB  GI:  Soft nondistended nontender BS+  : No suprapubic or CVA tenderness  MSK:  Left hip dressing C/D/I. FROM  CNS:  Aox3. No gross focal or global deficit appreciated  INTEG:  warm dry skin  PSYCH:  Fair mood      MONITOR:  CAPILLARY BLOOD GLUCOSE      POCT Blood Glucose.: 198 mg/dL (31 Oct 2023 11:28)  POCT Blood Glucose.: 243 mg/dL (31 Oct 2023 07:48)  POCT Blood Glucose.: 153 mg/dL (30 Oct 2023 22:01)  POCT Blood Glucose.: 164 mg/dL (30 Oct 2023 17:08)        I&O's Summary    30 Oct 2023 07:01  -  31 Oct 2023 07:00  --------------------------------------------------------  IN: 360 mL / OUT: 2850 mL / NET: -2490 mL                        Culture:    TTE:    RADIOLOGY        MEDICATIONS  (STANDING):  acetaminophen     Tablet .. 650 milliGRAM(s) Oral every 6 hours  allopurinol 300 milliGRAM(s) Oral at bedtime  aMIOdarone    Tablet 200 milliGRAM(s) Oral daily  amLODIPine   Tablet 10 milliGRAM(s) Oral daily  aspirin  chewable 81 milliGRAM(s) Oral two times a day  atorvastatin 20 milliGRAM(s) Oral at bedtime  dextrose 5%. 1000 milliLiter(s) (50 mL/Hr) IV Continuous <Continuous>  dextrose 5%. 1000 milliLiter(s) (100 mL/Hr) IV Continuous <Continuous>  dextrose 50% Injectable 25 Gram(s) IV Push once  dextrose 50% Injectable 25 Gram(s) IV Push once  dextrose 50% Injectable 12.5 Gram(s) IV Push once  DULoxetine 60 milliGRAM(s) Oral two times a day  glimepiride. 2 milliGRAM(s) Oral at bedtime  glucagon  Injectable 1 milliGRAM(s) IntraMuscular once  influenza  Vaccine (HIGH DOSE) 0.7 milliLiter(s) IntraMuscular once  insulin lispro (ADMELOG) corrective regimen sliding scale   SubCutaneous three times a day before meals  insulin lispro (ADMELOG) corrective regimen sliding scale   SubCutaneous at bedtime  levothyroxine 75 MICROGram(s) Oral daily  lisinopril 40 milliGRAM(s) Oral daily  pantoprazole    Tablet 40 milliGRAM(s) Oral before breakfast  rivaroxaban 20 milliGRAM(s) Oral with dinner  senna 2 Tablet(s) Oral at bedtime  tamsulosin 0.4 milliGRAM(s) Oral at bedtime      MEDICATIONS  (PRN):  dextrose Oral Gel 15 Gram(s) Oral once PRN Blood Glucose LESS THAN 70 milliGRAM(s)/deciliter  oxyCODONE    IR 5 milliGRAM(s) Oral every 6 hours PRN Severe Pain (7 - 10)

## 2023-10-31 NOTE — DISCHARGE NOTE NURSING/CASE MANAGEMENT/SOCIAL WORK - PATIENT PORTAL LINK FT
Pre-Surgery Instructions:   Medication Instructions    acetaminophen (TYLENOL) 325 mg suppository Instructed patient per Anesthesia Guidelines   albuterol (PROVENTIL HFA,VENTOLIN HFA) 90 mcg/act inhaler Instructed patient per Anesthesia Guidelines   drospirenone-ethinyl estradiol (DILLON) 3-0 02 MG per tablet Instructed patient per Anesthesia Guidelines   EPIPEN 2-JENNA 0 3 MG/0 3ML SOAJ Instructed patient per Anesthesia Guidelines   IBUPROFEN PO Patient was instructed by Physician and understands   Loratadine (CLARITIN) 10 MG CAPS Instructed patient per Anesthesia Guidelines  Pre op instructions reviewed with pt's mom Miladys Castillo, also  [phone], via phone  Instructed to take loratadine (prn) and albuterol (prn) a m of surgery  LD ibuprofen 10/02/19  My Surgical Experience    The following information was developed to assist you to prepare for your operation  What do I need to do before coming to the hospital?   Arrange for a responsible person to drive you to and from the hospital    Arrange care for your children at home  Children are not allowed in the recovery areas of the hospital   Plan to wear clothing that is easy to put on and take off  If you are having shoulder surgery, wear a shirt that buttons or zippers in the front  Bathing  o Shower the evening before and the morning of your surgery with an antibacterial soap  Please refer to the Pre Op Showering Instructions for Surgery Patients Sheet   o Remove nail polish and all body piercing jewelry  o Do not shave any body part for at least 24 hours before surgery-this includes face, arms, legs and upper body  Food  o Nothing to eat or drink after midnight the night before your surgery   This includes candy and chewing gum  o Exception: If your surgery is after 12:00pm (noon), you may have clear liquids such as 7-Up®, ginger ale, apple or cranberry juice, Jell-O®, water, or clear broth until 8:00 am  o Do not drink milk or juice with You can access the FollowMyHealth Patient Portal offered by [facility] by registering at the following website: [email or web address]. By joining [employer]’s FollowMyHealth portal, you will also be able to view your health information using other applications (apps) compatible with our system. pulp on the morning before surgery  o Do not drink alcohol 24 hours before surgery  Medicine  o Follow instructions you received from your surgeon about which medicines you may take on the day of surgery  o If instructed to take medicine on the morning of surgery, take pills with just a small sip of water  Call your prescribing doctor for specific information on what to do if you take insulin    What should I bring to the hospital?    Bring:  Branford Rafter or a walker, if you have them, for foot or knee surgery   A list of the daily medicines, vitamins, minerals, herbals and nutritional supplements you take  Include the dosages of medicines and the time you take them each day   Glasses, dentures or hearing aids   Minimal clothing; you will be wearing hospital sleepwear   Photo ID; required to verify your identity   If you have a Living Will or Power of , bring a copy of the documents   If you have an ostomy, bring an extra pouch and any supplies you use    Do not bring   Medicines or inhalers   Money, valuables or jewelry    What other information should I know about the day of surgery?  Notify your surgeons if you develop a cold, sore throat, cough, fever, rash or any other illness   Report to the Ambulatory Surgical/Same Day Surgery Unit   You will be instructed to stop at Registration only if you have not been pre-registered   Inform your  fi they do not stay that they will be asked by the staff to leave a phone number where they can be reached   Be available to be reached before surgery  In the event the operating room schedule changes, you may be asked to come in earlier or later than expected    *It is important to tell your doctor and others involved in your health care if you are taking or have been taking any non-prescription drugs, vitamins, minerals, herbals or other nutritional supplements   Any of these may interact with some food or medicines and cause a reaction

## 2023-10-31 NOTE — PROGRESS NOTE ADULT - REASON FOR ADMISSION
fracture; PT unable to perform at home
fracture; PT unable to perform at home
s/p left RABIA
fracture; PT unable to perform at home

## 2023-11-01 ENCOUNTER — APPOINTMENT (OUTPATIENT)
Dept: ENDOCRINOLOGY | Facility: CLINIC | Age: 68
End: 2023-11-01

## 2023-11-01 ENCOUNTER — APPOINTMENT (OUTPATIENT)
Dept: FAMILY MEDICINE | Facility: CLINIC | Age: 68
End: 2023-11-01

## 2023-11-01 ENCOUNTER — NON-APPOINTMENT (OUTPATIENT)
Age: 68
End: 2023-11-01

## 2023-11-08 ENCOUNTER — APPOINTMENT (OUTPATIENT)
Dept: MRI IMAGING | Facility: CLINIC | Age: 68
End: 2023-11-08

## 2023-11-13 PROCEDURE — 82962 GLUCOSE BLOOD TEST: CPT

## 2023-11-13 PROCEDURE — 86850 RBC ANTIBODY SCREEN: CPT

## 2023-11-13 PROCEDURE — 94660 CPAP INITIATION&MGMT: CPT

## 2023-11-13 PROCEDURE — 85027 COMPLETE CBC AUTOMATED: CPT

## 2023-11-13 PROCEDURE — C1713: CPT

## 2023-11-13 PROCEDURE — 85025 COMPLETE CBC W/AUTO DIFF WBC: CPT

## 2023-11-13 PROCEDURE — 86901 BLOOD TYPING SEROLOGIC RH(D): CPT

## 2023-11-13 PROCEDURE — 85730 THROMBOPLASTIN TIME PARTIAL: CPT

## 2023-11-13 PROCEDURE — S2900: CPT

## 2023-11-13 PROCEDURE — 80053 COMPREHEN METABOLIC PANEL: CPT

## 2023-11-13 PROCEDURE — G0378: CPT

## 2023-11-13 PROCEDURE — C1776: CPT

## 2023-11-13 PROCEDURE — C9399: CPT

## 2023-11-13 PROCEDURE — 99285 EMERGENCY DEPT VISIT HI MDM: CPT | Mod: 25

## 2023-11-13 PROCEDURE — 36415 COLL VENOUS BLD VENIPUNCTURE: CPT

## 2023-11-13 PROCEDURE — C1889: CPT

## 2023-11-13 PROCEDURE — 80048 BASIC METABOLIC PNL TOTAL CA: CPT

## 2023-11-13 PROCEDURE — 86900 BLOOD TYPING SEROLOGIC ABO: CPT

## 2023-11-13 PROCEDURE — 73502 X-RAY EXAM HIP UNI 2-3 VIEWS: CPT

## 2023-11-13 PROCEDURE — 97110 THERAPEUTIC EXERCISES: CPT

## 2023-11-13 PROCEDURE — 97116 GAIT TRAINING THERAPY: CPT

## 2023-11-13 PROCEDURE — 85610 PROTHROMBIN TIME: CPT

## 2023-11-14 ENCOUNTER — APPOINTMENT (OUTPATIENT)
Dept: ORTHOPEDIC SURGERY | Facility: CLINIC | Age: 68
End: 2023-11-14

## 2023-11-15 ENCOUNTER — APPOINTMENT (OUTPATIENT)
Dept: GASTROENTEROLOGY | Facility: CLINIC | Age: 68
End: 2023-11-15
Payer: MEDICARE

## 2023-11-15 VITALS
SYSTOLIC BLOOD PRESSURE: 137 MMHG | HEIGHT: 67 IN | DIASTOLIC BLOOD PRESSURE: 74 MMHG | WEIGHT: 315 LBS | TEMPERATURE: 97.7 F | BODY MASS INDEX: 49.44 KG/M2 | HEART RATE: 68 BPM

## 2023-11-15 PROCEDURE — 99214 OFFICE O/P EST MOD 30 MIN: CPT

## 2023-11-15 RX ORDER — CELECOXIB 400 MG/1
400 CAPSULE ORAL TWICE DAILY
Qty: 60 | Refills: 0 | Status: DISCONTINUED | COMMUNITY
Start: 2023-08-10 | End: 2023-11-15

## 2023-11-15 RX ORDER — ASPIRIN/CALCIUM CARB/MAGNESIUM 324 MG
500 TABLET ORAL
Qty: 0 | Refills: 0 | DISCHARGE
Start: 2023-11-15

## 2023-11-15 RX ORDER — RAMIPRIL 10 MG/1
10 CAPSULE ORAL
Qty: 90 | Refills: 2 | Status: DISCONTINUED | COMMUNITY
Start: 2020-08-26 | End: 2023-11-15

## 2023-11-22 NOTE — PRE-OP CHECKLIST - LOOSE TEETH
From: Timi Hernandez  To: Abram Patten  Sent: 11/22/2023 1:14 PM EST  Subject: Carotid ultrasound    Dr. patten & ,    the dizzy spells, headaches, lose of balance and feeling like I am going to pass out when going from down to up seems to worsening. One of my APRN listened to my carotid arteries on both sides of my neck and said the left doesn’t sounded like it’s flowing like it should. Is it possible to get and Carotid ultrasound on both sides?    Should I also let Dr. Sage know as well?    Dontrell Hernandez  [phone]  
no

## 2023-12-01 NOTE — ED ADULT NURSE REASSESSMENT NOTE - BOWEL SOUNDS RUQ
Colonoscopy 5/22/2019; - Ascending colon polyp 6 mm - Transverse colon polyp 5 mm - Internal hemorrhoids - Repeat colonoscopy in 5 years . EGD 5/23/2019; - Single erosion greater than 5 mm in the distal esophagus with erosive esophagitis, LA class B - 3 cm hiatal hernia - Stomach severe erythema and evidence of moderately severe hemorrhagic gastritis
present
present

## 2023-12-05 ENCOUNTER — APPOINTMENT (OUTPATIENT)
Dept: ORTHOPEDIC SURGERY | Facility: CLINIC | Age: 68
End: 2023-12-05
Payer: MEDICARE

## 2023-12-05 PROCEDURE — 73502 X-RAY EXAM HIP UNI 2-3 VIEWS: CPT

## 2023-12-05 PROCEDURE — 99024 POSTOP FOLLOW-UP VISIT: CPT

## 2023-12-13 ENCOUNTER — APPOINTMENT (OUTPATIENT)
Dept: ENDOCRINOLOGY | Facility: CLINIC | Age: 68
End: 2023-12-13

## 2023-12-15 ENCOUNTER — NON-APPOINTMENT (OUTPATIENT)
Age: 68
End: 2023-12-15

## 2023-12-21 ENCOUNTER — OUTPATIENT (OUTPATIENT)
Dept: OUTPATIENT SERVICES | Facility: HOSPITAL | Age: 68
LOS: 1 days | End: 2023-12-21
Payer: MEDICARE

## 2023-12-21 ENCOUNTER — APPOINTMENT (OUTPATIENT)
Dept: MRI IMAGING | Facility: CLINIC | Age: 68
End: 2023-12-21

## 2023-12-21 DIAGNOSIS — Z98.82 BREAST IMPLANT STATUS: Chronic | ICD-10-CM

## 2023-12-21 DIAGNOSIS — Z98.49 CATARACT EXTRACTION STATUS, UNSPECIFIED EYE: Chronic | ICD-10-CM

## 2023-12-21 DIAGNOSIS — C22.0 LIVER CELL CARCINOMA: ICD-10-CM

## 2023-12-21 DIAGNOSIS — Z98.890 OTHER SPECIFIED POSTPROCEDURAL STATES: Chronic | ICD-10-CM

## 2023-12-21 DIAGNOSIS — Z96.652 PRESENCE OF LEFT ARTIFICIAL KNEE JOINT: Chronic | ICD-10-CM

## 2023-12-21 DIAGNOSIS — Z90.5 ACQUIRED ABSENCE OF KIDNEY: Chronic | ICD-10-CM

## 2023-12-21 PROCEDURE — 74183 MRI ABD W/O CNTR FLWD CNTR: CPT | Mod: 26

## 2024-01-05 NOTE — DISCHARGE NOTE NURSING/CASE MANAGEMENT/SOCIAL WORK - NSDCVIVACCINE_GEN_ALL_CORE_FT
ED Provider Note  [facility]      History     Chief Complaint   Patient presents with    Dental Pain     Patient reports upper right tooth pain for 4 days, radiates to ear and cheek.       Dental Pain    Polina Barrientos is a 26 year old female who is here with 3 to 4 days of right facial pain and right upper premolar pain without swelling or signs of abscess.  No fevers mild right ear pain mild headache no sore throat no COVID or influenza symptoms.    Past Medical History  Past Medical History:   Diagnosis Date    Asthma     does not use inhaler, dx'd     Depressive disorder     h/o depression and suidide attempt 2015    Varicella      Past Surgical History:   Procedure Laterality Date     SECTION N/A 2022    Procedure:  SECTION;  Surgeon: Lianna Beltre MD;  Location: [facility] L+[facility]     amoxicillin (AMOXIL) 500 MG capsule  acetaminophen (TYLENOL) 325 MG tablet  buprenorphine HCl-naloxone HCl (SUBOXONE) 4-1 MG per film  buprenorphine HCl-naloxone HCl (SUBOXONE) 8-2 MG per film  ferrous sulfate (FE TABS) 325 (65 Fe) MG EC tablet  FLUoxetine (PROZAC) 40 MG capsule  ibuprofen (ADVIL/MOTRIN) 600 MG tablet  Prenatal Vit-Fe Fumarate-FA (PRENATAL MULTIVITAMIN W/IRON) 27-0.8 MG tablet  senna-docusate (SENOKOT-S/PERICOLACE) 8.6-50 MG tablet      No Known Allergies  Family History  Family History   Problem Relation Age of Onset    Diabetes Father      Social History   Social History     Tobacco Use    Smoking status: Never    Smokeless tobacco: Never   Vaping Use    Vaping Use: Never used   Substance Use Topics    Alcohol use: Not Currently     Comment: occasionally    Drug use: Not Currently     Types: Marijuana     Comment: per prenatal: oxycodone and suboxone         A medically appropriate review of systems was performed with pertinent positives and negatives noted in the HPI, and all other systems negative.    Physical Exam   BP: 109/66  Pulse: 74  Temp: 98   F (36.7  C)  Resp: 18  SpO2: 97 %  Physical Exam  Vitals and nursing note reviewed.   HENT:      Head: Atraumatic.      Nose: Nose normal.      Mouth/Throat:      Mouth: Mucous membranes are moist.      Dentition: Normal dentition. No dental caries.     Eyes:      Extraocular Movements: Extraocular movements intact.      Pupils: Pupils are equal, round, and reactive to light.   Musculoskeletal:      Cervical back: Neck supple.   Neurological:      Mental Status: She is alert.           ED Course, Procedures, & Data      Procedures                   No results found for any visits on 01/04/24.  Medications - No data to display  Labs Ordered and Resulted from Time of ED Arrival to Time of ED Departure - No data to display  No orders to display            Assessment & Plan    Patient with pain without swelling to tooth #2 also complaining of facial pain in this area.  Suspect periapical abscess will treat with amoxicillin and provide Tylenol and ibuprofen and recommend close dental follow-up.    I have reviewed the nursing notes. I have reviewed the findings, diagnosis, plan and need for follow up with the patient.    New Prescriptions    AMOXICILLIN (AMOXIL) 500 MG CAPSULE    Take 1 capsule (500 mg) by mouth 3 times daily for 7 days       Final diagnoses:   Pain, dental       Nirav Hopkins MD  [facility] EMERGENCY DEPARTMENT  1/4/2024     Nirav Hopkins MD  01/04/24 [id number]     No Vaccines Administered.

## 2024-01-09 ENCOUNTER — OUTPATIENT (OUTPATIENT)
Dept: OUTPATIENT SERVICES | Facility: HOSPITAL | Age: 69
LOS: 1 days | Discharge: HOME | End: 2024-01-09

## 2024-01-09 DIAGNOSIS — Z96.652 PRESENCE OF LEFT ARTIFICIAL KNEE JOINT: Chronic | ICD-10-CM

## 2024-01-09 DIAGNOSIS — Z98.49 CATARACT EXTRACTION STATUS, UNSPECIFIED EYE: Chronic | ICD-10-CM

## 2024-01-09 DIAGNOSIS — C22.0 LIVER CELL CARCINOMA: ICD-10-CM

## 2024-01-09 DIAGNOSIS — Z98.890 OTHER SPECIFIED POSTPROCEDURAL STATES: Chronic | ICD-10-CM

## 2024-01-09 DIAGNOSIS — Z98.82 BREAST IMPLANT STATUS: Chronic | ICD-10-CM

## 2024-01-09 DIAGNOSIS — Z90.5 ACQUIRED ABSENCE OF KIDNEY: Chronic | ICD-10-CM

## 2024-01-10 ENCOUNTER — APPOINTMENT (OUTPATIENT)
Dept: HEMATOLOGY ONCOLOGY | Facility: CLINIC | Age: 69
End: 2024-01-10
Payer: MEDICARE

## 2024-01-10 ENCOUNTER — RESULT REVIEW (OUTPATIENT)
Age: 69
End: 2024-01-10

## 2024-01-10 ENCOUNTER — APPOINTMENT (OUTPATIENT)
Dept: RHEUMATOLOGY | Facility: CLINIC | Age: 69
End: 2024-01-10
Payer: MEDICARE

## 2024-01-10 VITALS
SYSTOLIC BLOOD PRESSURE: 132 MMHG | HEART RATE: 86 BPM | OXYGEN SATURATION: 98 % | DIASTOLIC BLOOD PRESSURE: 88 MMHG | HEIGHT: 67 IN | TEMPERATURE: 97.9 F

## 2024-01-10 DIAGNOSIS — M25.552 PAIN IN LEFT HIP: ICD-10-CM

## 2024-01-10 DIAGNOSIS — M47.812 SPONDYLOSIS W/OUT MYELOPATHY OR RADICULOPATHY, CERVICAL REGION: ICD-10-CM

## 2024-01-10 DIAGNOSIS — Z79.899 OTHER LONG TERM (CURRENT) DRUG THERAPY: ICD-10-CM

## 2024-01-10 DIAGNOSIS — M1A.9XX0 CHRONIC GOUT, UNSPECIFIED, W/OUT TOPHUS (TOPHI): ICD-10-CM

## 2024-01-10 DIAGNOSIS — M47.816 SPONDYLOSIS W/OUT MYELOPATHY OR RADICULOPATHY, LUMBAR REGION: ICD-10-CM

## 2024-01-10 LAB
BASOPHILS # BLD AUTO: 0.1 K/UL — SIGNIFICANT CHANGE UP (ref 0–0.2)
BASOPHILS # BLD AUTO: 0.1 K/UL — SIGNIFICANT CHANGE UP (ref 0–0.2)
BASOPHILS NFR BLD AUTO: 1.7 % — SIGNIFICANT CHANGE UP (ref 0–2)
BASOPHILS NFR BLD AUTO: 1.7 % — SIGNIFICANT CHANGE UP (ref 0–2)
EOSINOPHIL # BLD AUTO: 0.3 K/UL — SIGNIFICANT CHANGE UP (ref 0–0.5)
EOSINOPHIL # BLD AUTO: 0.3 K/UL — SIGNIFICANT CHANGE UP (ref 0–0.5)
EOSINOPHIL NFR BLD AUTO: 4.1 % — SIGNIFICANT CHANGE UP (ref 0–6)
EOSINOPHIL NFR BLD AUTO: 4.1 % — SIGNIFICANT CHANGE UP (ref 0–6)
HCT VFR BLD CALC: 47.6 % — SIGNIFICANT CHANGE UP (ref 39–50)
HCT VFR BLD CALC: 47.6 % — SIGNIFICANT CHANGE UP (ref 39–50)
HGB BLD-MCNC: 15.1 G/DL — SIGNIFICANT CHANGE UP (ref 13–17)
HGB BLD-MCNC: 15.1 G/DL — SIGNIFICANT CHANGE UP (ref 13–17)
LYMPHOCYTES # BLD AUTO: 1.1 K/UL — SIGNIFICANT CHANGE UP (ref 1–3.3)
LYMPHOCYTES # BLD AUTO: 1.1 K/UL — SIGNIFICANT CHANGE UP (ref 1–3.3)
LYMPHOCYTES # BLD AUTO: 14.9 % — SIGNIFICANT CHANGE UP (ref 13–44)
LYMPHOCYTES # BLD AUTO: 14.9 % — SIGNIFICANT CHANGE UP (ref 13–44)
MCHC RBC-ENTMCNC: 27.6 PG — SIGNIFICANT CHANGE UP (ref 27–34)
MCHC RBC-ENTMCNC: 27.6 PG — SIGNIFICANT CHANGE UP (ref 27–34)
MCHC RBC-ENTMCNC: 31.7 G/DL — LOW (ref 32–36)
MCHC RBC-ENTMCNC: 31.7 G/DL — LOW (ref 32–36)
MCV RBC AUTO: 87 FL — SIGNIFICANT CHANGE UP (ref 80–100)
MCV RBC AUTO: 87 FL — SIGNIFICANT CHANGE UP (ref 80–100)
MONOCYTES # BLD AUTO: 0.4 K/UL — SIGNIFICANT CHANGE UP (ref 0–0.9)
MONOCYTES # BLD AUTO: 0.4 K/UL — SIGNIFICANT CHANGE UP (ref 0–0.9)
MONOCYTES NFR BLD AUTO: 5.7 % — SIGNIFICANT CHANGE UP (ref 2–14)
MONOCYTES NFR BLD AUTO: 5.7 % — SIGNIFICANT CHANGE UP (ref 2–14)
NEUTROPHILS # BLD AUTO: 5.4 K/UL — SIGNIFICANT CHANGE UP (ref 1.8–7.4)
NEUTROPHILS # BLD AUTO: 5.4 K/UL — SIGNIFICANT CHANGE UP (ref 1.8–7.4)
NEUTROPHILS NFR BLD AUTO: 73.6 % — SIGNIFICANT CHANGE UP (ref 43–77)
NEUTROPHILS NFR BLD AUTO: 73.6 % — SIGNIFICANT CHANGE UP (ref 43–77)
PLATELET # BLD AUTO: 256 K/UL — SIGNIFICANT CHANGE UP (ref 150–400)
PLATELET # BLD AUTO: 256 K/UL — SIGNIFICANT CHANGE UP (ref 150–400)
RBC # BLD: 5.47 M/UL — SIGNIFICANT CHANGE UP (ref 4.2–5.8)
RBC # BLD: 5.47 M/UL — SIGNIFICANT CHANGE UP (ref 4.2–5.8)
RBC # FLD: 15.7 % — HIGH (ref 10.3–14.5)
RBC # FLD: 15.7 % — HIGH (ref 10.3–14.5)
WBC # BLD: 7.3 K/UL — SIGNIFICANT CHANGE UP (ref 3.8–10.5)
WBC # BLD: 7.3 K/UL — SIGNIFICANT CHANGE UP (ref 3.8–10.5)
WBC # FLD AUTO: 7.3 K/UL — SIGNIFICANT CHANGE UP (ref 3.8–10.5)
WBC # FLD AUTO: 7.3 K/UL — SIGNIFICANT CHANGE UP (ref 3.8–10.5)

## 2024-01-10 PROCEDURE — 99214 OFFICE O/P EST MOD 30 MIN: CPT

## 2024-01-10 NOTE — PHYSICAL EXAM
[Restricted in physically strenuous activity but ambulatory and able to carry out work of a light or sedentary nature] : Status 1- Restricted in physically strenuous activity but ambulatory and able to carry out work of a light or sedentary nature, e.g., light house work, office work [Obese] : obese [Ulcers] : no ulcers [Mucositis] : no mucositis [Thrush] : no thrush [Vesicles] : no vesicles [Normal] : affect appropriate [de-identified] : small lens opacities [de-identified] : edentulous upper; extractions lower [de-identified] : numerous multi coloured tattoos on chest back and arms

## 2024-01-10 NOTE — HISTORY OF PRESENT ILLNESS
[Disease: _____________________] : Disease: [unfilled] [de-identified] : 68 year old M with h/o anemia, Afib, gout, DM, HTN, HLD, hypothyroid, obesity, JASON and renal cancer s/p right nephrectomy who was diagnosed with HCC in June 2022.\par  06/07/2023 First visit with Dr Hyde \par  \par  He was noted to have a suspicious kidney lesion on imaging in December 2021 and was referred for an abdominal MRI in January 2022, with incidental note of a multiple small indeterminate hepatic lesions which are indeterminate but for which metastatic renal cell carcinoma cannot be excluded. Subsequent MRI abdomen in April 2022 revealed an endophytic right lower pole renal lesion suspicious for malignancy, evidence of liver metastasis and multiple prominent subcentimeter periportal, retroperitoneal and retrocaval lymph nodes which are nonspecific.\par  \par  On 4/20/22 he underwent a right nephrectomy with Dr Jhonathan Shetty.  Path showed RCC , clear cell, nuclear grade II, pT1a, Stage I. No indication for adjuvant pembro. \par  \par  PET/CT performed on 5/13/22 showed no definitive hypermetabolic evidence of malignant disease. There are prominent retroperitoneal and iliac chain lymph nodes without corresponding abnormal activity. No discrete hepatic focus to correspond with liver lesions seen on MRI (continued MRI surveillance is recommended), right anterolateral subcutaneous activity possibly related to abdominal surgery.\par  \par  On 6/1/22 he underwent a CT-guided biopsy of a representative mass in the inferior right hepatic lobe. Pathology demonstrated well differentiated hepatocellular carcinoma. Immunohistochemical stains show tumor cells are positive for arginase, focal positive for HSA, CK7 and CA IX, negative for CK20, CDX2, TTF1, Villin, Reklaw-8, vimentin and AFP. The morphology and immuno profile support the diagnosis of hepatocellular carcinoma.\par  \par  Pt works as a storage . Feels well. Denies HA. CP, SOB, abd pain, constipation, diarrhea, melena, hematuria, dysuria.  [de-identified] : moderately differentiated HCC [FreeTextEntry1] : evaluation for SRT [de-identified] : 7/28/22: Angel is here for follow up for HCC. Pt's case was discussed at . Some of the lesions suggestive of metastasis rather than HCC. Pending  bx then re discussion at  to assess for transplant eligibility. Pt will also follow up with IR for LDT. Denies HA. CP, SOB, abd pain, constipation, diarrhea, melena, hematuria, dysuria.  10/25/22: : Angel is here for follow up for HCC.Bx of one of the liver lesions was neg or malignancy. mix bibrous tissue with mixed inflammation and liver cirrhosis. No evidence of metastatic RCC. Will obtain interval MRI. Pt to follow up with IR for LDT. Pt will also follow up with IR for LDT. Denies HA. CP, SOB, abd pain, constipation, diarrhea, melena, hematuria, dysuria.  1/12/22: Care transferred from Dr. Miller to Dr. Stratton. Patient seen by urology for nephrolithiasis requiring antibiotics. Planning on going in for a procedure. since last visit, the patient's insurance issues have not been resolved, and he has not been approved for further treatment.  06/07/2023 first visit with Dr Hyde; when asked how he felt and is current state of health, patient responded"...you tell me..." He has had bone scanning and he is currently awaiting CT/PET scan not requested by this office.I read report of May 17 bone scan that showed no evidence of osseous lesions; (he has only a left kidney) . Scans are being requested by pulmonary, GI hepatology and IR to assess suitability of SRT   3/21/23: Patient presented to the ER on 3/6/23-3/15/23, admitted for sepsis 2/2 RLE cellulitis given course of IV abx. With hospital course complicated by fall  CT head x2  negative for acute pathology.  Severe edema still present of RLE but no signs of compartment syndrome.  Reports intermitted abd pain. Denies n/v/d  4/27/23:Patient feels well, has a normal appetite. Continues to work without difficulty. LE swelling has not worsened  Denies n/v, abdominal pain, weight loss,  Patient pending cataract surgery in June 2023 Continues on Xarelto 2/2 A-fib Patient had f/u with Dr. Phipps on 4/20/23, discussed liver biopsy Had f/u with Dr. Santos who recommended a bone scan.   6/7/23: Patient presents for a followup (transfer of care from Dr. Stratton) Continues on Xarelto 20 mg 2/2 A-fib Not currently on abx Ambulates with a cane, for hip pain  Admits 70lb intentional weight loss Pending cataract surgery  7/26/23: Patient presents for a followup Patient planning for embolization on 7/31/23 Gout issues resolved.  Reports left degenerative hip, planning to f/u with Dr. Schafer He has stopped eliquis and asa  09/13/23:  Mr Magallon is now s/p embolization on 07/31/23. Tolerated without issue. No complications and feeling well overall.   01/10/24: Mr Magallon returns for follow up.  MRI from Dec 21, 2023 shows  LR-TR Nonviable lesion within segment 5 (previously biopsy-proven HCC). Indeterminate rim-enhancing lesions within segment 4 are unchanged since 1/24/2022. No new liver lesion. He feels well with no new complaints [80: Normal activity with effort; some signs or symptoms of disease.] : 80: Normal activity with effort; some signs or symptoms of disease.  [ECOG Performance Status: 2 - Ambulatory and capable of all self care but unable to carry out any work activities] : Performance Status: 2 - Ambulatory and capable of all self care but unable to carry out any work activities. Up and about more than 50% of waking hours

## 2024-01-10 NOTE — ASSESSMENT
[FreeTextEntry1] : 68 year old M with h/o anemia, Afib, gout, type 2 DM, HTN, hyperlipidemia , hypothyroid, obesity, JASON and right renal cancer s/p right nephrectomy who was diagnosed with HCC in June 2022. # HCC -MRI abd showed 0.7 and 1.2 cm Lt hepatic lobe lesion and 1.1 cm right hepatic lobe lesion -no evidence of metastatic disease on PET -pt seen by hepatology >> the case was discussed at [facility]. pending bx of liver lesion as one of the lesion appear to be different from HCC. -will defer systemic therapy for now as pt is eligible for LDT and being evaluated for possible liver transplant. -Patient to follow up with Dr. Phipps and Dr. Santos. -PET/CT and CT scan scheduled on 6/7/23 and Pending MRI on 6/15/23 per Dr. Phipps # RCC -s/p right nephrectomy -Path showed RCC , clear cell, nuclear grade II, pT 1 a, Stage I. -No indication for adjuvant TKI or chemotherapy/immune therapy. Reviewed -3/29/23 CT Chest: Few ill-defined ground-glass nodular opacities primarily in the right middle lobe 0.5 cm is unchanged. Right lower lobe superior segment 0.5 cm ground-glass nodule abutting the major fissure is new. -5/17/23 Spect / CT bone: No radionuclide evidence of osseous metastases. -6/7/23 PET/CT: Focal activity in the right hepatic lobe corresponding to one of the enhancing MRI lesions compatible with HCC. 6/14/23 MR abdomen: A 3.1 cm biopsy-proven hepatocellular carcinoma in the right hepatic lobe with interval growth from prior imaging in February 2023. Two rim-enhancing lesions in segment 4 are without significant change and are indeterminate. -09/13/23: now s/p embolization on 07/31/23, doing very well overall with no significant side effects or complications. He will have his MRI in November and will follow up after.   01/10/24: Mr Magallon returns for follow up.  MRI from Dec 21, 2023 shows  LR-TR Nonviable lesion within segment 5 (previously biopsy-proven HCC). Indeterminate rim-enhancing lesions within segment 4 are unchanged since 1/24/2022. No new liver lesion. He feels well with no new complaints. AFP pending, follow up in 3 months, follow up with Dr. Phipps [Supportive] : Goals of care discussed with patient: Supportive [Palliative Care Plan] : not applicable at this time

## 2024-01-10 NOTE — REVIEW OF SYSTEMS
[Patient Intake Form Reviewed] : Patient intake form was reviewed [Fever] : no fever [Chills] : no chills [Fatigue] : no fatigue [Recent Change In Weight] : ~T no recent weight change [Vision Problems] : vision problems [Lower Ext Edema] : lower extremity edema [Joint Pain] : joint pain [Joint Stiffness] : joint stiffness [Muscle Pain] : muscle pain [Muscle Weakness] : muscle weakness [Difficulty Walking] : difficulty walking [FreeTextEntry2] : negative except as indicated in interval history [FreeTextEntry3] : Cataracts [de-identified] : uses a cane

## 2024-01-15 ENCOUNTER — RX RENEWAL (OUTPATIENT)
Age: 69
End: 2024-01-15

## 2024-01-15 RX ORDER — MELOXICAM 15 MG/1
15 TABLET ORAL
Qty: 90 | Refills: 3 | Status: ACTIVE | COMMUNITY
Start: 2020-09-15 | End: 1900-01-01

## 2024-01-16 PROBLEM — M47.816 LUMBAR SPONDYLOSIS: Status: ACTIVE | Noted: 2020-12-08

## 2024-01-16 PROBLEM — Z79.899 ON ALLOPURINOL THERAPY: Status: ACTIVE | Noted: 2021-02-24

## 2024-01-16 PROBLEM — M1A.9XX0 CHRONIC GOUT: Status: ACTIVE | Noted: 2021-01-12

## 2024-01-16 PROBLEM — M25.552 PAIN OF LEFT HIP: Status: ACTIVE | Noted: 2023-08-02

## 2024-01-16 NOTE — ASSESSMENT
[FreeTextEntry1] : 1. Diffuse degenerative and post-traumatic OA of his R knee, hands, R shoulder, hips, and lower spine. He has DIP subluxations due to [place] nodes and degenerative changes.  2. Gout - Elevated ESR/inflammatory markers - should not exist in gout since this would only occur w/ flare activity, would want to r/o other inflammatory arthritides such as RA, SpA, PsA, etc. He remains on NSAIDs while anticoagulated w/ a NOAC, we reviewed these risks and discussed adding SNRI for chronic joint pain mgmt. Has reports of thigh pain w/o upper shoulder girdle pain. No cranial sxs suggestive of GCA. Less likely PMR bout would like to get cytokine panel to eval for IL-6 elevations seen. 3. LFT elevation - chronic NSAIDs, anticoag, APAP use. Now off NSAIDs - taking low-dose steroids and allopurinol along w/ NSAID. Discussed importance of not taking Aleve (takes twice daily while on A/C)- also taking high ASA dose. Following w/ Onc- liver ca. 4. Triggering digit #3- s/p inj last 7/2023 Serologies all negative - only elevated ESR/CRP along w/ UA. NO active gouty flares but inflammatory symptoms and pain at times.  - Lab monitoring drawn in office today w/ CMP, UA - c/w Duloxetine 60mg BID (Rx PCP) - c/w Allopurinol 300mg BID (Rx PCP) - last UA 5.4 - MRI cervical spine, referral to pain mgmt based on findings if amenable to QUYEN, etc. - f/u hemeonc, neph   RTO 4-6 months.

## 2024-01-16 NOTE — PROCEDURE
[Today's Date:] : Date: [unfilled] [Arthrocentesis] : arthrocentesis was performed [Risks] : risks [Benefits] : benefits [Alternatives] : alternatives [Consent Obtained] : written consent was obtained prior to the procedure and is detailed in the patient's record [Patient] : Prior to the start of the procedure a time out was taken and the identity of the patient was confirmed via name and date of birth with the patient. The correct site and the procedure to be performed were confirmed. The correct side was confirmed if applicable. The availability of the correct equipment was verified [Therapeutic] : therapeutic [#1 Site: ______] : #1 site identified in the [unfilled] [Ethyl Chloride] : ethyl chloride [1%] : 1%  [Without Epi] : without epinephrine [Chlorhexidine] : chlorhexidine [___ml 1% Lidocaine] : [unfilled] ml of 1% lidocaine [Depomedrol ___ mg] : Depomedrol [unfilled] mg [Tolerated Well] : the patient tolerated the procedure well [Reports Improvement in Symptoms] : reports improvement in symptoms [No Complications] : there were no complications [Instructions Given] : handouts/patient instructions were given to patient [Patient Instructed to Call] : patient was instructed to call if redness at site, a decrease in range of motion or an increase in pain is noted after procedure. [de-identified] : 30g 1.5 in  [FreeTextEntry1] : Injected 10mg Kenalog into R #3 A1 pulley today for immediate relief of chronic trigger finger. \par  Pt tolerated injection well. Ice 15 min off/on over 3-4 hours this evening for any pain secondary to injection or post-injection inflammatory response.

## 2024-01-16 NOTE — PHYSICAL EXAM
[General Appearance - Alert] : alert [General Appearance - In No Acute Distress] : in no acute distress [General Appearance - Well Nourished] : well nourished [General Appearance - Well Developed] : well developed [General Appearance - Well-Appearing] : healthy appearing [Sclera] : the sclera and conjunctiva were normal [PERRL With Normal Accommodation] : pupils were equal in size, round, and reactive to light [Extraocular Movements] : extraocular movements were intact [Outer Ear] : the ears and nose were normal in appearance [Nasal Cavity] : the nasal mucosa and septum were normal [Oropharynx] : the oropharynx was normal [Neck Appearance] : the appearance of the neck was normal [Neck Cervical Mass (___cm)] : no neck mass was observed [Jugular Venous Distention Increased] : there was no jugular-venous distention [Thyroid Diffuse Enlargement] : the thyroid was not enlarged [Thyroid Nodule] : there were no palpable thyroid nodules [Respiration, Rhythm And Depth] : normal respiratory rhythm and effort [Auscultation Breath Sounds / Voice Sounds] : lungs were clear to auscultation bilaterally [Heart Rate And Rhythm] : heart rate was normal and rhythm regular [Heart Sounds] : normal S1 and S2 [Heart Sounds Gallop] : no gallops [Murmurs] : no murmurs [Heart Sounds Pericardial Friction Rub] : no pericardial rub [Full Pulse] : the pedal pulses are present [Edema] : there was no peripheral edema [Bowel Sounds] : normal bowel sounds [Abdomen Soft] : soft [Abdomen Tenderness] : non-tender [Abdomen Mass (___ Cm)] : no abdominal mass palpated [Cervical Lymph Nodes Enlarged Posterior Bilaterally] : posterior cervical [Cervical Lymph Nodes Enlarged Anterior Bilaterally] : anterior cervical [Supraclavicular Lymph Nodes Enlarged Bilaterally] : supraclavicular [No CVA Tenderness] : no ~M costovertebral angle tenderness [No Spinal Tenderness] : no spinal tenderness [Abnormal Walk] : normal gait [Nail Clubbing] : no clubbing  or cyanosis of the fingernails [Musculoskeletal - Swelling] : no joint swelling seen [Motor Tone] : muscle strength and tone were normal [FreeTextEntry1] : \par  Hands- OA changes in B/L hands with Heberden and Jose's nodes. L #3 DIP with ulnar subluxation\par  Wrists- normal\par  Elbows- normal\par  Shoulders- anterior GH tenderness, +Supraspinatus signs\par  Hips- Reduced external rotation bilaterally. L bursitis.\par  Knees- Patellofemoral crepitus on R with small effusion.\par  Ankles- normal\par  Feet- normal\par  MMT 10/10 proximally/distally bilaterally.  [Skin Color & Pigmentation] : normal skin color and pigmentation [Skin Turgor] : normal skin turgor [] : no rash [Skin Lesions] : no skin lesions [Deep Tendon Reflexes (DTR)] : deep tendon reflexes were 2+ and symmetric [Sensation] : the sensory exam was normal to light touch and pinprick [Motor Exam] : the motor exam was normal [No Focal Deficits] : no focal deficits [Oriented To Time, Place, And Person] : oriented to person, place, and time [Impaired Insight] : insight and judgment were intact [Affect] : the affect was normal [Mood] : the mood was normal

## 2024-01-16 NOTE — HISTORY OF PRESENT ILLNESS
[___ Month(s) Ago] : [unfilled] month(s) ago [FreeTextEntry1] : - doing fairly ok, no gout episodes - increasing neck pain - Digit triggered R #3 digit stable at this time, discussed we can inject if worsens again - Mostly c/o increased neck pain with bothersome cracking/popping. States he tried PT previously with no relief.  Will obtain MRI- discussed w/ pt in detail - Remains on Allopurinol 300mg/d, DUL 60mg BID, and Xarelto 20mg/d.  - last UA within goal - ROS remains unchanged otherwise, no new meds

## 2024-01-16 NOTE — DATA REVIEWED
[No studies available for review at this time.] : No studies available for review at this time. [FreeTextEntry1] :  .

## 2024-01-17 ENCOUNTER — APPOINTMENT (OUTPATIENT)
Dept: ORTHOPEDIC SURGERY | Facility: CLINIC | Age: 69
End: 2024-01-17
Payer: MEDICARE

## 2024-01-17 PROCEDURE — 99024 POSTOP FOLLOW-UP VISIT: CPT

## 2024-01-17 PROCEDURE — 73502 X-RAY EXAM HIP UNI 2-3 VIEWS: CPT

## 2024-01-17 NOTE — HISTORY OF PRESENT ILLNESS
[___ Weeks Post Op] : [unfilled] weeks post op [Procedure: ___] : status post [unfilled] [] : left Total Hip Arthroplasty [Healed] : healed [Xray (Date:___)] : [unfilled] Xray -  [Hardware in Good Position] : hardware in good position [No Obvious Fractures] : no obvious fractures [Good Overall Alignment] : good overall alignment [Doing Well] : is doing well [Excellent Pain Control] : has excellent pain control [No Sign of Infection] : is showing no signs of infection [Chills] : no chills [Fever] : no fever [Erythema] : not erythematous [Discharge] : absent of discharge [Swelling] : not swollen [Dehiscence] : not dehisced [de-identified] : Status post left posterior total hip arthroplasty with bri. (DOS: 10/24/2023) [de-identified] : 68 year old male presents today 12 weeks status post left posterior total hip arthroplasty with bri. The patient underwent surgery on 10/24/2023. Overall, the patient is happy with the results of his operation.  He is ambulating without any assistive devices.  He states he is having no pain in his hip.  He is very happy with his progress [de-identified] : Left Lower Extremity: Incision is well-healed without erythema drainage or discharge, hip full flexion with 40 degree of external rotation and 10 degree of internal rotation without pain, leg lengths equal, 5 out of 5 strength for plantarflexion dorsiflexion, sensation intact to light touch over the foot, foot warm well perfused brisk capillary refill. [de-identified] : AP pelvis and 2 views of the left hip obtained in the office today show no acute fracture or dislocation. There is a left total hip arthroplasty in appropriate alignment without evidence of fracture dislocation or hardware complication. [de-identified] : 68 year old male presents today 12 weeks status post left posterior total hip arthroplasty with bri. The patient underwent surgery on 10/24/2023. Overall, I am happy with the patient's progress.  He will continue with low impact activity and exercise.  Continue take Tylenol as needed for the pain.  He will follow-up with his rheumatologist for possible SNRI due to his history of anticoagulation use for the pain in his back and hands.  I will see him back at 1 year after surgery for repeat evaluation and x-ray.  All questions were asked and answered

## 2024-01-24 ENCOUNTER — OUTPATIENT (OUTPATIENT)
Dept: OUTPATIENT SERVICES | Facility: HOSPITAL | Age: 69
LOS: 1 days | End: 2024-01-24

## 2024-01-24 ENCOUNTER — APPOINTMENT (OUTPATIENT)
Dept: MRI IMAGING | Facility: CLINIC | Age: 69
End: 2024-01-24
Payer: MEDICARE

## 2024-01-24 DIAGNOSIS — Z98.49 CATARACT EXTRACTION STATUS, UNSPECIFIED EYE: Chronic | ICD-10-CM

## 2024-01-24 DIAGNOSIS — Z96.652 PRESENCE OF LEFT ARTIFICIAL KNEE JOINT: Chronic | ICD-10-CM

## 2024-01-24 DIAGNOSIS — Z98.82 BREAST IMPLANT STATUS: Chronic | ICD-10-CM

## 2024-01-24 DIAGNOSIS — Z98.890 OTHER SPECIFIED POSTPROCEDURAL STATES: Chronic | ICD-10-CM

## 2024-01-24 DIAGNOSIS — Z90.5 ACQUIRED ABSENCE OF KIDNEY: Chronic | ICD-10-CM

## 2024-01-24 DIAGNOSIS — M47.812 SPONDYLOSIS WITHOUT MYELOPATHY OR RADICULOPATHY, CERVICAL REGION: ICD-10-CM

## 2024-01-24 PROCEDURE — 72141 MRI NECK SPINE W/O DYE: CPT | Mod: 26

## 2024-01-29 ENCOUNTER — NON-APPOINTMENT (OUTPATIENT)
Age: 69
End: 2024-01-29

## 2024-02-05 ENCOUNTER — RX RENEWAL (OUTPATIENT)
Age: 69
End: 2024-02-05

## 2024-02-05 RX ORDER — DULOXETINE HYDROCHLORIDE 60 MG/1
60 CAPSULE, DELAYED RELEASE PELLETS ORAL
Qty: 180 | Refills: 0 | Status: ACTIVE | COMMUNITY
Start: 2021-08-11 | End: 1900-01-01

## 2024-02-09 NOTE — H&P PST ADULT - NSICDXFAMILYHX_GEN_ALL_CORE_FT
FAMILY HISTORY:  Mother  Still living? Unknown  Family history of cerebrovascular accident (CVA), Age at diagnosis: Age Unknown    Sibling  Still living? Unknown  Family history of diabetes mellitus, Age at diagnosis: Age Unknown  Family history of essential hypertension, Age at diagnosis: Age Unknown    
Minimal (Score 0-3)

## 2024-02-12 LAB
AFP-TM SERPL-MCNC: 3.6 NG/ML
ALBUMIN SERPL ELPH-MCNC: 4.4 G/DL
ALP BLD-CCNC: 130 U/L
ALT SERPL-CCNC: 35 U/L
ANION GAP SERPL CALC-SCNC: 15 MMOL/L
AST SERPL-CCNC: 33 U/L
BILIRUB SERPL-MCNC: 0.2 MG/DL
BUN SERPL-MCNC: 19 MG/DL
CALCIUM SERPL-MCNC: 9.6 MG/DL
CHLORIDE SERPL-SCNC: 101 MMOL/L
CO2 SERPL-SCNC: 22 MMOL/L
CREAT SERPL-MCNC: 1.2 MG/DL
EGFR: 67 ML/MIN/1.73M2
GLUCOSE SERPL-MCNC: 238 MG/DL
MAGNESIUM SERPL-MCNC: 1.9 MG/DL
POTASSIUM SERPL-SCNC: 4.5 MMOL/L
PROT SERPL-MCNC: 7.7 G/DL
SODIUM SERPL-SCNC: 138 MMOL/L

## 2024-02-21 ENCOUNTER — APPOINTMENT (OUTPATIENT)
Dept: GASTROENTEROLOGY | Facility: CLINIC | Age: 69
End: 2024-02-21

## 2024-03-04 ENCOUNTER — APPOINTMENT (OUTPATIENT)
Dept: OPHTHALMOLOGY | Facility: CLINIC | Age: 69
End: 2024-03-04
Payer: MEDICARE

## 2024-03-04 ENCOUNTER — NON-APPOINTMENT (OUTPATIENT)
Age: 69
End: 2024-03-04

## 2024-03-04 PROCEDURE — 92014 COMPRE OPH EXAM EST PT 1/>: CPT

## 2024-03-06 ENCOUNTER — APPOINTMENT (OUTPATIENT)
Dept: ORTHOPEDIC SURGERY | Facility: CLINIC | Age: 69
End: 2024-03-06
Payer: MEDICARE

## 2024-03-06 DIAGNOSIS — Z96.642 PRESENCE OF LEFT ARTIFICIAL HIP JOINT: ICD-10-CM

## 2024-03-06 PROCEDURE — 73502 X-RAY EXAM HIP UNI 2-3 VIEWS: CPT

## 2024-03-06 PROCEDURE — 99213 OFFICE O/P EST LOW 20 MIN: CPT

## 2024-03-06 NOTE — PHYSICAL EXAM
[de-identified] : Left Lower Extremity: Incision is well-healed without erythema drainage or discharge, hip full flexion with 40 degree of external rotation and 10 degree of internal rotation without pain, leg lengths equal, 5 out of 5 strength for plantarflexion dorsiflexion, sensation intact to light touch over the foot, foot warm well perfused brisk capillary refill. The surgical incision site(s) healed, not erythematous, absent of discharge, not swollen and not dehisced. [de-identified] : (03/06/2024) Xray -  hardware in good position, no obvious fractures and good overall alignment. AP pelvis and 2 views of the left hip obtained in the office today show no acute fracture or dislocation. There is a left total hip arthroplasty in appropriate alignment without evidence of fracture dislocation or hardware complication.

## 2024-03-06 NOTE — HISTORY OF PRESENT ILLNESS
[de-identified] : 68 year old male presents today 4.5 months status post left posterior total hip arthroplasty with bri. The patient underwent surgery on 10/24/2023. Overall, the patient is happy with the results of his operation. He is ambulating without any assistive devices. He states he is having no pain in his hip. He is very happy with his progress.  Denies any falls or trauma

## 2024-03-06 NOTE — DISCUSSION/SUMMARY
[Medication Risks Reviewed] : Medication risks reviewed [de-identified] : 68 year old male presents today 4.5 months status post left posterior total hip arthroplasty with bri. The patient underwent surgery on 10/24/2023. Overall, I am happy with the patient's progress. He will continue with low impact activity and exercise. Continue take Tylenol as needed for the pain. He will follow-up with his rheumatologist for possible SNRI due to his history of anticoagulation use for the pain in his back and hands. I will see him back at 1 year after surgery for repeat evaluation and x-ray. All questions were asked and answered. The patient verbalized understanding the plan.

## 2024-04-17 ENCOUNTER — OUTPATIENT (OUTPATIENT)
Dept: OUTPATIENT SERVICES | Facility: HOSPITAL | Age: 69
LOS: 1 days | Discharge: HOME | End: 2024-04-17

## 2024-04-17 DIAGNOSIS — Z96.652 PRESENCE OF LEFT ARTIFICIAL KNEE JOINT: Chronic | ICD-10-CM

## 2024-04-17 DIAGNOSIS — Z98.82 BREAST IMPLANT STATUS: Chronic | ICD-10-CM

## 2024-04-17 DIAGNOSIS — Z98.49 CATARACT EXTRACTION STATUS, UNSPECIFIED EYE: Chronic | ICD-10-CM

## 2024-04-17 DIAGNOSIS — Z90.5 ACQUIRED ABSENCE OF KIDNEY: Chronic | ICD-10-CM

## 2024-04-17 DIAGNOSIS — C22.0 LIVER CELL CARCINOMA: ICD-10-CM

## 2024-04-17 DIAGNOSIS — Z98.890 OTHER SPECIFIED POSTPROCEDURAL STATES: Chronic | ICD-10-CM

## 2024-05-01 ENCOUNTER — RESULT REVIEW (OUTPATIENT)
Age: 69
End: 2024-05-01

## 2024-05-01 ENCOUNTER — APPOINTMENT (OUTPATIENT)
Dept: HEMATOLOGY ONCOLOGY | Facility: CLINIC | Age: 69
End: 2024-05-01
Payer: MEDICARE

## 2024-05-01 VITALS
HEART RATE: 76 BPM | BODY MASS INDEX: 49.44 KG/M2 | SYSTOLIC BLOOD PRESSURE: 103 MMHG | WEIGHT: 315 LBS | DIASTOLIC BLOOD PRESSURE: 70 MMHG | HEIGHT: 67 IN | OXYGEN SATURATION: 93 %

## 2024-05-01 DIAGNOSIS — C64.1 MALIGNANT NEOPLASM OF RIGHT KIDNEY, EXCEPT RENAL PELVIS: ICD-10-CM

## 2024-05-01 LAB
BASOPHILS # BLD AUTO: 0 K/UL — SIGNIFICANT CHANGE UP (ref 0–0.2)
BASOPHILS NFR BLD AUTO: 0.7 % — SIGNIFICANT CHANGE UP (ref 0–2)
EOSINOPHIL # BLD AUTO: 0.3 K/UL — SIGNIFICANT CHANGE UP (ref 0–0.5)
EOSINOPHIL NFR BLD AUTO: 3.5 % — SIGNIFICANT CHANGE UP (ref 0–6)
HCT VFR BLD CALC: 47 % — SIGNIFICANT CHANGE UP (ref 39–50)
HGB BLD-MCNC: 15.9 G/DL — SIGNIFICANT CHANGE UP (ref 13–17)
LYMPHOCYTES # BLD AUTO: 1.1 K/UL — SIGNIFICANT CHANGE UP (ref 1–3.3)
LYMPHOCYTES # BLD AUTO: 15 % — SIGNIFICANT CHANGE UP (ref 13–44)
MCHC RBC-ENTMCNC: 29.6 PG — SIGNIFICANT CHANGE UP (ref 27–34)
MCHC RBC-ENTMCNC: 33.8 G/DL — SIGNIFICANT CHANGE UP (ref 32–36)
MCV RBC AUTO: 87.4 FL — SIGNIFICANT CHANGE UP (ref 80–100)
MONOCYTES # BLD AUTO: 0.3 K/UL — SIGNIFICANT CHANGE UP (ref 0–0.9)
MONOCYTES NFR BLD AUTO: 4.1 % — SIGNIFICANT CHANGE UP (ref 2–14)
NEUTROPHILS # BLD AUTO: 5.6 K/UL — SIGNIFICANT CHANGE UP (ref 1.8–7.4)
NEUTROPHILS NFR BLD AUTO: 76.7 % — SIGNIFICANT CHANGE UP (ref 43–77)
PLATELET # BLD AUTO: 216 K/UL — SIGNIFICANT CHANGE UP (ref 150–400)
RBC # BLD: 5.37 M/UL — SIGNIFICANT CHANGE UP (ref 4.2–5.8)
RBC # FLD: 13.9 % — SIGNIFICANT CHANGE UP (ref 10.3–14.5)
WBC # BLD: 7.4 K/UL — SIGNIFICANT CHANGE UP (ref 3.8–10.5)
WBC # FLD AUTO: 7.4 K/UL — SIGNIFICANT CHANGE UP (ref 3.8–10.5)

## 2024-05-01 PROCEDURE — 99214 OFFICE O/P EST MOD 30 MIN: CPT

## 2024-05-01 NOTE — ASSESSMENT
[Supportive] : Goals of care discussed with patient: Supportive [Palliative Care Plan] : not applicable at this time [FreeTextEntry1] : 68 year old M with h/o anemia, Afib, gout, type 2 DM, HTN, hyperlipidemia , hypothyroid, obesity, JASON and right renal cancer s/p right nephrectomy who was diagnosed with HCC in June 2022. # HCC -MRI abd showed 0.7 and 1.2 cm Lt hepatic lobe lesion and 1.1 cm right hepatic lobe lesion -no evidence of metastatic disease on PET -pt seen by hepatology >> the case was discussed at [facility]. pending bx of liver lesion as one of the lesion appear to be different from HCC. -will defer systemic therapy for now as pt is eligible for LDT and being evaluated for possible liver transplant. -Patient to follow up with Dr. Phipps and Dr. Santos. -PET/CT and CT scan scheduled on 6/7/23 and Pending MRI on 6/15/23 per Dr. Phipps # RCC -s/p right nephrectomy -Path showed RCC , clear cell, nuclear grade II, pT 1 a, Stage I. -No indication for adjuvant TKI or chemotherapy/immune therapy. Reviewed -3/29/23 CT Chest: Few ill-defined ground-glass nodular opacities primarily in the right middle lobe 0.5 cm is unchanged. Right lower lobe superior segment 0.5 cm ground-glass nodule abutting the major fissure is new. -5/17/23 Spect / CT bone: No radionuclide evidence of osseous metastases. -6/7/23 PET/CT: Focal activity in the right hepatic lobe corresponding to one of the enhancing MRI lesions compatible with HCC. 6/14/23 MR abdomen: A 3.1 cm biopsy-proven hepatocellular carcinoma in the right hepatic lobe with interval growth from prior imaging in February 2023. Two rim-enhancing lesions in segment 4 are without significant change and are indeterminate. -09/13/23: now s/p embolization on 07/31/23, doing very well overall with no significant side effects or complications. He will have his MRI in November and will follow up after.   01/10/24: Mr Magallon returns for follow up.  MRI from Dec 21, 2023 shows  LR-TR Nonviable lesion within segment 5 (previously biopsy-proven HCC). Indeterminate rim-enhancing lesions within segment 4 are unchanged since 1/24/2022. No new liver lesion. He feels well with no new complaints. AFP pending, follow up in 3 months, follow up with Dr. Phipps  5/1/24: -patient presents for follow up regarding h/o HCC and RCC -increased MILLER recently, likely related to h/o asthma or Afib -Dr. Phipps follow up next week -advised dermatology evaluation ASAP for non-healing lesion on nose -MRI A/P and CT chest in late June -Dr. Bonilla follow up in early July

## 2024-05-01 NOTE — PHYSICAL EXAM
[Restricted in physically strenuous activity but ambulatory and able to carry out work of a light or sedentary nature] : Status 1- Restricted in physically strenuous activity but ambulatory and able to carry out work of a light or sedentary nature, e.g., light house work, office work [Obese] : obese [Normal] : affect appropriate [Ulcers] : no ulcers [Mucositis] : no mucositis [Thrush] : no thrush [Vesicles] : no vesicles [de-identified] : small lens opacities [de-identified] : edentulous upper; extractions lower [de-identified] : numerous multi coloured tattoos on chest back and arms

## 2024-05-01 NOTE — REVIEW OF SYSTEMS
[Patient Intake Form Reviewed] : Patient intake form was reviewed [Vision Problems] : vision problems [Lower Ext Edema] : lower extremity edema [Joint Pain] : joint pain [Joint Stiffness] : joint stiffness [Muscle Pain] : muscle pain [Muscle Weakness] : muscle weakness [Difficulty Walking] : difficulty walking [Fever] : no fever [Chills] : no chills [Fatigue] : no fatigue [Recent Change In Weight] : ~T no recent weight change [FreeTextEntry2] : negative except as indicated in interval history [FreeTextEntry3] : Cataracts [de-identified] : uses a cane

## 2024-05-02 RX ORDER — INSULIN GLARGINE 100 [IU]/ML
100 INJECTION, SOLUTION SUBCUTANEOUS
Qty: 2 | Refills: 2 | Status: DISCONTINUED | COMMUNITY
Start: 2022-01-17 | End: 2024-05-02

## 2024-05-05 NOTE — ASU PREOP CHECKLIST - ADVANCE DIRECTIVE ADDRESSED/READDRESSED
on the discharge service for the patient. I have reviewed and made amendments to the documentation where necessary. [facility] [phone]/done

## 2024-05-07 ENCOUNTER — APPOINTMENT (OUTPATIENT)
Dept: ENDOCRINOLOGY | Facility: CLINIC | Age: 69
End: 2024-05-07
Payer: MEDICARE

## 2024-05-07 VITALS
HEART RATE: 73 BPM | WEIGHT: 315 LBS | DIASTOLIC BLOOD PRESSURE: 76 MMHG | SYSTOLIC BLOOD PRESSURE: 108 MMHG | OXYGEN SATURATION: 93 % | BODY MASS INDEX: 49.44 KG/M2 | HEIGHT: 67 IN

## 2024-05-07 DIAGNOSIS — Z79.4 TYPE 2 DIABETES MELLITUS WITH DIABETIC CHRONIC KIDNEY DISEASE: ICD-10-CM

## 2024-05-07 DIAGNOSIS — E11.8 TYPE 2 DIABETES MELLITUS WITH UNSPECIFIED COMPLICATIONS: ICD-10-CM

## 2024-05-07 DIAGNOSIS — E11.22 TYPE 2 DIABETES MELLITUS WITH DIABETIC CHRONIC KIDNEY DISEASE: ICD-10-CM

## 2024-05-07 LAB — GLUCOSE BLDC GLUCOMTR-MCNC: 423

## 2024-05-07 PROCEDURE — G2211 COMPLEX E/M VISIT ADD ON: CPT

## 2024-05-07 PROCEDURE — 99214 OFFICE O/P EST MOD 30 MIN: CPT

## 2024-05-07 PROCEDURE — 82962 GLUCOSE BLOOD TEST: CPT

## 2024-05-07 RX ORDER — DULOXETINE HYDROCHLORIDE 30 MG/1
CAPSULE, DELAYED RELEASE ORAL
Refills: 0 | Status: ACTIVE | COMMUNITY

## 2024-05-07 RX ORDER — FAMOTIDINE 10 MG/1
TABLET, FILM COATED ORAL
Refills: 0 | Status: ACTIVE | COMMUNITY

## 2024-05-07 RX ORDER — TAMSULOSIN HYDROCHLORIDE 0.4 MG/1
0.4 CAPSULE ORAL
Refills: 0 | Status: ACTIVE | COMMUNITY

## 2024-05-07 RX ORDER — OXYCODONE AND ACETAMINOPHEN 5; 325 MG/1; MG/1
5-325 TABLET ORAL
Refills: 0 | Status: ACTIVE | COMMUNITY

## 2024-05-07 RX ORDER — ACETAMINOPHEN 325 MG/1
325 TABLET ORAL
Refills: 0 | Status: ACTIVE | COMMUNITY

## 2024-05-07 RX ORDER — LISINOPRIL 40 MG/1
40 TABLET ORAL
Refills: 0 | Status: ACTIVE | COMMUNITY

## 2024-05-07 RX ORDER — AMLODIPINE BESYLATE 5 MG/1
5 TABLET ORAL
Refills: 0 | Status: ACTIVE | COMMUNITY

## 2024-05-07 RX ORDER — AMIODARONE HYDROCHLORIDE 200 MG/1
200 TABLET ORAL
Refills: 0 | Status: ACTIVE | COMMUNITY

## 2024-05-07 RX ORDER — GLIMEPIRIDE 4 MG/1
4 TABLET ORAL
Refills: 0 | Status: DISCONTINUED | COMMUNITY
End: 2024-05-07

## 2024-05-07 NOTE — REVIEW OF SYSTEMS
[SOB on Exertion] : shortness of breath on exertion [Fatigue] : no fatigue [Recent Weight Gain (___ Lbs)] : no recent weight gain [Recent Weight Loss (___ Lbs)] : no recent weight loss [Visual Field Defect] : no visual field defect [Blurred Vision] : no blurred vision [Dysphagia] : no dysphagia [Neck Pain] : no neck pain [Dysphonia] : no dysphonia [Chest Pain] : no chest pain [Nausea] : no nausea [Constipation] : no constipation [Vomiting] : no vomiting [Diarrhea] : no diarrhea [Polyuria] : no polyuria [Polydipsia] : no polydipsia [FreeTextEntry5] : afib

## 2024-05-07 NOTE — HISTORY OF PRESENT ILLNESS
[FreeTextEntry1] : Pt lost to follow up since 2023 Now with extremely high blood sugars- appears no reason Not currently wearing viktoriya  DM type:2 Severity:severely uncontrolled Duration:several years Onset:found DM on labs  Associated symptoms or complications:none  Modifying Factors:severe obesity; uncontrolled diabetes continues despite weight loss from 375 to 315 pounds. Alfred anorexia or abdominal pain; made many diet changes in an effort to lose weight for anticipated cardioversion  Current regimen: Glimepiride 2 mg BID did not tolerate metformin  Need updated HGA1C  Current control: checks 3x a day Lately alot of 300s and 400s FS in office- 423  Last eye exam: dom WHITTAKER- 4/2024  PMH: atrial fib renal stones kidney and liver cancer hypothyroid-  levothyroxine 75 mcg daily. Need updated TFTs

## 2024-05-07 NOTE — ASSESSMENT
[FreeTextEntry1] : T2DM -Unsure why patients blood sugars are so out of control- he states there is no obvious reason- no cancer treatments, steroids, sickness etc -For now, increase glimperide to 4 mg BID. Labs today. Place viktoriya on again today(sample given). Pt MUST drop off viktoriya in 2 weeks. In 2 weeks, if sugars not improved, he will begin insulin. He agrees but wants an opportunity to do it himself.  HLD -need updated lipid panel   HTN -BP stable in office   Hypothyroidism -Continue lt4 75 mcg daily. Patient advised to take every day in the morning, on an empty stomach, and with no other medications. Need updated TFTs  RTO 6 weeks NP for accountability

## 2024-05-08 LAB
25(OH)D3 SERPL-MCNC: 31.9 NG/ML
ALBUMIN SERPL ELPH-MCNC: 3.8 G/DL
ALP BLD-CCNC: 111 U/L
ALT SERPL-CCNC: 45 U/L
ANION GAP SERPL CALC-SCNC: 13 MMOL/L
AST SERPL-CCNC: 40 U/L
BASOPHILS # BLD AUTO: 0.07 K/UL
BASOPHILS NFR BLD AUTO: 1 %
BILIRUB SERPL-MCNC: 0.2 MG/DL
BUN SERPL-MCNC: 17 MG/DL
CALCIUM SERPL-MCNC: 9.5 MG/DL
CHLORIDE SERPL-SCNC: 99 MMOL/L
CHOLEST SERPL-MCNC: 194 MG/DL
CO2 SERPL-SCNC: 24 MMOL/L
CREAT SERPL-MCNC: 1.3 MG/DL
EGFR: 60 ML/MIN/1.73M2
EOSINOPHIL # BLD AUTO: 0.28 K/UL
EOSINOPHIL NFR BLD AUTO: 4.1 %
ESTIMATED AVERAGE GLUCOSE: 312 MG/DL
FRUCTOSAMINE SERPL-MCNC: 470 UMOL/L
GLUCOSE SERPL-MCNC: 455 MG/DL
HBA1C MFR BLD HPLC: 12.5 %
HCT VFR BLD CALC: 45.3 %
HDLC SERPL-MCNC: 39 MG/DL
HGB BLD-MCNC: 14.8 G/DL
IMM GRANULOCYTES NFR BLD AUTO: 0.4 %
LDLC SERPL CALC-MCNC: 123 MG/DL
LYMPHOCYTES # BLD AUTO: 1.08 K/UL
LYMPHOCYTES NFR BLD AUTO: 15.8 %
MAN DIFF?: NORMAL
MCHC RBC-ENTMCNC: 28.3 PG
MCHC RBC-ENTMCNC: 32.7 GM/DL
MCV RBC AUTO: 86.6 FL
MONOCYTES # BLD AUTO: 0.37 K/UL
MONOCYTES NFR BLD AUTO: 5.4 %
NEUTROPHILS # BLD AUTO: 5.01 K/UL
NEUTROPHILS NFR BLD AUTO: 73.3 %
NONHDLC SERPL-MCNC: 155 MG/DL
PLATELET # BLD AUTO: 232 K/UL
POTASSIUM SERPL-SCNC: 4.9 MMOL/L
PROT SERPL-MCNC: 7 G/DL
RBC # BLD: 5.23 M/UL
RBC # FLD: 15.6 %
SODIUM SERPL-SCNC: 136 MMOL/L
T3FREE SERPL-MCNC: 2.33 PG/ML
T4 FREE SERPL-MCNC: 1 NG/DL
TRIGL SERPL-MCNC: 180 MG/DL
TSH SERPL-ACNC: 7.43 UIU/ML
VIT B12 SERPL-MCNC: 1732 PG/ML
WBC # FLD AUTO: 6.84 K/UL

## 2024-05-08 RX ORDER — LEVOTHYROXINE SODIUM 0.09 MG/1
88 TABLET ORAL
Qty: 90 | Refills: 1 | Status: ACTIVE | COMMUNITY
Start: 2020-09-23 | End: 1900-01-01

## 2024-05-08 RX ORDER — PEN NEEDLE, DIABETIC 32GX 5/32"
32G X 4 MM NEEDLE, DISPOSABLE MISCELLANEOUS
Qty: 1 | Refills: 1 | Status: ACTIVE | COMMUNITY
Start: 2024-05-08 | End: 1900-01-01

## 2024-05-19 LAB
AFP-TM SERPL-MCNC: 2.7 NG/ML
ALBUMIN SERPL ELPH-MCNC: 4.2 G/DL
ALP BLD-CCNC: 121 U/L
ALT SERPL-CCNC: 46 U/L
ANION GAP SERPL CALC-SCNC: 14 MMOL/L
AST SERPL-CCNC: 40 U/L
BILIRUB SERPL-MCNC: 0.3 MG/DL
BUN SERPL-MCNC: 29 MG/DL
CALCIUM SERPL-MCNC: 9.7 MG/DL
CHLORIDE SERPL-SCNC: 94 MMOL/L
CO2 SERPL-SCNC: 24 MMOL/L
CREAT SERPL-MCNC: 1.4 MG/DL
EGFR: 55 ML/MIN/1.73M2
GLUCOSE SERPL-MCNC: 555 MG/DL
MAGNESIUM SERPL-MCNC: 2.1 MG/DL
POTASSIUM SERPL-SCNC: 4.8 MMOL/L
PROT SERPL-MCNC: 7.8 G/DL
SODIUM SERPL-SCNC: 132 MMOL/L

## 2024-05-21 ENCOUNTER — RESULT REVIEW (OUTPATIENT)
Age: 69
End: 2024-05-21

## 2024-05-23 PROCEDURE — 95251 CONT GLUC MNTR ANALYSIS I&R: CPT

## 2024-05-28 ENCOUNTER — APPOINTMENT (OUTPATIENT)
Dept: GASTROENTEROLOGY | Facility: CLINIC | Age: 69
End: 2024-05-28

## 2024-05-29 ENCOUNTER — APPOINTMENT (OUTPATIENT)
Dept: GASTROENTEROLOGY | Facility: CLINIC | Age: 69
End: 2024-05-29
Payer: MEDICARE

## 2024-05-29 VITALS
OXYGEN SATURATION: 96 % | DIASTOLIC BLOOD PRESSURE: 82 MMHG | WEIGHT: 315 LBS | SYSTOLIC BLOOD PRESSURE: 127 MMHG | HEIGHT: 67 IN | HEART RATE: 74 BPM | BODY MASS INDEX: 49.44 KG/M2 | RESPIRATION RATE: 16 BRPM

## 2024-05-29 DIAGNOSIS — C22.0 LIVER CELL CARCINOMA: ICD-10-CM

## 2024-05-29 PROCEDURE — 99215 OFFICE O/P EST HI 40 MIN: CPT

## 2024-05-29 NOTE — ADDENDUM
[FreeTextEntry1] : I, Devi Rodgers NP, acted as scribe for ABIODUN Gerard for this patient encounter.

## 2024-05-29 NOTE — HISTORY OF PRESENT ILLNESS
[de-identified] : EVERETT GEORGE is a 68 year old male with a PMH significant for anemia, Afib, gout, DM, HTN, HLD, hypothyroid, obesity, JASON and renal cancer s/p right nephrectomy who was diagnosed with HCC in June 2022.  5/29/24: Patient presents for a follow up.  Recent labs reviewed: AST 40 / ALT 45 /  / T.Bili 0.2 / AFP 2.7.  Last MRI w/IVC 12/21/23 LR-TR Nonviable lesion within segment 5 (previously biopsy-proven HCC). Indeterminate rim-enhancing lesions within segment 4 are unchanged. Denies fatigue, malaise, arthralgias, myalgias, pruritus, recent infection, abdominal pain or distension, jaundice, hematemesis, hematochezia, dark urine, confusion, unintentional weight loss or gain.  11/15/23: Pt presents for f/u visit. Labs reviewed w/pt. LFTs stable. AFP 7.5 from 670 in July prior to y90. 3 month MRI Abdomen w/wo IVC pending. Pt had a hip replacement in late October and is currently in a rehab facility. No complications from surgery.   8/2/23: Pt presents for f/u visit. Pt had y90 yesterday. Doing well. Reports labs were drawn today at PCPs office. Denies abdominal pain. Has ongoing hip pain for which he is following w/ Dr. Schafer.  4/20/23: 67 y/o male with multiple comorbidities was hospitalized at [facility] 3/6-3/16 with RLE cellulitis, marked leukocytosis 21K and fever. Treated with Zosyn/vancomycin >> Zosyn/daptomycin with slow resolution of symptoms. Work up included, neg BCX, CT RLE that did not show collections, and Doppler LE US negative for DVT. Discharge on doxy 100 BID which was extended for another week at his last visit.  I'm pleased with the progress observed today after completing almost 3 weeks of antibiotics. I do not recommend further treatment. Wound dressings were changed in the office; he shall continue with wound care at home.  Aril 4 : ?Lithotrypsy at [facility]. Complication :desated,hyperglycemis high bp, went into Afiib. Card saw him on WEdnesday, d/c Friday 4/19 : Seen by IR who recommended bone scan  August 2022: He was noted to have a suspicious kidney lesion on imaging in December 2021 and was referred for an abdominal MRI in January 2022, with incidental note of a multiple small indeterminate hepatic lesions which are indeterminate but for which metastatic renal cell carcinoma cannot be excluded. Subsequent MRI abdomen in April 2022 revealed an endophytic right lower pole renal lesion suspicious for malignancy, evidence of liver metastasis and multiple prominent subcentimeter periportal, retroperitoneal and retrocaval lymph nodes which are nonspecific.  On 4/20/22 he underwent a right nephrectomy with Dr Jhonathan Shetty. Path showed RCC, clear cell, nuclear grade II, pT1a, Stage I. No indication for adjuvant pembro.  PET/CT performed on 5/13/22 showed no definitive hypermetabolic evidence of malignant disease. There are prominent retroperitoneal and iliac chain lymph nodes without corresponding abnormal activity. No discrete hepatic focus to correspond with liver lesions seen on MRI (continued MRI surveillance is recommended), right anterolateral subcutaneous activity possibly related to abdominal surgery.  On 6/1/22 he underwent a CT-guided biopsy of a representative mass in the inferior right hepatic lobe. Pathology demonstrated well differentiated hepatocellular carcinoma. Immunohistochemical stains show tumor cells are positive for arginase, focal positive for HSA, CK7 and CA IX, negative for CK20, CDX2, TTF1, Villin, [place]-8, vimentin and AFP. The morphology and immuno profile support the diagnosis of hepatocellular carcinoma.  8/10/22: Pt's case was discussed at . Some of the lesions suggestive of metastasis rather than HCC. Pending bx then re discussion at  to assess for transplant eligibility. Pt will also follow up with IR for LDT.  Labs 7/20/22: bilirubin 0.2, AST 18, ALT 21, AlkPhos 115, INR 1.55, AFP 8.7  Denies fatigue, malaise, arthralgias, myalgias, pruritus, recent infection, abdominal pain or distension, jaundice, hematemesis, hematochezia, dark urine, confusion, unintentional weight loss or gain. Denies family history of liver disease, sudden death or late trimester abortions.

## 2024-05-29 NOTE — ASSESSMENT
[FreeTextEntry1] : EVERETT GEORGE is a 68 year old male with a PMH significant for anemia, Afib, gout, DM, HTN, HLD, hypothyroid, obesity, JASON and renal cancer s/p right nephrectomy who was diagnosed with HCC in June 2022.  HCC s/p treatment and cure -Ordered for MRI Abdomen w/wo IVC 6/12/24 from [facility]  -Recent MRI abd w/IVC (12/21/23) - LR-TR Nonviable lesion within segment 5 (previously biopsy-proven HCC). Indeterminate rim-enhancing lesions within segment 4 are unchanged  -MRI Abd (6/14/24) showed 0.7 and 1.2 cm Lt hepatic lobe lesion and 1.1 cm right hepatic lobe lesion -No evidence of metastatic disease on PET -Y90 done on 7/31/23.  AFP 7.5 from 670 in July prior to y90. -High Protein Low Fat Low Salt (up to 2G Na/day) Diet, no prolonged fasting, Mediterranean Diet info provided -Continue to abstain from alcohol and all illicit drugs   Follow up in 6 months for labs and imaging

## 2024-06-12 ENCOUNTER — APPOINTMENT (OUTPATIENT)
Dept: CT IMAGING | Facility: CLINIC | Age: 69
End: 2024-06-12
Payer: MEDICARE

## 2024-06-12 ENCOUNTER — OUTPATIENT (OUTPATIENT)
Dept: OUTPATIENT SERVICES | Facility: HOSPITAL | Age: 69
LOS: 1 days | End: 2024-06-12

## 2024-06-12 ENCOUNTER — APPOINTMENT (OUTPATIENT)
Dept: MRI IMAGING | Facility: CLINIC | Age: 69
End: 2024-06-12
Payer: MEDICARE

## 2024-06-12 DIAGNOSIS — Z96.652 PRESENCE OF LEFT ARTIFICIAL KNEE JOINT: Chronic | ICD-10-CM

## 2024-06-12 DIAGNOSIS — Z98.890 OTHER SPECIFIED POSTPROCEDURAL STATES: Chronic | ICD-10-CM

## 2024-06-12 DIAGNOSIS — Z98.82 BREAST IMPLANT STATUS: Chronic | ICD-10-CM

## 2024-06-12 DIAGNOSIS — Z98.49 CATARACT EXTRACTION STATUS, UNSPECIFIED EYE: Chronic | ICD-10-CM

## 2024-06-12 DIAGNOSIS — Z90.5 ACQUIRED ABSENCE OF KIDNEY: Chronic | ICD-10-CM

## 2024-06-12 DIAGNOSIS — C22.0 LIVER CELL CARCINOMA: ICD-10-CM

## 2024-06-12 PROCEDURE — 72197 MRI PELVIS W/O & W/DYE: CPT | Mod: 26

## 2024-06-12 PROCEDURE — 71250 CT THORAX DX C-: CPT | Mod: 26

## 2024-06-12 PROCEDURE — 74183 MRI ABD W/O CNTR FLWD CNTR: CPT | Mod: 26

## 2024-06-17 ENCOUNTER — APPOINTMENT (OUTPATIENT)
Dept: ENDOCRINOLOGY | Facility: CLINIC | Age: 69
End: 2024-06-17
Payer: MEDICARE

## 2024-06-17 VITALS
HEART RATE: 80 BPM | DIASTOLIC BLOOD PRESSURE: 72 MMHG | SYSTOLIC BLOOD PRESSURE: 130 MMHG | WEIGHT: 315 LBS | BODY MASS INDEX: 49.44 KG/M2 | HEIGHT: 67 IN | OXYGEN SATURATION: 93 %

## 2024-06-17 DIAGNOSIS — E03.9 HYPOTHYROIDISM, UNSPECIFIED: ICD-10-CM

## 2024-06-17 DIAGNOSIS — E66.01 MORBID (SEVERE) OBESITY DUE TO EXCESS CALORIES: ICD-10-CM

## 2024-06-17 DIAGNOSIS — I10 ESSENTIAL (PRIMARY) HYPERTENSION: ICD-10-CM

## 2024-06-17 DIAGNOSIS — E11.65 TYPE 2 DIABETES MELLITUS WITH HYPERGLYCEMIA: ICD-10-CM

## 2024-06-17 DIAGNOSIS — E78.5 HYPERLIPIDEMIA, UNSPECIFIED: ICD-10-CM

## 2024-06-17 PROCEDURE — 99214 OFFICE O/P EST MOD 30 MIN: CPT

## 2024-06-17 RX ORDER — INSULIN GLARGINE 100 [IU]/ML
100 INJECTION, SOLUTION SUBCUTANEOUS DAILY
Qty: 2 | Refills: 0 | Status: ACTIVE | COMMUNITY
Start: 2024-05-08 | End: 1900-01-01

## 2024-06-17 RX ORDER — GLIMEPIRIDE 4 MG/1
4 TABLET ORAL
Qty: 180 | Refills: 0 | Status: ACTIVE | COMMUNITY
Start: 2022-01-17 | End: 1900-01-01

## 2024-06-17 NOTE — ASSESSMENT
[FreeTextEntry1] : T2DM- uncontrolled, goal A1C < 7 based on age - Unclear why patient with worsening hyperglycemia- no steroids, recent illness or changes in diet/habits - Discussed long term complications of diabetes at length including MI, CVA, ESRD, Dialysis, Blindness, Amputations, Infections, Erectile dysfunction - Continue checking BG with Talon - Educated on diet and exercise, goal exercise 30 minutes 5 x per week - Discussed the need to see Ophthalmology and Podiatry yearly - Patient 100% very high on Talon- discussed the need for basal bolus vs trialing adding GLP-1, we will try adding Ozempic today and increasing basal dose today. Patient aware may need basal bolus moving forward if adding GLP-1 does not help to start lowering BG levels. - Patient endorses rotating injection sites - Continue Glimepiride 4 mg BID - Increase Lantus to 24 units QHS - To start Ozempic 0.25 mg weekly x4 weeks, if tolerating well increase to Ozempic 0.5 mg weekly    Hypothyroidism - Dose recently changes, will get updated TFT's now - For now, continue with LT4 88 mcg daily - Reinforced appropriate pill technique  Obesity - Discussed exercise, diet and life style modifications - Will start GLP-1 - Agreeable to meet with CDE in ~ 4 weeks for check-in   HLD- goal LDL < 70 - C/w Statin   HTN- BP stable this visit - C/w ACE   I have counseled the patient on the benefits, risks and side effects of this GLP-1 agonist. We discussed the benefits from cardiac standpoint and on glucose and weight reduction. We also discussed side effects with the patient including possible nausea, vomiting or other GI side effects. I have also discussed the risk of pancreatitis, including the symptoms to look out for, such as food intolerance, nausea/emesis and abdominal pain. The patient was also explained the link with medullary thyroid cancer and has denied any personal or family history of medullary thyroid cancer. I also counseled patient on the gallbladder inflammation risks as well as the risk for worsening gallbladder stones. As well as the risk of worsening retinopathy and the need to follow up with an eye doctor. Patient also instructed to hold the medication 1-2 weeks prior to having surgery. Patient currently denies any abdominal pain. Patient able to verbalize and teach back understanding of risks vs benefits of this GLP-1.     CGM STATEMENT: This patient with diabetes                                                                      - Injects insulin 1+ times daily    - Is currently on CGM, testing glucose continuously - Has been seen in the office by a provider within the last six months to review CGM data with their provider                  CGM is medically necessary for this pt. - CGM will improve/maintain A1c - CGM will reduce hypoglycemic events Talon and Dexcom each require one test strip daily to use in case of sensor failure or for blood glucose verification or during sensor warmup.   RTO in 4 weeks with CDE for check-in and see if further dosing changes need to be made RTO in 8 weeks with NP, labs before RTO in 5 months with MD

## 2024-06-17 NOTE — HISTORY OF PRESENT ILLNESS
[FreeTextEntry1] : Interval hx: Now has Talon 3. Went for MRI for abd/liver-> meeting with heme onc next week. C/o SOB with activity- following with Cardiology. C/o polydipsia and polyuria- sugar has been high.   History of DM: Diagnosed: ~ 5 years ago Severity: uncontrolled FH of DM: brother Home regimen: Glimepiride 4 mg BID Lantus 18 units QHS     SMBG: Talon Talon data reviewed Av Active CGM: 23%- only has been on for 8 days, new sensor Very High: 100% High: 0 In Target: 0 Low: 0 Very Low:0  Eye doctor: s/p cataract R eye, going to see in Sep for f/u, denies DR Neuropathy: denies Kidney disease: s/p R nephrectomy- had RCC   Smoking: former, quit 17 years ago Exercise: active with work-    ================================================= Hypothyroidism - On LT4 88 mcg daily - Reports taking in afternoon away from other medications- waits ~ 30 minutes before eating  Labs May 2024: Fructosamine 470-> correlates to an A1c of 9.6% A1C 12.5% , , HDL 39,  Cr 1.30, GFR 60 B12 1723 TSH 7.43, FT4 1.0, FT3 2.33 Vitamin D 31.9   All other ROS reviewed, and they are negative. Labs reviewed.

## 2024-06-17 NOTE — PHYSICAL EXAM
[Alert] : alert [Obese] : obese [No Acute Distress] : no acute distress [No Thyroid Nodules] : no palpable thyroid nodules [No Accessory Muscle Use] : no accessory muscle use [Normal Rate and Effort] : normal respiratory rate and effort [Clear to Auscultation] : lungs were clear to auscultation bilaterally [Normal Rate] : heart rate was normal [Regular Rhythm] : with a regular rhythm [Not Tender] : non-tender [Not Distended] : not distended [Soft] : abdomen soft [Oriented x3] : oriented to person, place, and time [Normal Affect] : the affect was normal [Normal Mood] : the mood was normal

## 2024-06-20 ENCOUNTER — OUTPATIENT (OUTPATIENT)
Dept: OUTPATIENT SERVICES | Facility: HOSPITAL | Age: 69
LOS: 1 days | Discharge: HOME | End: 2024-06-20

## 2024-06-20 DIAGNOSIS — Z98.82 BREAST IMPLANT STATUS: Chronic | ICD-10-CM

## 2024-06-20 DIAGNOSIS — Z90.5 ACQUIRED ABSENCE OF KIDNEY: Chronic | ICD-10-CM

## 2024-06-20 DIAGNOSIS — Z96.652 PRESENCE OF LEFT ARTIFICIAL KNEE JOINT: Chronic | ICD-10-CM

## 2024-06-20 DIAGNOSIS — Z98.49 CATARACT EXTRACTION STATUS, UNSPECIFIED EYE: Chronic | ICD-10-CM

## 2024-06-20 DIAGNOSIS — Z98.890 OTHER SPECIFIED POSTPROCEDURAL STATES: Chronic | ICD-10-CM

## 2024-06-20 DIAGNOSIS — C22.0 LIVER CELL CARCINOMA: ICD-10-CM

## 2024-06-26 ENCOUNTER — RESULT REVIEW (OUTPATIENT)
Age: 69
End: 2024-06-26

## 2024-06-26 ENCOUNTER — APPOINTMENT (OUTPATIENT)
Dept: HEMATOLOGY ONCOLOGY | Facility: CLINIC | Age: 69
End: 2024-06-26
Payer: MEDICARE

## 2024-06-26 VITALS
WEIGHT: 315 LBS | BODY MASS INDEX: 46.65 KG/M2 | SYSTOLIC BLOOD PRESSURE: 113 MMHG | OXYGEN SATURATION: 95 % | HEART RATE: 76 BPM | RESPIRATION RATE: 16 BRPM | TEMPERATURE: 97.2 F | HEIGHT: 69 IN | DIASTOLIC BLOOD PRESSURE: 74 MMHG

## 2024-06-26 LAB
BASOPHILS # BLD AUTO: 0.1 K/UL — SIGNIFICANT CHANGE UP (ref 0–0.2)
BASOPHILS NFR BLD AUTO: 1.1 % — SIGNIFICANT CHANGE UP (ref 0–2)
EOSINOPHIL # BLD AUTO: 0.3 K/UL — SIGNIFICANT CHANGE UP (ref 0–0.5)
EOSINOPHIL NFR BLD AUTO: 4.1 % — SIGNIFICANT CHANGE UP (ref 0–6)
HCT VFR BLD CALC: 46.7 % — SIGNIFICANT CHANGE UP (ref 39–50)
HGB BLD-MCNC: 15.4 G/DL — SIGNIFICANT CHANGE UP (ref 13–17)
LYMPHOCYTES # BLD AUTO: 1.1 K/UL — SIGNIFICANT CHANGE UP (ref 1–3.3)
LYMPHOCYTES # BLD AUTO: 14.5 % — SIGNIFICANT CHANGE UP (ref 13–44)
MCHC RBC-ENTMCNC: 28.8 PG — SIGNIFICANT CHANGE UP (ref 27–34)
MCHC RBC-ENTMCNC: 32.9 G/DL — SIGNIFICANT CHANGE UP (ref 32–36)
MCV RBC AUTO: 87.6 FL — SIGNIFICANT CHANGE UP (ref 80–100)
MONOCYTES # BLD AUTO: 0.4 K/UL — SIGNIFICANT CHANGE UP (ref 0–0.9)
MONOCYTES NFR BLD AUTO: 5 % — SIGNIFICANT CHANGE UP (ref 2–14)
NEUTROPHILS # BLD AUTO: 5.7 K/UL — SIGNIFICANT CHANGE UP (ref 1.8–7.4)
NEUTROPHILS NFR BLD AUTO: 75.3 % — SIGNIFICANT CHANGE UP (ref 43–77)
PLATELET # BLD AUTO: 234 K/UL — SIGNIFICANT CHANGE UP (ref 150–400)
RBC # BLD: 5.33 M/UL — SIGNIFICANT CHANGE UP (ref 4.2–5.8)
RBC # FLD: 13.5 % — SIGNIFICANT CHANGE UP (ref 10.3–14.5)
WBC # BLD: 7.6 K/UL — SIGNIFICANT CHANGE UP (ref 3.8–10.5)
WBC # FLD AUTO: 7.6 K/UL — SIGNIFICANT CHANGE UP (ref 3.8–10.5)

## 2024-06-26 PROCEDURE — 99214 OFFICE O/P EST MOD 30 MIN: CPT

## 2024-06-27 LAB
ALBUMIN SERPL ELPH-MCNC: 4 G/DL
ALP BLD-CCNC: 109 U/L
ALT SERPL-CCNC: 47 U/L
ANION GAP SERPL CALC-SCNC: 14 MMOL/L
AST SERPL-CCNC: 51 U/L
BILIRUB SERPL-MCNC: 0.4 MG/DL
BUN SERPL-MCNC: 23 MG/DL
CALCIUM SERPL-MCNC: 9.6 MG/DL
CHLORIDE SERPL-SCNC: 101 MMOL/L
CO2 SERPL-SCNC: 21 MMOL/L
CREAT SERPL-MCNC: 1.28 MG/DL
EGFR: 61 ML/MIN/1.73M2
GLUCOSE SERPL-MCNC: 320 MG/DL
MAGNESIUM SERPL-MCNC: 2.2 MG/DL
POTASSIUM SERPL-SCNC: 4.6 MMOL/L
PROT SERPL-MCNC: 7.6 G/DL
SODIUM SERPL-SCNC: 135 MMOL/L

## 2024-07-03 ENCOUNTER — APPOINTMENT (OUTPATIENT)
Dept: RHEUMATOLOGY | Facility: CLINIC | Age: 69
End: 2024-07-03
Payer: MEDICARE

## 2024-07-03 DIAGNOSIS — M1A.9XX0 CHRONIC GOUT, UNSPECIFIED, W/OUT TOPHUS (TOPHI): ICD-10-CM

## 2024-07-03 DIAGNOSIS — M15.9 POLYOSTEOARTHRITIS, UNSPECIFIED: ICD-10-CM

## 2024-07-03 DIAGNOSIS — M47.816 SPONDYLOSIS W/OUT MYELOPATHY OR RADICULOPATHY, LUMBAR REGION: ICD-10-CM

## 2024-07-03 DIAGNOSIS — Z79.899 OTHER LONG TERM (CURRENT) DRUG THERAPY: ICD-10-CM

## 2024-07-03 DIAGNOSIS — Z96.642 PRESENCE OF LEFT ARTIFICIAL HIP JOINT: ICD-10-CM

## 2024-07-03 PROCEDURE — G2211 COMPLEX E/M VISIT ADD ON: CPT

## 2024-07-03 PROCEDURE — 99214 OFFICE O/P EST MOD 30 MIN: CPT

## 2024-07-22 ENCOUNTER — NON-APPOINTMENT (OUTPATIENT)
Age: 69
End: 2024-07-22

## 2024-07-22 ENCOUNTER — APPOINTMENT (OUTPATIENT)
Dept: OPHTHALMOLOGY | Facility: CLINIC | Age: 69
End: 2024-07-22
Payer: MEDICARE

## 2024-07-22 PROCEDURE — 92014 COMPRE OPH EXAM EST PT 1/>: CPT

## 2024-07-24 ENCOUNTER — APPOINTMENT (OUTPATIENT)
Dept: ENDOCRINOLOGY | Facility: CLINIC | Age: 69
End: 2024-07-24

## 2024-07-30 RX ORDER — SEMAGLUTIDE 0.68 MG/ML
2 INJECTION, SOLUTION SUBCUTANEOUS
Qty: 1 | Refills: 2 | Status: ACTIVE | COMMUNITY
Start: 2024-07-30 | End: 1900-01-01

## 2024-08-07 ENCOUNTER — APPOINTMENT (OUTPATIENT)
Dept: FAMILY MEDICINE | Facility: CLINIC | Age: 69
End: 2024-08-07

## 2024-08-07 ENCOUNTER — NON-APPOINTMENT (OUTPATIENT)
Age: 69
End: 2024-08-07

## 2024-08-07 ENCOUNTER — APPOINTMENT (OUTPATIENT)
Dept: PULMONOLOGY | Facility: CLINIC | Age: 69
End: 2024-08-07

## 2024-08-07 PROBLEM — R06.02 SOB (SHORTNESS OF BREATH): Status: ACTIVE | Noted: 2024-08-07

## 2024-08-07 PROBLEM — E66.01 OBESITY, MORBID, BMI 40.0-49.9: Status: ACTIVE | Noted: 2024-08-07

## 2024-08-07 PROBLEM — G47.33 SEVERE OBSTRUCTIVE SLEEP APNEA: Status: ACTIVE | Noted: 2024-08-07

## 2024-08-07 PROCEDURE — 83036 HEMOGLOBIN GLYCOSYLATED A1C: CPT | Mod: QW

## 2024-08-07 PROCEDURE — G2211 COMPLEX E/M VISIT ADD ON: CPT

## 2024-08-07 PROCEDURE — 99204 OFFICE O/P NEW MOD 45 MIN: CPT

## 2024-08-07 PROCEDURE — 93000 ELECTROCARDIOGRAM COMPLETE: CPT

## 2024-08-07 PROCEDURE — G0439: CPT

## 2024-08-07 PROCEDURE — 94010 BREATHING CAPACITY TEST: CPT

## 2024-08-07 PROCEDURE — 99397 PER PM REEVAL EST PAT 65+ YR: CPT

## 2024-08-07 NOTE — HEALTH RISK ASSESSMENT
[Good] : ~his/her~  mood as  good [1 or 2 (0 pts)] : 1 or 2 (0 points) [Never (0 pts)] : Never (0 points) [No] : In the past 12 months have you used drugs other than those required for medical reasons? No [No falls in past year] : Patient reported no falls in the past year [0] : 2) Feeling down, depressed, or hopeless: Not at all (0) [PHQ-2 Negative - No further assessment needed] : PHQ-2 Negative - No further assessment needed [Former] : Former [< 15 Years] : < 15 Years [Patient/Caregiver not ready to engage] : , patient/caregiver not ready to engage [I will adhere to the patient's wishes.] : I will adhere to the patient's wishes. [Time Spent: ___ minutes] : Time Spent: [unfilled] minutes [Audit-CScore] : 0 [de-identified] : average [de-identified] : average [[facility]] : 8 [[id number]] : 0 [AdvancecareDate] : 06/22

## 2024-08-07 NOTE — CURRENT MEDS
[Takes medication as prescribed] : takes [None] : Patient does not have any barriers to medication adherence [FreeTextEntry1] : Takes Xarelto, has d/c the ASA, has received radition for liver cancer (diagnosed 1 year prior)

## 2024-08-07 NOTE — HISTORY OF PRESENT ILLNESS
[FreeTextEntry1] : 70 y/o M with PMH of HTN, HLD, arthritis, asthma, hypothyroidism, primary liver hepatocellular carcinoma, clear cell carcinoma of R kidney, DM2, afib, s/p left hip replacement presents for an annual visit. [de-identified] : 68 y/o M with PMH of HTN, HLD, arthritis, asthma, hypothyroidism, primary liver hepatocellular carcinoma, clear cell carcinoma of R kidney, DM2, afib, s/p left hip replacement presents for an annual visit. States that he uses his CPAP to sleep. Patient stated he follows up with an oncologist, cardiologist, rheumatologist, orthopedics, urology, follows up with a provider regarding his DM2. He is complaining of dry skin and two itchy wounds on his nose. He is also complaining of worsening shortness of breath that occurs any time he is active. Denies any cough, sputum, sore throat, wheezing, fever, chills or recent changes in weight

## 2024-08-07 NOTE — PHYSICAL EXAM
[No Acute Distress] : no acute distress [Low Lying Soft Palate] : low lying soft palate [Elongated Uvula] : elongated uvula [Enlarged Base of the Tongue] : enlarged base of the tongue [Normal S1, S2] : normal s1, s2 [No Resp Distress] : no resp distress [No Acc Muscle Use] : no acc muscle use [Normal Rhythm and Effort] : normal rhythm and effort [Clear to Auscultation Bilaterally] : clear to auscultation bilaterally [Benign] : benign [Normal Color/ Pigmentation] : normal color/ pigmentation [Oriented x3] : oriented x3 [Normal Mood] : normal mood [Normal Affect] : normal affect

## 2024-08-07 NOTE — HISTORY OF PRESENT ILLNESS
[FreeTextEntry1] : 68 y/o M with PMH of HTN, HLD, arthritis, asthma, hypothyroidism, primary liver hepatocellular carcinoma, clear cell carcinoma of R kidney, DM2, afib, s/p left hip replacement presents for an annual visit. [de-identified] : 70 y/o M with PMH of HTN, HLD, arthritis, asthma, hypothyroidism, primary liver hepatocellular carcinoma, clear cell carcinoma of R kidney, DM2, afib, s/p left hip replacement presents for an annual visit. States that he uses his CPAP to sleep. Patient stated he follows up with an oncologist, cardiologist, rheumatologist, orthopedics, urology, follows up with a provider regarding his DM2. He is complaining of dry skin and two itchy wounds on his nose. He is also complaining of worsening shortness of breath that occurs any time he is active. Denies any cough, sputum, sore throat, wheezing, fever, chills or recent changes in weight

## 2024-08-07 NOTE — PROCEDURE
[FreeTextEntry1] : EXAM: [id number] - CT CHEST - ORDERED BY: BRAYDEN ALDRIDGE   PROCEDURE DATE: 06/12/2024    INTERPRETATION: CLINICAL INFORMATION: Hepatocellular carcinoma. Surveillance imaging.  COMPARISON: PET CT 6/17/2023, chest CT 3/29/2023  CONTRAST/COMPLICATIONS: IV Contrast: NONE Oral Contrast: NONE Complications: None reported at time of study completion  PROCEDURE: CT of the Chest was performed. Sagittal and coronal reformats were performed.  FINDINGS:  LUNGS AND LARGE AIRWAYS: Patent central airways. No pulmonary nodules or parenchymal consolidation. PLEURA: No pleural effusion. VESSELS: Aortic calcification. Coronary artery calcification. HEART: Heart size is normal. No pericardial effusion. MEDIASTINUM AND HARMAN: No lymphadenopathy. CHEST WALL AND LOWER NECK: Bilateral breast implants. VISUALIZED UPPER ABDOMEN: Cirrhosis. Hepatic steatosis. For full evaluation of liver disease, correlate with MRI same day. Right nephrectomy. Left renal cyst. BONES: Degenerative changes. Chronic T4 vertebral body fracture.  IMPRESSION: No evidence of metastatic disease in the chest.    --- End of Report ---       MARY PONCE MD; Attending Radiologist This document has been electronically signed. Jun 14 2024 11:53AM

## 2024-08-07 NOTE — ASSESSMENT
[FreeTextEntry1] : 68 y/o M with PMH of HTN, HLD, arthritis, asthma, hypothyroidism, primary liver hepatocellular carcinoma, clear cell carcinoma of R kidney, DM2, afib, s/p left hip replacement presents for an annual visit. States that he uses his CPAP to sleep. Patient stated he follows up with an oncologist, cardiologist, rheumatologist, orthopedics, urology, follows up with a provider regarding his DM2. He is complaining of dry skin and two itchy wounds on his nose. He is also complaining of worsening shortness of breath that occurs any time he is active. Denies any cough, sputum, sore throat, wheezing, fever, chills or recent changes in weight  Care plan reviewed Labs reviewed A1C: 8.8 Spirometry:FEV1 43% Pulmonary referral Fecal Occult Blood Immunology Recommended moisturizing Medications reviewed and renewed Encouraged continued follow up w specialists  FTC 3 mo

## 2024-08-07 NOTE — PROCEDURE
[FreeTextEntry1] : EXAM: [id number] - CT CHEST - ORDERED BY: BRAYDEN ALDRIDGE   PROCEDURE DATE: 06/12/2024    INTERPRETATION: CLINICAL INFORMATION: Hepatocellular carcinoma. Surveillance imaging.  COMPARISON: PET CT 6/17/2023, chest CT 3/29/2023  CONTRAST/COMPLICATIONS: IV Contrast: NONE Oral Contrast: NONE Complications: None reported at time of study completion  PROCEDURE: CT of the Chest was performed. Sagittal and coronal reformats were performed.  FINDINGS:  LUNGS AND LARGE AIRWAYS: Patent central airways. No pulmonary nodules or parenchymal consolidation. PLEURA: No pleural effusion. VESSELS: Aortic calcification. Coronary artery calcification. HEART: Heart size is normal. No pericardial effusion. MEDIASTINUM AND HARMAN: No lymphadenopathy. CHEST WALL AND LOWER NECK: Bilateral breast implants. VISUALIZED UPPER ABDOMEN: Cirrhosis. Hepatic steatosis. For full evaluation of liver disease, correlate with MRI same day. Right nephrectomy. Left renal cyst. BONES: Degenerative changes. Chronic T4 vertebral body fracture.  IMPRESSION: No evidence of metastatic disease in the chest.    --- End of Report ---       MARY PONCE MD; Attending Radiologist This document has been electronically signed. Jun 14 2024 11:53AM

## 2024-08-07 NOTE — PLAN
[TextEntry] : Will order new BPAP as his is >6 y/o. Leak may be cause of elevated AHI although still very satisfactory control and clinical improvement. Facial hair may be leading to leak. Will see if improved with new machine. Advised him to review other mask choices. PFT to be done. Cardiology f/u. Weight loss a must; obesity contributing to symptoms. On full dose A/C at this time. Annual flu vaccine. Will follow.

## 2024-08-07 NOTE — PHYSICAL EXAM
[No Acute Distress] : no acute distress [Well Nourished] : well nourished [Well Developed] : well developed [Well-Appearing] : well-appearing [Normal Sclera/Conjunctiva] : normal sclera/conjunctiva [PERRL] : pupils equal round and reactive to light [EOMI] : extraocular movements intact [Normal Outer Ear/Nose] : the outer ears and nose were normal in appearance [Normal Oropharynx] : the oropharynx was normal [No JVD] : no jugular venous distention [No Lymphadenopathy] : no lymphadenopathy [Supple] : supple [Thyroid Normal, No Nodules] : the thyroid was normal and there were no nodules present [No Respiratory Distress] : no respiratory distress  [No Accessory Muscle Use] : no accessory muscle use [Clear to Auscultation] : lungs were clear to auscultation bilaterally [Normal Rate] : normal rate  [Regular Rhythm] : with a regular rhythm [Normal S1, S2] : normal S1 and S2 [No Murmur] : no murmur heard [No Carotid Bruits] : no carotid bruits [No Abdominal Bruit] : a ~M bruit was not heard ~T in the abdomen [No Varicosities] : no varicosities [Pedal Pulses Present] : the pedal pulses are present [No Edema] : there was no peripheral edema [No Palpable Aorta] : no palpable aorta [No Extremity Clubbing/Cyanosis] : no extremity clubbing/cyanosis [Soft] : abdomen soft [Non Tender] : non-tender [Non-distended] : non-distended [No Masses] : no abdominal mass palpated [No HSM] : no HSM [Normal Bowel Sounds] : normal bowel sounds [Normal Posterior Cervical Nodes] : no posterior cervical lymphadenopathy [Normal Anterior Cervical Nodes] : no anterior cervical lymphadenopathy [No CVA Tenderness] : no CVA  tenderness [No Spinal Tenderness] : no spinal tenderness [No Joint Swelling] : no joint swelling [Grossly Normal Strength/Tone] : grossly normal strength/tone [No Rash] : no rash [Coordination Grossly Intact] : coordination grossly intact [No Focal Deficits] : no focal deficits [Normal Gait] : normal gait [Deep Tendon Reflexes (DTR)] : deep tendon reflexes were 2+ and symmetric [Normal Affect] : the affect was normal [Normal Insight/Judgement] : insight and judgment were intact [de-identified] : Bilateral leg edema, patient utilizes compression stockings.  [de-identified] : He walks with an antalgic gait, some instability. Utilizes cane to ambulate.  [de-identified] : no wheezing [de-identified] : surgical scars noted [de-identified] : surgical scars noted

## 2024-08-07 NOTE — PHYSICAL EXAM
[No Acute Distress] : no acute distress [Well Nourished] : well nourished [Well Developed] : well developed [Well-Appearing] : well-appearing [Normal Sclera/Conjunctiva] : normal sclera/conjunctiva [PERRL] : pupils equal round and reactive to light [EOMI] : extraocular movements intact [Normal Outer Ear/Nose] : the outer ears and nose were normal in appearance [Normal Oropharynx] : the oropharynx was normal [No JVD] : no jugular venous distention [No Lymphadenopathy] : no lymphadenopathy [Supple] : supple [Thyroid Normal, No Nodules] : the thyroid was normal and there were no nodules present [No Respiratory Distress] : no respiratory distress  [No Accessory Muscle Use] : no accessory muscle use [Clear to Auscultation] : lungs were clear to auscultation bilaterally [Normal Rate] : normal rate  [Regular Rhythm] : with a regular rhythm [Normal S1, S2] : normal S1 and S2 [No Murmur] : no murmur heard [No Carotid Bruits] : no carotid bruits [No Abdominal Bruit] : a ~M bruit was not heard ~T in the abdomen [No Varicosities] : no varicosities [Pedal Pulses Present] : the pedal pulses are present [No Edema] : there was no peripheral edema [No Palpable Aorta] : no palpable aorta [No Extremity Clubbing/Cyanosis] : no extremity clubbing/cyanosis [Soft] : abdomen soft [Non Tender] : non-tender [Non-distended] : non-distended [No Masses] : no abdominal mass palpated [No HSM] : no HSM [Normal Bowel Sounds] : normal bowel sounds [Normal Posterior Cervical Nodes] : no posterior cervical lymphadenopathy [Normal Anterior Cervical Nodes] : no anterior cervical lymphadenopathy [No CVA Tenderness] : no CVA  tenderness [No Spinal Tenderness] : no spinal tenderness [No Joint Swelling] : no joint swelling [Grossly Normal Strength/Tone] : grossly normal strength/tone [No Rash] : no rash [Coordination Grossly Intact] : coordination grossly intact [No Focal Deficits] : no focal deficits [Normal Gait] : normal gait [Deep Tendon Reflexes (DTR)] : deep tendon reflexes were 2+ and symmetric [Normal Affect] : the affect was normal [Normal Insight/Judgement] : insight and judgment were intact [de-identified] : Bilateral leg edema, patient utilizes compression stockings.  [de-identified] : He walks with an antalgic gait, some instability. Utilizes cane to ambulate.  [de-identified] : no wheezing [de-identified] : surgical scars noted [de-identified] : surgical scars noted

## 2024-08-07 NOTE — HISTORY OF PRESENT ILLNESS
[Former] : former [Lab] : lab [BPAP:] : BPAP [TextBox_4] : Hx severe JASON.  Last here in 2019.  Hx renal cancer s/p R nephrectomy 4/20/22; no chemo.  Hx HCC dx 2022 on liver bx; found at time of kidney CA. Treated with radioembolism.   Getting onc f/u for both.   On xarelto for afib.   Reports SOB for the past 2 years. With exertion only but requiring less exertion to get symptomatic. Intermittent. Feels worse in hot, humid weather. Ventolin does not help. No wheeze, no cough.  Hx Afib. Sees Dr Luo. CP at times.   Hx severe JASON on BPAP. Current machine from 5/21/19. Therapeutic AHI 14 with high leak; says he is using a FFM which is what he likes. At times, hears air coming around it. Facial hair may be an issue. Compliance excellent. Feels much better using it; night and day.  Quit smoking 2007.    [YearQuit] : 2007 [TextBox_100] : 7/30/13 [TextBox_108] : 89 [TextBox_104] : 8/9/13 [TextBox_135] : 24/18

## 2024-08-07 NOTE — HEALTH RISK ASSESSMENT
[Good] : ~his/her~  mood as  good [1 or 2 (0 pts)] : 1 or 2 (0 points) [Never (0 pts)] : Never (0 points) [No] : In the past 12 months have you used drugs other than those required for medical reasons? No [No falls in past year] : Patient reported no falls in the past year [0] : 2) Feeling down, depressed, or hopeless: Not at all (0) [PHQ-2 Negative - No further assessment needed] : PHQ-2 Negative - No further assessment needed [Former] : Former [< 15 Years] : < 15 Years [Patient/Caregiver not ready to engage] : , patient/caregiver not ready to engage [I will adhere to the patient's wishes.] : I will adhere to the patient's wishes. [Time Spent: ___ minutes] : Time Spent: [unfilled] minutes [Audit-CScore] : 0 [de-identified] : average [de-identified] : average [[facility]] : 8 [[id number]] : 0 [AdvancecareDate] : 06/22

## 2024-08-07 NOTE — REVIEW OF SYSTEMS
[Fatigue] : fatigue [Joint Stiffness] : joint stiffness [Muscle Weakness] : muscle weakness [Joint Swelling] : joint swelling [Negative] : Heme/Lymph [Shortness Of Breath] : shortness of breath [Dyspnea on Exertion] : dyspnea on exertion [Fever] : no fever [Chills] : no chills [Hot Flashes] : no hot flashes [Night Sweats] : no night sweats [Recent Change In Weight] : ~T no recent weight change [Discharge] : no discharge [Pain] : no pain [Redness] : no redness [Vision Problems] : no vision problems [Itching] : no itching [Earache] : no earache [Hearing Loss] : no hearing loss [Nosebleeds] : no nosebleeds [Postnasal Drip] : no postnasal drip [Nasal Discharge] : no nasal discharge [Sore Throat] : no sore throat [Hoarseness] : no hoarseness [Chest Pain] : no chest pain [Palpitations] : no palpitations [Claudication] : no  leg claudication [Lower Ext Edema] : no lower extremity edema [Orthopena] : no orthopnea [Paroxysmal Nocturnal Dyspnea] : no paroxysmal nocturnal dyspnea [Wheezing] : no wheezing [Cough] : no cough [Abdominal Pain] : no abdominal pain [Nausea] : no nausea [Constipation] : no constipation [Diarrhea] : no diarrhea [Vomiting] : no vomiting [Heartburn] : no heartburn [Melena] : no melena [Dysuria] : no dysuria [Incontinence] : no incontinence [Hesitancy] : no hesitancy [Nocturia] : no nocturia [Hematuria] : no hematuria [Frequency] : no frequency [Impotence] : no impotency [Poor Libido] : libido not poor [Joint Pain] : no joint pain [Muscle Pain] : no muscle pain [Back Pain] : no back pain [Itching] : no itching [Headache] : no headache [Dizziness] : no dizziness [Fainting] : no fainting [Confusion] : no confusion [Unsteady Walk] : no ataxia [Memory Loss] : no memory loss [FreeTextEntry3] : cataract surgery  [FreeTextEntry9] : hip pain currently, left knee joint replacement, left hip replacement scheduled for october 2023.

## 2024-08-12 ENCOUNTER — APPOINTMENT (OUTPATIENT)
Dept: OPHTHALMOLOGY | Facility: CLINIC | Age: 69
End: 2024-08-12

## 2024-08-24 ENCOUNTER — NON-APPOINTMENT (OUTPATIENT)
Age: 69
End: 2024-08-24

## 2024-08-28 ENCOUNTER — APPOINTMENT (OUTPATIENT)
Dept: PULMONOLOGY | Facility: CLINIC | Age: 69
End: 2024-08-28
Payer: MEDICARE

## 2024-08-28 ENCOUNTER — RX CHANGE (OUTPATIENT)
Age: 69
End: 2024-08-28

## 2024-08-28 VITALS — BODY MASS INDEX: 49.44 KG/M2 | HEIGHT: 67 IN | WEIGHT: 315 LBS

## 2024-08-28 VITALS
SYSTOLIC BLOOD PRESSURE: 130 MMHG | HEART RATE: 83 BPM | DIASTOLIC BLOOD PRESSURE: 68 MMHG | RESPIRATION RATE: 16 BRPM | OXYGEN SATURATION: 95 %

## 2024-08-28 DIAGNOSIS — R06.02 SHORTNESS OF BREATH: ICD-10-CM

## 2024-08-28 DIAGNOSIS — G47.33 OBSTRUCTIVE SLEEP APNEA (ADULT) (PEDIATRIC): ICD-10-CM

## 2024-08-28 DIAGNOSIS — I48.91 UNSPECIFIED ATRIAL FIBRILLATION: ICD-10-CM

## 2024-08-28 DIAGNOSIS — Z87.891 PERSONAL HISTORY OF NICOTINE DEPENDENCE: ICD-10-CM

## 2024-08-28 DIAGNOSIS — E66.01 MORBID (SEVERE) OBESITY DUE TO EXCESS CALORIES: ICD-10-CM

## 2024-08-28 DIAGNOSIS — J45.909 UNSPECIFIED ASTHMA, UNCOMPLICATED: ICD-10-CM

## 2024-08-28 PROCEDURE — G2211 COMPLEX E/M VISIT ADD ON: CPT

## 2024-08-28 PROCEDURE — 99215 OFFICE O/P EST HI 40 MIN: CPT

## 2024-08-28 RX ORDER — FLUTICASONE PROPIONATE 110 UG/1
110 AEROSOL, METERED RESPIRATORY (INHALATION) TWICE DAILY
Qty: 3 | Refills: 3 | Status: DISCONTINUED | COMMUNITY
Start: 2024-08-28 | End: 2024-08-28

## 2024-08-28 RX ORDER — FLUTICASONE FUROATE 100 UG/1
100 POWDER RESPIRATORY (INHALATION) DAILY
Qty: 1 | Refills: 5 | Status: ACTIVE | COMMUNITY
Start: 1900-01-01 | End: 1900-01-01

## 2024-08-28 NOTE — END OF VISIT
[Time Spent: ___ minutes] : I have spent [unfilled] minutes of time on the encounter which excludes teaching and separately reported services.
[Time Spent: ___ minutes] : I have spent [unfilled] minutes of time on the encounter which excludes teaching and separately reported services.
mother reports child been coughing for a while seen by pediatrician noted with Left  swollen glands   no fevers

## 2024-09-03 ENCOUNTER — RX RENEWAL (OUTPATIENT)
Age: 69
End: 2024-09-03

## 2024-09-03 ENCOUNTER — APPOINTMENT (OUTPATIENT)
Dept: ENDOCRINOLOGY | Facility: CLINIC | Age: 69
End: 2024-09-03
Payer: MEDICARE

## 2024-09-03 VITALS
OXYGEN SATURATION: 92 % | BODY MASS INDEX: 49.44 KG/M2 | SYSTOLIC BLOOD PRESSURE: 122 MMHG | HEART RATE: 93 BPM | WEIGHT: 315 LBS | HEIGHT: 67 IN | DIASTOLIC BLOOD PRESSURE: 70 MMHG

## 2024-09-03 DIAGNOSIS — E11.65 TYPE 2 DIABETES MELLITUS WITH HYPERGLYCEMIA: ICD-10-CM

## 2024-09-03 LAB — GLUCOSE BLDC GLUCOMTR-MCNC: 158

## 2024-09-03 PROCEDURE — 82962 GLUCOSE BLOOD TEST: CPT

## 2024-09-03 PROCEDURE — 99214 OFFICE O/P EST MOD 30 MIN: CPT

## 2024-09-03 PROCEDURE — G2211 COMPLEX E/M VISIT ADD ON: CPT

## 2024-09-03 PROCEDURE — 95251 CONT GLUC MNTR ANALYSIS I&R: CPT

## 2024-09-03 NOTE — ASSESSMENT
[FreeTextEntry1] : DM type 2, improving control on ozempic. Increase to 1 mg weekly, and decrease glimepiride to 4 mg daily underlying cirrhosis shown histologically at high risk for CHF; possible future use of a sglt-2 if advised by cardiology  Hypothyroidism euthyroid on replacement - For now, continue with LT4 88 mcg daily - Reinforced appropriate pill technique  HLD- goal LDL < 70 - C/w Statin   HTN- BP stable this visit - C/w ACE       CGM STATEMENT: This patient with diabetes                                                                      - Injects insulin 1+ times daily    - Is currently on CGM, testing glucose continuously - Has been seen in the office by a provider within the last six months to review CGM data with their provider                  CGM is medically necessary for this pt. - CGM will improve/maintain A1c - CGM will reduce hypoglycemic events Talon and Dexcom each require one test strip daily to use in case of sensor failure or for blood glucose verification or during sensor warmup.   RTO in 4 weeks with CDE for check-in and see if further dosing changes need to be made RTO in 8 weeks with NP, labs before RTO in 5 months with MD

## 2024-09-03 NOTE — HISTORY OF PRESENT ILLNESS
[Continuous Glucose Monitoring] : Continuous Glucose Monitoring: Yes [Talon] : Talon [FreeTextEntry1] : Interval hx: Now has Talon 3. History of DM: Diagnosed: ~ 5 years ago Severity: uncontrolled FH of DM: brother Home regimen: Glimepiride 4 mg BID Lantus 24 units QHS ozempic 0.5      Eye doctor: s/p cataract R eye, going to see in Sep for f/u, denies DR Neuropathy: denies Kidney disease: s/p R nephrectomy- had RCC JASON hepatocellular CA, radioembolization afib   Smoking: former, quit 17 years ago Exercise: active with work-    ================================================= Hypothyroidism - On LT4 88 mcg daily - Reports taking in afternoon away from other medications- waits ~ 30 minutes before eating  Labs May 2024: Fructosamine 470-> correlates to an A1c of 9.6% A1C 12.5% , , HDL 39,  Cr 1.30, GFR 60 B12 1723 TSH 7.43, FT4 1.0, FT3 2.33 Vitamin D 31.9   All other ROS reviewed, and they are negative. Labs reviewed. [FreeTextEntry2] : 60 [FreeTextEntry3] : 40 [FreeTextEntry4] : 0 [de-identified] : 7.6 [FreeTextEntry5] : 178 [FreeTextEntry6] : 23,4

## 2024-09-04 NOTE — PLAN
[TextEntry] : Will f/u on order for new BPAP as his is >6 y/o. Patient is using and benefiting from current Bipap machine. Excellent Compliance. Leak may be cause of elevated AHI although still very satisfactory control and clinical improvement. Facial hair may be leading to leak. Will see if improved with new machine. Advised him to review other mask choices. Trial of ICS; flovent or whatever is covered.  Cardiology f/u. Weight loss a must; obesity contributing to symptoms. On full dose A/C at this time. Annual flu vaccine. Will follow.

## 2024-09-04 NOTE — PROCEDURE
[FreeTextEntry1] : PFT done today 8/28/24: no obstruction. mild restriction. DLCO normal.  ------------ EXAM: [id number] - CT CHEST - ORDERED BY: BRAYDEN ALDRIDGE   PROCEDURE DATE: 06/12/2024    INTERPRETATION: CLINICAL INFORMATION: Hepatocellular carcinoma. Surveillance imaging.  COMPARISON: PET CT 6/17/2023, chest CT 3/29/2023  CONTRAST/COMPLICATIONS: IV Contrast: NONE Oral Contrast: NONE Complications: None reported at time of study completion  PROCEDURE: CT of the Chest was performed. Sagittal and coronal reformats were performed.  FINDINGS:  LUNGS AND LARGE AIRWAYS: Patent central airways. No pulmonary nodules or parenchymal consolidation. PLEURA: No pleural effusion. VESSELS: Aortic calcification. Coronary artery calcification. HEART: Heart size is normal. No pericardial effusion. MEDIASTINUM AND HARMAN: No lymphadenopathy. CHEST WALL AND LOWER NECK: Bilateral breast implants. VISUALIZED UPPER ABDOMEN: Cirrhosis. Hepatic steatosis. For full evaluation of liver disease, correlate with MRI same day. Right nephrectomy. Left renal cyst. BONES: Degenerative changes. Chronic T4 vertebral body fracture.  IMPRESSION: No evidence of metastatic disease in the chest.    --- End of Report ---       MARY PONCE MD; Attending Radiologist This document has been electronically signed. Jun 14 2024 11:53AM

## 2024-09-04 NOTE — PROCEDURE
[FreeTextEntry1] : PFT done today 8/28/24: no obstruction. mild restriction. DLCO normal.  ------------ EXAM: [id number] - CT CHEST - ORDERED BY: BRAYDEN ALDRIDGE   PROCEDURE DATE: 06/12/2024    INTERPRETATION: CLINICAL INFORMATION: Hepatocellular carcinoma. Surveillance imaging.  COMPARISON: PET CT 6/17/2023, chest CT 3/29/2023  CONTRAST/COMPLICATIONS: IV Contrast: NONE Oral Contrast: NONE Complications: None reported at time of study completion  PROCEDURE: CT of the Chest was performed. Sagittal and coronal reformats were performed.  FINDINGS:  LUNGS AND LARGE AIRWAYS: Patent central airways. No pulmonary nodules or parenchymal consolidation. PLEURA: No pleural effusion. VESSELS: Aortic calcification. Coronary artery calcification. HEART: Heart size is normal. No pericardial effusion. MEDIASTINUM AND HARMAN: No lymphadenopathy. CHEST WALL AND LOWER NECK: Bilateral breast implants. VISUALIZED UPPER ABDOMEN: Cirrhosis. Hepatic steatosis. For full evaluation of liver disease, correlate with MRI same day. Right nephrectomy. Left renal cyst. BONES: Degenerative changes. Chronic T4 vertebral body fracture.  IMPRESSION: No evidence of metastatic disease in the chest.    --- End of Report ---       MARY PONCE MD; Attending Radiologist This document has been electronically signed. Jun 14 2024 11:53AM

## 2024-09-04 NOTE — ASSESSMENT
[FreeTextEntry1] : No obstruction. Restriction likely due to MO; CT chest was clear. No A/C so VTE not likely. Asthma not ruled out 100%. Obesity and deconditioning playing large role. Sees cardio. JASON on bpap; entitled to new machine.

## 2024-09-04 NOTE — HISTORY OF PRESENT ILLNESS
[Former] : former [Lab] : lab [BPAP:] : BPAP [TextBox_4] : Hx severe JASON.  Hx renal cancer s/p R nephrectomy 4/20/22; no chemo.  Hx HCC dx 2022 on liver bx; found at time of kidney CA. Treated with radioembolism.   Getting onc f/u for both.   On xarelto for afib.   Reports SOB for the past 2 years. With exertion only but requiring less exertion to get symptomatic. Intermittent. Feels worse in hot, humid weather. Ventolin does not help. No wheeze, no cough.  PFT done today 8/28/24 with no obstruction, mild restriction, normal DLCO.   Hx Afib. Sees Dr Luo. CP at times.   Hx severe JASON on BPAP. Current machine from 5/21/19. Therapeutic AHI 14 with high leak; says he is using a FFM which is what he likes. At times, hears air coming around it. Facial hair may be an issue. Compliance excellent. Feels much better using it; night and day. New machine ordered last visit; has not heard from the DME yet.   Quit smoking 2007.    [YearQuit] : 2007 [TextBox_100] : 7/30/13 [TextBox_108] : 89 [TextBox_104] : 8/9/13 [TextBox_135] : 24/18

## 2024-09-18 ENCOUNTER — OUTPATIENT (OUTPATIENT)
Dept: OUTPATIENT SERVICES | Facility: HOSPITAL | Age: 69
LOS: 1 days | Discharge: HOME | End: 2024-09-18

## 2024-09-18 DIAGNOSIS — Z98.82 BREAST IMPLANT STATUS: Chronic | ICD-10-CM

## 2024-09-18 DIAGNOSIS — C22.0 LIVER CELL CARCINOMA: ICD-10-CM

## 2024-09-18 DIAGNOSIS — Z96.652 PRESENCE OF LEFT ARTIFICIAL KNEE JOINT: Chronic | ICD-10-CM

## 2024-09-18 DIAGNOSIS — Z90.5 ACQUIRED ABSENCE OF KIDNEY: Chronic | ICD-10-CM

## 2024-09-18 DIAGNOSIS — Z98.890 OTHER SPECIFIED POSTPROCEDURAL STATES: Chronic | ICD-10-CM

## 2024-09-18 DIAGNOSIS — Z98.49 CATARACT EXTRACTION STATUS, UNSPECIFIED EYE: Chronic | ICD-10-CM

## 2024-09-25 ENCOUNTER — RESULT REVIEW (OUTPATIENT)
Age: 69
End: 2024-09-25

## 2024-09-25 ENCOUNTER — APPOINTMENT (OUTPATIENT)
Dept: HEMATOLOGY ONCOLOGY | Facility: CLINIC | Age: 69
End: 2024-09-25
Payer: MEDICARE

## 2024-09-25 ENCOUNTER — LABORATORY RESULT (OUTPATIENT)
Age: 69
End: 2024-09-25

## 2024-09-25 ENCOUNTER — RX RENEWAL (OUTPATIENT)
Age: 69
End: 2024-09-25

## 2024-09-25 VITALS
BODY MASS INDEX: 49.44 KG/M2 | DIASTOLIC BLOOD PRESSURE: 75 MMHG | HEART RATE: 80 BPM | WEIGHT: 315 LBS | HEIGHT: 67 IN | TEMPERATURE: 97.2 F | SYSTOLIC BLOOD PRESSURE: 117 MMHG | OXYGEN SATURATION: 91 %

## 2024-09-25 DIAGNOSIS — C64.1 MALIGNANT NEOPLASM OF RIGHT KIDNEY, EXCEPT RENAL PELVIS: ICD-10-CM

## 2024-09-25 DIAGNOSIS — C22.0 LIVER CELL CARCINOMA: ICD-10-CM

## 2024-09-25 LAB
BASOPHILS # BLD AUTO: 0.1 K/UL — SIGNIFICANT CHANGE UP (ref 0–0.2)
BASOPHILS NFR BLD AUTO: 0.7 % — SIGNIFICANT CHANGE UP (ref 0–2)
EOSINOPHIL # BLD AUTO: 0.5 K/UL — SIGNIFICANT CHANGE UP (ref 0–0.5)
EOSINOPHIL NFR BLD AUTO: 4.6 % — SIGNIFICANT CHANGE UP (ref 0–6)
HCT VFR BLD CALC: 46 % — SIGNIFICANT CHANGE UP (ref 39–50)
HGB BLD-MCNC: 14.6 G/DL — SIGNIFICANT CHANGE UP (ref 13–17)
LYMPHOCYTES # BLD AUTO: 1.1 K/UL — SIGNIFICANT CHANGE UP (ref 1–3.3)
LYMPHOCYTES # BLD AUTO: 10.9 % — LOW (ref 13–44)
MCHC RBC-ENTMCNC: 28.3 PG — SIGNIFICANT CHANGE UP (ref 27–34)
MCHC RBC-ENTMCNC: 31.6 G/DL — LOW (ref 32–36)
MCV RBC AUTO: 89.3 FL — SIGNIFICANT CHANGE UP (ref 80–100)
MONOCYTES # BLD AUTO: 0.3 K/UL — SIGNIFICANT CHANGE UP (ref 0–0.9)
MONOCYTES NFR BLD AUTO: 3.5 % — SIGNIFICANT CHANGE UP (ref 2–14)
NEUTROPHILS # BLD AUTO: 8.1 K/UL — HIGH (ref 1.8–7.4)
NEUTROPHILS NFR BLD AUTO: 80.3 % — HIGH (ref 43–77)
PLATELET # BLD AUTO: 246 K/UL — SIGNIFICANT CHANGE UP (ref 150–400)
RBC # BLD: 5.15 M/UL — SIGNIFICANT CHANGE UP (ref 4.2–5.8)
RBC # FLD: 13.8 % — SIGNIFICANT CHANGE UP (ref 10.3–14.5)
WBC # BLD: 10 K/UL — SIGNIFICANT CHANGE UP (ref 3.8–10.5)
WBC # FLD AUTO: 10 K/UL — SIGNIFICANT CHANGE UP (ref 3.8–10.5)

## 2024-09-25 PROCEDURE — 99214 OFFICE O/P EST MOD 30 MIN: CPT

## 2024-09-25 NOTE — HISTORY OF PRESENT ILLNESS
[Disease: _____________________] : Disease: [unfilled] [80: Normal activity with effort; some signs or symptoms of disease.] : 80: Normal activity with effort; some signs or symptoms of disease.  [ECOG Performance Status: 2 - Ambulatory and capable of all self care but unable to carry out any work activities] : Performance Status: 2 - Ambulatory and capable of all self care but unable to carry out any work activities. Up and about more than 50% of waking hours [de-identified] : 68 year old M with h/o anemia, Afib, gout, DM, HTN, HLD, hypothyroid, obesity, JASON and renal cancer s/p right nephrectomy who was diagnosed with HCC in June 2022.\par  06/07/2023 First visit with Dr Hyde \par  \par  He was noted to have a suspicious kidney lesion on imaging in December 2021 and was referred for an abdominal MRI in January 2022, with incidental note of a multiple small indeterminate hepatic lesions which are indeterminate but for which metastatic renal cell carcinoma cannot be excluded. Subsequent MRI abdomen in April 2022 revealed an endophytic right lower pole renal lesion suspicious for malignancy, evidence of liver metastasis and multiple prominent subcentimeter periportal, retroperitoneal and retrocaval lymph nodes which are nonspecific.\par  \par  On 4/20/22 he underwent a right nephrectomy with Dr Jhonathan Shetty.  Path showed RCC , clear cell, nuclear grade II, pT1a, Stage I. No indication for adjuvant pembro. \par  \par  PET/CT performed on 5/13/22 showed no definitive hypermetabolic evidence of malignant disease. There are prominent retroperitoneal and iliac chain lymph nodes without corresponding abnormal activity. No discrete hepatic focus to correspond with liver lesions seen on MRI (continued MRI surveillance is recommended), right anterolateral subcutaneous activity possibly related to abdominal surgery.\par  \par  On 6/1/22 he underwent a CT-guided biopsy of a representative mass in the inferior right hepatic lobe. Pathology demonstrated well differentiated hepatocellular carcinoma. Immunohistochemical stains show tumor cells are positive for arginase, focal positive for HSA, CK7 and CA IX, negative for CK20, CDX2, TTF1, Villin, [place]-8, vimentin and AFP. The morphology and immuno profile support the diagnosis of hepatocellular carcinoma.\par  \par  Pt works as a storage . Feels well. Denies HA. CP, SOB, abd pain, constipation, diarrhea, melena, hematuria, dysuria.  [de-identified] : moderately differentiated HCC [FreeTextEntry1] : evaluation for SRT [de-identified] : 7/28/22: Angel is here for follow up for HCC. Pt's case was discussed at . Some of the lesions suggestive of metastasis rather than HCC. Pending  bx then re discussion at  to assess for transplant eligibility. Pt will also follow up with IR for LDT. Denies HA. CP, SOB, abd pain, constipation, diarrhea, melena, hematuria, dysuria.  10/25/22: : Angel is here for follow up for HCC.Bx of one of the liver lesions was neg or malignancy. mix bibrous tissue with mixed inflammation and liver cirrhosis. No evidence of metastatic RCC. Will obtain interval MRI. Pt to follow up with IR for LDT. Pt will also follow up with IR for LDT. Denies HA. CP, SOB, abd pain, constipation, diarrhea, melena, hematuria, dysuria.  1/12/22: Care transferred from Dr. Miller to Dr. Stratton. Patient seen by urology for nephrolithiasis requiring antibiotics. Planning on going in for a procedure. since last visit, the patient's insurance issues have not been resolved, and he has not been approved for further treatment.  06/07/2023 first visit with Dr Hyde; when asked how he felt and is current state of health, patient responded"...you tell me..." He has had bone scanning and he is currently awaiting CT/PET scan not requested by this office.I read report of May 17 bone scan that showed no evidence of osseous lesions; (he has only a left kidney) . Scans are being requested by pulmonary, GI hepatology and IR to assess suitability of SRT  3/21/23: Patient presented to the ER on 3/6/23-3/15/23, admitted for sepsis 2/2 RLE cellulitis given course of IV abx. With hospital course complicated by fall  CT head x2  negative for acute pathology.  Severe edema still present of RLE but no signs of compartment syndrome.  Reports intermitted abd pain. Denies n/v/d  4/27/23:Patient feels well, has a normal appetite. Continues to work without difficulty. LE swelling has not worsened  Denies n/v, abdominal pain, weight loss,  Patient pending cataract surgery in June 2023 Continues on Xarelto 2/2 A-fib Patient had f/u with Dr. Phipps on 4/20/23, discussed liver biopsy Had f/u with Dr. Santos who recommended a bone scan.   6/7/23: Patient presents for a followup (transfer of care from Dr. Stratton) Continues on Xarelto 20 mg 2/2 A-fib Not currently on abx Ambulates with a cane, for hip pain  Admits 70lb intentional weight loss Pending cataract surgery  7/26/23: Patient presents for a followup Patient planning for embolization on 7/31/23 Gout issues resolved.  Reports left degenerative hip, planning to f/u with Dr. Schafer He has stopped eliquis and asa  09/13/23:  Mr Magallon is now s/p embolization on 07/31/23. Tolerated without issue. No complications and feeling well overall.   01/10/24: Mr Magallon returns for follow up.  MRI from Dec 21, 2023 shows  LR-TR Nonviable lesion within segment 5 (previously biopsy-proven HCC). Indeterminate rim-enhancing lesions within segment 4 are unchanged since 1/24/2022. No new liver lesion. He feels well with no new complaints  5/1/24 Patient presents for surveillance follow up regarding recent history of RCC and HCC. + Fatigue. + MILLER, chronic but now with minimal activity. + R flank/lower abdominal pain. No hematuria. No N/V/D. No fevers  06/26/24: Mr Magallon returns for follow up. MRI abd/pelv from 06/12/24 shows post radioembolization changes in segment 5 without evidence of viable tumor, Nonviable. Small indeterminate rim enhancing foci in segment 4 are unchanged from prior imaging dating to January 2022. Continued follow-up is recommended. No new lesions. CT chest was also with CAROLYN. Start Glp1 treatment for weight loss and diabetes.   9/25/24 Patient presents for surveillance follow up regarding recent history of RCC and HCC. + MILLER, chronic and remains slightly worse recently, occurs with minimal activity. + Constipation. R flank/lower abdominal pain resolved. Fatigue resolved. No hematuria. No N/V/D. No fevers.

## 2024-09-25 NOTE — PHYSICAL EXAM
[Restricted in physically strenuous activity but ambulatory and able to carry out work of a light or sedentary nature] : Status 1- Restricted in physically strenuous activity but ambulatory and able to carry out work of a light or sedentary nature, e.g., light house work, office work [Obese] : obese [Normal] : affect appropriate [Ulcers] : no ulcers [Mucositis] : no mucositis [Thrush] : no thrush [Vesicles] : no vesicles [de-identified] : small lens opacities [de-identified] : edentulous upper; extractions lower [de-identified] : numerous multi coloured tattoos on chest back and arms

## 2024-09-25 NOTE — ASSESSMENT
[Supportive] : Goals of care discussed with patient: Supportive [Palliative Care Plan] : not applicable at this time [Future Reassessment of Pain Scale] : Future reassessment of pain scale    [FreeTextEntry1] : 68 year old M with h/o anemia, Afib, gout, type 2 DM, HTN, hyperlipidemia , hypothyroid, obesity, JASON and right renal cancer s/p right nephrectomy who was diagnosed with HCC in June 2022. # HCC -MRI abd showed 0.7 and 1.2 cm Lt hepatic lobe lesion and 1.1 cm right hepatic lobe lesion -no evidence of metastatic disease on PET -pt seen by hepatology >> the case was discussed at [facility]. pending bx of liver lesion as one of the lesion appear to be different from HCC. -will defer systemic therapy for now as pt is eligible for LDT and being evaluated for possible liver transplant. -Patient to follow up with Dr. Phipps and Dr. Santos. -PET/CT and CT scan scheduled on 6/7/23 and Pending MRI on 6/15/23 per Dr. Phipps # RCC -s/p right nephrectomy -Path showed RCC , clear cell, nuclear grade II, pT 1 a, Stage I. -No indication for adjuvant TKI or chemotherapy/immune therapy. Reviewed -3/29/23 CT Chest: Few ill-defined ground-glass nodular opacities primarily in the right middle lobe 0.5 cm is unchanged. Right lower lobe superior segment 0.5 cm ground-glass nodule abutting the major fissure is new. -5/17/23 Spect / CT bone: No radionuclide evidence of osseous metastases. -6/7/23 PET/CT: Focal activity in the right hepatic lobe corresponding to one of the enhancing MRI lesions compatible with HCC. 6/14/23 MR abdomen: A 3.1 cm biopsy-proven hepatocellular carcinoma in the right hepatic lobe with interval growth from prior imaging in February 2023. Two rim-enhancing lesions in segment 4 are without significant change and are indeterminate. -09/13/23: now s/p embolization on 07/31/23, doing very well overall with no significant side effects or complications. He will have his MRI in November and will follow up after.   01/10/24: Mr Magallon returns for follow up.  MRI from Dec 21, 2023 shows  LR-TR Nonviable lesion within segment 5 (previously biopsy-proven HCC). Indeterminate rim-enhancing lesions within segment 4 are unchanged since 1/24/2022. No new liver lesion. He feels well with no new complaints. AFP pending, follow up in 3 months, follow up with Dr. Phipps   06/26/24: Mr Magallon returns for follow up. MRI abd/pelv from 06/12/24 shows post radioembolization changes in segment 5 without evidence of viable tumor, Nonviable. Small indeterminate rim enhancing foci in segment 4 are unchanged from prior imaging dating to January 2022. Continued follow-up is recommended. No new lesions. CT chest was also with CAROLYN. Start Glp1 treatment for weight loss and diabetes.  -Currently CAROLYN, follow up in 3 months  9/25/24 Patient presents with minimal complaints. No abdominal pain, flank pain or hematuria.  Most recent CT and MRI in June was CAROLYN. Next imaging in DecemberELVIRA'Melvin follow up soon after to review.

## 2024-09-25 NOTE — REVIEW OF SYSTEMS
[Patient Intake Form Reviewed] : Patient intake form was reviewed [Vision Problems] : vision problems [Lower Ext Edema] : lower extremity edema [Joint Pain] : joint pain [Joint Stiffness] : joint stiffness [Muscle Pain] : muscle pain [Muscle Weakness] : muscle weakness [Difficulty Walking] : difficulty walking [Fever] : no fever [Chills] : no chills [Fatigue] : no fatigue [Recent Change In Weight] : ~T no recent weight change [FreeTextEntry2] : negative except as indicated in interval history [FreeTextEntry3] : Cataracts [de-identified] : uses a cane

## 2024-10-04 LAB
ALBUMIN SERPL ELPH-MCNC: 4.1 G/DL
ALP BLD-CCNC: 113 U/L
ALT SERPL-CCNC: 30 U/L
ANION GAP SERPL CALC-SCNC: 18 MMOL/L
AST SERPL-CCNC: 32 U/L
BILIRUB SERPL-MCNC: 0.4 MG/DL
BUN SERPL-MCNC: 19 MG/DL
CALCIUM SERPL-MCNC: 9.4 MG/DL
CHLORIDE SERPL-SCNC: 97 MMOL/L
CO2 SERPL-SCNC: 24 MMOL/L
CREAT SERPL-MCNC: 1.25 MG/DL
EGFR: 63 ML/MIN/1.73M2
GLUCOSE SERPL-MCNC: 162 MG/DL
MAGNESIUM SERPL-MCNC: 2.1 MG/DL
POTASSIUM SERPL-SCNC: 3.9 MMOL/L
PROT SERPL-MCNC: 7.8 G/DL
SODIUM SERPL-SCNC: 139 MMOL/L

## 2024-10-28 ENCOUNTER — RX RENEWAL (OUTPATIENT)
Age: 69
End: 2024-10-28

## 2024-10-31 NOTE — HISTORY OF PRESENT ILLNESS
Mother reports pt with dizziness, weakness, headache, and fatigue. Pt unable to get out of bed. Pt states last night with body aches and fatigue. Yesterday slept through classes. Pt with hx of headaches. Pt also with slight and noise sensitivity    [Disease: _____________________] : Disease: [unfilled] [80: Normal activity with effort; some signs or symptoms of disease.] : 80: Normal activity with effort; some signs or symptoms of disease.  [ECOG Performance Status: 2 - Ambulatory and capable of all self care but unable to carry out any work activities] : Performance Status: 2 - Ambulatory and capable of all self care but unable to carry out any work activities. Up and about more than 50% of waking hours [de-identified] : 68 year old M with h/o anemia, Afib, gout, DM, HTN, HLD, hypothyroid, obesity, JASON and renal cancer s/p right nephrectomy who was diagnosed with HCC in June 2022.\par  06/07/2023 First visit with Dr Hyde \par  \par  He was noted to have a suspicious kidney lesion on imaging in December 2021 and was referred for an abdominal MRI in January 2022, with incidental note of a multiple small indeterminate hepatic lesions which are indeterminate but for which metastatic renal cell carcinoma cannot be excluded. Subsequent MRI abdomen in April 2022 revealed an endophytic right lower pole renal lesion suspicious for malignancy, evidence of liver metastasis and multiple prominent subcentimeter periportal, retroperitoneal and retrocaval lymph nodes which are nonspecific.\par  \par  On 4/20/22 he underwent a right nephrectomy with Dr Jhonathan Shetty.  Path showed RCC , clear cell, nuclear grade II, pT1a, Stage I. No indication for adjuvant pembro. \par  \par  PET/CT performed on 5/13/22 showed no definitive hypermetabolic evidence of malignant disease. There are prominent retroperitoneal and iliac chain lymph nodes without corresponding abnormal activity. No discrete hepatic focus to correspond with liver lesions seen on MRI (continued MRI surveillance is recommended), right anterolateral subcutaneous activity possibly related to abdominal surgery.\par  \par  On 6/1/22 he underwent a CT-guided biopsy of a representative mass in the inferior right hepatic lobe. Pathology demonstrated well differentiated hepatocellular carcinoma. Immunohistochemical stains show tumor cells are positive for arginase, focal positive for HSA, CK7 and CA IX, negative for CK20, CDX2, TTF1, Villin, [place]-8, vimentin and AFP. The morphology and immuno profile support the diagnosis of hepatocellular carcinoma.\par  \par  Pt works as a storage . Feels well. Denies HA. CP, SOB, abd pain, constipation, diarrhea, melena, hematuria, dysuria.  [de-identified] : moderately differentiated HCC [FreeTextEntry1] : evaluation for SRT [de-identified] : 7/28/22: Angel is here for follow up for HCC. Pt's case was discussed at . Some of the lesions suggestive of metastasis rather than HCC. Pending  bx then re discussion at  to assess for transplant eligibility. Pt will also follow up with IR for LDT. Denies HA. CP, SOB, abd pain, constipation, diarrhea, melena, hematuria, dysuria.  10/25/22: : Angel is here for follow up for HCC.Bx of one of the liver lesions was neg or malignancy. mix bibrous tissue with mixed inflammation and liver cirrhosis. No evidence of metastatic RCC. Will obtain interval MRI. Pt to follow up with IR for LDT. Pt will also follow up with IR for LDT. Denies HA. CP, SOB, abd pain, constipation, diarrhea, melena, hematuria, dysuria.  1/12/22: Care transferred from Dr. Miller to Dr. Stratton. Patient seen by urology for nephrolithiasis requiring antibiotics. Planning on going in for a procedure. since last visit, the patient's insurance issues have not been resolved, and he has not been approved for further treatment.  06/07/2023 first visit with Dr Hyde; when asked how he felt and is current state of health, patient responded"...you tell me..." He has had bone scanning and he is currently awaiting CT/PET scan not requested by this office.I read report of May 17 bone scan that showed no evidence of osseous lesions; (he has only a left kidney) . Scans are being requested by pulmonary, GI hepatology and IR to assess suitability of SRT  3/21/23: Patient presented to the ER on 3/6/23-3/15/23, admitted for sepsis 2/2 RLE cellulitis given course of IV abx. With hospital course complicated by fall  CT head x2  negative for acute pathology.  Severe edema still present of RLE but no signs of compartment syndrome.  Reports intermitted abd pain. Denies n/v/d  4/27/23:Patient feels well, has a normal appetite. Continues to work without difficulty. LE swelling has not worsened  Denies n/v, abdominal pain, weight loss,  Patient pending cataract surgery in June 2023 Continues on Xarelto 2/2 A-fib Patient had f/u with Dr. Phipps on 4/20/23, discussed liver biopsy Had f/u with Dr. Santos who recommended a bone scan.   6/7/23: Patient presents for a followup (transfer of care from Dr. Stratton) Continues on Xarelto 20 mg 2/2 A-fib Not currently on abx Ambulates with a cane, for hip pain  Admits 70lb intentional weight loss Pending cataract surgery  7/26/23: Patient presents for a followup Patient planning for embolization on 7/31/23 Gout issues resolved.  Reports left degenerative hip, planning to f/u with Dr. Schafer He has stopped eliquis and asa  09/13/23:  Mr Magallon is now s/p embolization on 07/31/23. Tolerated without issue. No complications and feeling well overall.   01/10/24: Mr Magallon returns for follow up.  MRI from Dec 21, 2023 shows  LR-TR Nonviable lesion within segment 5 (previously biopsy-proven HCC). Indeterminate rim-enhancing lesions within segment 4 are unchanged since 1/24/2022. No new liver lesion. He feels well with no new complaints  5/1/24 Patient presents for surveillance follow up regarding recent history of RCC and HCC. + Fatigue. + MILLER, chronic but now with minimal activity. + R flank/lower abdominal pain. No hematuria. No N/V/D. No fevers

## 2024-11-04 ENCOUNTER — RX RENEWAL (OUTPATIENT)
Age: 69
End: 2024-11-04

## 2024-11-06 ENCOUNTER — APPOINTMENT (OUTPATIENT)
Dept: FAMILY MEDICINE | Facility: CLINIC | Age: 69
End: 2024-11-06
Payer: MEDICARE

## 2024-11-06 VITALS
SYSTOLIC BLOOD PRESSURE: 180 MMHG | TEMPERATURE: 98.1 F | HEIGHT: 67 IN | DIASTOLIC BLOOD PRESSURE: 82 MMHG | HEART RATE: 88 BPM | WEIGHT: 315 LBS | OXYGEN SATURATION: 97 % | BODY MASS INDEX: 49.44 KG/M2 | RESPIRATION RATE: 14 BRPM

## 2024-11-06 DIAGNOSIS — E78.5 HYPERLIPIDEMIA, UNSPECIFIED: ICD-10-CM

## 2024-11-06 DIAGNOSIS — E03.9 HYPOTHYROIDISM, UNSPECIFIED: ICD-10-CM

## 2024-11-06 DIAGNOSIS — I10 ESSENTIAL (PRIMARY) HYPERTENSION: ICD-10-CM

## 2024-11-06 DIAGNOSIS — I48.91 UNSPECIFIED ATRIAL FIBRILLATION: ICD-10-CM

## 2024-11-06 DIAGNOSIS — E11.8 TYPE 2 DIABETES MELLITUS WITH UNSPECIFIED COMPLICATIONS: ICD-10-CM

## 2024-11-06 DIAGNOSIS — M25.50 PAIN IN UNSPECIFIED JOINT: ICD-10-CM

## 2024-11-06 DIAGNOSIS — D64.9 ANEMIA, UNSPECIFIED: ICD-10-CM

## 2024-11-06 DIAGNOSIS — Z79.4 TYPE 2 DIABETES MELLITUS WITH DIABETIC CHRONIC KIDNEY DISEASE: ICD-10-CM

## 2024-11-06 DIAGNOSIS — E11.22 TYPE 2 DIABETES MELLITUS WITH DIABETIC CHRONIC KIDNEY DISEASE: ICD-10-CM

## 2024-11-06 DIAGNOSIS — C22.0 LIVER CELL CARCINOMA: ICD-10-CM

## 2024-11-06 DIAGNOSIS — E11.65 TYPE 2 DIABETES MELLITUS WITH HYPERGLYCEMIA: ICD-10-CM

## 2024-11-06 PROCEDURE — G2211 COMPLEX E/M VISIT ADD ON: CPT

## 2024-11-06 PROCEDURE — 99214 OFFICE O/P EST MOD 30 MIN: CPT

## 2024-11-08 ENCOUNTER — RX RENEWAL (OUTPATIENT)
Age: 69
End: 2024-11-08

## 2024-11-08 LAB
ALBUMIN SERPL ELPH-MCNC: 3.9 G/DL
ALP BLD-CCNC: 103 U/L
ALT SERPL-CCNC: 27 U/L
ANION GAP SERPL CALC-SCNC: 16 MMOL/L
AST SERPL-CCNC: 29 U/L
BASOPHILS # BLD AUTO: 0.06 K/UL
BASOPHILS NFR BLD AUTO: 0.7 %
BILIRUB SERPL-MCNC: 0.2 MG/DL
BUN SERPL-MCNC: 15 MG/DL
CALCIUM SERPL-MCNC: 9.3 MG/DL
CHLORIDE SERPL-SCNC: 103 MMOL/L
CHOLEST SERPL-MCNC: 137 MG/DL
CO2 SERPL-SCNC: 21 MMOL/L
CREAT SERPL-MCNC: 1.22 MG/DL
EGFR: 65 ML/MIN/1.73M2
EOSINOPHIL # BLD AUTO: 0.48 K/UL
EOSINOPHIL NFR BLD AUTO: 5.9 %
ESTIMATED AVERAGE GLUCOSE: 154 MG/DL
GLUCOSE SERPL-MCNC: 163 MG/DL
HBA1C MFR BLD HPLC: 7 %
HCT VFR BLD CALC: 44.2 %
HDLC SERPL-MCNC: 38 MG/DL
HGB BLD-MCNC: 13.9 G/DL
IMM GRANULOCYTES NFR BLD AUTO: 0.4 %
LDLC SERPL CALC-MCNC: 77 MG/DL
LYMPHOCYTES # BLD AUTO: 1.09 K/UL
LYMPHOCYTES NFR BLD AUTO: 13.5 %
MAN DIFF?: NORMAL
MCHC RBC-ENTMCNC: 28.1 PG
MCHC RBC-ENTMCNC: 31.4 G/DL
MCV RBC AUTO: 89.3 FL
MONOCYTES # BLD AUTO: 0.43 K/UL
MONOCYTES NFR BLD AUTO: 5.3 %
NEUTROPHILS # BLD AUTO: 5.98 K/UL
NEUTROPHILS NFR BLD AUTO: 74.2 %
NONHDLC SERPL-MCNC: 98 MG/DL
PLATELET # BLD AUTO: 291 K/UL
POTASSIUM SERPL-SCNC: 4.2 MMOL/L
PROT SERPL-MCNC: 7.5 G/DL
PSA SERPL-MCNC: 0.99 NG/ML
RBC # BLD: 4.95 M/UL
RBC # FLD: 15.8 %
SODIUM SERPL-SCNC: 140 MMOL/L
TESTOST SERPL-MCNC: 151 NG/DL
TRIGL SERPL-MCNC: 117 MG/DL
TSH SERPL-ACNC: 0.95 UIU/ML
WBC # FLD AUTO: 8.07 K/UL

## 2024-11-09 ENCOUNTER — RX RENEWAL (OUTPATIENT)
Age: 69
End: 2024-11-09

## 2024-11-20 ENCOUNTER — APPOINTMENT (OUTPATIENT)
Dept: GASTROENTEROLOGY | Facility: CLINIC | Age: 69
End: 2024-11-20

## 2024-11-20 ENCOUNTER — APPOINTMENT (OUTPATIENT)
Dept: PULMONOLOGY | Facility: CLINIC | Age: 69
End: 2024-11-20
Payer: MEDICARE

## 2024-11-20 VITALS
HEIGHT: 67 IN | HEART RATE: 90 BPM | RESPIRATION RATE: 16 BRPM | BODY MASS INDEX: 49.44 KG/M2 | OXYGEN SATURATION: 98 % | SYSTOLIC BLOOD PRESSURE: 150 MMHG | DIASTOLIC BLOOD PRESSURE: 90 MMHG | WEIGHT: 315 LBS

## 2024-11-20 VITALS
DIASTOLIC BLOOD PRESSURE: 90 MMHG | HEIGHT: 67 IN | RESPIRATION RATE: 16 BRPM | WEIGHT: 315 LBS | BODY MASS INDEX: 49.44 KG/M2 | OXYGEN SATURATION: 98 % | SYSTOLIC BLOOD PRESSURE: 150 MMHG | HEART RATE: 90 BPM

## 2024-11-20 DIAGNOSIS — Z87.891 PERSONAL HISTORY OF NICOTINE DEPENDENCE: ICD-10-CM

## 2024-11-20 DIAGNOSIS — Z09 ENCOUNTER FOR FOLLOW-UP EXAMINATION AFTER COMPLETED TREATMENT FOR CONDITIONS OTHER THAN MALIGNANT NEOPLASM: ICD-10-CM

## 2024-11-20 DIAGNOSIS — E66.01 MORBID (SEVERE) OBESITY DUE TO EXCESS CALORIES: ICD-10-CM

## 2024-11-20 DIAGNOSIS — G47.33 OBSTRUCTIVE SLEEP APNEA (ADULT) (PEDIATRIC): ICD-10-CM

## 2024-11-20 DIAGNOSIS — C22.0 LIVER CELL CARCINOMA: ICD-10-CM

## 2024-11-20 DIAGNOSIS — I48.91 UNSPECIFIED ATRIAL FIBRILLATION: ICD-10-CM

## 2024-11-20 DIAGNOSIS — R06.02 SHORTNESS OF BREATH: ICD-10-CM

## 2024-11-20 PROCEDURE — 99215 OFFICE O/P EST HI 40 MIN: CPT

## 2024-11-20 PROCEDURE — G2211 COMPLEX E/M VISIT ADD ON: CPT

## 2024-11-20 PROCEDURE — 99214 OFFICE O/P EST MOD 30 MIN: CPT

## 2024-11-20 RX ORDER — APIXABAN 5 MG/1
5 TABLET, FILM COATED ORAL
Refills: 0 | Status: ACTIVE | COMMUNITY

## 2024-12-05 ENCOUNTER — EMERGENCY (EMERGENCY)
Facility: HOSPITAL | Age: 69
LOS: 1 days | Discharge: HOME | End: 2024-12-05
Attending: EMERGENCY MEDICINE
Payer: MEDICARE

## 2024-12-05 VITALS
OXYGEN SATURATION: 97 % | DIASTOLIC BLOOD PRESSURE: 96 MMHG | TEMPERATURE: 97 F | SYSTOLIC BLOOD PRESSURE: 158 MMHG | HEART RATE: 76 BPM | HEIGHT: 69 IN | RESPIRATION RATE: 18 BRPM | WEIGHT: 315 LBS

## 2024-12-05 DIAGNOSIS — Z90.5 ACQUIRED ABSENCE OF KIDNEY: Chronic | ICD-10-CM

## 2024-12-05 DIAGNOSIS — Z98.49 CATARACT EXTRACTION STATUS, UNSPECIFIED EYE: Chronic | ICD-10-CM

## 2024-12-05 DIAGNOSIS — Z96.652 PRESENCE OF LEFT ARTIFICIAL KNEE JOINT: Chronic | ICD-10-CM

## 2024-12-05 DIAGNOSIS — Z98.890 OTHER SPECIFIED POSTPROCEDURAL STATES: Chronic | ICD-10-CM

## 2024-12-05 DIAGNOSIS — Z98.82 BREAST IMPLANT STATUS: Chronic | ICD-10-CM

## 2024-12-05 LAB
ACETONE SERPL-MCNC: NEGATIVE — SIGNIFICANT CHANGE UP
ALBUMIN SERPL ELPH-MCNC: 3.6 G/DL — SIGNIFICANT CHANGE UP (ref 3.3–5.2)
ALP SERPL-CCNC: 108 U/L — SIGNIFICANT CHANGE UP (ref 40–120)
ALT FLD-CCNC: 34 U/L — SIGNIFICANT CHANGE UP
ANION GAP SERPL CALC-SCNC: 13 MMOL/L — SIGNIFICANT CHANGE UP (ref 5–17)
APPEARANCE UR: CLEAR — SIGNIFICANT CHANGE UP
APTT BLD: 29.3 SEC — SIGNIFICANT CHANGE UP (ref 24.5–35.6)
AST SERPL-CCNC: 33 U/L — SIGNIFICANT CHANGE UP
BASOPHILS # BLD AUTO: 0.06 K/UL — SIGNIFICANT CHANGE UP (ref 0–0.2)
BASOPHILS NFR BLD AUTO: 0.7 % — SIGNIFICANT CHANGE UP (ref 0–2)
BILIRUB SERPL-MCNC: 0.3 MG/DL — LOW (ref 0.4–2)
BILIRUB UR-MCNC: NEGATIVE — SIGNIFICANT CHANGE UP
BUN SERPL-MCNC: 24.4 MG/DL — HIGH (ref 8–20)
CALCIUM SERPL-MCNC: 9.3 MG/DL — SIGNIFICANT CHANGE UP (ref 8.4–10.5)
CHLORIDE SERPL-SCNC: 96 MMOL/L — SIGNIFICANT CHANGE UP (ref 96–108)
CO2 SERPL-SCNC: 24 MMOL/L — SIGNIFICANT CHANGE UP (ref 22–29)
COLOR SPEC: YELLOW — SIGNIFICANT CHANGE UP
CREAT SERPL-MCNC: 1.12 MG/DL — SIGNIFICANT CHANGE UP (ref 0.5–1.3)
DIFF PNL FLD: NEGATIVE — SIGNIFICANT CHANGE UP
EGFR: 72 ML/MIN/1.73M2 — SIGNIFICANT CHANGE UP
EOSINOPHIL # BLD AUTO: 0.46 K/UL — SIGNIFICANT CHANGE UP (ref 0–0.5)
EOSINOPHIL NFR BLD AUTO: 5.4 % — SIGNIFICANT CHANGE UP (ref 0–6)
GAS PNL BLDV: SIGNIFICANT CHANGE UP
GLUCOSE SERPL-MCNC: 455 MG/DL — CRITICAL HIGH (ref 70–99)
GLUCOSE UR QL: >=1000 MG/DL
HCT VFR BLD CALC: 41.7 % — SIGNIFICANT CHANGE UP (ref 39–50)
HGB BLD-MCNC: 14 G/DL — SIGNIFICANT CHANGE UP (ref 13–17)
HIV 1 & 2 AB SERPL IA.RAPID: SIGNIFICANT CHANGE UP
IMM GRANULOCYTES NFR BLD AUTO: 0.4 % — SIGNIFICANT CHANGE UP (ref 0–0.9)
INR BLD: 0.97 RATIO — SIGNIFICANT CHANGE UP (ref 0.85–1.16)
KETONES UR-MCNC: NEGATIVE MG/DL — SIGNIFICANT CHANGE UP
LACTATE SERPL-SCNC: 2 MMOL/L — SIGNIFICANT CHANGE UP (ref 0.5–2)
LEUKOCYTE ESTERASE UR-ACNC: NEGATIVE — SIGNIFICANT CHANGE UP
LYMPHOCYTES # BLD AUTO: 1.32 K/UL — SIGNIFICANT CHANGE UP (ref 1–3.3)
LYMPHOCYTES # BLD AUTO: 15.6 % — SIGNIFICANT CHANGE UP (ref 13–44)
MAGNESIUM SERPL-MCNC: 1.9 MG/DL — SIGNIFICANT CHANGE UP (ref 1.6–2.6)
MCHC RBC-ENTMCNC: 28.1 PG — SIGNIFICANT CHANGE UP (ref 27–34)
MCHC RBC-ENTMCNC: 33.6 G/DL — SIGNIFICANT CHANGE UP (ref 32–36)
MCV RBC AUTO: 83.6 FL — SIGNIFICANT CHANGE UP (ref 80–100)
MONOCYTES # BLD AUTO: 0.51 K/UL — SIGNIFICANT CHANGE UP (ref 0–0.9)
MONOCYTES NFR BLD AUTO: 6 % — SIGNIFICANT CHANGE UP (ref 2–14)
NEUTROPHILS # BLD AUTO: 6.08 K/UL — SIGNIFICANT CHANGE UP (ref 1.8–7.4)
NEUTROPHILS NFR BLD AUTO: 71.9 % — SIGNIFICANT CHANGE UP (ref 43–77)
NITRITE UR-MCNC: NEGATIVE — SIGNIFICANT CHANGE UP
PH UR: 6.5 — SIGNIFICANT CHANGE UP (ref 5–8)
PHOSPHATE SERPL-MCNC: 3.4 MG/DL — SIGNIFICANT CHANGE UP (ref 2.4–4.7)
PLATELET # BLD AUTO: 247 K/UL — SIGNIFICANT CHANGE UP (ref 150–400)
POTASSIUM SERPL-MCNC: 4.5 MMOL/L — SIGNIFICANT CHANGE UP (ref 3.5–5.3)
POTASSIUM SERPL-SCNC: 4.5 MMOL/L — SIGNIFICANT CHANGE UP (ref 3.5–5.3)
PROT SERPL-MCNC: 7.7 G/DL — SIGNIFICANT CHANGE UP (ref 6.6–8.7)
PROT UR-MCNC: SIGNIFICANT CHANGE UP MG/DL
PROTHROM AB SERPL-ACNC: 11.2 SEC — SIGNIFICANT CHANGE UP (ref 9.9–13.4)
RBC # BLD: 4.99 M/UL — SIGNIFICANT CHANGE UP (ref 4.2–5.8)
RBC # FLD: 14.7 % — HIGH (ref 10.3–14.5)
SODIUM SERPL-SCNC: 132 MMOL/L — LOW (ref 135–145)
SP GR SPEC: 1.03 — SIGNIFICANT CHANGE UP (ref 1–1.03)
UROBILINOGEN FLD QL: 0.2 MG/DL — SIGNIFICANT CHANGE UP (ref 0.2–1)
WBC # BLD: 8.46 K/UL — SIGNIFICANT CHANGE UP (ref 3.8–10.5)
WBC # FLD AUTO: 8.46 K/UL — SIGNIFICANT CHANGE UP (ref 3.8–10.5)

## 2024-12-05 PROCEDURE — 99223 1ST HOSP IP/OBS HIGH 75: CPT

## 2024-12-05 RX ORDER — ALLOPURINOL 300 MG/1
300 TABLET ORAL DAILY
Refills: 0 | Status: ACTIVE | OUTPATIENT
Start: 2024-12-05 | End: 2025-11-03

## 2024-12-05 RX ORDER — RIVAROXABAN 10 MG/1
20 TABLET, FILM COATED ORAL
Refills: 0 | Status: DISCONTINUED | OUTPATIENT
Start: 2024-12-05 | End: 2024-12-05

## 2024-12-05 RX ORDER — TAMSULOSIN HYDROCHLORIDE 0.4 MG/1
0.4 CAPSULE ORAL AT BEDTIME
Refills: 0 | Status: ACTIVE | OUTPATIENT
Start: 2024-12-05 | End: 2025-11-03

## 2024-12-05 RX ORDER — DULOXETINE HCL 60 MG
60 CAPSULE,DELAYED RELEASE (ENTERIC COATED) ORAL DAILY
Refills: 0 | Status: ACTIVE | OUTPATIENT
Start: 2024-12-05 | End: 2025-11-03

## 2024-12-05 RX ORDER — 0.9 % SODIUM CHLORIDE 0.9 %
1000 INTRAVENOUS SOLUTION INTRAVENOUS
Refills: 0 | Status: ACTIVE | OUTPATIENT
Start: 2024-12-05 | End: 2025-11-03

## 2024-12-05 RX ORDER — SODIUM CHLORIDE 9 MG/ML
1000 INJECTION, SOLUTION INTRAMUSCULAR; INTRAVENOUS; SUBCUTANEOUS ONCE
Refills: 0 | Status: DISCONTINUED | OUTPATIENT
Start: 2024-12-05 | End: 2024-12-05

## 2024-12-05 RX ORDER — INSULIN REG, HUM S-S BUFF 100/ML
10 VIAL (ML) INJECTION ONCE
Refills: 0 | Status: COMPLETED | OUTPATIENT
Start: 2024-12-05 | End: 2024-12-05

## 2024-12-05 RX ORDER — GLUCAGON INJECTION, SOLUTION 0.5 MG/.1ML
1 INJECTION, SOLUTION SUBCUTANEOUS ONCE
Refills: 0 | Status: ACTIVE | OUTPATIENT
Start: 2024-12-05 | End: 2025-11-03

## 2024-12-05 RX ORDER — INSULIN GLARGINE 100 [IU]/ML
24 INJECTION, SOLUTION SUBCUTANEOUS AT BEDTIME
Refills: 0 | Status: ACTIVE | OUTPATIENT
Start: 2024-12-05 | End: 2025-11-03

## 2024-12-05 RX ORDER — LEVOTHYROXINE SODIUM 150 MCG
88 TABLET ORAL DAILY
Refills: 0 | Status: ACTIVE | OUTPATIENT
Start: 2024-12-05 | End: 2025-11-03

## 2024-12-05 RX ORDER — LISINOPRIL 20 MG/1
40 TABLET ORAL DAILY
Refills: 0 | Status: ACTIVE | OUTPATIENT
Start: 2024-12-05 | End: 2025-11-03

## 2024-12-05 RX ORDER — SODIUM CHLORIDE 9 MG/ML
1000 INJECTION, SOLUTION INTRAMUSCULAR; INTRAVENOUS; SUBCUTANEOUS ONCE
Refills: 0 | Status: COMPLETED | OUTPATIENT
Start: 2024-12-05 | End: 2024-12-05

## 2024-12-05 RX ORDER — ENOXAPARIN SODIUM 30 MG/.3ML
148 INJECTION SUBCUTANEOUS EVERY 12 HOURS
Refills: 0 | Status: DISCONTINUED | OUTPATIENT
Start: 2024-12-05 | End: 2024-12-06

## 2024-12-05 RX ORDER — INSULIN REG, HUM S-S BUFF 100/ML
10 VIAL (ML) INJECTION ONCE
Refills: 0 | Status: DISCONTINUED | OUTPATIENT
Start: 2024-12-05 | End: 2024-12-05

## 2024-12-05 RX ORDER — AMLODIPINE BESYLATE 10 MG/1
5 TABLET ORAL DAILY
Refills: 0 | Status: ACTIVE | OUTPATIENT
Start: 2024-12-05 | End: 2025-11-03

## 2024-12-05 RX ADMIN — TAMSULOSIN HYDROCHLORIDE 0.4 MILLIGRAM(S): 0.4 CAPSULE ORAL at 21:47

## 2024-12-05 RX ADMIN — Medication 10 UNIT(S): at 18:58

## 2024-12-05 RX ADMIN — SODIUM CHLORIDE 1000 MILLILITER(S): 9 INJECTION, SOLUTION INTRAMUSCULAR; INTRAVENOUS; SUBCUTANEOUS at 17:14

## 2024-12-05 RX ADMIN — INSULIN GLARGINE 24 UNIT(S): 100 INJECTION, SOLUTION SUBCUTANEOUS at 21:47

## 2024-12-05 RX ADMIN — Medication 12: at 18:56

## 2024-12-05 RX ADMIN — SODIUM CHLORIDE 1000 MILLILITER(S): 9 INJECTION, SOLUTION INTRAMUSCULAR; INTRAVENOUS; SUBCUTANEOUS at 18:59

## 2024-12-05 RX ADMIN — Medication 20 MILLIGRAM(S): at 21:47

## 2024-12-05 NOTE — ED CDU PROVIDER INITIAL DAY NOTE - OBJECTIVE STATEMENT
69-year-old male morbidly obese history of asthma JASON on CPAP nocturnal, hypertension hyperlipidemia atrial fibrillation recently transitioned off of the Xarelto due to lack of insurance coverage on plan to Eliquis within the last few weeks, insulin-dependent diabetic, liver cancer status post resection, kidney cancer status post right nephrectomy, hypothyroidism, osteoarthritis.  Reports since change over from Xarelto to Eliquis over the last few weeks he has noticed elevated blood sugar readings at home, today noting the highest blood sugar of 500 with associated polyuria and polydipsia.  Reports that he had researched diabetes and elevated blood sugar while on Eliquis with multiple reports of elevated blood sugar readings.  Had called his endocrinologist Dr. Chaudhry's office earlier today due to elevated blood sugar reading, and referred to the emergency department for further evaluation and care.  Patient blood sugar over 500 on arrival to the emergency department, VBG with stable pH no anion gap on BMP.  Endocrinology consulted for hyperglycemia.

## 2024-12-05 NOTE — ED ADULT NURSE NOTE - NSFALLUNIVINTERV_ED_ALL_ED
Bed/Stretcher in lowest position, wheels locked, appropriate side rails in place/Call bell, personal items and telephone in reach/Instruct patient to call for assistance before getting out of bed/chair/stretcher/Non-slip footwear applied when patient is off stretcher/[place] to call system/Physically safe environment - no spills, clutter or unnecessary equipment/Purposeful proactive rounding/Room/bathroom lighting operational, light cord in reach

## 2024-12-05 NOTE — ED ADULT NURSE NOTE - NS_SISCREENINGSR_GEN_ALL_ED
Currently on ferrous sulfate 325 mg daily and vit c   Reports compliance, asymptomatic  Was seen by HEMA/ON[facility], labs were ordered but pt has been unable to f/u with her pending test    Patient with Hx of BetaThalassemia minor     Encourage the patient to perform her labs as soon as possible for better understanding of her anemia  Will order CBC and CMP, plus labs pending recommended by her hematologist    F/U in 2 weeks for further evaluation       was reccommended to performed the labs including the recommended by her hematologist Negative

## 2024-12-05 NOTE — ED PROVIDER NOTE - CLINICAL SUMMARY MEDICAL DECISION MAKING FREE TEXT BOX
lab results reviewed with patient; IVF and meds given; place in obs for hyperglycemia control and endocrinology assessment Patient is a 69yM with PMHx of COPD, RCC s/p Right nephrectomy, liver CA, sleep apnea, Afib and T2DM who presents to [facility] ED for elevated glucose. Lab results reviewed with patient; IVF and meds given; place in obs for hyperglycemia control and endocrinology assessment

## 2024-12-05 NOTE — ED PROVIDER NOTE - OBJECTIVE STATEMENT
Patient is a 69yM with PMHx of COPD, RCC s/p Right nephrectomy, liver CA, sleep apnea, and T2DM who presents to [facility] ED for elevated glucose. Patient is a 69yM with PMHx of COPD, RCC s/p Right nephrectomy, liver CA, sleep apnea, Afib and T2DM who presents to [facility] ED for elevated glucose. Patient reports that ~3m ago he was started on Ozempic 1x/week and Lantus 24U at bedtime. Patient was also switched from Xarelto to Eliquis early in October. Patient reports at the same time of the transition he noticed that his sugar levels started to increase. Prior to October sugar was in the high 60s, afterwards sugars were ~250s consistently. Patient is a 69yM with PMHx of COPD, RCC s/p Right nephrectomy, liver CA, sleep apnea, Afib and T2DM who presents to [facility] ED for elevated glucose. Patient reports that ~3m ago he was started on Ozempic 1x/week and Lantus 24U at bedtime. Patient was also switched from Xarelto to Eliquis early in October. Patient reports at the same time of the transition he noticed that his sugar levels started to increase. Prior to October sugar was in the high 60s, afterwards sugars were ~250s consistently. This morning, patient  and repeat of 525 few hours later. He called his Endocrinologist (Dr. Chaudhry) who recommended he repeat the BG and come to the ED for further evaluation. Patient denies nausea, vomiting, chest pain, SOB. Does endorse increased urination and thirst.

## 2024-12-05 NOTE — ED CDU PROVIDER INITIAL DAY NOTE - PHYSICAL EXAMINATION
Gen: obese male no acute distress   Head: NC/AT  Neck: trachea midline  Resp:  No distress  Ext: no deformities  Neuro:  A&O appears non focal  Skin:  Warm and dry as visualized  Psych:  Normal affect and mood

## 2024-12-05 NOTE — ED PROVIDER NOTE - IV ALTEPLASE EXCL REL HIDDEN
Medication Scribe Admission Medication History    Admission medication history is complete. The information provided in this note is only as accurate as the sources available at the time of the update.    Information Source(s): Patient, Family member, and CareEverywhere/SureScripts via in-person    Pertinent Information: daughter Jenny and granddaughter Joshua present    Changes made to PTA medication list:  Added: None  Deleted: myrbetriq  Changed: warfarin regimen based on anticoag visit 02/21/2024    Allergies reviewed with patient and updates made in EHR: yes    Medication History Completed By: DENI MCCRARY 2/29/2024 4:59 PM    PTA Med List   Medication Sig Last Dose    acetaminophen (TYLENOL) 325 MG tablet Take 3 tablets (975 mg) by mouth every 8 hours as needed for pain More than a month at unkn    lovastatin (MEVACOR) 40 MG tablet TAKE ONE TABLET BY MOUTH AT BEDTIME (Patient taking differently: Take 40 mg by mouth at bedtime) 2/28/2024 at hs    warfarin ANTICOAGULANT (COUMADIN) 5 MG tablet Take 2.5 mg by mouth BEDTIME: every evening EXCEPT Mondays take 5mg or as directed by anticoagulation clinic. Next INR 03/13/2024 2/28/2024 at hs    warfarin ANTICOAGULANT (COUMADIN) 5 MG tablet TAKE ONE TABLET BY MOUTH ONCE DAILY ON MONDAY AND FRIDAY AND 1/2 TABLET ALL OTHER DAYS OF THE WEEK OR AS DIRECTED BY THE COUMADIN CLINIC - 30 day candido refill, patient needs to schedule annual visit with provider for future refills (Patient taking differently: Take 5 mg by mouth BEDTIME: ON MONDAY,  AND 1/2 TABLET (2.5mg) ALL OTHER DAYS OF THE WEEK OR AS DIRECTED BY THE COUMADIN CLINIC Next INR 03/13/2024) 2/26/2024 at hs       
show

## 2024-12-05 NOTE — ED ADULT TRIAGE NOTE - CHIEF COMPLAINT QUOTE
pt states had glucose checked glucose 586, c/o being thirsty, frequent urination  A&Ox3, resp wnl, + SOB

## 2024-12-05 NOTE — ED CDU PROVIDER INITIAL DAY NOTE - CLINICAL SUMMARY MEDICAL DECISION MAKING FREE TEXT BOX
69-year-old male morbidly obese history of asthma JASON on CPAP nocturnal, hypertension hyperlipidemia atrial fibrillation recently transitioned off of the Xarelto due to lack of insurance coverage on plan to Eliquis within the last few weeks, insulin-dependent diabetic, liver cancer status post resection, kidney cancer status post right nephrectomy, hypothyroidism, osteoarthritis.  Reports since change over from Xarelto to Eliquis over the last few weeks he has noticed elevated blood sugar readings at home, today noting the highest blood sugar of 500 with associated polyuria and polydipsia.  Reports that he had researched diabetes and elevated blood sugar while on Eliquis with multiple reports of elevated blood sugar readings.  Had called his endocrinologist Dr. Chaudhry's office earlier today due to elevated blood sugar reading, and referred to the emergency department for further evaluation and care.  Patient blood sugar over 500 on arrival to the emergency department, VBG with stable pH no anion gap on BMP.  Endocrinology consulted for hyperglycemia.          Patient to receive 10 of regular insulin subQinjection with close monitoring of blood glucose readings, pending hemoglobin A1c at this point in time.  Endocrinology consultation had been placed.  Patient advised on need of anticoagulation medication with his history of atrial fibrillation, advised to contact his cardiology group [facility] in regards to reported associated reaction to Eliquis and blood sugar readings.

## 2024-12-05 NOTE — ED ADULT NURSE NOTE - SUICIDE SCREENING QUESTION 2
Pt is scheduled for colonoscopy for 11/30 @ 1pm. He is on blood thinner (Warfarin) for blood clots in his leg. He was told that he needs to stop medication 3 days before appointment. He is asking on what day does he need to stop medication and once complete when he should begin.    Please advise    ELDON# [phone]   Stop warfarin 5 days before and start night of procedure and labs INR 5 days later   Hide Additional Notes?: No Detail Level: Simple No Detail Level: Generalized

## 2024-12-05 NOTE — ED PROVIDER NOTE - ATTENDING CONTRIBUTION TO CARE
69 year old male PMHx DM, COPD c/o hyperglycemia. PE unremarkable. lab results reviewed with patient, IVF and meds given

## 2024-12-06 ENCOUNTER — NON-APPOINTMENT (OUTPATIENT)
Age: 69
End: 2024-12-06

## 2024-12-06 VITALS
SYSTOLIC BLOOD PRESSURE: 150 MMHG | DIASTOLIC BLOOD PRESSURE: 88 MMHG | OXYGEN SATURATION: 96 % | RESPIRATION RATE: 20 BRPM | HEART RATE: 80 BPM

## 2024-12-06 LAB
A1C WITH ESTIMATED AVERAGE GLUCOSE RESULT: 9.3 % — HIGH (ref 4–5.6)
ALBUMIN SERPL ELPH-MCNC: 3.4 G/DL — SIGNIFICANT CHANGE UP (ref 3.3–5.2)
ALP SERPL-CCNC: 100 U/L — SIGNIFICANT CHANGE UP (ref 40–120)
ALT FLD-CCNC: 33 U/L — SIGNIFICANT CHANGE UP
ANION GAP SERPL CALC-SCNC: 13 MMOL/L — SIGNIFICANT CHANGE UP (ref 5–17)
AST SERPL-CCNC: 38 U/L — SIGNIFICANT CHANGE UP
BILIRUB SERPL-MCNC: 0.3 MG/DL — LOW (ref 0.4–2)
BUN SERPL-MCNC: 23.7 MG/DL — HIGH (ref 8–20)
CALCIUM SERPL-MCNC: 9.2 MG/DL — SIGNIFICANT CHANGE UP (ref 8.4–10.5)
CHLORIDE SERPL-SCNC: 100 MMOL/L — SIGNIFICANT CHANGE UP (ref 96–108)
CO2 SERPL-SCNC: 24 MMOL/L — SIGNIFICANT CHANGE UP (ref 22–29)
CREAT SERPL-MCNC: 1.18 MG/DL — SIGNIFICANT CHANGE UP (ref 0.5–1.3)
EGFR: 68 ML/MIN/1.73M2 — SIGNIFICANT CHANGE UP
ESTIMATED AVERAGE GLUCOSE: 220 MG/DL — HIGH (ref 68–114)
GLUCOSE BLDC GLUCOMTR-MCNC: 257 MG/DL — HIGH (ref 70–99)
GLUCOSE BLDC GLUCOMTR-MCNC: 296 MG/DL — HIGH (ref 70–99)
GLUCOSE SERPL-MCNC: 211 MG/DL — HIGH (ref 70–99)
POTASSIUM SERPL-MCNC: 4.5 MMOL/L — SIGNIFICANT CHANGE UP (ref 3.5–5.3)
POTASSIUM SERPL-SCNC: 4.5 MMOL/L — SIGNIFICANT CHANGE UP (ref 3.5–5.3)
PROT SERPL-MCNC: 7.2 G/DL — SIGNIFICANT CHANGE UP (ref 6.6–8.7)
SODIUM SERPL-SCNC: 137 MMOL/L — SIGNIFICANT CHANGE UP (ref 135–145)

## 2024-12-06 PROCEDURE — 82947 ASSAY GLUCOSE BLOOD QUANT: CPT

## 2024-12-06 PROCEDURE — 99283 EMERGENCY DEPT VISIT LOW MDM: CPT | Mod: 25

## 2024-12-06 PROCEDURE — 82009 KETONE BODYS QUAL: CPT

## 2024-12-06 PROCEDURE — 82330 ASSAY OF CALCIUM: CPT

## 2024-12-06 PROCEDURE — 99223 1ST HOSP IP/OBS HIGH 75: CPT

## 2024-12-06 PROCEDURE — 81003 URINALYSIS AUTO W/O SCOPE: CPT

## 2024-12-06 PROCEDURE — 85730 THROMBOPLASTIN TIME PARTIAL: CPT

## 2024-12-06 PROCEDURE — 85025 COMPLETE CBC W/AUTO DIFF WBC: CPT

## 2024-12-06 PROCEDURE — 94660 CPAP INITIATION&MGMT: CPT

## 2024-12-06 PROCEDURE — 84100 ASSAY OF PHOSPHORUS: CPT

## 2024-12-06 PROCEDURE — 99238 HOSP IP/OBS DSCHRG MGMT 30/<: CPT

## 2024-12-06 PROCEDURE — 84132 ASSAY OF SERUM POTASSIUM: CPT

## 2024-12-06 PROCEDURE — G0378: CPT

## 2024-12-06 PROCEDURE — 85610 PROTHROMBIN TIME: CPT

## 2024-12-06 PROCEDURE — 83605 ASSAY OF LACTIC ACID: CPT

## 2024-12-06 PROCEDURE — 82435 ASSAY OF BLOOD CHLORIDE: CPT

## 2024-12-06 PROCEDURE — 85018 HEMOGLOBIN: CPT

## 2024-12-06 PROCEDURE — 96360 HYDRATION IV INFUSION INIT: CPT

## 2024-12-06 PROCEDURE — 83036 HEMOGLOBIN GLYCOSYLATED A1C: CPT

## 2024-12-06 PROCEDURE — 83735 ASSAY OF MAGNESIUM: CPT

## 2024-12-06 PROCEDURE — 84295 ASSAY OF SERUM SODIUM: CPT

## 2024-12-06 PROCEDURE — 82962 GLUCOSE BLOOD TEST: CPT

## 2024-12-06 PROCEDURE — 85014 HEMATOCRIT: CPT

## 2024-12-06 PROCEDURE — 86703 HIV-1/HIV-2 1 RESULT ANTBDY: CPT

## 2024-12-06 PROCEDURE — 36415 COLL VENOUS BLD VENIPUNCTURE: CPT

## 2024-12-06 PROCEDURE — 80053 COMPREHEN METABOLIC PANEL: CPT

## 2024-12-06 PROCEDURE — 82803 BLOOD GASES ANY COMBINATION: CPT

## 2024-12-06 RX ORDER — INSULIN GLARGINE 100 [IU]/ML
30 INJECTION, SOLUTION SUBCUTANEOUS
Qty: 100 | Refills: 0
Start: 2024-12-06

## 2024-12-06 RX ORDER — DABIGATRAN ETEXILATE 150 MG/1
150 CAPSULE ORAL EVERY 12 HOURS
Refills: 0 | Status: ACTIVE | OUTPATIENT
Start: 2024-12-06 | End: 2025-11-04

## 2024-12-06 RX ORDER — DABIGATRAN ETEXILATE 150 MG/1
1 CAPSULE ORAL
Qty: 60 | Refills: 0
Start: 2024-12-06 | End: 2025-01-04

## 2024-12-06 RX ORDER — INSULIN GLARGINE 100 [IU]/ML
0 INJECTION, SOLUTION SUBCUTANEOUS
Qty: 100 | Refills: 0
Start: 2024-12-06

## 2024-12-06 RX ORDER — ISOPROPYL ALCOHOL, BENZOCAINE .7; .06 ML/ML; ML/ML
0 SWAB TOPICAL
Qty: 100 | Refills: 1
Start: 2024-12-06

## 2024-12-06 RX ADMIN — LISINOPRIL 40 MILLIGRAM(S): 20 TABLET ORAL at 05:51

## 2024-12-06 RX ADMIN — Medication 6: at 11:42

## 2024-12-06 RX ADMIN — DABIGATRAN ETEXILATE 150 MILLIGRAM(S): 150 CAPSULE ORAL at 14:01

## 2024-12-06 RX ADMIN — Medication 88 MICROGRAM(S): at 05:51

## 2024-12-06 RX ADMIN — Medication 6: at 07:57

## 2024-12-06 RX ADMIN — AMLODIPINE BESYLATE 5 MILLIGRAM(S): 10 TABLET ORAL at 05:51

## 2024-12-06 RX ADMIN — Medication 60 MILLIGRAM(S): at 11:41

## 2024-12-06 RX ADMIN — Medication 8 UNIT(S): at 11:42

## 2024-12-06 RX ADMIN — Medication 8 UNIT(S): at 07:56

## 2024-12-06 RX ADMIN — ALLOPURINOL 300 MILLIGRAM(S): 300 TABLET ORAL at 11:41

## 2024-12-06 RX ADMIN — ENOXAPARIN SODIUM 148 MILLIGRAM(S): 30 INJECTION SUBCUTANEOUS at 05:51

## 2024-12-06 NOTE — CONSULT NOTE ADULT - ASSESSMENT
HPI: Objective Statement: Patient is a 69yM with PMHx of COPD, RCC s/p Right nephrectomy, liver CA, sleep apnea, Afib and T2DM who presents to [facility] ED for elevated glucose.  He is Dr. Chaudhry patient.  At home he is on glimepiride 4 mg daily, lantus 24 units daily and ozempic 0.5 mg weekly.  His a1c was 7% in 9/24.  But lately his sugars have been high.    DM type 2 , uncontrolled having high sugars: a1c 9.3% on admission:  -Will recommend to go up on lantus to 30 units daily and admelog 10 units tid with meals.  -For now will recommend to send home on basal bolus regimen, stop glimepiride ,m may also stop ozempic.  -check fingerstick ac tid hs.  -diabetic diet.    Hypothyroidism:  0conitnue home dose of levothyroxine.         HPI: Objective Statement: Patient is a 69yM with PMHx of COPD, RCC s/p Right nephrectomy, liver CA, sleep apnea, Afib and T2DM who presents to [facility] ED for elevated glucose.  He is Dr. Chaudhry patient.  At home he is on glimepiride 4 mg daily, lantus 24 units daily and ozempic 0.5 mg weekly.  His a1c was 7% in 9/24.  But lately his sugars have been high.    DM type 2 , uncontrolled having high sugars: a1c 9.3% on admission:  -Will recommend to go up on lantus to 30 units daily and admelog 10 units tid with meals.  -For now will recommend to send home on basal bolus regimen, stop glimepiride , may also stop ozempic for now.May restart as outpt.  -check fingerstick ac tid hs.  -diabetic diet.    Hypothyroidism:  -conitnue home dose of levothyroxine.

## 2024-12-06 NOTE — ED CDU PROVIDER DISPOSITION NOTE - ATTENDING CONTRIBUTION TO CARE
independent evaluation  pt with hx type 2 diabetes in observation for uncontrolled diabetes and need to be switched to insulin  seen by endocrinology and sancho recommendations made  Also seen by cardiology bc cardiac hx and cleared for discharge  pt to follow up with his MDs as outpt james fuchs and sancho

## 2024-12-06 NOTE — ED CDU PROVIDER DISPOSITION NOTE - NSFOLLOWUPINSTRUCTIONS_ED_ALL_ED_FT
-Recommended by endocrinologist to take 30 units of the Lantus at the bedtime  - recommended by the endocrinologist to take 8 to 12 units of Admelog  with meal please recheck your sugar before taking it  - glucose/sugar tablets is in case of the emergency if you find that your sugar is too low  - start taking Pradaxa as recommended by cardiology 150 mg twice a day instead of Xarelto  - follow-up with a primary care doctor recheck the blood within the 2 to 3 days and recheck your kidney function as well  - recommended by endocrinologist that do not take glyburide and Ozempic for now  -Please follow-up with up with endocrinologist Dr. sethi  within  7 to 10 days for further evaluation  - make sure you have a snack before going to the bed  - exercising and continuing home medications  - please call and follow-up with your cardiologist within 1 or 2 weeks for further evaluation anticoagulation medication regimen     we can use this guideline  2 Unit(s) if Glucose 151 - 200  4 Unit(s) if Glucose 201 - 250  6 Unit(s) if Glucose 251 - 300  8 Unit(s) if Glucose 301 - 350  10 Unit(s) if Glucose 351 - 400  12 Unit(s) if Glucose Greater Than 400 call your primary care doctor or come back to the emergency room -Recommended by endocrinologist to take 30 units of the Lantus at the bedtime  - recommended by the endocrinologist to take 8 to 12 units of Admelog  with meal please recheck your sugar before taking it  - glucose/sugar tablets is in case of the emergency if you find that your sugar is too low  - start taking Pradaxa as recommended by cardiology 150 mg twice a day instead of Xarelto  - follow-up with a primary care doctor recheck the blood within the 2 to 3 days and recheck your kidney function as well  - recommended by endocrinologist that do not take glyburide and Ozempic for now  -Please follow-up with up with endocrinologist Dr. Martinez  within  7 to 10 days for further evaluation  - make sure you have a snack before going to the bed  - exercising and continuing home medications  - please call and follow-up with your cardiologist within 1 or 2 weeks for further evaluation anticoagulation medication regimen     we can use this guideline  2 Unit(s) if Glucose 151 - 200  4 Unit(s) if Glucose 201 - 250  6 Unit(s) if Glucose 251 - 300  8 Unit(s) if Glucose 301 - 350  10 Unit(s) if Glucose 351 - 400  12 Unit(s) if Glucose Greater Than 400 call your primary care doctor or come back to the emergency room

## 2024-12-06 NOTE — ED CDU PROVIDER SUBSEQUENT DAY NOTE - CLINICAL SUMMARY MEDICAL DECISION MAKING FREE TEXT BOX
69-year-old male morbidly obese history of asthma JASON on CPAP nocturnal, hypertension hyperlipidemia atrial fibrillation recently transitioned off of the Xarelto due to lack of insurance coverage on plan to Eliquis within the last few weeks, insulin-dependent diabetic, liver cancer status post resection, kidney cancer status post right nephrectomy, hypothyroidism, osteoarthritis.  Reports since change over from Xarelto to Eliquis over the last few weeks he has noticed elevated blood sugar readings at home, today noting the highest blood sugar of 500 with associated polyuria and polydipsia.  Reports that he had researched diabetes and elevated blood sugar while on Eliquis with multiple reports of elevated blood sugar readings.  Had called his endocrinologist Dr. Chaudhry's office earlier today due to elevated blood sugar reading, and referred to the emergency department for further evaluation and care.  Patient blood sugar over 500 on arrival to the emergency department, however downtrended to 220 while in obs, A1C 9.3, VBG with stable pH no anion gap on BMP.  Endocrinology consulted for hyperglycemia, cards consulted as well.           Patient to receive 10 of regular insulin subQinjection with close monitoring of blood glucose readings, pending hemoglobin A1c at this point in time.  Endocrinology consultation had been placed.  Patient advised on need of anticoagulation medication with his history of atrial fibrillation, advised to contact his cardiology group [facility] in regards to reported associated reaction to Eliquis and blood sugar readings.

## 2024-12-06 NOTE — ED CDU PROVIDER DISPOSITION NOTE - PATIENT PORTAL LINK FT
You can access the FollowMyHealth Patient Portal offered by [facility] by registering at the following website: [email or web address]. By joining [employer]’s FollowMyHealth portal, you will also be able to view your health information using other applications (apps) compatible with our system.

## 2024-12-06 NOTE — ED CDU PROVIDER SUBSEQUENT DAY NOTE - HISTORY
Pt resting comfortably at time of re-assessment. No events overnight. Pending endo and cards cs. Will continue to monitor.

## 2024-12-06 NOTE — ED ADULT NURSE REASSESSMENT NOTE - NS ED NURSE REASSESS COMMENT FT1
Received pt from Cristhian WATSON. pt sitting quietly in stretcher NAD noted at this time. pt admitted to Observation awaiting Bed placement. NSR on CM. No new orders at this time pt safety ongoing.

## 2024-12-06 NOTE — ED CDU PROVIDER DISPOSITION NOTE - CLINICAL COURSE
69-year-old male morbidly obese history of asthma JASON on CPAP nocturnal, hypertension hyperlipidemia atrial fibrillation recently transitioned off of the Xarelto due to lack of insurance coverage on plan to Eliquis within the last few weeks, insulin-dependent diabetic, liver cancer status post resection, kidney cancer status post right nephrectomy, hypothyroidism, osteoarthritis.  Reports since change over from Xarelto to Eliquis over the last few weeks he has noticed elevated blood sugar readings at home, today noting the highest blood sugar of 500 with associated polyuria and polydipsia.  Reports that he had researched diabetes and elevated blood sugar while on Eliquis with multiple reports of elevated blood sugar readings.  Had called his endocrinologist Dr. Chaudhry's office earlier today due to elevated blood sugar reading, and referred to the emergency department for further evaluation and care.  Patient blood sugar over 500 on arrival to the emergency department, VBG with stable pH no anion gap on BMP.  Endocrinology consulted for hyperglycemia. patient kept on observation seen by endocrinologist who recommended increase the Lantus to 30 units at the bedtime.  And insulin Admelog between 8 to 12 units between the meals. patient will ever has a glucometers on his left arm check his sugar. he follow-up with Dr. Martinez. patient seen by cardiologist who recommended Pradaxa 150 mg twice a daily the patient agreed to take it for now and follow-up with his cardiologist prescription sent to the pharmacy

## 2024-12-06 NOTE — CONSULT NOTE ADULT - SUBJECTIVE AND OBJECTIVE BOX
Patient is a 69y old  Male who presents with a chief complaint of hyperglycemia    HPI: Objective Statement: Patient is a 69yM with PMHx of COPD, RCC s/p Right nephrectomy, liver CA, sleep apnea, Afib and T2DM who presents to [facility] ED for elevated glucose.   Patient reports that ~3m ago he was started on Ozempic 1x/week and Lantus 24U at bedtime. Patient was also switched from Xarelto to Eliquis early in October.   Patient reports at the same time of the transition he noticed that his sugar levels started to increase. Prior to October sugar was in the high 60s, afterwards sugars were ~250s consistently.   patient  and repeat of 525 few hours later. He called his Endocrinologist (Dr. Chaudhry) who recommended he repeat the BG and come to the ED for further evaluation.   Patient denied nausea, vomiting, chest pain, SOB. Does endorse increased urination and thirst.  At home he is on glimepiride 4 mg daily, lantus 24 units daily and ozempic 0.5 mg weekly.  His a1c was 7% in 9/24.          PAST MEDICAL & SURGICAL HISTORY:  Afib  on Xarelto    HTN (hypertension)    Degenerative joint disease    Asthma    Obstructive sleep apnea  use BIPAP    Diabetes mellitus    Renal cell carcinoma    Hepatocellular carcinoma    HLD (hyperlipidemia)    Hypothyroidism    History of left knee replacement  2015    H/O elbow surgery  right 1987    H/O cataract extraction  left 2020    H/O right nephrectomy  2022    H/O breast implant        Social History:      FAMILY HISTORY:  Family history of cerebrovascular accident (CVA) (Mother)    Family history of essential hypertension (Sibling)    Family history of diabetes mellitus (Sibling)          Allergies    fish (Unknown)  shellfish (Vomiting; Nausea)  No Known Drug Allergies    Intolerances        REVIEW OF SYSTEMS:    CONSTITUTIONAL: No fever, weight loss, or fatigue  EYES: No eye pain, visual disturbances, or discharge  ENMT:  No difficulty hearing, tinnitus, vertigo; No sinus or throat pain  NECK: No pain or stiffness  RESPIRATORY: No cough, wheezing, chills or hemoptysis; No shortness of breath  CARDIOVASCULAR: No chest pain, palpitations, dizziness, or leg swelling  GASTROINTESTINAL: No abdominal or epigastric pain. No nausea, vomiting, or hematemesis;     No diarrhea or constipation. No melena or hematochezia.  NEUROLOGICAL: No headaches, memory loss, loss of strength, numbness, or tremors  SKIN: No itching, burning, rashes, or lesions   MUSCULOSKELETAL: No joint pain or swelling; No muscle, back, or extremity pain  PSYCHIATRIC: No depression, anxiety, mood swings, or difficulty sleeping        MEDICATIONS  (STANDING):  allopurinol 300 milliGRAM(s) Oral daily  amLODIPine   Tablet 5 milliGRAM(s) Oral daily  atorvastatin 20 milliGRAM(s) Oral at bedtime  dextrose 5%. 1000 milliLiter(s) (100 mL/Hr) IV Continuous <Continuous>  dextrose 5%. 1000 milliLiter(s) (50 mL/Hr) IV Continuous <Continuous>  dextrose 50% Injectable 25 Gram(s) IV Push once  dextrose 50% Injectable 12.5 Gram(s) IV Push once  dextrose 50% Injectable 25 Gram(s) IV Push once  DULoxetine 60 milliGRAM(s) Oral daily  enoxaparin Injectable 148 milliGRAM(s) SubCutaneous every 12 hours  glucagon  Injectable 1 milliGRAM(s) IntraMuscular once  hydrochlorothiazide 25 milliGRAM(s) Oral daily  insulin glargine Injectable (LANTUS) 24 Unit(s) SubCutaneous at bedtime  insulin lispro (ADMELOG) corrective regimen sliding scale   SubCutaneous three times a day before meals  insulin lispro Injectable (ADMELOG) 8 Unit(s) SubCutaneous three times a day with meals  levothyroxine 88 MICROGram(s) Oral daily  lisinopril 40 milliGRAM(s) Oral daily  tamsulosin 0.4 milliGRAM(s) Oral at bedtime    MEDICATIONS  (PRN):  dextrose Oral Gel 15 Gram(s) Oral once PRN Blood Glucose LESS THAN 70 milliGRAM(s)/deciliter      Vital Signs Last 24 Hrs  T(C): 36.4 (06 Dec 2024 07:52), Max: 36.6 (05 Dec 2024 23:52)  T(F): 97.5 (06 Dec 2024 07:52), Max: 97.8 (05 Dec 2024 23:52)  HR: 80 (06 Dec 2024 12:01) (75 - 84)  BP: 150/88 (06 Dec 2024 12:01) (111/76 - 159/104)  BP(mean): --  RR: 20 (06 Dec 2024 12:01) (18 - 23)  SpO2: 96% (06 Dec 2024 12:01) (91% - 100%)    Parameters below as of 06 Dec 2024 07:52  Patient On (Oxygen Delivery Method): room air          PHYSICAL EXAM:    Constitutional: NAD, well-groomed, well-developed  HEENT: PERRL, no exophalmos  Neck: No LAD,  no thyroid enlargement  Respiratory: CTAB  Cardiovascular: S1 and S2, RRR, no M/G/R  Gastrointestinal: BS+, soft, no organomegaly  Neurological: A/O x 3  Psychiatric: Normal mood, normal affect  Skin: No rashes, no acanthosis        LABS  12-06    137  |  100  |  23.7[H]  ----------------------------<  211[H]  4.5   |  24.0  |  1.18    Ca    9.2      06 Dec 2024 05:36  Phos  3.4     12-05  Mg     1.9     12-05    TPro  7.2  /  Alb  3.4  /  TBili  0.3[L]  /  DBili  x   /  AST  38  /  ALT  33  /  AlkPhos  100  12-06                          14.0   8.46  )-----------( 247      ( 05 Dec 2024 17:17 )             41.7       A1C with Estimated Average Glucose Result: 9.3 % (12-06-24 @ 05:36)        Ketone - Urine: Negative mg/dL (12-05 @ 17:17)    Aspartate Aminotransferase (AST/SGOT): 38 U/L (12-06-24 @ 05:36)  Alanine Aminotransferase (ALT/SGPT): 33 U/L (12-06-24 @ 05:36)  Alkaline Phosphatase: 100 U/L (12-06-24 @ 05:36)  Albumin: 3.4 g/dL (12-06-24 @ 05:36)  Alkaline Phosphatase: 108 U/L (12-05-24 @ 17:17)  Albumin: 3.6 g/dL (12-05-24 @ 17:17)  Aspartate Aminotransferase (AST/SGOT): 33 U/L (12-05-24 @ 17:17)  Alanine Aminotransferase (ALT/SGPT): 34 U/L (12-05-24 @ 17:17)      CAPILLARY BLOOD GLUCOSE      POCT Blood Glucose.: 296 mg/dL (06 Dec 2024 11:39)  POCT Blood Glucose.: 257 mg/dL (06 Dec 2024 07:50)  POCT Blood Glucose.: 426 mg/dL (05 Dec 2024 18:28)  POCT Blood Glucose.: 553 mg/dL (05 Dec 2024 15:58)       Patient is a 69y old  Male who presents with a chief complaint of hyperglycemia    HPI: Objective Statement: Patient is a 69yM with PMHx of COPD, RCC s/p Right nephrectomy, liver CA, sleep apnea, Afib and T2DM who presents to [facility] ED for elevated glucose.   Patient reports that ~3m ago he was started on Ozempic 1x/week and Lantus 24U at bedtime. Patient was also switched from Xarelto to Eliquis early in October.   Patient reports at the same time of the transition he noticed that his sugar levels started to increase. Prior to October sugar was in the high 60s, afterwards sugars were ~250s consistently.   patient  and repeat of 525 few hours later. He called his Endocrinologist (Dr. Chaudhry) who recommended he repeat the BG and come to the ED for further evaluation.   Patient denied nausea, vomiting, chest pain, SOB. Does endorse increased urination and thirst.  At home he is on glimepiride 4 mg daily, lantus 24 units daily and ozempic 0.5 mg weekly.  His a1c was 7% in 9/24.          PAST MEDICAL & SURGICAL HISTORY:  Afib  on Xarelto    HTN (hypertension)    Degenerative joint disease    Asthma    Obstructive sleep apnea  use BIPAP    Diabetes mellitus    Renal cell carcinoma    Hepatocellular carcinoma    HLD (hyperlipidemia)    Hypothyroidism    History of left knee replacement  2015    H/O elbow surgery  right 1987    H/O cataract extraction  left 2020    H/O right nephrectomy  2022    H/O breast implant        Social History: non smoker      FAMILY HISTORY:  Family history of cerebrovascular accident (CVA) (Mother)    Family history of essential hypertension (Sibling)    Family history of diabetes mellitus (Sibling)          Allergies    fish (Unknown)  shellfish (Vomiting; Nausea)  No Known Drug Allergies    Intolerances        REVIEW OF SYSTEMS:    CONSTITUTIONAL: No fever, weight loss, + fatigue  EYES: No eye pain, visual disturbances, or discharge  ENMT:  No difficulty hearing, tinnitus, vertigo; No sinus or throat pain  NECK: No pain or stiffness  RESPIRATORY: No cough, wheezing, chills or hemoptysis; No shortness of breath  CARDIOVASCULAR: No chest pain, palpitations, dizziness, or leg swelling  GASTROINTESTINAL: No abdominal or epigastric pain. No nausea, vomiting, or hematemesis;     No diarrhea or constipation. No melena or hematochezia.  NEUROLOGICAL: No headaches, memory loss, loss of strength, numbness, or tremors  SKIN: No itching, burning, rashes, or lesions   MUSCULOSKELETAL: No joint pain or swelling; No muscle, back, or extremity pain  PSYCHIATRIC: No depression, anxiety, mood swings, or difficulty sleeping        MEDICATIONS  (STANDING):  allopurinol 300 milliGRAM(s) Oral daily  amLODIPine   Tablet 5 milliGRAM(s) Oral daily  atorvastatin 20 milliGRAM(s) Oral at bedtime  dextrose 5%. 1000 milliLiter(s) (100 mL/Hr) IV Continuous <Continuous>  dextrose 5%. 1000 milliLiter(s) (50 mL/Hr) IV Continuous <Continuous>  dextrose 50% Injectable 25 Gram(s) IV Push once  dextrose 50% Injectable 12.5 Gram(s) IV Push once  dextrose 50% Injectable 25 Gram(s) IV Push once  DULoxetine 60 milliGRAM(s) Oral daily  enoxaparin Injectable 148 milliGRAM(s) SubCutaneous every 12 hours  glucagon  Injectable 1 milliGRAM(s) IntraMuscular once  hydrochlorothiazide 25 milliGRAM(s) Oral daily  insulin glargine Injectable (LANTUS) 24 Unit(s) SubCutaneous at bedtime  insulin lispro (ADMELOG) corrective regimen sliding scale   SubCutaneous three times a day before meals  insulin lispro Injectable (ADMELOG) 8 Unit(s) SubCutaneous three times a day with meals  levothyroxine 88 MICROGram(s) Oral daily  lisinopril 40 milliGRAM(s) Oral daily  tamsulosin 0.4 milliGRAM(s) Oral at bedtime    MEDICATIONS  (PRN):  dextrose Oral Gel 15 Gram(s) Oral once PRN Blood Glucose LESS THAN 70 milliGRAM(s)/deciliter      Vital Signs Last 24 Hrs  T(C): 36.4 (06 Dec 2024 07:52), Max: 36.6 (05 Dec 2024 23:52)  T(F): 97.5 (06 Dec 2024 07:52), Max: 97.8 (05 Dec 2024 23:52)  HR: 80 (06 Dec 2024 12:01) (75 - 84)  BP: 150/88 (06 Dec 2024 12:01) (111/76 - 159/104)  BP(mean): --  RR: 20 (06 Dec 2024 12:01) (18 - 23)  SpO2: 96% (06 Dec 2024 12:01) (91% - 100%)    Parameters below as of 06 Dec 2024 07:52  Patient On (Oxygen Delivery Method): room air          PHYSICAL EXAM:    Constitutional: NAD, well-groomed, well-developed  HEENT: PERRL, no exophalmos  Neck: No LAD,  no thyroid enlargement  Respiratory: CTAB  Cardiovascular: S1 and S2, RRR, no M/G/R  Gastrointestinal: BS+, soft, no organomegaly  Neurological: A/O x 3  Psychiatric: Normal mood, normal affect  Skin: No rashes, no acanthosis        LABS  12-06    137  |  100  |  23.7[H]  ----------------------------<  211[H]  4.5   |  24.0  |  1.18    Ca    9.2      06 Dec 2024 05:36  Phos  3.4     12-05  Mg     1.9     12-05    TPro  7.2  /  Alb  3.4  /  TBili  0.3[L]  /  DBili  x   /  AST  38  /  ALT  33  /  AlkPhos  100  12-06                          14.0   8.46  )-----------( 247      ( 05 Dec 2024 17:17 )             41.7       A1C with Estimated Average Glucose Result: 9.3 % (12-06-24 @ 05:36)        Ketone - Urine: Negative mg/dL (12-05 @ 17:17)    Aspartate Aminotransferase (AST/SGOT): 38 U/L (12-06-24 @ 05:36)  Alanine Aminotransferase (ALT/SGPT): 33 U/L (12-06-24 @ 05:36)  Alkaline Phosphatase: 100 U/L (12-06-24 @ 05:36)  Albumin: 3.4 g/dL (12-06-24 @ 05:36)  Alkaline Phosphatase: 108 U/L (12-05-24 @ 17:17)  Albumin: 3.6 g/dL (12-05-24 @ 17:17)  Aspartate Aminotransferase (AST/SGOT): 33 U/L (12-05-24 @ 17:17)  Alanine Aminotransferase (ALT/SGPT): 34 U/L (12-05-24 @ 17:17)      CAPILLARY BLOOD GLUCOSE      POCT Blood Glucose.: 296 mg/dL (06 Dec 2024 11:39)  POCT Blood Glucose.: 257 mg/dL (06 Dec 2024 07:50)  POCT Blood Glucose.: 426 mg/dL (05 Dec 2024 18:28)  POCT Blood Glucose.: 553 mg/dL (05 Dec 2024 15:58)

## 2024-12-06 NOTE — PROGRESS NOTE ADULT - SUBJECTIVE AND OBJECTIVE BOX
EVERETT GEORGE  [id number]      HPI:  70 y/o male PMHx AF/AFL s/p failed DCCV, HTN, DM, obesity, HLD, JASON on CPAP p/w elevated glucose. Patient reports that 3 months ago he was started on Ozempic and Lantus. Patient was also switched from Xarelto to Eliquis early in October. Patient reports at the same time of the transition he noticed that his sugar levels started to increase.  Home monitoring with glucose up to 500 yesterday and advised to come to ER.  Sugars are better now.  Reports polydipsia and polyuria, no other c/o.  Denies CP, SOB, palps.      ALLERGIES:  fish (Unknown)  shellfish (Vomiting; Nausea)  No Known Drug Allergies      PAST MEDICAL & SURGICAL HISTORY:  Degenerative joint disease  Asthma  Renal cell carcinoma  Hepatocellular carcinoma  Hypothyroidism  History of left knee replacement  H/O elbow surgery  H/O cataract extraction  H/O right nephrectomy  Otherwise, as noted above      MEDICATIONS (HOME):  · 	aspirin 1000mg daily  · 	oxyCODONE 5 mg oral tablet: 1 tab(s) orally every 6 hours As needed Severe Pain (7 - 10)  · 	omeprazole 20 mg oral delayed release tablet: 1 tab(s) orally once a day MDD: 1  · 	Senna S 50 mg-8.6 mg oral tablet: 2 tab(s) orally once a day (at bedtime) MDD: 2  · 	acetaminophen 325 mg oral tablet: 3 tab(s) orally every 8 hours  · 	amiodarone 200 mg oral tablet: 1 tab(s) orally once a day  · 	allopurinol 300 mg oral tablet: 1 tab(s) orally once a day  · 	levothyroxine 75 mcg (0.075 mg) oral tablet: 1 tab(s) orally once a day  · 	amLODIPine 10 mg oral tablet: 1 tab(s) orally once a day  · 	ramipril 10 mg oral capsule: 1 cap(s) orally once a day  · 	glimepiride 2 mg oral tablet: 1 tab(s) orally once a day  · 	atorvastatin 20 mg oral tablet: 1 tab(s) orally once a day  · 	Flomax 0.4 mg oral capsule: 1 cap(s) orally once a day  · 	DULoxetine 60 mg oral delayed release capsule: 1 cap(s) orally 2 times a day        SOCIAL HISTORY:  Patient denies alcohol, tobacco, drug use    FAMILY HISTORY:  Family history of cerebrovascular accident (CVA) (Mother)  Family history of essential hypertension (Sibling)  Family history of diabetes mellitus (Sibling)        ROS:  Patient denies cough, and other than noted above full ROS is unremarkable      PHYSICAL EXAM:  Vital Signs Last 24 Hrs  T(C): 36.4 (06 Dec 2024 07:52), Max: 36.6 (05 Dec 2024 23:52)  T(F): 97.5 (06 Dec 2024 07:52), Max: 97.8 (05 Dec 2024 23:52)  HR: 80 (06 Dec 2024 12:01) (75 - 84)  BP: 150/88 (06 Dec 2024 12:01) (111/76 - 159/104)  RR: 20 (06 Dec 2024 12:01) (18 - 23)  SpO2: 96% (06 Dec 2024 12:01) (91% - 100%)  General: Patient comfortable in NAD  HEENT: NCAT, mmm, EOMI  Neck: no JVD, no carotid bruits  CVS: nl s1, split s2, no s3, no murmur or rubs, irreg  Chest: CTA b/l  Abdomen: soft, nt/nd  Extremities: No c/c/e  Neuro: A&O x3  Psych: Normal affect          LABS:                        14.0   8.46  )-----------( 247      ( 05 Dec 2024 17:17 )             41.7     12-06    137  |  100  |  23.7[H]  ----------------------------<  211[H]  4.5   |  24.0  |  1.18    Ca    9.2      06 Dec 2024 05:36  Phos  3.4     12-05  Mg     1.9     12-05    TPro  7.2  /  Alb  3.4  /  TBili  0.3[L]  /  DBili  x   /  AST  38  /  ALT  33  /  AlkPhos  100  12-06        PT/INR - ( 05 Dec 2024 20:11 )   PT: 11.2 sec;   INR: 0.97 ratio         PTT - ( 05 Dec 2024 20:11 )  PTT:29.3 sec        Assessment:  70 y/o male PMHx AF/AFL s/p failed DCCV, HTN, DM, obesity, HLD, JASON on CPAP p/w elevated glucose.  Patient was switched from Xarelto to Eliquis early in October. Patient reports at the same time of the transition he noticed that his sugar levels started to increase.  Home monitoring with glucose up to 500 yesterday and advised to come to ER.  D/w patient that glucose elevation is not a typical or known s/e of Eliquis but he is unwilling to consider continuing it.  He also states he will not pay for Xarelto.  Can try Pradaxa if affordable.  If not would have to be on Coumadin.  Patient does not want Coumadin.  He states he will be able to get Xarelto in January.  Patient instructed on importance of being on A/C given high risk of CVA.     Plan:  1. Pradaxa 150mg BID for CVA PPX.  2. If cannot get Pradaxa 2/2 cost he will call Dr. Domínguez to discuss options.  3. Long term may consider LAAO device.  4. Continue other current CV meds at current doses as PTA.  5. Endo f/u for DM.  6. CV stable for d/c.    D/w ER PA.  Will not actively follow.  Please call with additional questions  Thanks!

## 2024-12-06 NOTE — ED ADULT NURSE REASSESSMENT NOTE - NS ED NURSE REASSESS COMMENT FT1
pt sitting quietly in stretcher NAD noted at this time. pt admitted to Observation awaiting Bed placement. NSR on CM. No new orders at this time pt safety ongoing.

## 2024-12-06 NOTE — ED CDU PROVIDER SUBSEQUENT DAY NOTE - ATTENDING APP SHARED VISIT CONTRIBUTION OF CARE
indep eval  pt with complex med hx on diabetes protocol  HGbA1C 9  hx oral antihyperglycemics  today plan endocrine and cardio eval

## 2024-12-06 NOTE — ED CDU PROVIDER SUBSEQUENT DAY NOTE - PROGRESS NOTE DETAILS
see the patient at the bedside patient wife at the bedside. pending evaluation for Endo and cardiology patient has no complaint- since last night .denies any chest pain or shortness of breath

## 2024-12-11 RX ORDER — INSULIN ASPART 100 [IU]/ML
100 INJECTION, SOLUTION INTRAVENOUS; SUBCUTANEOUS
Qty: 3 | Refills: 1 | Status: ACTIVE | COMMUNITY
Start: 2024-12-11 | End: 1900-01-01

## 2024-12-18 ENCOUNTER — OUTPATIENT (OUTPATIENT)
Dept: OUTPATIENT SERVICES | Facility: HOSPITAL | Age: 69
LOS: 1 days | End: 2024-12-18

## 2024-12-18 ENCOUNTER — APPOINTMENT (OUTPATIENT)
Dept: CT IMAGING | Facility: CLINIC | Age: 69
End: 2024-12-18
Payer: MEDICARE

## 2024-12-18 ENCOUNTER — APPOINTMENT (OUTPATIENT)
Dept: MRI IMAGING | Facility: CLINIC | Age: 69
End: 2024-12-18
Payer: MEDICARE

## 2024-12-18 DIAGNOSIS — Z98.82 BREAST IMPLANT STATUS: Chronic | ICD-10-CM

## 2024-12-18 DIAGNOSIS — C64.1 MALIGNANT NEOPLASM OF RIGHT KIDNEY, EXCEPT RENAL PELVIS: ICD-10-CM

## 2024-12-18 DIAGNOSIS — Z98.49 CATARACT EXTRACTION STATUS, UNSPECIFIED EYE: Chronic | ICD-10-CM

## 2024-12-18 DIAGNOSIS — Z98.890 OTHER SPECIFIED POSTPROCEDURAL STATES: Chronic | ICD-10-CM

## 2024-12-18 DIAGNOSIS — Z90.5 ACQUIRED ABSENCE OF KIDNEY: Chronic | ICD-10-CM

## 2024-12-18 DIAGNOSIS — Z96.652 PRESENCE OF LEFT ARTIFICIAL KNEE JOINT: Chronic | ICD-10-CM

## 2024-12-18 PROCEDURE — 72197 MRI PELVIS W/O & W/DYE: CPT | Mod: 26

## 2024-12-18 PROCEDURE — 74183 MRI ABD W/O CNTR FLWD CNTR: CPT | Mod: 26

## 2024-12-18 PROCEDURE — 71250 CT THORAX DX C-: CPT | Mod: 26

## 2024-12-24 ENCOUNTER — RX RENEWAL (OUTPATIENT)
Age: 69
End: 2024-12-24

## 2024-12-24 RX ORDER — 70%ISOPROPYL ALCOHOL 0.7 ML/ML
70 SWAB TOPICAL
Qty: 100 | Refills: 3 | Status: ACTIVE | COMMUNITY
Start: 2024-12-24 | End: 1900-01-01

## 2024-12-27 ENCOUNTER — RX RENEWAL (OUTPATIENT)
Age: 69
End: 2024-12-27

## 2025-01-08 ENCOUNTER — APPOINTMENT (OUTPATIENT)
Dept: ENDOCRINOLOGY | Facility: CLINIC | Age: 70
End: 2025-01-08
Payer: MEDICARE

## 2025-01-08 ENCOUNTER — NON-APPOINTMENT (OUTPATIENT)
Age: 70
End: 2025-01-08

## 2025-01-08 ENCOUNTER — APPOINTMENT (OUTPATIENT)
Dept: RHEUMATOLOGY | Facility: CLINIC | Age: 70
End: 2025-01-08
Payer: MEDICARE

## 2025-01-08 VITALS
HEART RATE: 78 BPM | BODY MASS INDEX: 49.44 KG/M2 | OXYGEN SATURATION: 98 % | DIASTOLIC BLOOD PRESSURE: 82 MMHG | HEIGHT: 67 IN | WEIGHT: 315 LBS | SYSTOLIC BLOOD PRESSURE: 140 MMHG

## 2025-01-08 VITALS
OXYGEN SATURATION: 93 % | TEMPERATURE: 98.2 F | DIASTOLIC BLOOD PRESSURE: 80 MMHG | HEART RATE: 71 BPM | HEIGHT: 67 IN | SYSTOLIC BLOOD PRESSURE: 138 MMHG

## 2025-01-08 DIAGNOSIS — M15.0 PRIMARY GENERALIZED (OSTEO)ARTHRITIS: ICD-10-CM

## 2025-01-08 DIAGNOSIS — M1A.9XX0 CHRONIC GOUT, UNSPECIFIED, W/OUT TOPHUS (TOPHI): ICD-10-CM

## 2025-01-08 DIAGNOSIS — M47.812 SPONDYLOSIS W/OUT MYELOPATHY OR RADICULOPATHY, CERVICAL REGION: ICD-10-CM

## 2025-01-08 DIAGNOSIS — E11.22 TYPE 2 DIABETES MELLITUS WITH DIABETIC CHRONIC KIDNEY DISEASE: ICD-10-CM

## 2025-01-08 DIAGNOSIS — E66.01 MORBID (SEVERE) OBESITY DUE TO EXCESS CALORIES: ICD-10-CM

## 2025-01-08 DIAGNOSIS — Z79.899 OTHER LONG TERM (CURRENT) DRUG THERAPY: ICD-10-CM

## 2025-01-08 DIAGNOSIS — M47.816 SPONDYLOSIS W/OUT MYELOPATHY OR RADICULOPATHY, LUMBAR REGION: ICD-10-CM

## 2025-01-08 DIAGNOSIS — E78.5 HYPERLIPIDEMIA, UNSPECIFIED: ICD-10-CM

## 2025-01-08 DIAGNOSIS — I10 ESSENTIAL (PRIMARY) HYPERTENSION: ICD-10-CM

## 2025-01-08 DIAGNOSIS — Z79.4 TYPE 2 DIABETES MELLITUS WITH DIABETIC CHRONIC KIDNEY DISEASE: ICD-10-CM

## 2025-01-08 DIAGNOSIS — E03.9 HYPOTHYROIDISM, UNSPECIFIED: ICD-10-CM

## 2025-01-08 PROCEDURE — 99214 OFFICE O/P EST MOD 30 MIN: CPT

## 2025-01-08 PROCEDURE — 95251 CONT GLUC MNTR ANALYSIS I&R: CPT

## 2025-01-08 PROCEDURE — G2211 COMPLEX E/M VISIT ADD ON: CPT

## 2025-01-08 PROCEDURE — 36415 COLL VENOUS BLD VENIPUNCTURE: CPT

## 2025-01-08 RX ORDER — PREDNISONE 20 MG/1
20 TABLET ORAL
Qty: 3 | Refills: 0 | Status: ACTIVE | COMMUNITY
Start: 2025-01-08 | End: 1900-01-01

## 2025-01-09 LAB
ALBUMIN SERPL ELPH-MCNC: 3.8 G/DL
ALP BLD-CCNC: 115 U/L
ALT SERPL-CCNC: 39 U/L
ANION GAP SERPL CALC-SCNC: 15 MMOL/L
AST SERPL-CCNC: 44 U/L
BILIRUB SERPL-MCNC: 0.2 MG/DL
BUN SERPL-MCNC: 19 MG/DL
CALCIUM SERPL-MCNC: 9.6 MG/DL
CHLORIDE SERPL-SCNC: 100 MMOL/L
CO2 SERPL-SCNC: 24 MMOL/L
CREAT SERPL-MCNC: 1.05 MG/DL
EGFR: 78 ML/MIN/1.73M2
GLUCOSE SERPL-MCNC: 372 MG/DL
POTASSIUM SERPL-SCNC: 4.7 MMOL/L
PROT SERPL-MCNC: 7.4 G/DL
SODIUM SERPL-SCNC: 139 MMOL/L
URATE SERPL-MCNC: 4.2 MG/DL

## 2025-01-13 ENCOUNTER — OUTPATIENT (OUTPATIENT)
Dept: OUTPATIENT SERVICES | Facility: HOSPITAL | Age: 70
LOS: 1 days | Discharge: HOME | End: 2025-01-13

## 2025-01-13 DIAGNOSIS — C22.0 LIVER CELL CARCINOMA: ICD-10-CM

## 2025-01-13 DIAGNOSIS — Z98.49 CATARACT EXTRACTION STATUS, UNSPECIFIED EYE: Chronic | ICD-10-CM

## 2025-01-13 DIAGNOSIS — Z98.82 BREAST IMPLANT STATUS: Chronic | ICD-10-CM

## 2025-01-13 DIAGNOSIS — Z90.5 ACQUIRED ABSENCE OF KIDNEY: Chronic | ICD-10-CM

## 2025-01-13 DIAGNOSIS — Z96.652 PRESENCE OF LEFT ARTIFICIAL KNEE JOINT: Chronic | ICD-10-CM

## 2025-01-13 DIAGNOSIS — Z98.890 OTHER SPECIFIED POSTPROCEDURAL STATES: Chronic | ICD-10-CM

## 2025-01-14 ENCOUNTER — RX RENEWAL (OUTPATIENT)
Age: 70
End: 2025-01-14

## 2025-01-14 PROBLEM — M15.0 PRIMARY OSTEOARTHRITIS INVOLVING MULTIPLE JOINTS: Status: ACTIVE | Noted: 2023-07-05

## 2025-01-15 ENCOUNTER — APPOINTMENT (OUTPATIENT)
Dept: HEMATOLOGY ONCOLOGY | Facility: CLINIC | Age: 70
End: 2025-01-15

## 2025-01-29 ENCOUNTER — APPOINTMENT (OUTPATIENT)
Dept: GASTROENTEROLOGY | Facility: CLINIC | Age: 70
End: 2025-01-29
Payer: MEDICARE

## 2025-01-29 VITALS
BODY MASS INDEX: 49.44 KG/M2 | DIASTOLIC BLOOD PRESSURE: 80 MMHG | WEIGHT: 315 LBS | SYSTOLIC BLOOD PRESSURE: 130 MMHG | HEIGHT: 67 IN | HEART RATE: 78 BPM | RESPIRATION RATE: 16 BRPM | OXYGEN SATURATION: 98 %

## 2025-01-29 DIAGNOSIS — R16.0 HEPATOMEGALY, NOT ELSEWHERE CLASSIFIED: ICD-10-CM

## 2025-01-29 DIAGNOSIS — C22.0 LIVER CELL CARCINOMA: ICD-10-CM

## 2025-01-29 PROCEDURE — 99215 OFFICE O/P EST HI 40 MIN: CPT

## 2025-01-30 ENCOUNTER — RX RENEWAL (OUTPATIENT)
Age: 70
End: 2025-01-30

## 2025-02-12 ENCOUNTER — LABORATORY RESULT (OUTPATIENT)
Age: 70
End: 2025-02-12

## 2025-02-12 ENCOUNTER — APPOINTMENT (OUTPATIENT)
Dept: FAMILY MEDICINE | Facility: CLINIC | Age: 70
End: 2025-02-12
Payer: MEDICARE

## 2025-02-12 ENCOUNTER — RX RENEWAL (OUTPATIENT)
Age: 70
End: 2025-02-12

## 2025-02-12 VITALS
BODY MASS INDEX: 49.44 KG/M2 | SYSTOLIC BLOOD PRESSURE: 128 MMHG | WEIGHT: 315 LBS | HEIGHT: 67 IN | TEMPERATURE: 97.4 F | HEART RATE: 98 BPM | RESPIRATION RATE: 14 BRPM | DIASTOLIC BLOOD PRESSURE: 86 MMHG | OXYGEN SATURATION: 94 %

## 2025-02-12 DIAGNOSIS — E11.8 TYPE 2 DIABETES MELLITUS WITH UNSPECIFIED COMPLICATIONS: ICD-10-CM

## 2025-02-12 DIAGNOSIS — L03.115 CELLULITIS OF RIGHT LOWER LIMB: ICD-10-CM

## 2025-02-12 DIAGNOSIS — Z01.818 ENCOUNTER FOR OTHER PREPROCEDURAL EXAMINATION: ICD-10-CM

## 2025-02-12 DIAGNOSIS — M25.50 PAIN IN UNSPECIFIED JOINT: ICD-10-CM

## 2025-02-12 DIAGNOSIS — R16.0 HEPATOMEGALY, NOT ELSEWHERE CLASSIFIED: ICD-10-CM

## 2025-02-12 DIAGNOSIS — I48.91 UNSPECIFIED ATRIAL FIBRILLATION: ICD-10-CM

## 2025-02-12 DIAGNOSIS — R06.02 SHORTNESS OF BREATH: ICD-10-CM

## 2025-02-12 DIAGNOSIS — E03.9 HYPOTHYROIDISM, UNSPECIFIED: ICD-10-CM

## 2025-02-12 DIAGNOSIS — Z86.39 PERSONAL HISTORY OF OTHER ENDOCRINE, NUTRITIONAL AND METABOLIC DISEASE: ICD-10-CM

## 2025-02-12 DIAGNOSIS — E66.01 MORBID (SEVERE) OBESITY DUE TO EXCESS CALORIES: ICD-10-CM

## 2025-02-12 DIAGNOSIS — E78.5 HYPERLIPIDEMIA, UNSPECIFIED: ICD-10-CM

## 2025-02-12 DIAGNOSIS — I10 ESSENTIAL (PRIMARY) HYPERTENSION: ICD-10-CM

## 2025-02-12 DIAGNOSIS — Z79.899 OTHER LONG TERM (CURRENT) DRUG THERAPY: ICD-10-CM

## 2025-02-12 DIAGNOSIS — E11.22 TYPE 2 DIABETES MELLITUS WITH DIABETIC CHRONIC KIDNEY DISEASE: ICD-10-CM

## 2025-02-12 DIAGNOSIS — M47.816 SPONDYLOSIS W/OUT MYELOPATHY OR RADICULOPATHY, LUMBAR REGION: ICD-10-CM

## 2025-02-12 DIAGNOSIS — M17.31 UNILATERAL POST-TRAUMATIC OSTEOARTHRITIS, RIGHT KNEE: ICD-10-CM

## 2025-02-12 DIAGNOSIS — Z74.09 OTHER REDUCED MOBILITY: ICD-10-CM

## 2025-02-12 DIAGNOSIS — M25.552 PAIN IN LEFT HIP: ICD-10-CM

## 2025-02-12 DIAGNOSIS — Z85.05 PERSONAL HISTORY OF MALIGNANT NEOPLASM OF LIVER: ICD-10-CM

## 2025-02-12 DIAGNOSIS — J45.909 UNSPECIFIED ASTHMA, UNCOMPLICATED: ICD-10-CM

## 2025-02-12 DIAGNOSIS — G47.33 OBSTRUCTIVE SLEEP APNEA (ADULT) (PEDIATRIC): ICD-10-CM

## 2025-02-12 DIAGNOSIS — D64.9 ANEMIA, UNSPECIFIED: ICD-10-CM

## 2025-02-12 DIAGNOSIS — Z79.4 TYPE 2 DIABETES MELLITUS WITH DIABETIC CHRONIC KIDNEY DISEASE: ICD-10-CM

## 2025-02-12 DIAGNOSIS — L98.9 DISORDER OF THE SKIN AND SUBCUTANEOUS TISSUE, UNSPECIFIED: ICD-10-CM

## 2025-02-12 DIAGNOSIS — E11.65 TYPE 2 DIABETES MELLITUS WITH HYPERGLYCEMIA: ICD-10-CM

## 2025-02-12 DIAGNOSIS — N28.89 OTHER SPECIFIED DISORDERS OF KIDNEY AND URETER: ICD-10-CM

## 2025-02-12 DIAGNOSIS — M47.812 SPONDYLOSIS W/OUT MYELOPATHY OR RADICULOPATHY, CERVICAL REGION: ICD-10-CM

## 2025-02-12 DIAGNOSIS — C64.1 MALIGNANT NEOPLASM OF RIGHT KIDNEY, EXCEPT RENAL PELVIS: ICD-10-CM

## 2025-02-12 PROCEDURE — 36415 COLL VENOUS BLD VENIPUNCTURE: CPT

## 2025-02-12 PROCEDURE — 82962 GLUCOSE BLOOD TEST: CPT

## 2025-02-12 PROCEDURE — 94010 BREATHING CAPACITY TEST: CPT

## 2025-02-12 PROCEDURE — 99215 OFFICE O/P EST HI 40 MIN: CPT | Mod: 25

## 2025-02-12 RX ORDER — DABIGATRAN ETEXILATE 150 MG/1
150 CAPSULE ORAL TWICE DAILY
Qty: 90 | Refills: 0 | Status: ACTIVE | COMMUNITY
Start: 2025-02-12 | End: 1900-01-01

## 2025-02-12 NOTE — ASSESSMENT
Controlled with current regime [FreeTextEntry1] : 1. 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 He has DIP subluxations due to [place] nodes and degenerative changes. \par 2. Elevated ESR/inflammatory markers - should not exist in gout since this would only occur w/ flare activity, would want to r/o other inflammatory arthritides such as RA, SpA, PsA, etc. He remains on NSAIDs while anticoagulated w/ a NOAC, we reviewed these risks and discussed adding SNRI for chronic joint pain mgmt.  Has reports of thigh pain w/o upper shoulder girdle pain. No cranial sxs suggestive of GCA. Less likely PMR bout would like to get cytokine panel to eval for IL-6 elevations seen.\par \par LFT elevation - chronic NSAIDs, anticoag, APAP use. Now off NSAIDs - taking low-dose steroids and allopurinol along w/ NSAID. Discussed importance of not taking Aleve (takes twice daily while on A/C)- also taking high ASA dose\par Serologies all negative - only elevated ESR/CRP along w/ UA. NO active gouty flares but inflammatory symptoms and pain at times.\par DDx of inflammatory arthritis superimposed on degen/post-traumatic OA.  Possibly PMR vs seroneg RA\par \par - Previous lab results were discussed with the patient. Mild ESR levels.\par - Labs. Will call patient to discuss results when obtained. \par - c/w Duloxetine 60mg BID\par - c/w Allopurinol 300mg BID\par - avoid NSAIDs - Aleve\par - Suggested we obtain an XR of the cervical spine w/obliques, B/L hip w/pelvis, and B/L shoulder to rule out possible bony pathology. When results are available, we will discuss further. \par \par RTO 6 weeks No

## 2025-02-13 LAB
ALBUMIN SERPL ELPH-MCNC: 3.8 G/DL
ALP BLD-CCNC: 111 U/L
ALT SERPL-CCNC: 42 U/L
ANION GAP SERPL CALC-SCNC: 15 MMOL/L
AST SERPL-CCNC: 41 U/L
BASOPHILS # BLD AUTO: 0.06 K/UL
BASOPHILS NFR BLD AUTO: 0.6 %
BILIRUB SERPL-MCNC: 0.3 MG/DL
BUN SERPL-MCNC: 18 MG/DL
CALCIUM SERPL-MCNC: 9.8 MG/DL
CHLORIDE SERPL-SCNC: 97 MMOL/L
CHOLEST SERPL-MCNC: 132 MG/DL
CO2 SERPL-SCNC: 23 MMOL/L
CREAT SERPL-MCNC: 1.1 MG/DL
EGFR: 74 ML/MIN/1.73M2
EOSINOPHIL # BLD AUTO: 0.3 K/UL
EOSINOPHIL NFR BLD AUTO: 2.9 %
GLUCOSE SERPL-MCNC: 197 MG/DL
HCT VFR BLD CALC: 50 %
HDLC SERPL-MCNC: 37 MG/DL
HGB BLD-MCNC: 15.8 G/DL
IMM GRANULOCYTES NFR BLD AUTO: 0.4 %
LDLC SERPL CALC-MCNC: 67 MG/DL
LYMPHOCYTES # BLD AUTO: 1.33 K/UL
LYMPHOCYTES NFR BLD AUTO: 13.1 %
MAN DIFF?: NORMAL
MCHC RBC-ENTMCNC: 27.5 PG
MCHC RBC-ENTMCNC: 31.6 G/DL
MCV RBC AUTO: 87 FL
MONOCYTES # BLD AUTO: 0.52 K/UL
MONOCYTES NFR BLD AUTO: 5.1 %
NEUTROPHILS # BLD AUTO: 7.93 K/UL
NEUTROPHILS NFR BLD AUTO: 77.9 %
NONHDLC SERPL-MCNC: 94 MG/DL
NT-PROBNP SERPL-MCNC: 53 PG/ML
PLATELET # BLD AUTO: 262 K/UL
POTASSIUM SERPL-SCNC: 4.2 MMOL/L
PROT SERPL-MCNC: 7.3 G/DL
RBC # BLD: 5.75 M/UL
RBC # FLD: 15.3 %
SODIUM SERPL-SCNC: 134 MMOL/L
TRIGL SERPL-MCNC: 158 MG/DL
TSH SERPL-ACNC: 0.06 UIU/ML
WBC # FLD AUTO: 10.18 K/UL

## 2025-02-19 ENCOUNTER — RESULT REVIEW (OUTPATIENT)
Age: 70
End: 2025-02-19

## 2025-02-19 ENCOUNTER — APPOINTMENT (OUTPATIENT)
Dept: DERMATOLOGY | Facility: CLINIC | Age: 70
End: 2025-02-19
Payer: MEDICARE

## 2025-02-19 PROCEDURE — 11102 TANGNTL BX SKIN SINGLE LES: CPT

## 2025-02-19 PROCEDURE — 99204 OFFICE O/P NEW MOD 45 MIN: CPT | Mod: 25

## 2025-02-25 ENCOUNTER — APPOINTMENT (OUTPATIENT)
Dept: ENDOCRINOLOGY | Facility: CLINIC | Age: 70
End: 2025-02-25
Payer: MEDICARE

## 2025-02-25 DIAGNOSIS — E11.8 TYPE 2 DIABETES MELLITUS WITH UNSPECIFIED COMPLICATIONS: ICD-10-CM

## 2025-02-25 DIAGNOSIS — E03.9 HYPOTHYROIDISM, UNSPECIFIED: ICD-10-CM

## 2025-02-25 LAB — GLUCOSE BLDC GLUCOMTR-MCNC: 217

## 2025-02-25 PROCEDURE — 95251 CONT GLUC MNTR ANALYSIS I&R: CPT

## 2025-02-25 PROCEDURE — 82962 GLUCOSE BLOOD TEST: CPT

## 2025-02-25 PROCEDURE — 99214 OFFICE O/P EST MOD 30 MIN: CPT

## 2025-02-25 PROCEDURE — G2211 COMPLEX E/M VISIT ADD ON: CPT

## 2025-03-03 ENCOUNTER — APPOINTMENT (OUTPATIENT)
Dept: OPHTHALMOLOGY | Facility: CLINIC | Age: 70
End: 2025-03-03

## 2025-03-17 ENCOUNTER — APPOINTMENT (OUTPATIENT)
Dept: DERMATOLOGY | Facility: CLINIC | Age: 70
End: 2025-03-17
Payer: MEDICARE

## 2025-03-17 DIAGNOSIS — C44.311 BASAL CELL CARCINOMA OF SKIN OF NOSE: ICD-10-CM

## 2025-03-17 PROCEDURE — 99214 OFFICE O/P EST MOD 30 MIN: CPT

## 2025-04-16 ENCOUNTER — LABORATORY RESULT (OUTPATIENT)
Age: 70
End: 2025-04-16

## 2025-04-16 ENCOUNTER — APPOINTMENT (OUTPATIENT)
Dept: PLASTIC SURGERY | Facility: CLINIC | Age: 70
End: 2025-04-16
Payer: MEDICARE

## 2025-04-16 VITALS
RESPIRATION RATE: 16 BRPM | WEIGHT: 315 LBS | HEART RATE: 82 BPM | SYSTOLIC BLOOD PRESSURE: 158 MMHG | BODY MASS INDEX: 49.44 KG/M2 | DIASTOLIC BLOOD PRESSURE: 84 MMHG | OXYGEN SATURATION: 97 % | HEIGHT: 67 IN | TEMPERATURE: 97.5 F

## 2025-04-16 DIAGNOSIS — C44.311 BASAL CELL CARCINOMA OF SKIN OF NOSE: ICD-10-CM

## 2025-04-16 PROCEDURE — 99204 OFFICE O/P NEW MOD 45 MIN: CPT

## 2025-05-09 NOTE — ED ADULT TRIAGE NOTE - WEIGHT IN [facility]
PLEASE SEND REFERRAL TO [facility] IN [place] PER PATIENT REQUEST      FAX: [phone]    JO GARCIA   is a   79  female who has SUZI. Pt mentions back in 2019 she had SUZI and had iron infusions.  In note from 2019, was referred to hematology but never saw them. Pe mentions she did have a stool study for blood and 1 of the 3 was positive and thinks it is related her hemorrhoids.  br
lizIn the last month, she mentions bowel habit changes "seem to not be digesting  food." Has a BM daily, BSS 3-4 and occasionally can be different. Occasional diarrhea "not real strong diarrhea."    Has 2 hemorrhoids that are bothering her and has blood after wiping at times. Denies fever, rectal pain, melena, abdominal pain, nausea, vomiting.
br br
br When asked about dysphagia she mentions "sore tongue." At times can feel pills getting stuck and is not that often but has been going on for a couple of years. 
br
liz Pt also has been having  pruritus for months. She has a rash on neck and PCP looked at it and told her "nothing there, its normal." Rash has been present for 6 months. br
br
Blood work 1/2025:
br CMP bilirubin 0.2, , AST 22, ALT 18
br CBC hemoglobin 8.1 (L), hematocrit 28.6 (L), MCV 75, platelet 422 
br
br 
Last colon/EGD 2019--normal esophagus, duodenum, nl D, gastritis adenoma.br
br
brSees cardiology for aortic stenosis,  CAD, bifscicular block (LAFB and RBBB) stent placed in LAD 2021. Denies MI. Takes 81 mg of asprin daily. brDenies kidney disease. Denies hx of stroke brDenies sleep apnea, asthma, COPD
br fx history of colon cancer brother dx'ed at 82brDenies hx of adverse reactions to anesthesia.  148.3

## 2025-05-14 ENCOUNTER — APPOINTMENT (OUTPATIENT)
Dept: FAMILY MEDICINE | Facility: CLINIC | Age: 70
End: 2025-05-14
Payer: MEDICARE

## 2025-05-14 ENCOUNTER — APPOINTMENT (OUTPATIENT)
Dept: DERMATOLOGY | Facility: CLINIC | Age: 70
End: 2025-05-14
Payer: MEDICARE

## 2025-05-14 VITALS
HEIGHT: 67 IN | WEIGHT: 315 LBS | OXYGEN SATURATION: 95 % | DIASTOLIC BLOOD PRESSURE: 76 MMHG | BODY MASS INDEX: 49.44 KG/M2 | RESPIRATION RATE: 15 BRPM | TEMPERATURE: 98.2 F | SYSTOLIC BLOOD PRESSURE: 130 MMHG | HEART RATE: 62 BPM

## 2025-05-14 DIAGNOSIS — E03.9 HYPOTHYROIDISM, UNSPECIFIED: ICD-10-CM

## 2025-05-14 DIAGNOSIS — D64.9 ANEMIA, UNSPECIFIED: ICD-10-CM

## 2025-05-14 DIAGNOSIS — J45.909 UNSPECIFIED ASTHMA, UNCOMPLICATED: ICD-10-CM

## 2025-05-14 DIAGNOSIS — E11.8 TYPE 2 DIABETES MELLITUS WITH UNSPECIFIED COMPLICATIONS: ICD-10-CM

## 2025-05-14 DIAGNOSIS — C44.311 BASAL CELL CARCINOMA OF SKIN OF NOSE: ICD-10-CM

## 2025-05-14 DIAGNOSIS — I25.2 OLD MYOCARDIAL INFARCTION: ICD-10-CM

## 2025-05-14 DIAGNOSIS — Z12.11 ENCOUNTER FOR SCREENING FOR MALIGNANT NEOPLASM OF COLON: ICD-10-CM

## 2025-05-14 DIAGNOSIS — I10 ESSENTIAL (PRIMARY) HYPERTENSION: ICD-10-CM

## 2025-05-14 DIAGNOSIS — M1A.9XX0 CHRONIC GOUT, UNSPECIFIED, W/OUT TOPHUS (TOPHI): ICD-10-CM

## 2025-05-14 DIAGNOSIS — R91.8 OTHER NONSPECIFIC ABNORMAL FINDING OF LUNG FIELD: ICD-10-CM

## 2025-05-14 DIAGNOSIS — C22.0 LIVER CELL CARCINOMA: ICD-10-CM

## 2025-05-14 DIAGNOSIS — C64.1 MALIGNANT NEOPLASM OF RIGHT KIDNEY, EXCEPT RENAL PELVIS: ICD-10-CM

## 2025-05-14 DIAGNOSIS — E78.5 HYPERLIPIDEMIA, UNSPECIFIED: ICD-10-CM

## 2025-05-14 PROCEDURE — G2211 COMPLEX E/M VISIT ADD ON: CPT

## 2025-05-14 PROCEDURE — 99214 OFFICE O/P EST MOD 30 MIN: CPT

## 2025-05-15 PROBLEM — Z12.11 COLON CANCER SCREENING: Status: ACTIVE | Noted: 2025-05-15

## 2025-05-15 NOTE — ED CDU PROVIDER SUBSEQUENT DAY NOTE - NSICDXPASTMEDICALHX_GEN_ALL_CORE_FT
Name band; PAST MEDICAL HISTORY:  Afib on Xarelto    Asthma     Degenerative joint disease     Diabetes mellitus     Hepatocellular carcinoma     HLD (hyperlipidemia)     HTN (hypertension)     Hypothyroidism     Obstructive sleep apnea use BIPAP    Renal cell carcinoma

## 2025-05-21 ENCOUNTER — APPOINTMENT (OUTPATIENT)
Dept: PULMONOLOGY | Facility: CLINIC | Age: 70
End: 2025-05-21
Payer: MEDICARE

## 2025-05-21 VITALS
WEIGHT: 315 LBS | HEART RATE: 72 BPM | DIASTOLIC BLOOD PRESSURE: 82 MMHG | BODY MASS INDEX: 49.44 KG/M2 | SYSTOLIC BLOOD PRESSURE: 132 MMHG | OXYGEN SATURATION: 95 % | RESPIRATION RATE: 16 BRPM | HEIGHT: 67 IN

## 2025-05-21 DIAGNOSIS — I48.91 UNSPECIFIED ATRIAL FIBRILLATION: ICD-10-CM

## 2025-05-21 DIAGNOSIS — R06.02 SHORTNESS OF BREATH: ICD-10-CM

## 2025-05-21 DIAGNOSIS — Z79.899 OTHER LONG TERM (CURRENT) DRUG THERAPY: ICD-10-CM

## 2025-05-21 DIAGNOSIS — E66.01 MORBID (SEVERE) OBESITY DUE TO EXCESS CALORIES: ICD-10-CM

## 2025-05-21 DIAGNOSIS — G47.33 OBSTRUCTIVE SLEEP APNEA (ADULT) (PEDIATRIC): ICD-10-CM

## 2025-05-21 PROCEDURE — 99214 OFFICE O/P EST MOD 30 MIN: CPT

## 2025-05-21 PROCEDURE — G2211 COMPLEX E/M VISIT ADD ON: CPT

## 2025-06-03 NOTE — ED PROVIDER NOTE - IV ALTEPLASE EXCL ABS HIDDEN
Case Management Assessment  Initial Evaluation    Date/Time of Evaluation: 6/3/2025 1:14 PM  Assessment Completed by: YURY RASHEED RN    If patient is discharged prior to next notation, then this note serves as note for discharge by case management.    Patient Name: Shira Man                   YOB: 1949  Diagnosis: Spinal stenosis of lumbar region with neurogenic claudication [M48.062]  Synovial cyst of lumbar facet joint [M71.38]  Cyst of lumbar facet joint [M71.38]                   Date / Time: 6/3/2025  5:57 AM    Patient Admission Status: Observation   Readmission Risk (Low < 19, Mod (19-27), High > 27): No data recorded  Current PCP: Kayleen Aaron MD  PCP verified by CM? Yes    Chart Reviewed: Yes      History Provided by: Patient  Patient Orientation: Alert and Oriented    Patient Cognition:      Hospitalization in the last 30 days (Readmission):  No    If yes, Readmission Assessment in  Navigator will be completed.    Advance Directives:      Code Status: Full Code   Patient's Primary Decision Maker is: Legal Next of Kin (spouse)    Primary Decision Maker: Barry Man - Spouse - [phone]    Discharge Planning:    Patient lives with: Spouse/Significant Other Type of Home: House  Primary Care Giver: Self  Patient Support Systems include: Spouse/Significant Other   Current Financial resources: Medicare  Current community resources: None  Current services prior to admission: None            Current DME:              Type of Home Care services:  None    ADLS  Prior functional level: Independent in ADLs/IADLs  Current functional level: Assistance with the following:, Toileting, Cooking, Housework, Shopping, Mobility    PT AM-PAC:   /24  OT AM-PAC:   /24    Family can provide assistance at DC: Yes  Would you like Case Management to discuss the discharge plan with any other family members/significant others, and if so, who? Yes (spouse)  Plans to Return to Present Housing: Yes  Other  show

## 2025-06-04 ENCOUNTER — APPOINTMENT (OUTPATIENT)
Dept: ENDOCRINOLOGY | Facility: CLINIC | Age: 70
End: 2025-06-04
Payer: MEDICARE

## 2025-06-04 VITALS
BODY MASS INDEX: 49.44 KG/M2 | SYSTOLIC BLOOD PRESSURE: 161 MMHG | OXYGEN SATURATION: 97 % | DIASTOLIC BLOOD PRESSURE: 112 MMHG | WEIGHT: 315 LBS | HEART RATE: 78 BPM | HEIGHT: 67 IN

## 2025-06-04 VITALS — DIASTOLIC BLOOD PRESSURE: 90 MMHG | SYSTOLIC BLOOD PRESSURE: 140 MMHG

## 2025-06-04 DIAGNOSIS — E11.8 TYPE 2 DIABETES MELLITUS WITH UNSPECIFIED COMPLICATIONS: ICD-10-CM

## 2025-06-04 PROCEDURE — 95251 CONT GLUC MNTR ANALYSIS I&R: CPT

## 2025-06-04 PROCEDURE — 99214 OFFICE O/P EST MOD 30 MIN: CPT

## 2025-06-04 PROCEDURE — G2211 COMPLEX E/M VISIT ADD ON: CPT

## 2025-06-04 RX ORDER — SEMAGLUTIDE 2.68 MG/ML
8 INJECTION, SOLUTION SUBCUTANEOUS
Qty: 9 | Refills: 1 | Status: ACTIVE | COMMUNITY
Start: 2025-06-04 | End: 1900-01-01

## 2025-06-05 ENCOUNTER — TRANSCRIPTION ENCOUNTER (OUTPATIENT)
Age: 70
End: 2025-06-05

## 2025-06-11 ENCOUNTER — APPOINTMENT (OUTPATIENT)
Dept: GASTROENTEROLOGY | Facility: CLINIC | Age: 70
End: 2025-06-11

## 2025-06-11 ENCOUNTER — APPOINTMENT (OUTPATIENT)
Dept: FAMILY MEDICINE | Facility: CLINIC | Age: 70
End: 2025-06-11
Payer: MEDICARE

## 2025-06-11 VITALS
RESPIRATION RATE: 14 BRPM | WEIGHT: 315 LBS | OXYGEN SATURATION: 98 % | DIASTOLIC BLOOD PRESSURE: 84 MMHG | TEMPERATURE: 97.2 F | SYSTOLIC BLOOD PRESSURE: 148 MMHG | HEIGHT: 67 IN | BODY MASS INDEX: 49.44 KG/M2 | HEART RATE: 80 BPM

## 2025-06-11 VITALS
HEIGHT: 67 IN | WEIGHT: 315 LBS | OXYGEN SATURATION: 97 % | SYSTOLIC BLOOD PRESSURE: 152 MMHG | HEART RATE: 86 BPM | RESPIRATION RATE: 14 BRPM | BODY MASS INDEX: 49.44 KG/M2 | TEMPERATURE: 97.3 F | DIASTOLIC BLOOD PRESSURE: 87 MMHG

## 2025-06-11 PROBLEM — Z01.818 PRE-OP EVALUATION: Status: ACTIVE | Noted: 2025-06-11

## 2025-06-11 PROBLEM — Z09 FOLLOW-UP EXAM: Status: ACTIVE | Noted: 2025-06-11

## 2025-06-11 PROCEDURE — 99215 OFFICE O/P EST HI 40 MIN: CPT

## 2025-06-11 PROCEDURE — G2211 COMPLEX E/M VISIT ADD ON: CPT

## 2025-06-18 ENCOUNTER — APPOINTMENT (OUTPATIENT)
Dept: DERMATOLOGY | Facility: CLINIC | Age: 70
End: 2025-06-18
Payer: MEDICARE

## 2025-06-18 ENCOUNTER — NON-APPOINTMENT (OUTPATIENT)
Age: 70
End: 2025-06-18

## 2025-06-18 PROCEDURE — 17312 MOHS ADDL STAGE: CPT

## 2025-06-18 PROCEDURE — 17311 MOHS 1 STAGE H/N/HF/G: CPT

## 2025-06-18 RX ORDER — CEPHALEXIN 500 MG/1
500 CAPSULE ORAL 3 TIMES DAILY
Qty: 21 | Refills: 0 | Status: ACTIVE | COMMUNITY
Start: 2025-06-18 | End: 1900-01-01

## 2025-06-20 ENCOUNTER — APPOINTMENT (OUTPATIENT)
Facility: CLINIC | Age: 70
End: 2025-06-20

## 2025-06-20 NOTE — ED PROVIDER NOTE - NS ED ROS FT
[facility] Note    Subjective   Patient ID: Christina is a 86 year old female.    Chief Complaint   Patient presents with    Constipation    Abdominal Pain     HPI    85 yo female h/o HTN, HL, hypothyroid, h/o colitis, presented with abd pain LLQ, constipation, since last night, with chills, LLQ pain 6/10, no nausea no vomiting, has constipation, brought in by her son, hard stool all night, eating and drinking normal, has eat last night before pain started    Medications:  Current Outpatient Medications   Medication Sig    levothyroxine 50 MCG tablet TAKE 1 TABLET BY MOUTH EVERY DAY    metoPROLOL succinate (TOPROL-XL) 25 MG 24 hr tablet TAKE 1 TABLET BY MOUTH EVERY DAY    atorvastatin (LIPITOR) 10 MG tablet TAKE 1 TABLET BY MOUTH EVERY DAY    losartan (COZAAR) 100 MG tablet TAKE 1 TABLET BY MOUTH EVERY DAY    amLODIPine (NORVASC) 10 MG tablet TAKE 1 TABLET BY MOUTH EVERY DAY    traZODone (DESYREL) 50 MG tablet TAKE 1 TABLET BY MOUTH EVERY DAY AT BEDTIME AS NEEDED FOR SLEEP    LORazepam (ATIVAN) 0.5 MG tablet Take 1 tablet by mouth every 8 hours as needed for Anxiety.    Omega-3 Fatty Acids (FISH OIL) 1000 MG CAPSULE DELAYED RELEASE Take 1,000 capsules by mouth.    Probiotic Product (ALIGN PO) Take 400 mg by mouth.     No current facility-administered medications for this visit.       Allergies:  ALLERGIES:   Allergen Reactions    Acetaminophen Other (See Comments)     agitation    Altarussin Other (See Comments)    Carisoprodol Other (See Comments)    Carisoprodol-Aspirin Other (See Comments)    Robafen Dm Cough Other (See Comments)    Sudafed ANXIETY    Voltaren Other (See Comments)       Past Medical History  Past Medical History:   Diagnosis Date    Acromioclavicular joint pain 03/28/2018    Back pain     Cataract     Constipation     Contusion of knee 01/10/2013    Diarrhea 07/12/2016    Dry eye syndrome     Fecal incontinence     HTN (hypertension)     Hypothyroidism      Iliotibial band syndrome 2015    Knee osteoarthritis     Dr Cerda-s/p TKA    Knee pain 01/10/2013    Pseudophakia of both eyes     Trochanteric bursitis 2015    Ulcerative colitis (CMD)     Well controlled       Past Surgical History:  Past Surgical History:   Procedure Laterality Date     section, classic      X1    Cholecystectomy      Colonoscopy  2016    no more needed    D and c      x7    Partial hysterectomy      Remv 2nd cataract,corn-scler sectn      Tonsillectomy and adenoidectomy      Total knee arthroplasty         Family History:  Family History   Family history unknown: Yes       Social History:  Social History     Tobacco Use    Smoking status: Never     Passive exposure: Never    Smokeless tobacco: Never   Vaping Use    Vaping status: never used   Substance Use Topics    Alcohol use: Never    Drug use: Never       Review of Systems:  Patient's medications, allergies, past medical, surgical, and social history  were reviewed and updated as appropriate.    Review of Systems   Gastrointestinal:  Positive for abdominal pain.       Objective:    Visit Vitals  /68   Pulse (!) 100   Temp 97.4 °F (36.3 °C)   Resp 18   Ht 5' 7\" (1.702 m)   Wt 78.5 kg (173 lb)   SpO2 98%   BMI 27.10 kg/m²       Physical Exam  Constitutional:       Appearance: Normal appearance.   HENT:      Right Ear: Tympanic membrane normal.      Left Ear: Tympanic membrane normal.      Nose: Nose normal.      Mouth/Throat:      Mouth: Mucous membranes are moist.      Neck: Normal range of motion and neck supple.   Eyes:      Extraocular Movements: Extraocular movements intact.      Pupils: Pupils are equal, round, and reactive to light.   Cardiovascular:      Rate and Rhythm: Normal rate and regular rhythm.      Pulses: Normal pulses.      Heart sounds: Normal heart sounds.   Pulmonary:      Effort: Pulmonary effort is normal.      Breath sounds: Normal breath sounds.   Abdominal:      Palpations: Abdomen  is soft.      Comments: LLQ tender to palpation with rebound   Musculoskeletal:         General: Normal range of motion.   Skin:     General: Skin is warm and dry.      Capillary Refill: Capillary refill takes less than 2 seconds.   Neurological:      General: No focal deficit present.      Mental Status: She is alert and oriented to person, place, and time.         Labs:  No results found for this visit on 25.    Imaging:   No results found.       Procedures:  Procedures      Assessment and Plan:  Assessment   Constipation, unspecified constipation type (Primary)    Left lower quadrant abdominal pain       Medical Decision Makin yo female h/o HTN, HL, hypothyroid, h/o colitis, presented with abd pain LLQ, constipation, since last night, with chills, LLQ pain 6/10, no nausea no vomiting, has constipation, brought in by her son, hard stool all night, eating and drinking normal, has eat last night before pain started    Exam see above    Past medical history social history drug allergies surgical history were obtained and reviewed with pt    Suggested to be transfer to ER due to LLQ pain, son understand and agree    Discussed that the [facility] is not an exhaustive resource and should not take the place of primary care. Discussed following up with PCP in 2-3 days or sooner if symptoms worsen    Instructions provided as documented in the AVS.    Thank you for visiting [facility].    NAKIA ARZATE MD  2025    This note was made using voice dictation and may include inadvertent errors due to the dictation software. Please contact for clarification regarding any noted discrepancies.    NAKIA ARZATE MD   2025         Constitutional: +fever, no chills  Eyes: no vision changes  ENT: no nasal congestion, no sore throat  CV: no chest pain  Resp: +shortness of breath  GI: no abdominal pain, no vomiting, no diarrhea  : no dysuria  MSK: no joint pain  Skin: +rash  Neuro: no headache, no focal weakness, no paresthesias

## 2025-07-02 ENCOUNTER — OUTPATIENT (OUTPATIENT)
Dept: OUTPATIENT SERVICES | Facility: HOSPITAL | Age: 70
LOS: 1 days | End: 2025-07-02

## 2025-07-02 ENCOUNTER — APPOINTMENT (OUTPATIENT)
Dept: MRI IMAGING | Facility: CLINIC | Age: 70
End: 2025-07-02
Payer: MEDICARE

## 2025-07-02 DIAGNOSIS — Z98.890 OTHER SPECIFIED POSTPROCEDURAL STATES: Chronic | ICD-10-CM

## 2025-07-02 DIAGNOSIS — Z98.82 BREAST IMPLANT STATUS: Chronic | ICD-10-CM

## 2025-07-02 DIAGNOSIS — Z98.49 CATARACT EXTRACTION STATUS, UNSPECIFIED EYE: Chronic | ICD-10-CM

## 2025-07-02 DIAGNOSIS — C22.0 LIVER CELL CARCINOMA: ICD-10-CM

## 2025-07-02 DIAGNOSIS — Z90.5 ACQUIRED ABSENCE OF KIDNEY: Chronic | ICD-10-CM

## 2025-07-02 PROCEDURE — 74183 MRI ABD W/O CNTR FLWD CNTR: CPT | Mod: 26

## 2025-07-09 ENCOUNTER — APPOINTMENT (OUTPATIENT)
Dept: PLASTIC SURGERY | Facility: CLINIC | Age: 70
End: 2025-07-09
Payer: MEDICARE

## 2025-07-09 ENCOUNTER — APPOINTMENT (OUTPATIENT)
Dept: RHEUMATOLOGY | Facility: CLINIC | Age: 70
End: 2025-07-09
Payer: MEDICARE

## 2025-07-09 VITALS
TEMPERATURE: 98.3 F | HEIGHT: 67 IN | HEART RATE: 63 BPM | WEIGHT: 315 LBS | RESPIRATION RATE: 16 BRPM | OXYGEN SATURATION: 95 % | BODY MASS INDEX: 49.44 KG/M2

## 2025-07-09 VITALS
HEIGHT: 67 IN | BODY MASS INDEX: 49.44 KG/M2 | DIASTOLIC BLOOD PRESSURE: 80 MMHG | WEIGHT: 315 LBS | HEART RATE: 61 BPM | SYSTOLIC BLOOD PRESSURE: 140 MMHG | TEMPERATURE: 97.5 F | OXYGEN SATURATION: 94 %

## 2025-07-09 LAB
ALBUMIN SERPL ELPH-MCNC: 3.8 G/DL
ALP BLD-CCNC: 109 U/L
ALT SERPL-CCNC: 37 U/L
ANION GAP SERPL CALC-SCNC: 15 MMOL/L
AST SERPL-CCNC: 44 U/L
BASOPHILS # BLD AUTO: 0.07 K/UL
BASOPHILS NFR BLD AUTO: 0.7 %
BILIRUB SERPL-MCNC: 0.3 MG/DL
BUN SERPL-MCNC: 22 MG/DL
CALCIUM SERPL-MCNC: 9.8 MG/DL
CHLORIDE SERPL-SCNC: 104 MMOL/L
CO2 SERPL-SCNC: 22 MMOL/L
CREAT SERPL-MCNC: 1.46 MG/DL
CRP SERPL-MCNC: 13 MG/L
EGFRCR SERPLBLD CKD-EPI 2021: 52 ML/MIN/1.73M2
EOSINOPHIL # BLD AUTO: 0.3 K/UL
EOSINOPHIL NFR BLD AUTO: 3.2 %
GLUCOSE SERPL-MCNC: 227 MG/DL
HCT VFR BLD CALC: 48.5 %
HGB BLD-MCNC: 15.5 G/DL
IMM GRANULOCYTES NFR BLD AUTO: 0.3 %
LYMPHOCYTES # BLD AUTO: 1.57 K/UL
LYMPHOCYTES NFR BLD AUTO: 16.6 %
MAN DIFF?: NORMAL
MCHC RBC-ENTMCNC: 28.2 PG
MCHC RBC-ENTMCNC: 32 G/DL
MCV RBC AUTO: 88.3 FL
MONOCYTES # BLD AUTO: 0.41 K/UL
MONOCYTES NFR BLD AUTO: 4.3 %
NEUTROPHILS # BLD AUTO: 7.08 K/UL
NEUTROPHILS NFR BLD AUTO: 74.9 %
PLATELET # BLD AUTO: 226 K/UL
POTASSIUM SERPL-SCNC: 4.7 MMOL/L
PROT SERPL-MCNC: 7.5 G/DL
RBC # BLD: 5.49 M/UL
RBC # FLD: 16.3 %
SODIUM SERPL-SCNC: 141 MMOL/L
URATE SERPL-MCNC: 7 MG/DL
WBC # FLD AUTO: 9.46 K/UL

## 2025-07-09 PROCEDURE — 99214 OFFICE O/P EST MOD 30 MIN: CPT

## 2025-07-09 PROCEDURE — G2211 COMPLEX E/M VISIT ADD ON: CPT

## 2025-07-09 PROCEDURE — 99213 OFFICE O/P EST LOW 20 MIN: CPT

## 2025-07-10 ENCOUNTER — NON-APPOINTMENT (OUTPATIENT)
Age: 70
End: 2025-07-10

## 2025-07-15 ENCOUNTER — RX RENEWAL (OUTPATIENT)
Age: 70
End: 2025-07-15

## 2025-07-16 ENCOUNTER — RX RENEWAL (OUTPATIENT)
Age: 70
End: 2025-07-16

## 2025-07-30 ENCOUNTER — RX RENEWAL (OUTPATIENT)
Age: 70
End: 2025-07-30

## 2025-08-06 ENCOUNTER — RX RENEWAL (OUTPATIENT)
Age: 70
End: 2025-08-06

## 2025-08-07 ENCOUNTER — APPOINTMENT (OUTPATIENT)
Facility: CLINIC | Age: 70
End: 2025-08-07
Payer: MEDICARE

## 2025-08-07 PROCEDURE — 15271 SKIN SUB GRAFT TRNK/ARM/LEG: CPT

## 2025-08-07 PROCEDURE — 14060 TIS TRNFR E/N/E/L 10 SQ CM/<: CPT

## 2025-08-07 PROCEDURE — 15620 DELAY FLAP F/C/C/N/AX/G/H/F: CPT

## 2025-08-07 PROCEDURE — 15004 WOUND PREP F/N/HF/G: CPT

## 2025-08-07 RX ORDER — OXYCODONE AND ACETAMINOPHEN 5; 325 MG/1; MG/1
5-325 TABLET ORAL EVERY 6 HOURS
Qty: 20 | Refills: 0 | Status: ACTIVE | COMMUNITY
Start: 2025-08-07 | End: 1900-01-01

## 2025-08-07 RX ORDER — ONDANSETRON 4 MG/1
4 TABLET, ORALLY DISINTEGRATING ORAL EVERY 8 HOURS
Qty: 20 | Refills: 0 | Status: ACTIVE | COMMUNITY
Start: 2025-08-07 | End: 1900-01-01

## 2025-08-08 RX ORDER — AMOXICILLIN AND CLAVULANATE POTASSIUM 875; 125 MG/1; MG/1
875-125 TABLET, COATED ORAL
Qty: 10 | Refills: 0 | Status: ACTIVE | COMMUNITY
Start: 2025-08-08 | End: 1900-01-01

## 2025-08-08 RX ORDER — NITROGLYCERIN 20 MG/G
2 OINTMENT TOPICAL
Qty: 5 | Refills: 0 | Status: ACTIVE | COMMUNITY
Start: 2025-08-08 | End: 1900-01-01

## 2025-08-09 ENCOUNTER — EMERGENCY (EMERGENCY)
Facility: HOSPITAL | Age: 70
LOS: 1 days | End: 2025-08-09
Attending: EMERGENCY MEDICINE
Payer: MEDICARE

## 2025-08-09 VITALS
RESPIRATION RATE: 18 BRPM | DIASTOLIC BLOOD PRESSURE: 81 MMHG | TEMPERATURE: 99 F | SYSTOLIC BLOOD PRESSURE: 161 MMHG | OXYGEN SATURATION: 100 % | HEART RATE: 79 BPM

## 2025-08-09 DIAGNOSIS — Z98.82 BREAST IMPLANT STATUS: Chronic | ICD-10-CM

## 2025-08-09 DIAGNOSIS — Z96.652 PRESENCE OF LEFT ARTIFICIAL KNEE JOINT: Chronic | ICD-10-CM

## 2025-08-09 DIAGNOSIS — Z98.49 CATARACT EXTRACTION STATUS, UNSPECIFIED EYE: Chronic | ICD-10-CM

## 2025-08-09 DIAGNOSIS — Z98.890 OTHER SPECIFIED POSTPROCEDURAL STATES: Chronic | ICD-10-CM

## 2025-08-09 DIAGNOSIS — Z90.5 ACQUIRED ABSENCE OF KIDNEY: Chronic | ICD-10-CM

## 2025-08-09 PROCEDURE — 99283 EMERGENCY DEPT VISIT LOW MDM: CPT

## 2025-08-09 PROCEDURE — 99284 EMERGENCY DEPT VISIT MOD MDM: CPT

## 2025-08-09 RX ORDER — NITROGLYCERIN 20 MG/G
1 OINTMENT TOPICAL ONCE
Refills: 0 | Status: COMPLETED | OUTPATIENT
Start: 2025-08-09 | End: 2025-08-09

## 2025-08-09 RX ADMIN — NITROGLYCERIN 1 INCH(S): 20 OINTMENT TOPICAL at 23:22

## 2025-08-10 VITALS — SYSTOLIC BLOOD PRESSURE: 143 MMHG | DIASTOLIC BLOOD PRESSURE: 103 MMHG

## 2025-08-11 ENCOUNTER — APPOINTMENT (OUTPATIENT)
Dept: PLASTIC SURGERY | Facility: CLINIC | Age: 70
End: 2025-08-11

## 2025-08-11 VITALS
HEIGHT: 67 IN | WEIGHT: 315 LBS | TEMPERATURE: 98 F | RESPIRATION RATE: 16 BRPM | DIASTOLIC BLOOD PRESSURE: 85 MMHG | SYSTOLIC BLOOD PRESSURE: 149 MMHG | HEART RATE: 70 BPM | OXYGEN SATURATION: 96 % | BODY MASS INDEX: 49.44 KG/M2

## 2025-08-11 DIAGNOSIS — S01.20XD: ICD-10-CM

## 2025-08-11 PROCEDURE — 99024 POSTOP FOLLOW-UP VISIT: CPT

## 2025-08-13 ENCOUNTER — LABORATORY RESULT (OUTPATIENT)
Age: 70
End: 2025-08-13

## 2025-08-13 ENCOUNTER — APPOINTMENT (OUTPATIENT)
Dept: FAMILY MEDICINE | Facility: CLINIC | Age: 70
End: 2025-08-13
Payer: MEDICARE

## 2025-08-13 VITALS
BODY MASS INDEX: 49.44 KG/M2 | OXYGEN SATURATION: 95 % | TEMPERATURE: 97.8 F | WEIGHT: 315 LBS | HEART RATE: 89 BPM | DIASTOLIC BLOOD PRESSURE: 90 MMHG | HEIGHT: 67 IN | SYSTOLIC BLOOD PRESSURE: 126 MMHG

## 2025-08-13 DIAGNOSIS — E11.65 TYPE 2 DIABETES MELLITUS WITH HYPERGLYCEMIA: ICD-10-CM

## 2025-08-13 DIAGNOSIS — E78.5 HYPERLIPIDEMIA, UNSPECIFIED: ICD-10-CM

## 2025-08-13 DIAGNOSIS — D64.9 ANEMIA, UNSPECIFIED: ICD-10-CM

## 2025-08-13 DIAGNOSIS — J45.909 UNSPECIFIED ASTHMA, UNCOMPLICATED: ICD-10-CM

## 2025-08-13 DIAGNOSIS — I10 ESSENTIAL (PRIMARY) HYPERTENSION: ICD-10-CM

## 2025-08-13 DIAGNOSIS — C64.1 MALIGNANT NEOPLASM OF RIGHT KIDNEY, EXCEPT RENAL PELVIS: ICD-10-CM

## 2025-08-13 DIAGNOSIS — I48.91 UNSPECIFIED ATRIAL FIBRILLATION: ICD-10-CM

## 2025-08-13 DIAGNOSIS — E11.8 TYPE 2 DIABETES MELLITUS WITH UNSPECIFIED COMPLICATIONS: ICD-10-CM

## 2025-08-13 DIAGNOSIS — E03.9 HYPOTHYROIDISM, UNSPECIFIED: ICD-10-CM

## 2025-08-13 DIAGNOSIS — C22.0 LIVER CELL CARCINOMA: ICD-10-CM

## 2025-08-13 DIAGNOSIS — E66.01 MORBID (SEVERE) OBESITY DUE TO EXCESS CALORIES: ICD-10-CM

## 2025-08-13 DIAGNOSIS — C44.311 BASAL CELL CARCINOMA OF SKIN OF NOSE: ICD-10-CM

## 2025-08-13 LAB — GLUCOSE BLDC GLUCOMTR-MCNC: 158

## 2025-08-13 PROCEDURE — 99214 OFFICE O/P EST MOD 30 MIN: CPT

## 2025-08-13 PROCEDURE — 82962 GLUCOSE BLOOD TEST: CPT

## 2025-08-13 PROCEDURE — G2211 COMPLEX E/M VISIT ADD ON: CPT

## 2025-08-13 PROCEDURE — 36415 COLL VENOUS BLD VENIPUNCTURE: CPT

## 2025-08-14 LAB
ALBUMIN SERPL ELPH-MCNC: 3.8 G/DL
ALP BLD-CCNC: 109 U/L
ALT SERPL-CCNC: 38 U/L
ANION GAP SERPL CALC-SCNC: 13 MMOL/L
AST SERPL-CCNC: 37 U/L
BASOPHILS # BLD AUTO: 0.05 K/UL
BASOPHILS NFR BLD AUTO: 0.6 %
BILIRUB SERPL-MCNC: 0.4 MG/DL
BUN SERPL-MCNC: 19 MG/DL
CALCIUM SERPL-MCNC: 9.3 MG/DL
CHLORIDE SERPL-SCNC: 101 MMOL/L
CO2 SERPL-SCNC: 25 MMOL/L
CREAT SERPL-MCNC: 1.14 MG/DL
EGFRCR SERPLBLD CKD-EPI 2021: 70 ML/MIN/1.73M2
EOSINOPHIL # BLD AUTO: 0.51 K/UL
EOSINOPHIL NFR BLD AUTO: 5.6 %
GLUCOSE SERPL-MCNC: 182 MG/DL
HCT VFR BLD CALC: 46.7 %
HGB BLD-MCNC: 14.8 G/DL
IMM GRANULOCYTES NFR BLD AUTO: 0.3 %
LYMPHOCYTES # BLD AUTO: 1.31 K/UL
LYMPHOCYTES NFR BLD AUTO: 14.4 %
MAN DIFF?: NORMAL
MCHC RBC-ENTMCNC: 27.6 PG
MCHC RBC-ENTMCNC: 31.7 G/DL
MCV RBC AUTO: 87.1 FL
MONOCYTES # BLD AUTO: 0.42 K/UL
MONOCYTES NFR BLD AUTO: 4.6 %
NEUTROPHILS # BLD AUTO: 6.76 K/UL
NEUTROPHILS NFR BLD AUTO: 74.5 %
PLATELET # BLD AUTO: 227 K/UL
POTASSIUM SERPL-SCNC: 4.2 MMOL/L
PROT SERPL-MCNC: 7.5 G/DL
RBC # BLD: 5.36 M/UL
RBC # FLD: 16.6 %
SODIUM SERPL-SCNC: 139 MMOL/L
TSH SERPL-ACNC: 6.14 UIU/ML
WBC # FLD AUTO: 9.08 K/UL

## 2025-08-19 ENCOUNTER — RX RENEWAL (OUTPATIENT)
Age: 70
End: 2025-08-19

## 2025-08-20 ENCOUNTER — APPOINTMENT (OUTPATIENT)
Dept: DERMATOLOGY | Facility: CLINIC | Age: 70
End: 2025-08-20

## 2025-08-20 ENCOUNTER — APPOINTMENT (OUTPATIENT)
Dept: PLASTIC SURGERY | Facility: CLINIC | Age: 70
End: 2025-08-20

## 2025-08-25 ENCOUNTER — APPOINTMENT (OUTPATIENT)
Dept: PLASTIC SURGERY | Facility: AMBULATORY MEDICAL SERVICES | Age: 70
End: 2025-08-25
Payer: MEDICARE

## 2025-08-25 PROCEDURE — 15731 FOREHEAD FLAP W/VASC PEDICLE: CPT | Mod: 58

## 2025-08-25 PROCEDURE — 15200 FTH/GFT FR TRNK 20 SQ CM/<: CPT | Mod: 58

## 2025-08-25 PROCEDURE — 15004 WOUND PREP F/N/HF/G: CPT | Mod: 58

## 2025-09-03 ENCOUNTER — APPOINTMENT (OUTPATIENT)
Dept: PLASTIC SURGERY | Facility: CLINIC | Age: 70
End: 2025-09-03
Payer: MEDICARE

## 2025-09-03 ENCOUNTER — APPOINTMENT (OUTPATIENT)
Dept: PULMONOLOGY | Facility: CLINIC | Age: 70
End: 2025-09-03
Payer: MEDICARE

## 2025-09-03 VITALS
WEIGHT: 315 LBS | RESPIRATION RATE: 16 BRPM | SYSTOLIC BLOOD PRESSURE: 126 MMHG | HEIGHT: 67 IN | HEART RATE: 87 BPM | OXYGEN SATURATION: 94 % | BODY MASS INDEX: 49.44 KG/M2 | DIASTOLIC BLOOD PRESSURE: 82 MMHG

## 2025-09-03 VITALS
DIASTOLIC BLOOD PRESSURE: 81 MMHG | BODY MASS INDEX: 49.44 KG/M2 | TEMPERATURE: 97.6 F | OXYGEN SATURATION: 98 % | HEART RATE: 76 BPM | RESPIRATION RATE: 16 BRPM | SYSTOLIC BLOOD PRESSURE: 146 MMHG | WEIGHT: 315 LBS | HEIGHT: 67 IN

## 2025-09-03 DIAGNOSIS — R91.8 OTHER NONSPECIFIC ABNORMAL FINDING OF LUNG FIELD: ICD-10-CM

## 2025-09-03 DIAGNOSIS — J45.909 UNSPECIFIED ASTHMA, UNCOMPLICATED: ICD-10-CM

## 2025-09-03 DIAGNOSIS — Z42.8 ENCOUNTER FOR OTHER PLASTIC AND RECONSTRUCTIVE SURGERY FOLLOWING MEDICAL PROCEDURE OR HEALED INJURY: ICD-10-CM

## 2025-09-03 DIAGNOSIS — G47.33 OBSTRUCTIVE SLEEP APNEA (ADULT) (PEDIATRIC): ICD-10-CM

## 2025-09-03 DIAGNOSIS — R06.02 SHORTNESS OF BREATH: ICD-10-CM

## 2025-09-03 DIAGNOSIS — Z79.899 OTHER LONG TERM (CURRENT) DRUG THERAPY: ICD-10-CM

## 2025-09-03 DIAGNOSIS — E66.01 MORBID (SEVERE) OBESITY DUE TO EXCESS CALORIES: ICD-10-CM

## 2025-09-03 PROCEDURE — 99024 POSTOP FOLLOW-UP VISIT: CPT

## 2025-09-03 PROCEDURE — 99214 OFFICE O/P EST MOD 30 MIN: CPT

## 2025-09-03 PROCEDURE — G2211 COMPLEX E/M VISIT ADD ON: CPT

## 2025-09-03 RX ORDER — FLUTICASONE PROPIONATE 110 UG/1
110 AEROSOL, METERED RESPIRATORY (INHALATION) TWICE DAILY
Qty: 3 | Refills: 3 | Status: ACTIVE | COMMUNITY
Start: 2025-09-03 | End: 1900-01-01

## 2025-09-04 PROBLEM — Z42.8 ENCOUNTER FOR OTHER PLASTIC AND RECONSTRUCTIVE SURGERY FOLLOWING MEDICAL PROCEDURE OR HEALED INJURY: Status: ACTIVE | Noted: 2025-09-04

## 2025-09-10 ENCOUNTER — APPOINTMENT (OUTPATIENT)
Dept: PLASTIC SURGERY | Facility: CLINIC | Age: 70
End: 2025-09-10
Payer: MEDICARE

## 2025-09-10 ENCOUNTER — APPOINTMENT (OUTPATIENT)
Dept: DERMATOLOGY | Facility: CLINIC | Age: 70
End: 2025-09-10
Payer: MEDICARE

## 2025-09-10 VITALS
HEIGHT: 67 IN | HEART RATE: 76 BPM | RESPIRATION RATE: 16 BRPM | OXYGEN SATURATION: 95 % | DIASTOLIC BLOOD PRESSURE: 82 MMHG | TEMPERATURE: 98.2 F | WEIGHT: 315 LBS | SYSTOLIC BLOOD PRESSURE: 152 MMHG | BODY MASS INDEX: 49.44 KG/M2

## 2025-09-10 DIAGNOSIS — S01.20XD: ICD-10-CM

## 2025-09-10 DIAGNOSIS — C44.311 BASAL CELL CARCINOMA OF SKIN OF NOSE: ICD-10-CM

## 2025-09-10 PROCEDURE — 99024 POSTOP FOLLOW-UP VISIT: CPT

## 2025-09-10 PROCEDURE — 99213 OFFICE O/P EST LOW 20 MIN: CPT

## 2025-09-15 ENCOUNTER — RX RENEWAL (OUTPATIENT)
Age: 70
End: 2025-09-15

## 2025-09-19 ENCOUNTER — APPOINTMENT (OUTPATIENT)
Facility: CLINIC | Age: 70
End: 2025-09-19

## (undated) DEVICE — ELCTR GROUNDING PAD ADULT COVIDIEN

## (undated) DEVICE — SUT SILK 2-0 18" TIES

## (undated) DEVICE — DRAPE XL SHEET 77X98"

## (undated) DEVICE — DRSG OPSITE 2.5 X 2"

## (undated) DEVICE — WOUND IRR SURGIPHOR

## (undated) DEVICE — PACK TOTAL HIP

## (undated) DEVICE — WARMING BLANKET UPPER ADULT

## (undated) DEVICE — SUT STRATAFIX SPIRAL MONOCRYL PLUS 3-0 30CM PS-2 UNDYED

## (undated) DEVICE — HOOD FLYTE STRYKER SURGICOOL W PEELAWAY

## (undated) DEVICE — DRAPE IOBAN 33" X 23"

## (undated) DEVICE — SUT VICRYL 2-0 27" CT-2 UNDYED

## (undated) DEVICE — MAKO VIZADISC HIP PROCEDURE TRACKING KIT

## (undated) DEVICE — ELCTR STRYKER NEPTUNE SMOKE EVACUATION PENCIL (GREEN)

## (undated) DEVICE — DRSG DERMABOND PRINEO 60CM

## (undated) DEVICE — SOL IRR BAG NS 0.9% 3000ML

## (undated) DEVICE — DRAPE 1/2 SHEET 40X57"

## (undated) DEVICE — DRAPE 3/4 SHEET 52X76"

## (undated) DEVICE — GLV 8 PROTEXIS ORTHO (BROWN)

## (undated) DEVICE — DRSG MEPILEX 10 X 25CM (4 X 10") POST OP AG SILVER

## (undated) DEVICE — VENODYNE/SCD SLEEVE CALF MEDIUM

## (undated) DEVICE — POSITIONER FOAM EGG CRATE ULNAR 2PCS (PINK)

## (undated) DEVICE — MAKO DRAPE KIT

## (undated) DEVICE — ELCTR AQUAMANTYS BIPOLAR SEALER 6.0

## (undated) DEVICE — SYR LUER LOK 30CC

## (undated) DEVICE — SUT ETHIBOND 5 4-30" CCS

## (undated) DEVICE — SUT STRATAFIX SPIRAL PDO 1 36CM CTX

## (undated) DEVICE — GLV 8 PROTEXIS (BLUE)

## (undated) DEVICE — NDL HYPO SAFE 20G X 1.5" (YELLOW)